# Patient Record
Sex: FEMALE | Race: WHITE | NOT HISPANIC OR LATINO | Employment: OTHER | URBAN - METROPOLITAN AREA
[De-identification: names, ages, dates, MRNs, and addresses within clinical notes are randomized per-mention and may not be internally consistent; named-entity substitution may affect disease eponyms.]

---

## 2019-01-22 ENCOUNTER — OFFICE VISIT (OUTPATIENT)
Dept: ENDOCRINOLOGY | Facility: CLINIC | Age: 64
End: 2019-01-22

## 2019-01-22 VITALS
BODY MASS INDEX: 35.52 KG/M2 | HEART RATE: 78 BPM | DIASTOLIC BLOOD PRESSURE: 88 MMHG | SYSTOLIC BLOOD PRESSURE: 112 MMHG | OXYGEN SATURATION: 99 % | WEIGHT: 194.2 LBS

## 2019-01-22 DIAGNOSIS — E11.42 DIABETIC PERIPHERAL NEUROPATHY ASSOCIATED WITH TYPE 2 DIABETES MELLITUS (HCC): ICD-10-CM

## 2019-01-22 DIAGNOSIS — E11.65 UNCONTROLLED TYPE 2 DIABETES MELLITUS WITH HYPERGLYCEMIA (HCC): Primary | ICD-10-CM

## 2019-01-22 LAB
GLUCOSE BLDC GLUCOMTR-MCNC: 284 MG/DL (ref 70–130)
HBA1C MFR BLD: 11.2 %

## 2019-01-22 PROCEDURE — 82570 ASSAY OF URINE CREATININE: CPT | Performed by: PHYSICIAN ASSISTANT

## 2019-01-22 PROCEDURE — 83036 HEMOGLOBIN GLYCOSYLATED A1C: CPT | Performed by: PHYSICIAN ASSISTANT

## 2019-01-22 PROCEDURE — 99204 OFFICE O/P NEW MOD 45 MIN: CPT | Performed by: PHYSICIAN ASSISTANT

## 2019-01-22 PROCEDURE — 82043 UR ALBUMIN QUANTITATIVE: CPT | Performed by: PHYSICIAN ASSISTANT

## 2019-01-22 PROCEDURE — 82947 ASSAY GLUCOSE BLOOD QUANT: CPT | Performed by: PHYSICIAN ASSISTANT

## 2019-01-22 RX ORDER — DULOXETIN HYDROCHLORIDE 60 MG/1
60 CAPSULE, DELAYED RELEASE ORAL DAILY
Refills: 0 | COMMUNITY
Start: 2019-01-14

## 2019-01-22 RX ORDER — INSULIN GLARGINE 100 [IU]/ML
INJECTION, SOLUTION SUBCUTANEOUS
Qty: 3 PEN | Refills: 6 | Status: SHIPPED | OUTPATIENT
Start: 2019-01-22 | End: 2019-03-12 | Stop reason: SDUPTHER

## 2019-01-22 RX ORDER — LORATADINE 10 MG/1
TABLET ORAL
Refills: 0 | COMMUNITY
Start: 2018-12-26

## 2019-01-22 RX ORDER — FLUTICASONE PROPIONATE 50 MCG
SPRAY, SUSPENSION (ML) NASAL
Refills: 0 | COMMUNITY
Start: 2018-12-23 | End: 2019-11-18

## 2019-01-22 RX ORDER — FUROSEMIDE 40 MG/1
TABLET ORAL
Refills: 0 | COMMUNITY
Start: 2018-12-23 | End: 2022-03-21 | Stop reason: HOSPADM

## 2019-01-22 RX ORDER — METFORMIN HYDROCHLORIDE 500 MG/1
TABLET, EXTENDED RELEASE ORAL
COMMUNITY
Start: 2014-07-29 | End: 2019-03-12

## 2019-01-22 RX ORDER — CHOLECALCIFEROL (VITAMIN D3) 125 MCG
5000 CAPSULE ORAL DAILY
Refills: 0 | COMMUNITY
Start: 2019-01-10

## 2019-01-22 RX ORDER — INSULIN LISPRO 100 [IU]/ML
INJECTION, SUSPENSION SUBCUTANEOUS
Refills: 0 | COMMUNITY
Start: 2019-01-10 | End: 2019-01-22 | Stop reason: ALTCHOICE

## 2019-01-22 RX ORDER — DOCUSATE SODIUM 100 MG/1
100 CAPSULE, LIQUID FILLED ORAL 2 TIMES DAILY
Refills: 0 | COMMUNITY
Start: 2019-01-10 | End: 2019-11-18

## 2019-01-22 RX ORDER — APIXABAN 5 MG/1
TABLET, FILM COATED ORAL
Refills: 0 | COMMUNITY
Start: 2019-01-11 | End: 2019-06-25 | Stop reason: HOSPADM

## 2019-01-22 RX ORDER — CEPHALEXIN 500 MG/1
CAPSULE ORAL
Refills: 0 | COMMUNITY
Start: 2019-01-10 | End: 2019-05-15

## 2019-01-22 RX ORDER — ACETAMINOPHEN/DIPHENHYDRAMINE 500MG-25MG
81 TABLET ORAL DAILY
Refills: 0 | COMMUNITY
Start: 2019-01-10 | End: 2022-03-21 | Stop reason: HOSPADM

## 2019-01-22 RX ORDER — FENOFIBRATE 145 MG/1
160 TABLET, COATED ORAL
Refills: 0 | COMMUNITY
Start: 2018-12-26 | End: 2023-02-23

## 2019-01-22 RX ORDER — FLURBIPROFEN SODIUM 0.3 MG/ML
SOLUTION/ DROPS OPHTHALMIC DAILY
Refills: 0 | COMMUNITY
Start: 2018-10-26 | End: 2019-11-18 | Stop reason: SDUPTHER

## 2019-01-22 RX ORDER — MORPHINE SULFATE 30 MG/1
30 TABLET, FILM COATED, EXTENDED RELEASE ORAL 2 TIMES DAILY
Refills: 0 | COMMUNITY
Start: 2018-12-28

## 2019-01-22 RX ORDER — MAGNESIUM OXIDE 400 MG/1
1 TABLET ORAL 2 TIMES DAILY
Refills: 0 | COMMUNITY
Start: 2019-01-10 | End: 2019-11-18

## 2019-01-22 NOTE — PROGRESS NOTES
"Chief Complaint  Establish care for Diabetes Mellitus.    HPI   Martha Harrell is a 63 y.o. female who is here today for evaluation of Diabetes Mellitus type 2. The initial diagnosis of diabetes was made approx 2008.     A1C -  11.2 (1/22/19)  Labs reviewed:  1/10/19- , LDL 67, TSH 1.85, vit D 39, GFR 97, H/H wnl    Diabetic complications: peripheral neuropathy  Eye exam current (within one year): due   Foot care and dental care: discussed, follows with [facility]    Current diabetic medications include:  Metformin ER 500mg 2 tab BID  Humalog 75/25 16U BID - doesn't eat breakfast most of the time, evening insulin taken sometimes before sometimes after dinner  On insulin since beginning 2018    Statin: no. On fenofibrate    Past medications: glipizide (dc when she was started on insulin)    Diabetic Monitoring  - checks glucose BID  Glucose is averaging- per pt reports fasting -300, before dinner 300-400  Hypoglycemia- no  Home blood sugar records: did not bring    Nutrition:     Current diet: in general, an \"unhealthy\" diet, on average, 1-2 meals per day. Dinner is usually only meal, sometimes eats breakfast. Drinks one reg mt dew per day.   Current exercise: none, chronic back/leg pain  Seen RD in past: no    The following portions of the patient's history were reviewed and updated by me as appropriate: allergies, current medications, past family history, past social history, past surgical history and problem list.    Past Medical History:   Diagnosis Date   • Anemia    • Asthma    • Chronic bronchitis (CMS/[facility])    • Depression    • Diabetes mellitus (CMS/[facility])    • Epilepsy (CMS/[facility])    • Fibromyalgia, primary    • GERD (gastroesophageal reflux disease)    • Hyperlipidemia        Medications    Current Outpatient Medications:   •  metFORMIN ER (GLUCOPHAGE-XR) 500 MG 24 hr tablet, Take  by mouth., Disp: , Rfl:   •  B-D UF III MINI PEN NEEDLES 31G X 5 MM misc, Daily., Disp: , Rfl: 0  •  " cephalexin (KEFLEX) 500 MG capsule, take 1 capsule by mouth three times a day for 10 days, Disp: , Rfl: 0  •  dapagliflozin (FARXIGA) 5 MG tablet tablet, Take 1 tablet by mouth Daily., Disp: 30 tablet, Rfl: 0  •  dapagliflozin (FARXIGA) 5 MG tablet tablet, Take 1 tablet by mouth Daily., Disp: 30 tablet, Rfl: 6  •  DULoxetine (CYMBALTA) 60 MG capsule, Take 60 mg by mouth Daily., Disp: , Rfl: 0  •  ELIQUIS 5 MG tablet tablet, , Disp: , Rfl: 0  •  fenofibrate (TRICOR) 145 MG tablet, take 1 tablet by mouth every evening at bedtime, Disp: , Rfl: 0  •  fluticasone (FLONASE) 50 MCG/ACT nasal spray, , Disp: , Rfl: 0  •  furosemide (LASIX) 40 MG tablet, , Disp: , Rfl: 0  •  Insulin Glargine (BASAGLAR KWIKPEN) 100 UNIT/ML injection pen, Start with 20u qhs, increase up to 30u, Disp: 3 pen, Rfl: 6  •  Insulin Lispro (HUMALOG) 100 UNIT/ML solution pen-injector, Take 10u before lunch and 15u before dinner, Disp: 3 pen, Rfl: 6  •  loratadine (CLARITIN) 10 MG tablet, take 1 tablet by mouth once daily if needed, Disp: , Rfl: 0  •  magnesium oxide (MAG-OX) 400 MG tablet, Take 1 tablet by mouth 2 (Two) Times a Day., Disp: , Rfl: 0  •  metFORMIN (GLUCOPHAGE) 1000 MG tablet, Take 1,000 mg by mouth 2 (Two) Times a Day., Disp: , Rfl: 0  •  Morphine (MS CONTIN) 30 MG 12 hr tablet, Take 30 mg by mouth 2 (Two) Times a Day., Disp: , Rfl: 0  •  ONE TOUCH ULTRA TEST test strip, , Disp: , Rfl: 0  •  ONETOUCH DELICA LANCETS FINE misc, , Disp: , Rfl: 1  •  RA ASPIRIN EC 81 MG EC tablet, Take 81 mg by mouth Daily., Disp: , Rfl: 0  •  RA COL-RITE 100 MG capsule, Take 100 mg by mouth 2 (Two) Times a Day., Disp: , Rfl: 0  •  RA VITAMIN D-3 5000 units capsule, Take 5,000 Units by mouth Daily., Disp: , Rfl: 0    Review of Systems  Review of Systems   Constitutional: Positive for fatigue and unexpected weight change (wt gain). Negative for chills and fever.        Feeling poorly   HENT: Positive for ear pain and rhinorrhea. Negative for hearing loss,  nosebleeds and sore throat.    Eyes: Positive for pain and itching. Negative for discharge, redness and visual disturbance.        Eyesight problems   Respiratory: Positive for cough, shortness of breath and wheezing.    Cardiovascular: Positive for leg swelling. Negative for chest pain and palpitations.   Gastrointestinal: Negative for abdominal pain, blood in stool, constipation and diarrhea.   Endocrine: Positive for heat intolerance and polydipsia. Negative for cold intolerance.   Genitourinary: Negative for dysuria, frequency, hematuria, pelvic pain, vaginal bleeding and vaginal discharge.   Musculoskeletal: Positive for arthralgias, joint swelling and myalgias. Negative for gait problem.   Skin: Negative for rash.   Allergic/Immunologic: Negative.    Neurological: Positive for dizziness, weakness, numbness and headaches. Negative for syncope.   Hematological: Negative for adenopathy. Does not bruise/bleed easily.   Psychiatric/Behavioral: Positive for sleep disturbance. Negative for suicidal ideas. The patient is not nervous/anxious.         Depressed, sadness        Physical Exam    /88   Pulse 78   Wt 88.1 kg (194 lb 3.2 oz)   SpO2 99%   Breastfeeding? No   BMI 35.52 kg/m² Body mass index is 35.52 kg/m².  Physical Exam   Constitutional: She is oriented to person, place, and time. She appears well-developed. No distress.   HENT:   Head: Normocephalic.   Right Ear: External ear normal.   Left Ear: External ear normal.   Mouth/Throat: No oropharyngeal exudate.   Eyes: Conjunctivae and lids are normal. Right eye exhibits no discharge. Left eye exhibits no discharge. Right pupil is reactive. Left pupil is reactive.   Neck: No JVD present. No tracheal deviation present. No thyroid mass and no thyromegaly present.   Cardiovascular: Normal rate, regular rhythm, normal heart sounds and intact distal pulses.   No murmur heard.  Pulmonary/Chest: Effort normal and breath sounds normal. No respiratory  distress. She has no wheezes.   Abdominal: Soft. Bowel sounds are normal. There is no tenderness.   Musculoskeletal: She exhibits no edema or tenderness.    Martha had a diabetic foot exam performed today.   During the foot exam she had a monofilament test performed.    Neurological Sensory Findings -  Altered sharp/dull right ankle/foot discrimination and altered sharp/dull left ankle/foot discrimination.  Vascular Status -  Her right foot exhibits normal foot vasculature  and no edema. Her left foot exhibits normal foot vasculature  and no edema.  Skin Integrity  -  Her right foot skin is intact. She has callous right foot.  She has no right foot onychomycosis and no right foot ulcer.Her left foot skin is intact.She has callous left foot. She has no left foot onychomycosis and no left foot ulcer..  Lymphadenopathy:     She has no cervical adenopathy.   Neurological: She is alert and oriented to person, place, and time.   Skin: Skin is warm, dry and intact. No rash noted. She is not diaphoretic. No erythema.   Venous stasis BLE   Psychiatric: She has a normal mood and affect. Her speech is normal and behavior is normal. Thought content normal.       Labs and Imaging   No results found for: HGBA1C  Office Visit on 01/22/2019   Component Date Value Ref Range Status   • Glucose 01/22/2019 284* 70 - 130 mg/dL Final     No images are attached to the encounter or orders placed in the encounter.  No Images in the past 120 days found..    Assessment / Plan   Martha was seen today for establish care and diabetes.    Diagnoses and all orders for this visit:    Uncontrolled type 2 diabetes mellitus with hyperglycemia (CMS/[facility])  -     Microalbumin / Creatinine Urine Ratio - Urine, Clean Catch  -     POC Glycosylated Hemoglobin (Hb A1C)  -     POCT Glucose  -     dapagliflozin (FARXIGA) 5 MG tablet tablet; Take 1 tablet by mouth Daily.  -     Insulin Lispro (HUMALOG) 100 UNIT/ML solution pen-injector; Take 10u before lunch  "and 15u before dinner  -     Insulin Glargine (BASAGLAR KWIKPEN) 100 UNIT/ML injection pen; Start with 20u qhs, increase up to 30u  -     dapagliflozin (FARXIGA) 5 MG tablet tablet; Take 1 tablet by mouth Daily.    Diabetic peripheral neuropathy associated with type 2 diabetes mellitus (CMS/HCC)        Diabetes Mellitus 2 is under poor control.  -A1c 11.2  -emphasis on dc reg mt dew. Diet reviewed and literature provided  -most days she only eats once a day, therefore premixed insulin not best option  -dc humalog 72/25  -start basaglar 20u qhs, to increase to 25u in a week  -start admelog 10u before lunch (if she eats lunch), 15u before dinner  -start farxiga 5mg qd. Discussed importance of adequate hydration and good hygiene with this medication. Samples and 30-day free coupon provided.  -asked pt to check bs before taking insulin and bring meter to f/u for insulin adjustments  -written instructions provided and reviewed pt  -she reported she was seeing her \"sugar doctor\" at  ([facility]) tomorrow. Does not need 2 endo providers. Asked her to contact pcp for clarification on this.         1.  Diet: 3-4 carb servings per meal for females, 4-5 carb servings per meal for males  Spread carb intake throughout the day  Increase lean protein and vegetable intake  Avoid sugary drinks and processed carbs including crackers, cookies, cakes  2.  Exercise: Recommend at least 30 minutes of exercise daily, at least 5 days per week. Increase exercise gradually.   3.  Blood Glucose Goal: Blood glucose goal <150 fasting, <180 2 hr postprandial  4.  Microalbumin due  5.  Education performed during this visit: long term diabetic complications discussed. , annual eye examinations at Ophthalmology discussed, dental hygiene discussed  and foot care reviewed., home glucose monitoring emphasized, all medications, side effects and compliance discussed carefully and Hypoglycemia management and prevention reviewed. Reviewed " ‘ABCs’ of diabetes management (respective goals in parentheses):  A1C (<7), blood pressure (<130/80), and cholesterol (LDL <100, if CVD <70).    Peripheral neuropathy  -controlled  -cont f/u with podiatry    There are no Patient Instructions on file for this visit.    Follow up: Return in about 6 weeks (around 3/5/2019).    Discussed the nature of the disease including, risks, complications, implications, management, safe and proper use of medications. Encouraged therapeutic lifestyle changes including low calorie diet with plenty of fruits and vegetables, daily exercise, medication compliance, and keeping scheduled follow up appointments with me and any other providers. Encouraged patient to have appointment for complete physical, fasting labs, appropriate screenings, and immunizations on an annual basis.    30 min  of 45 min face-to-face visit time spent for coordination of care and counselling regarding identified problems as outlined in the objective, assessment and discussion portions of the documentation.    Jaime Gillespie PA-C

## 2019-01-23 LAB
CREAT 24H UR-MCNC: 67.9 MG/DL
MICROALBUMIN UR-MCNC: 62.9 UG/ML
MICROALBUMIN/CREAT UR: 92.6 MG/G CREAT (ref 0–30)

## 2019-03-12 ENCOUNTER — OFFICE VISIT (OUTPATIENT)
Dept: ENDOCRINOLOGY | Facility: CLINIC | Age: 64
End: 2019-03-12

## 2019-03-12 VITALS
OXYGEN SATURATION: 94 % | HEART RATE: 76 BPM | BODY MASS INDEX: 33.11 KG/M2 | DIASTOLIC BLOOD PRESSURE: 78 MMHG | WEIGHT: 181 LBS | SYSTOLIC BLOOD PRESSURE: 122 MMHG

## 2019-03-12 DIAGNOSIS — E11.65 UNCONTROLLED TYPE 2 DIABETES MELLITUS WITH HYPERGLYCEMIA (HCC): Primary | ICD-10-CM

## 2019-03-12 DIAGNOSIS — E11.42 DIABETIC PERIPHERAL NEUROPATHY ASSOCIATED WITH TYPE 2 DIABETES MELLITUS (HCC): ICD-10-CM

## 2019-03-12 LAB — GLUCOSE BLDC GLUCOMTR-MCNC: 338 MG/DL (ref 70–130)

## 2019-03-12 PROCEDURE — 99214 OFFICE O/P EST MOD 30 MIN: CPT | Performed by: PHYSICIAN ASSISTANT

## 2019-03-12 PROCEDURE — 82947 ASSAY GLUCOSE BLOOD QUANT: CPT | Performed by: PHYSICIAN ASSISTANT

## 2019-03-12 RX ORDER — INSULIN GLARGINE 100 [IU]/ML
INJECTION, SOLUTION SUBCUTANEOUS
Qty: 5 PEN | Refills: 6 | Status: SHIPPED | OUTPATIENT
Start: 2019-03-12 | End: 2019-05-15 | Stop reason: ALTCHOICE

## 2019-03-12 RX ORDER — METFORMIN HYDROCHLORIDE 500 MG/1
TABLET, EXTENDED RELEASE ORAL
Qty: 120 TABLET | Refills: 6 | Status: SHIPPED | OUTPATIENT
Start: 2019-03-12 | End: 2019-11-18 | Stop reason: SDUPTHER

## 2019-03-12 NOTE — PROGRESS NOTES
"Chief Complaint  F/u for Diabetes Mellitus.    HPI   Martha Harrell is a 63 y.o. female who is here today for f/u of Diabetes Mellitus type 2. The initial diagnosis of diabetes was made approx 2008.     Stopped drinking sodas.    A1C - 11.2 (1/22/19)  Labs reviewed:  1/10/19- , LDL 67, TSH 1.85, vit D 39, GFR 97, H/H wnl    Diabetic complications: peripheral neuropathy  Eye exam current (within one year): due   Foot care and dental care: discussed, follows with [facility]    Current diabetic medications include:  Metformin ER 500mg 2 tab BID  Jardiance 10mg daily  Basaglar 30U QHS  Admelog 10U before lunch and 15U before dinner  On insulin since beginning 2018    Statin: no. On fenofibrate    Past medications: glipizide (dc when she was started on insulin), humalog 75/25    Diabetic Monitoring  -   Meter downloaded and reviewed- liminted BS readings available for review, mostly 300-500    Nutrition:     Current diet: in general, an \"unhealthy\" diet, on average, 1-2 meals per day. Dinner is usually only meal, sometimes eats breakfast. Drinks one reg mt dew per day.   Current exercise: none, chronic back/leg pain  Seen RD in past: no    The following portions of the patient's history were reviewed and updated by me as appropriate: allergies, current medications, past family history, past social history, past surgical history and problem list.    Past Medical History:   Diagnosis Date   • Anemia    • Asthma    • Chronic bronchitis (CMS/[facility])    • Depression    • Diabetes mellitus (CMS/[facility])    • Epilepsy (CMS/[facility])    • Fibromyalgia, primary    • GERD (gastroesophageal reflux disease)    • Hyperlipidemia        Medications    Current Outpatient Medications:   •  B-D UF III MINI PEN NEEDLES 31G X 5 MM misc, Daily., Disp: , Rfl: 0  •  cephalexin (KEFLEX) 500 MG capsule, take 1 capsule by mouth three times a day for 10 days, Disp: , Rfl: 0  •  DULoxetine (CYMBALTA) 60 MG capsule, Take 60 mg by mouth " Daily., Disp: , Rfl: 0  •  ELIQUIS 5 MG tablet tablet, , Disp: , Rfl: 0  •  Empagliflozin (JARDIANCE) 10 MG tablet, Take 10 mg by mouth Daily., Disp: 30 tablet, Rfl: 6  •  fenofibrate (TRICOR) 145 MG tablet, take 1 tablet by mouth every evening at bedtime, Disp: , Rfl: 0  •  fluticasone (FLONASE) 50 MCG/ACT nasal spray, , Disp: , Rfl: 0  •  furosemide (LASIX) 40 MG tablet, , Disp: , Rfl: 0  •  Insulin Glargine (BASAGLAR KWIKPEN) 100 UNIT/ML injection pen, Inject 40u sc qhs, increase to 50u after a week, Disp: 5 pen, Rfl: 6  •  Insulin Lispro (HUMALOG) 100 UNIT/ML solution pen-injector, Take 15u before lunch and 20u before dinner, Disp: 4 pen, Rfl: 6  •  loratadine (CLARITIN) 10 MG tablet, take 1 tablet by mouth once daily if needed, Disp: , Rfl: 0  •  magnesium oxide (MAG-OX) 400 MG tablet, Take 1 tablet by mouth 2 (Two) Times a Day., Disp: , Rfl: 0  •  metFORMIN ER (GLUCOPHAGE-XR) 500 MG 24 hr tablet, Tab 2 tab po bid, Disp: 120 tablet, Rfl: 6  •  Morphine (MS CONTIN) 30 MG 12 hr tablet, Take 30 mg by mouth 2 (Two) Times a Day., Disp: , Rfl: 0  •  ONE TOUCH ULTRA TEST test strip, , Disp: , Rfl: 0  •  ONETOUCH DELICA LANCETS FINE misc, , Disp: , Rfl: 1  •  RA ASPIRIN EC 81 MG EC tablet, Take 81 mg by mouth Daily., Disp: , Rfl: 0  •  RA COL-RITE 100 MG capsule, Take 100 mg by mouth 2 (Two) Times a Day., Disp: , Rfl: 0  •  RA VITAMIN D-3 5000 units capsule, Take 5,000 Units by mouth Daily., Disp: , Rfl: 0    Review of Systems  Review of Systems   Constitutional: Positive for fatigue and unexpected weight change (wt gain). Negative for chills and fever.        Feeling poorly   HENT: Positive for ear pain and rhinorrhea. Negative for hearing loss, nosebleeds and sore throat.    Eyes: Positive for pain and itching. Negative for discharge, redness and visual disturbance.        Eyesight problems   Respiratory: Positive for cough, shortness of breath and wheezing.    Cardiovascular: Positive for leg swelling. Negative for  chest pain and palpitations.   Gastrointestinal: Negative for abdominal pain, blood in stool, constipation and diarrhea.   Endocrine: Positive for heat intolerance and polydipsia. Negative for cold intolerance.   Genitourinary: Negative for dysuria, frequency, hematuria, pelvic pain, vaginal bleeding and vaginal discharge.   Musculoskeletal: Positive for arthralgias, joint swelling and myalgias. Negative for gait problem.   Skin: Negative for rash.   Allergic/Immunologic: Negative.    Neurological: Positive for dizziness, weakness, numbness and headaches. Negative for syncope.   Hematological: Negative for adenopathy. Does not bruise/bleed easily.   Psychiatric/Behavioral: Positive for sleep disturbance. Negative for suicidal ideas. The patient is not nervous/anxious.         Depressed, sadness        Physical Exam    /78   Pulse 76   Wt 82.1 kg (181 lb)   SpO2 94%   BMI 33.11 kg/m² Body mass index is 33.11 kg/m².  Physical Exam   Constitutional: She is oriented to person, place, and time. She appears well-developed. No distress.   HENT:   Head: Normocephalic.   Right Ear: External ear normal.   Left Ear: External ear normal.   Mouth/Throat: No oropharyngeal exudate.   Eyes: Conjunctivae and lids are normal. Right eye exhibits no discharge. Left eye exhibits no discharge. Right pupil is reactive. Left pupil is reactive.   Neck: No JVD present. No tracheal deviation present. No thyroid mass and no thyromegaly present.   Cardiovascular: Normal rate, regular rhythm, normal heart sounds and intact distal pulses.   No murmur heard.  Pulmonary/Chest: Effort normal and breath sounds normal. No respiratory distress. She has no wheezes.   Abdominal: Soft. Bowel sounds are normal. There is no tenderness.   Musculoskeletal: She exhibits no edema or tenderness.   Lymphadenopathy:     She has no cervical adenopathy.   Neurological: She is alert and oriented to person, place, and time.   Skin: Skin is warm, dry and  intact. No rash noted. She is not diaphoretic. No erythema.   Venous stasis BLE   Psychiatric: She has a normal mood and affect. Her speech is normal and behavior is normal. Thought content normal.       Labs and Imaging   Lab Results   Component Value Date    HGBA1C 11.2 01/22/2019     Office Visit on 03/12/2019   Component Date Value Ref Range Status   • Glucose 03/12/2019 338* 70 - 130 mg/dL Final     No images are attached to the encounter or orders placed in the encounter.  No Images in the past 120 days found..    Assessment / Plan   Martha was seen today for follow-up.    Diagnoses and all orders for this visit:    Uncontrolled type 2 diabetes mellitus with hyperglycemia (CMS/HCC)  -     POC Glucose Fingerstick  -     Insulin Lispro (HUMALOG) 100 UNIT/ML solution pen-injector; Take 15u before lunch and 20u before dinner  -     Insulin Glargine (BASAGLAR KWIKPEN) 100 UNIT/ML injection pen; Inject 40u sc qhs, increase to 50u after a week  -     metFORMIN ER (GLUCOPHAGE-XR) 500 MG 24 hr tablet; Tab 2 tab po bid    Diabetic peripheral neuropathy associated with type 2 diabetes mellitus (CMS/HCC)        Diabetes Mellitus 2 is under poor control.  -A1c 11.2 1/22/19  -meter downloaded and reviewed- limited readings, but all 300-500  -she has eliminated sodas from diet  -increase basaglar to 40u qhs, to increase to 50u in a week  -increase admelog to 15u before lunch (if she eats lunch), to 20u before dinner  -continue jardiance 10mg daily  - continue metformin 500mg 2 tab bid  -written instructions provided and reviewed with pt  -check BS more often and bring meter to f/u        1.  Diet: 3-4 carb servings per meal for females, 4-5 carb servings per meal for males  Spread carb intake throughout the day  Increase lean protein and vegetable intake  Avoid sugary drinks and processed carbs including crackers, cookies, cakes  2.  Exercise: Recommend at least 30 minutes of exercise daily, at least 5 days per week.  Increase exercise gradually.   3.  Blood Glucose Goal: Blood glucose goal <150 fasting, <180 2 hr postprandial  4.  Microalbumin - repeat next visit  5.  Education performed during this visit: long term diabetic complications discussed. , annual eye examinations at Ophthalmology discussed, dental hygiene discussed  and foot care reviewed., home glucose monitoring emphasized, all medications, side effects and compliance discussed carefully and Hypoglycemia management and prevention reviewed. Reviewed ‘ABCs’ of diabetes management (respective goals in parentheses):  A1C (<7), blood pressure (<130/80), and cholesterol (LDL <100, if CVD <70).    Peripheral neuropathy  -controlled  -cont f/u with podiatry    There are no Patient Instructions on file for this visit.    Follow up: Return in about 2 months (around 5/12/2019).    Discussed the nature of the disease including, risks, complications, implications, management, safe and proper use of medications. Encouraged therapeutic lifestyle changes including low calorie diet with plenty of fruits and vegetables, daily exercise, medication compliance, and keeping scheduled follow up appointments with me and any other providers. Encouraged patient to have appointment for complete physical, fasting labs, appropriate screenings, and immunizations on an annual basis.        Jaime Gillespie PA-C

## 2019-05-15 ENCOUNTER — OFFICE VISIT (OUTPATIENT)
Dept: ENDOCRINOLOGY | Facility: CLINIC | Age: 64
End: 2019-05-15

## 2019-05-15 VITALS
HEART RATE: 75 BPM | OXYGEN SATURATION: 96 % | WEIGHT: 181 LBS | DIASTOLIC BLOOD PRESSURE: 72 MMHG | BODY MASS INDEX: 33.11 KG/M2 | SYSTOLIC BLOOD PRESSURE: 120 MMHG

## 2019-05-15 DIAGNOSIS — R80.9 MICROALBUMINURIA DUE TO TYPE 2 DIABETES MELLITUS (HCC): ICD-10-CM

## 2019-05-15 DIAGNOSIS — E11.42 DIABETIC PERIPHERAL NEUROPATHY ASSOCIATED WITH TYPE 2 DIABETES MELLITUS (HCC): ICD-10-CM

## 2019-05-15 DIAGNOSIS — E11.65 UNCONTROLLED TYPE 2 DIABETES MELLITUS WITH HYPERGLYCEMIA (HCC): Primary | ICD-10-CM

## 2019-05-15 DIAGNOSIS — E11.29 MICROALBUMINURIA DUE TO TYPE 2 DIABETES MELLITUS (HCC): ICD-10-CM

## 2019-05-15 LAB
GLUCOSE BLDC GLUCOMTR-MCNC: 398 MG/DL (ref 70–130)
HBA1C MFR BLD: 12.1 %

## 2019-05-15 PROCEDURE — 99214 OFFICE O/P EST MOD 30 MIN: CPT | Performed by: PHYSICIAN ASSISTANT

## 2019-05-15 PROCEDURE — 83036 HEMOGLOBIN GLYCOSYLATED A1C: CPT | Performed by: PHYSICIAN ASSISTANT

## 2019-05-15 PROCEDURE — 82947 ASSAY GLUCOSE BLOOD QUANT: CPT | Performed by: PHYSICIAN ASSISTANT

## 2019-05-15 RX ORDER — L.ACIDOPH/B.ANIMALIS/B.LONGUM 15B CELL
CAPSULE ORAL
Refills: 0 | COMMUNITY
Start: 2019-04-12 | End: 2019-11-18

## 2019-05-15 RX ORDER — RANITIDINE 150 MG/1
TABLET ORAL
Refills: 0 | COMMUNITY
Start: 2019-04-22 | End: 2022-03-21 | Stop reason: HOSPADM

## 2019-05-15 RX ORDER — INSULIN LISPRO 100 [IU]/ML
INJECTION, SUSPENSION SUBCUTANEOUS
Refills: 0 | COMMUNITY
Start: 2019-04-12 | End: 2019-05-15

## 2019-05-15 RX ORDER — PHENYTOIN SODIUM 100 MG/1
CAPSULE, EXTENDED RELEASE ORAL
Refills: 0 | COMMUNITY
Start: 2019-04-14

## 2019-05-15 NOTE — PATIENT INSTRUCTIONS
Insulin dosing:  Basal insulin - Increase Lantus to 60 units at bedtime, in a week increase to 70 units and in 2 weeks 80 units.        Meal Insulin-  Take 20 units of humalog before lunch and 30 units before dinner. Add extra humalog for high sugar based on chart below         Correction insulin (add to meal insulin)   0 unit  if  Blood sugar (BS)  less than 150   1 unit   if BS  150 - 199   2 units if  - 249   3 units if  - 299   4 units if  - 349   5 units if -399   6 units if BS >400     Call with blood sugar readings in 4 weeks for further insulin adjustments.

## 2019-05-15 NOTE — PROGRESS NOTES
"Chief Complaint  F/u for Diabetes Mellitus.    HPI   Martha Harrell is a 64 y.o. female who is here today for f/u of Diabetes Mellitus type 2. The initial diagnosis of diabetes was made approx 2008.     bs 398 today.   Admits to eating a lot of fruit    A1C - 12.1 (5/15/19), 11.2 (1/22/19)  Labs reviewed:  1/10/19- , LDL 67, TSH 1.85, vit D 39, GFR 97, H/H wnl    Diabetic complications: peripheral neuropathy  Eye exam current (within one year): due   Foot care and dental care: discussed, follows with [facility]    Current diabetic medications include:  Metformin ER 500mg 2 tab BID  Jardiance 10mg daily - tolerating well  Lantus 50U QHS - sometimes forgets to take if she falls asleep early  Humalog 10U before lunch and 15U before dinner  On insulin since beginning 2018    Statin: no. On fenofibrate    Past medications: glipizide (dc when she was started on insulin), humalog 75/25    Diabetic Monitoring  -   No meter or log for review. Reports readings 200-300, down from 300-500s. Denies hypoglycemia    Nutrition:     Current diet: in general, an \"unhealthy\" diet, on average, 1-2 meals per day. Dinner is usually only meal, sometimes eats breakfast. Drinks one reg mt dew per day.   Current exercise: none, chronic back/leg pain  Seen RD in past: no    The following portions of the patient's history were reviewed and updated by me as appropriate: allergies, current medications, past family history, past social history, past surgical history and problem list.    Past Medical History:   Diagnosis Date   • Anemia    • Asthma    • Chronic bronchitis (CMS/HCC)    • Depression    • Diabetes mellitus (CMS/HCC)    • Epilepsy (CMS/HCC)    • Fibromyalgia, primary    • GERD (gastroesophageal reflux disease)    • Hyperlipidemia        Medications    Current Outpatient Medications:   •  B-D UF III MINI PEN NEEDLES 31G X 5 MM misc, Daily., Disp: , Rfl: 0  •  DULoxetine (CYMBALTA) 60 MG capsule, Take 60 mg by " mouth Daily., Disp: , Rfl: 0  •  ELIQUIS 5 MG tablet tablet, , Disp: , Rfl: 0  •  fenofibrate (TRICOR) 145 MG tablet, take 1 tablet by mouth every evening at bedtime, Disp: , Rfl: 0  •  fluticasone (FLONASE) 50 MCG/ACT nasal spray, , Disp: , Rfl: 0  •  furosemide (LASIX) 40 MG tablet, , Disp: , Rfl: 0  •  Insulin Glargine (LANTUS SOLOSTAR) 100 UNIT/ML injection pen, Increase to 60u qhs, to 70u in 1 week then to 80u in 2 weeks, Disp: 8 pen, Rfl: 6  •  loratadine (CLARITIN) 10 MG tablet, take 1 tablet by mouth once daily if needed, Disp: , Rfl: 0  •  magnesium oxide (MAG-OX) 400 MG tablet, Take 1 tablet by mouth 2 (Two) Times a Day., Disp: , Rfl: 0  •  metFORMIN ER (GLUCOPHAGE-XR) 500 MG 24 hr tablet, Tab 2 tab po bid, Disp: 120 tablet, Rfl: 6  •  Morphine (MS CONTIN) 30 MG 12 hr tablet, Take 30 mg by mouth 2 (Two) Times a Day., Disp: , Rfl: 0  •  ONE TOUCH ULTRA TEST test strip, , Disp: , Rfl: 0  •  ONETOUCH DELICA LANCETS FINE misc, , Disp: , Rfl: 1  •  phenytoin (DILANTIN) 100 MG ER capsule, take 2 capsule by mouth twice a day, Disp: , Rfl: 0  •  Probiotic Product (FLORAJEN3) capsule, , Disp: , Rfl: 0  •  RA ASPIRIN EC 81 MG EC tablet, Take 81 mg by mouth Daily., Disp: , Rfl: 0  •  RA COL-RITE 100 MG capsule, Take 100 mg by mouth 2 (Two) Times a Day., Disp: , Rfl: 0  •  RA VITAMIN D-3 5000 units capsule, Take 5,000 Units by mouth Daily., Disp: , Rfl: 0  •  Empagliflozin (JARDIANCE) 25 MG tablet, Take 25 mg by mouth Daily., Disp: 30 tablet, Rfl: 6  •  Insulin Lispro (HUMALOG KWIKPEN) 100 UNIT/ML solution pen-injector, Take 20 units before lunch and 30u before dinner plus correction 1:50 for >150, Disp: 7 pen, Rfl: 6  •  raNITIdine (ZANTAC) 150 MG tablet, , Disp: , Rfl: 0    Review of Systems  Review of Systems   Constitutional: Positive for fatigue and unexpected weight change (wt gain). Negative for chills and fever.        Feeling poorly   HENT: Positive for ear pain and rhinorrhea. Negative for hearing loss,  nosebleeds and sore throat.    Eyes: Positive for pain and itching. Negative for discharge, redness and visual disturbance.        Eyesight problems   Respiratory: Positive for cough, shortness of breath and wheezing.    Cardiovascular: Positive for leg swelling. Negative for chest pain and palpitations.   Gastrointestinal: Negative for abdominal pain, blood in stool, constipation and diarrhea.   Endocrine: Positive for heat intolerance and polydipsia. Negative for cold intolerance.   Genitourinary: Negative for dysuria, frequency, hematuria, pelvic pain, vaginal bleeding and vaginal discharge.   Musculoskeletal: Positive for arthralgias, joint swelling and myalgias. Negative for gait problem.   Skin: Negative for rash.   Allergic/Immunologic: Negative.    Neurological: Positive for dizziness, weakness, numbness and headaches. Negative for syncope.   Hematological: Negative for adenopathy. Does not bruise/bleed easily.   Psychiatric/Behavioral: Positive for sleep disturbance. Negative for suicidal ideas. The patient is not nervous/anxious.         Depressed, sadness        Physical Exam    /72   Pulse 75   Wt 82.1 kg (181 lb)   SpO2 96%   BMI 33.11 kg/m² Body mass index is 33.11 kg/m².  Physical Exam   Constitutional: She is oriented to person, place, and time. She appears well-developed. No distress.   HENT:   Head: Normocephalic.   Right Ear: External ear normal.   Left Ear: External ear normal.   Mouth/Throat: No oropharyngeal exudate.   Eyes: Conjunctivae and lids are normal. Right eye exhibits no discharge. Left eye exhibits no discharge. Right pupil is reactive. Left pupil is reactive.   Neck: No JVD present. No tracheal deviation present. No thyroid mass and no thyromegaly present.   Cardiovascular: Normal rate, regular rhythm, normal heart sounds and intact distal pulses.   No murmur heard.  Pulmonary/Chest: Effort normal and breath sounds normal. No respiratory distress. She has no wheezes.    Abdominal: Soft. Bowel sounds are normal. There is no tenderness.   Musculoskeletal: She exhibits no edema or tenderness.   Lymphadenopathy:     She has no cervical adenopathy.   Neurological: She is alert and oriented to person, place, and time.   Skin: Skin is warm, dry and intact. No rash noted. She is not diaphoretic. No erythema.   Venous stasis BLE   Psychiatric: She has a normal mood and affect. Her speech is normal and behavior is normal. Thought content normal.       Labs and Imaging   Lab Results   Component Value Date    HGBA1C 12.1 05/15/2019    HGBA1C 11.2 01/22/2019     Office Visit on 05/15/2019   Component Date Value Ref Range Status   • Glucose 05/15/2019 398* 70 - 130 mg/dL Final   • Hemoglobin A1C 05/15/2019 12.1  % Final     No images are attached to the encounter or orders placed in the encounter.  No Images in the past 120 days found..    Assessment / Plan   Martha was seen today for follow-up.    Diagnoses and all orders for this visit:    Uncontrolled type 2 diabetes mellitus with hyperglycemia (CMS/[facility]AC, located within [facility])  -     POC Glucose Fingerstick  -     POC Glycosylated Hemoglobin (Hb A1C)  -     Empagliflozin (JARDIANCE) 25 MG tablet; Take 25 mg by mouth Daily.  -     Insulin Glargine (LANTUS SOLOSTAR) 100 UNIT/ML injection pen; Increase to 60u qhs, to 70u in 1 week then to 80u in 2 weeks  -     Insulin Lispro (HUMALOG KWIKPEN) 100 UNIT/ML solution pen-injector; Take 20 units before lunch and 30u before dinner plus correction 1:50 for >150    Diabetic peripheral neuropathy associated with type 2 diabetes mellitus (CMS/[facility], located within [facility])    Microalbuminuria due to type 2 diabetes mellitus (CMS/[facility]AC, located within [facility])        Diabetes Mellitus 2 is under poor control.  -A1c 12.1, up from 11.2 1/22/19  -no meter or log for review but pt reports bs 200-300s without hypoglycemia  -she  -increase basaglar to 60u qhs, to increase to 70u in a week then to 80u in 2 weeks  -increase admelog to 20u before lunch (if she eats lunch), to 30u  before dinner, add correction 1:50 for >150  -continue jardiance 10mg daily  -continue metformin 500mg 2 tab bid  -written instructions provided and reviewed with pt  -bring meter to f/u  -discussed decreasing fruit intake as this is elevating her sugar. Discussed which fruits and better to consume and serving sizes. Literature provided.         1.  Diet: 3-4 carb servings per meal for females, 4-5 carb servings per meal for males  Spread carb intake throughout the day  Increase lean protein and vegetable intake  Avoid sugary drinks and processed carbs including crackers, cookies, cakes  2.  Exercise: Recommend at least 30 minutes of exercise daily, at least 5 days per week. Increase exercise gradually.   3.  Blood Glucose Goal: Blood glucose goal <150 fasting, <180 2 hr postprandial  4.  Microalbumin - repeat next visit  5.  Education performed during this visit: long term diabetic complications discussed. , annual eye examinations at [facility] discussed, dental hygiene discussed  and foot care reviewed., home glucose monitoring emphasized, all medications, side effects and compliance discussed carefully and Hypoglycemia management and prevention reviewed. Reviewed ‘ABCs’ of diabetes management (respective goals in parentheses):  A1C (<7), blood pressure (<130/80), and cholesterol (LDL <100, if CVD <70).    Peripheral neuropathy  -controlled  -cont f/u with podiatry    Microalbuminuria   -repeat next visit    Patient Instructions     Insulin dosing:  Basal insulin - Increase Lantus to 60 units at bedtime, in a week increase to 70 units and in 2 weeks 80 units.        Meal Insulin-  Take 20 units of humalog before lunch and 30 units before dinner. Add extra humalog for high sugar based on chart below         Correction insulin (add to meal insulin)   0 unit  if  Blood sugar (BS)  less than 150   1 unit   if BS  150 - 199   2 units if  - 249   3 units if  - 299   4 units if  - 349   5 units if  -399   6 units if BS >400     Call with blood sugar readings in 4 weeks for further insulin adjustments.        Follow up: Return in about 3 months (around 8/15/2019).    Discussed the nature of the disease including, risks, complications, implications, management, safe and proper use of medications. Encouraged therapeutic lifestyle changes including low calorie diet with plenty of fruits and vegetables, daily exercise, medication compliance, and keeping scheduled follow up appointments with me and any other providers. Encouraged patient to have appointment for complete physical, fasting labs, appropriate screenings, and immunizations on an annual basis.        Jaime Gillespie PA-C

## 2019-06-21 ENCOUNTER — APPOINTMENT (OUTPATIENT)
Dept: CARDIOLOGY | Facility: HOSPITAL | Age: 64
End: 2019-06-21

## 2019-06-21 ENCOUNTER — HOSPITAL ENCOUNTER (INPATIENT)
Facility: HOSPITAL | Age: 64
LOS: 3 days | Discharge: HOME HEALTH | End: 2019-06-25
Attending: EMERGENCY MEDICINE | Admitting: INTERNAL MEDICINE

## 2019-06-21 DIAGNOSIS — E11.42 DIABETIC PERIPHERAL NEUROPATHY ASSOCIATED WITH TYPE 2 DIABETES MELLITUS (HCC): ICD-10-CM

## 2019-06-21 DIAGNOSIS — Z78.9 FAILURE OF OUTPATIENT TREATMENT: ICD-10-CM

## 2019-06-21 DIAGNOSIS — I82.402 LEG DVT (DEEP VENOUS THROMBOEMBOLISM), ACUTE, LEFT (HCC): Primary | ICD-10-CM

## 2019-06-21 DIAGNOSIS — L03.116 CELLULITIS OF LEFT LOWER EXTREMITY: ICD-10-CM

## 2019-06-21 DIAGNOSIS — I87.2 STASIS DERMATITIS OF BOTH LEGS: ICD-10-CM

## 2019-06-21 DIAGNOSIS — E11.69 DIABETES MELLITUS TYPE 2 IN OBESE (HCC): ICD-10-CM

## 2019-06-21 DIAGNOSIS — E11.65 UNCONTROLLED TYPE 2 DIABETES MELLITUS WITH HYPERGLYCEMIA (HCC): ICD-10-CM

## 2019-06-21 DIAGNOSIS — E66.9 DIABETES MELLITUS TYPE 2 IN OBESE (HCC): ICD-10-CM

## 2019-06-21 PROBLEM — F32.A DEPRESSION: Chronic | Status: ACTIVE | Noted: 2019-06-21

## 2019-06-21 PROBLEM — E78.5 HYPERLIPIDEMIA: Chronic | Status: ACTIVE | Noted: 2019-06-21

## 2019-06-21 PROBLEM — M79.7 FIBROMYALGIA, PRIMARY: Chronic | Status: ACTIVE | Noted: 2019-06-21

## 2019-06-21 PROBLEM — G40.909 EPILEPSY: Chronic | Status: ACTIVE | Noted: 2019-06-21

## 2019-06-21 PROBLEM — I48.0 PAROXYSMAL ATRIAL FIBRILLATION (HCC): Chronic | Status: ACTIVE | Noted: 2019-06-21

## 2019-06-21 PROBLEM — K21.9 GERD (GASTROESOPHAGEAL REFLUX DISEASE): Chronic | Status: ACTIVE | Noted: 2019-06-21

## 2019-06-21 LAB
ALBUMIN SERPL-MCNC: 3.3 G/DL (ref 3.5–5.2)
ALBUMIN/GLOB SERPL: 0.9 G/DL
ALP SERPL-CCNC: 86 U/L (ref 39–117)
ALT SERPL W P-5'-P-CCNC: 9 U/L (ref 1–33)
ANION GAP SERPL CALCULATED.3IONS-SCNC: 14 MMOL/L
APTT PPP: 47.3 SECONDS (ref 85–120)
AST SERPL-CCNC: 12 U/L (ref 1–32)
BASOPHILS # BLD AUTO: 0.02 10*3/MM3 (ref 0–0.2)
BASOPHILS NFR BLD AUTO: 0.3 % (ref 0–1.5)
BILIRUB SERPL-MCNC: 0.3 MG/DL (ref 0.2–1.2)
BILIRUB UR QL STRIP: NEGATIVE
BUN BLD-MCNC: 19 MG/DL (ref 8–23)
BUN/CREAT SERPL: 31.1 (ref 7–25)
CALCIUM SPEC-SCNC: 8.8 MG/DL (ref 8.6–10.5)
CHLORIDE SERPL-SCNC: 99 MMOL/L (ref 98–107)
CLARITY UR: CLEAR
CO2 SERPL-SCNC: 27 MMOL/L (ref 22–29)
COLOR UR: YELLOW
CREAT BLD-MCNC: 0.61 MG/DL (ref 0.57–1)
D-LACTATE SERPL-SCNC: 2.1 MMOL/L (ref 0.5–2)
DEPRECATED RDW RBC AUTO: 54.8 FL (ref 37–54)
EOSINOPHIL # BLD AUTO: 0.41 10*3/MM3 (ref 0–0.4)
EOSINOPHIL NFR BLD AUTO: 5.6 % (ref 0.3–6.2)
ERYTHROCYTE [DISTWIDTH] IN BLOOD BY AUTOMATED COUNT: 16.2 % (ref 12.3–15.4)
GFR SERPL CREATININE-BSD FRML MDRD: 98 ML/MIN/1.73
GLOBULIN UR ELPH-MCNC: 3.6 GM/DL
GLUCOSE BLD-MCNC: 192 MG/DL (ref 65–99)
GLUCOSE BLDC GLUCOMTR-MCNC: 238 MG/DL (ref 70–130)
GLUCOSE UR STRIP-MCNC: ABNORMAL MG/DL
HCT VFR BLD AUTO: 40.3 % (ref 34–46.6)
HGB BLD-MCNC: 12.7 G/DL (ref 12–15.9)
HGB UR QL STRIP.AUTO: NEGATIVE
HOLD SPECIMEN: NORMAL
IMM GRANULOCYTES # BLD AUTO: 0.03 10*3/MM3 (ref 0–0.05)
IMM GRANULOCYTES NFR BLD AUTO: 0.4 % (ref 0–0.5)
INR PPP: 1.15 (ref 0.85–1.16)
KETONES UR QL STRIP: NEGATIVE
LEUKOCYTE ESTERASE UR QL STRIP.AUTO: NEGATIVE
LYMPHOCYTES # BLD AUTO: 1.9 10*3/MM3 (ref 0.7–3.1)
LYMPHOCYTES NFR BLD AUTO: 26 % (ref 19.6–45.3)
MCH RBC QN AUTO: 29.1 PG (ref 26.6–33)
MCHC RBC AUTO-ENTMCNC: 31.5 G/DL (ref 31.5–35.7)
MCV RBC AUTO: 92.4 FL (ref 79–97)
MONOCYTES # BLD AUTO: 0.46 10*3/MM3 (ref 0.1–0.9)
MONOCYTES NFR BLD AUTO: 6.3 % (ref 5–12)
NEUTROPHILS # BLD AUTO: 4.48 10*3/MM3 (ref 1.7–7)
NEUTROPHILS NFR BLD AUTO: 61.4 % (ref 42.7–76)
NITRITE UR QL STRIP: NEGATIVE
NRBC BLD AUTO-RTO: 0 /100 WBC (ref 0–0.2)
PH UR STRIP.AUTO: <=5 [PH] (ref 5–8)
PHENYTOIN SERPL-MCNC: 7.6 MCG/ML (ref 10–20)
PLATELET # BLD AUTO: 267 10*3/MM3 (ref 140–450)
PMV BLD AUTO: 11.1 FL (ref 6–12)
POTASSIUM BLD-SCNC: 3.9 MMOL/L (ref 3.5–5.2)
PROCALCITONIN SERPL-MCNC: 0.08 NG/ML (ref 0.1–0.25)
PROT SERPL-MCNC: 6.9 G/DL (ref 6–8.5)
PROT UR QL STRIP: NEGATIVE
PROTHROMBIN TIME: 14.1 SECONDS (ref 11.2–14.3)
RBC # BLD AUTO: 4.36 10*6/MM3 (ref 3.77–5.28)
SODIUM BLD-SCNC: 140 MMOL/L (ref 136–145)
SP GR UR STRIP: 1.02 (ref 1–1.03)
UFH PPP CHRO-ACNC: 0.1 IU/ML (ref 0.3–0.7)
UROBILINOGEN UR QL STRIP: ABNORMAL
WBC NRBC COR # BLD: 7.3 10*3/MM3 (ref 3.4–10.8)
WHOLE BLOOD HOLD SPECIMEN: NORMAL
WHOLE BLOOD HOLD SPECIMEN: NORMAL

## 2019-06-21 PROCEDURE — 81003 URINALYSIS AUTO W/O SCOPE: CPT | Performed by: EMERGENCY MEDICINE

## 2019-06-21 PROCEDURE — 93971 EXTREMITY STUDY: CPT | Performed by: INTERNAL MEDICINE

## 2019-06-21 PROCEDURE — 93005 ELECTROCARDIOGRAM TRACING: CPT | Performed by: NURSE PRACTITIONER

## 2019-06-21 PROCEDURE — 83735 ASSAY OF MAGNESIUM: CPT | Performed by: NURSE PRACTITIONER

## 2019-06-21 PROCEDURE — 85025 COMPLETE CBC W/AUTO DIFF WBC: CPT | Performed by: EMERGENCY MEDICINE

## 2019-06-21 PROCEDURE — 99285 EMERGENCY DEPT VISIT HI MDM: CPT

## 2019-06-21 PROCEDURE — 85610 PROTHROMBIN TIME: CPT | Performed by: EMERGENCY MEDICINE

## 2019-06-21 PROCEDURE — 80053 COMPREHEN METABOLIC PANEL: CPT | Performed by: EMERGENCY MEDICINE

## 2019-06-21 PROCEDURE — 83605 ASSAY OF LACTIC ACID: CPT | Performed by: EMERGENCY MEDICINE

## 2019-06-21 PROCEDURE — 99223 1ST HOSP IP/OBS HIGH 75: CPT | Performed by: INTERNAL MEDICINE

## 2019-06-21 PROCEDURE — 83036 HEMOGLOBIN GLYCOSYLATED A1C: CPT | Performed by: NURSE PRACTITIONER

## 2019-06-21 PROCEDURE — 80185 ASSAY OF PHENYTOIN TOTAL: CPT | Performed by: NURSE PRACTITIONER

## 2019-06-21 PROCEDURE — 93971 EXTREMITY STUDY: CPT

## 2019-06-21 PROCEDURE — 25010000002 HEPARIN (PORCINE) IN NACL 25000-0.45 UT/250ML-% SOLUTION: Performed by: EMERGENCY MEDICINE

## 2019-06-21 PROCEDURE — 85520 HEPARIN ASSAY: CPT | Performed by: EMERGENCY MEDICINE

## 2019-06-21 PROCEDURE — 84443 ASSAY THYROID STIM HORMONE: CPT | Performed by: NURSE PRACTITIONER

## 2019-06-21 PROCEDURE — 82962 GLUCOSE BLOOD TEST: CPT

## 2019-06-21 PROCEDURE — 84145 PROCALCITONIN (PCT): CPT | Performed by: EMERGENCY MEDICINE

## 2019-06-21 PROCEDURE — 85730 THROMBOPLASTIN TIME PARTIAL: CPT | Performed by: EMERGENCY MEDICINE

## 2019-06-21 PROCEDURE — 25010000002 CEFTRIAXONE PER 250 MG: Performed by: NURSE PRACTITIONER

## 2019-06-21 RX ORDER — ASPIRIN 325 MG
325 TABLET ORAL ONCE
Status: COMPLETED | OUTPATIENT
Start: 2019-06-21 | End: 2019-06-21

## 2019-06-21 RX ORDER — HEPARIN SODIUM 1000 [USP'U]/ML
80 INJECTION, SOLUTION INTRAVENOUS; SUBCUTANEOUS ONCE
Status: DISCONTINUED | OUTPATIENT
Start: 2019-06-21 | End: 2019-06-21

## 2019-06-21 RX ORDER — SODIUM CHLORIDE 9 MG/ML
75 INJECTION, SOLUTION INTRAVENOUS CONTINUOUS
Status: ACTIVE | OUTPATIENT
Start: 2019-06-21 | End: 2019-06-22

## 2019-06-21 RX ORDER — SODIUM CHLORIDE 0.9 % (FLUSH) 0.9 %
10 SYRINGE (ML) INJECTION AS NEEDED
Status: DISCONTINUED | OUTPATIENT
Start: 2019-06-21 | End: 2019-06-25 | Stop reason: HOSPADM

## 2019-06-21 RX ORDER — HEPARIN SODIUM 1000 [USP'U]/ML
40 INJECTION, SOLUTION INTRAVENOUS; SUBCUTANEOUS AS NEEDED
Status: DISCONTINUED | OUTPATIENT
Start: 2019-06-21 | End: 2019-06-21

## 2019-06-21 RX ORDER — SODIUM CHLORIDE 9 MG/ML
100 INJECTION, SOLUTION INTRAVENOUS ONCE
Status: DISCONTINUED | OUTPATIENT
Start: 2019-06-21 | End: 2019-06-21

## 2019-06-21 RX ADMIN — CEFTRIAXONE SODIUM 1 G: 1 INJECTION, POWDER, FOR SOLUTION INTRAMUSCULAR; INTRAVENOUS at 19:34

## 2019-06-21 RX ADMIN — SODIUM CHLORIDE 75 ML/HR: 9 INJECTION, SOLUTION INTRAVENOUS at 21:27

## 2019-06-21 RX ADMIN — ASPIRIN 325 MG ORAL TABLET 325 MG: 325 PILL ORAL at 19:34

## 2019-06-21 RX ADMIN — HEPARIN SODIUM 18 UNITS/KG/HR: 10000 INJECTION, SOLUTION INTRAVENOUS at 20:35

## 2019-06-22 PROBLEM — I82.402 LEG DVT (DEEP VENOUS THROMBOEMBOLISM), ACUTE, LEFT (HCC): Status: ACTIVE | Noted: 2019-06-22

## 2019-06-22 LAB
ANION GAP SERPL CALCULATED.3IONS-SCNC: 12 MMOL/L
BASOPHILS # BLD AUTO: 0.04 10*3/MM3 (ref 0–0.2)
BASOPHILS NFR BLD AUTO: 0.6 % (ref 0–1.5)
BH CV ECHO MEAS - BSA(HAYCOCK): 1.9 M^2
BH CV ECHO MEAS - BSA: 1.8 M^2
BH CV ECHO MEAS - BZI_BMI: 33.1 KILOGRAMS/M^2
BH CV ECHO MEAS - BZI_METRIC_HEIGHT: 157.5 CM
BH CV ECHO MEAS - BZI_METRIC_WEIGHT: 82.1 KG
BH CV LOW VAS LEFT COMMON FEMORAL SPONT: 1
BH CV LOW VAS LEFT DISTAL FEMORAL SPONT: 1
BH CV LOW VAS LEFT GASTRONEMIUS VESSEL: 1
BH CV LOW VAS LEFT GREATER SAPH AK VESSEL: 1
BH CV LOW VAS LEFT GREATER SAPH BK VESSEL: 1
BH CV LOW VAS LEFT LESSER SAPH VESSEL: 1
BH CV LOW VAS LEFT MID FEMORAL SPONT: 1
BH CV LOW VAS LEFT PERONEAL VESSEL: 1
BH CV LOW VAS LEFT POPLITEAL SPONT: 1
BH CV LOW VAS LEFT POSTERIOR TIBIAL VESSEL: 1
BH CV LOW VAS LEFT PROFUNDA FEMORAL SPONT: 1
BH CV LOW VAS LEFT PROXIMAL FEMORAL SPONT: 1
BH CV LOW VAS LEFT SAPHENOFEMORAL JUNCTION SPONT: 1
BH CV LOWER VASCULAR LEFT COMMON FEMORAL AUGMENT: NORMAL
BH CV LOWER VASCULAR LEFT COMMON FEMORAL COMPRESS: NORMAL
BH CV LOWER VASCULAR LEFT COMMON FEMORAL PHASIC: NORMAL
BH CV LOWER VASCULAR LEFT COMMON FEMORAL SPONT: NORMAL
BH CV LOWER VASCULAR LEFT COMMON FEMORAL THROMBUS: NORMAL
BH CV LOWER VASCULAR LEFT DISTAL FEMORAL AUGMENT: NORMAL
BH CV LOWER VASCULAR LEFT DISTAL FEMORAL COMPRESS: NORMAL
BH CV LOWER VASCULAR LEFT DISTAL FEMORAL PHASIC: NORMAL
BH CV LOWER VASCULAR LEFT DISTAL FEMORAL SPONT: NORMAL
BH CV LOWER VASCULAR LEFT GASTRONEMIUS COMPRESS: NORMAL
BH CV LOWER VASCULAR LEFT GASTRONEMIUS THROMBUS: NORMAL
BH CV LOWER VASCULAR LEFT GREATER SAPH AK COMPRESS: NORMAL
BH CV LOWER VASCULAR LEFT GREATER SAPH AK THROMBUS: NORMAL
BH CV LOWER VASCULAR LEFT GREATER SAPH BK COMPRESS: NORMAL
BH CV LOWER VASCULAR LEFT GREATER SAPH BK THROMBUS: NORMAL
BH CV LOWER VASCULAR LEFT LESSER SAPH COMPRESS: NORMAL
BH CV LOWER VASCULAR LEFT LESSER SAPH THROMBUS: NORMAL
BH CV LOWER VASCULAR LEFT MID FEMORAL AUGMENT: NORMAL
BH CV LOWER VASCULAR LEFT MID FEMORAL COMPRESS: NORMAL
BH CV LOWER VASCULAR LEFT MID FEMORAL PHASIC: NORMAL
BH CV LOWER VASCULAR LEFT MID FEMORAL SPONT: NORMAL
BH CV LOWER VASCULAR LEFT MID FEMORAL THROMBUS: NORMAL
BH CV LOWER VASCULAR LEFT PERONEAL AUGMENT: NORMAL
BH CV LOWER VASCULAR LEFT PERONEAL COMPRESS: NORMAL
BH CV LOWER VASCULAR LEFT POPLITEAL AUGMENT: NORMAL
BH CV LOWER VASCULAR LEFT POPLITEAL COMPRESS: NORMAL
BH CV LOWER VASCULAR LEFT POPLITEAL PHASIC: NORMAL
BH CV LOWER VASCULAR LEFT POPLITEAL SPONT: NORMAL
BH CV LOWER VASCULAR LEFT POPLITEAL THROMBUS: NORMAL
BH CV LOWER VASCULAR LEFT POSTERIOR TIBIAL AUGMENT: NORMAL
BH CV LOWER VASCULAR LEFT POSTERIOR TIBIAL COMPRESS: NORMAL
BH CV LOWER VASCULAR LEFT PROFUNDA FEMORAL AUGMENT: NORMAL
BH CV LOWER VASCULAR LEFT PROFUNDA FEMORAL COMPRESS: NORMAL
BH CV LOWER VASCULAR LEFT PROFUNDA FEMORAL PHASIC: NORMAL
BH CV LOWER VASCULAR LEFT PROFUNDA FEMORAL SPONT: NORMAL
BH CV LOWER VASCULAR LEFT PROFUNDA FEMORAL THROMBUS: NORMAL
BH CV LOWER VASCULAR LEFT PROXIMAL FEMORAL AUGMENT: NORMAL
BH CV LOWER VASCULAR LEFT PROXIMAL FEMORAL COMPRESS: NORMAL
BH CV LOWER VASCULAR LEFT PROXIMAL FEMORAL PHASIC: NORMAL
BH CV LOWER VASCULAR LEFT PROXIMAL FEMORAL SPONT: NORMAL
BH CV LOWER VASCULAR LEFT PROXIMAL FEMORAL THROMBUS: NORMAL
BH CV LOWER VASCULAR LEFT SAPHENOFEMORAL JUNCTION AUGMENT: NORMAL
BH CV LOWER VASCULAR LEFT SAPHENOFEMORAL JUNCTION COMPRESS: NORMAL
BH CV LOWER VASCULAR LEFT SAPHENOFEMORAL JUNCTION PHASIC: NORMAL
BH CV LOWER VASCULAR LEFT SAPHENOFEMORAL JUNCTION SPONT: NORMAL
BH CV LOWER VASCULAR LEFT SAPHENOFEMORAL JUNCTION THROMBUS: NORMAL
BH CV LOWER VASCULAR RIGHT COMMON FEMORAL AUGMENT: NORMAL
BH CV LOWER VASCULAR RIGHT COMMON FEMORAL COMPRESS: NORMAL
BH CV LOWER VASCULAR RIGHT COMMON FEMORAL PHASIC: NORMAL
BH CV LOWER VASCULAR RIGHT COMMON FEMORAL SPONT: NORMAL
BH CV LOWER VASCULAR RIGHT SAPHENOFEMORAL JUNCTION AUGMENT: NORMAL
BH CV LOWER VASCULAR RIGHT SAPHENOFEMORAL JUNCTION COMPRESS: NORMAL
BH CV LOWER VASCULAR RIGHT SAPHENOFEMORAL JUNCTION PHASIC: NORMAL
BH CV LOWER VASCULAR RIGHT SAPHENOFEMORAL JUNCTION SPONT: NORMAL
BUN BLD-MCNC: 18 MG/DL (ref 8–23)
BUN/CREAT SERPL: 34.6 (ref 7–25)
CALCIUM SPEC-SCNC: 8.6 MG/DL (ref 8.6–10.5)
CHLORIDE SERPL-SCNC: 103 MMOL/L (ref 98–107)
CO2 SERPL-SCNC: 28 MMOL/L (ref 22–29)
CREAT BLD-MCNC: 0.52 MG/DL (ref 0.57–1)
D-LACTATE SERPL-SCNC: 1.3 MMOL/L (ref 0.5–2)
DEPRECATED RDW RBC AUTO: 55.4 FL (ref 37–54)
EOSINOPHIL # BLD AUTO: 0.49 10*3/MM3 (ref 0–0.4)
EOSINOPHIL NFR BLD AUTO: 7.5 % (ref 0.3–6.2)
ERYTHROCYTE [DISTWIDTH] IN BLOOD BY AUTOMATED COUNT: 16.5 % (ref 12.3–15.4)
GFR SERPL CREATININE-BSD FRML MDRD: 118 ML/MIN/1.73
GLUCOSE BLD-MCNC: 219 MG/DL (ref 65–99)
GLUCOSE BLDC GLUCOMTR-MCNC: 184 MG/DL (ref 70–130)
GLUCOSE BLDC GLUCOMTR-MCNC: 212 MG/DL (ref 70–130)
GLUCOSE BLDC GLUCOMTR-MCNC: 249 MG/DL (ref 70–130)
GLUCOSE BLDC GLUCOMTR-MCNC: 252 MG/DL (ref 70–130)
HBA1C MFR BLD: 8.8 % (ref 4.8–5.6)
HCT VFR BLD AUTO: 35.6 % (ref 34–46.6)
HGB BLD-MCNC: 11.2 G/DL (ref 12–15.9)
IMM GRANULOCYTES # BLD AUTO: 0.02 10*3/MM3 (ref 0–0.05)
IMM GRANULOCYTES NFR BLD AUTO: 0.3 % (ref 0–0.5)
LYMPHOCYTES # BLD AUTO: 2.31 10*3/MM3 (ref 0.7–3.1)
LYMPHOCYTES NFR BLD AUTO: 35.4 % (ref 19.6–45.3)
MAGNESIUM SERPL-MCNC: 1.6 MG/DL (ref 1.6–2.4)
MAGNESIUM SERPL-MCNC: 1.9 MG/DL (ref 1.6–2.4)
MCH RBC QN AUTO: 29.2 PG (ref 26.6–33)
MCHC RBC AUTO-ENTMCNC: 31.5 G/DL (ref 31.5–35.7)
MCV RBC AUTO: 92.7 FL (ref 79–97)
MONOCYTES # BLD AUTO: 0.5 10*3/MM3 (ref 0.1–0.9)
MONOCYTES NFR BLD AUTO: 7.7 % (ref 5–12)
NEUTROPHILS # BLD AUTO: 3.16 10*3/MM3 (ref 1.7–7)
NEUTROPHILS NFR BLD AUTO: 48.5 % (ref 42.7–76)
NRBC BLD AUTO-RTO: 0 /100 WBC (ref 0–0.2)
PLATELET # BLD AUTO: 278 10*3/MM3 (ref 140–450)
PMV BLD AUTO: 9.5 FL (ref 6–12)
POTASSIUM BLD-SCNC: 3.9 MMOL/L (ref 3.5–5.2)
RBC # BLD AUTO: 3.84 10*6/MM3 (ref 3.77–5.28)
SODIUM BLD-SCNC: 143 MMOL/L (ref 136–145)
TSH SERPL DL<=0.05 MIU/L-ACNC: 2.54 MIU/ML (ref 0.27–4.2)
UFH PPP CHRO-ACNC: 0.26 IU/ML (ref 0.3–0.7)
UFH PPP CHRO-ACNC: 0.35 IU/ML (ref 0.3–0.7)
UFH PPP CHRO-ACNC: 0.47 IU/ML (ref 0.3–0.7)
WBC NRBC COR # BLD: 6.52 10*3/MM3 (ref 3.4–10.8)

## 2019-06-22 PROCEDURE — A9270 NON-COVERED ITEM OR SERVICE: HCPCS | Performed by: NURSE PRACTITIONER

## 2019-06-22 PROCEDURE — 85520 HEPARIN ASSAY: CPT

## 2019-06-22 PROCEDURE — 82962 GLUCOSE BLOOD TEST: CPT

## 2019-06-22 PROCEDURE — 99233 SBSQ HOSP IP/OBS HIGH 50: CPT | Performed by: HOSPITALIST

## 2019-06-22 PROCEDURE — 63710000001 FENOFIBRATE 145 MG TABLET: Performed by: NURSE PRACTITIONER

## 2019-06-22 PROCEDURE — 63710000001 INSULIN LISPRO (HUMAN) PER 5 UNITS: Performed by: NURSE PRACTITIONER

## 2019-06-22 PROCEDURE — 25010000002 CEFTRIAXONE PER 250 MG: Performed by: NURSE PRACTITIONER

## 2019-06-22 PROCEDURE — 25010000002 HEPARIN (PORCINE) IN NACL 25000-0.45 UT/250ML-% SOLUTION

## 2019-06-22 PROCEDURE — 63710000001 VITAMIN D3 5000 UNITS CAPSULE: Performed by: NURSE PRACTITIONER

## 2019-06-22 PROCEDURE — 83735 ASSAY OF MAGNESIUM: CPT | Performed by: NURSE PRACTITIONER

## 2019-06-22 PROCEDURE — 63710000001 ASPIRIN 81 MG TABLET DELAYED-RELEASE: Performed by: NURSE PRACTITIONER

## 2019-06-22 PROCEDURE — 63710000001 INSULIN DETEMIR PER 5 UNITS: Performed by: NURSE PRACTITIONER

## 2019-06-22 PROCEDURE — 80048 BASIC METABOLIC PNL TOTAL CA: CPT | Performed by: NURSE PRACTITIONER

## 2019-06-22 PROCEDURE — 25010000002 VANCOMYCIN 10 G RECONSTITUTED SOLUTION

## 2019-06-22 PROCEDURE — 63710000001 CETIRIZINE 10 MG TABLET: Performed by: NURSE PRACTITIONER

## 2019-06-22 PROCEDURE — 63710000001 DOCUSATE SODIUM 100 MG CAPSULE: Performed by: NURSE PRACTITIONER

## 2019-06-22 PROCEDURE — 25010000002 HEPARIN (PORCINE) PER 1000 UNITS

## 2019-06-22 PROCEDURE — 85025 COMPLETE CBC W/AUTO DIFF WBC: CPT | Performed by: NURSE PRACTITIONER

## 2019-06-22 PROCEDURE — 63710000001 FLUTICASONE 50 MCG/ACT SUSPENSION 16 G BOTTLE: Performed by: NURSE PRACTITIONER

## 2019-06-22 PROCEDURE — 63710000001 PHENYTOIN 100 MG CAPSULE: Performed by: NURSE PRACTITIONER

## 2019-06-22 PROCEDURE — 63710000001 MORPHINE 30 MG TABLET CONTROLLED-RELEASE: Performed by: NURSE PRACTITIONER

## 2019-06-22 PROCEDURE — 63710000001 FAMOTIDINE 20 MG TABLET: Performed by: NURSE PRACTITIONER

## 2019-06-22 PROCEDURE — 63710000001 DULOXETINE 60 MG CAPSULE DELAYED-RELEASE PARTICLES: Performed by: NURSE PRACTITIONER

## 2019-06-22 RX ORDER — ASPIRIN 81 MG/1
81 TABLET ORAL DAILY
Status: DISCONTINUED | OUTPATIENT
Start: 2019-06-22 | End: 2019-06-25 | Stop reason: HOSPADM

## 2019-06-22 RX ORDER — DEXTROSE MONOHYDRATE 25 G/50ML
25 INJECTION, SOLUTION INTRAVENOUS
Status: DISCONTINUED | OUTPATIENT
Start: 2019-06-22 | End: 2019-06-25 | Stop reason: HOSPADM

## 2019-06-22 RX ORDER — NICOTINE POLACRILEX 4 MG
15 LOZENGE BUCCAL
Status: DISCONTINUED | OUTPATIENT
Start: 2019-06-22 | End: 2019-06-25 | Stop reason: HOSPADM

## 2019-06-22 RX ORDER — DOCUSATE SODIUM 100 MG/1
100 CAPSULE, LIQUID FILLED ORAL 2 TIMES DAILY
Status: DISCONTINUED | OUTPATIENT
Start: 2019-06-22 | End: 2019-06-25 | Stop reason: HOSPADM

## 2019-06-22 RX ORDER — ONDANSETRON 2 MG/ML
4 INJECTION INTRAMUSCULAR; INTRAVENOUS EVERY 6 HOURS PRN
Status: DISCONTINUED | OUTPATIENT
Start: 2019-06-22 | End: 2019-06-25 | Stop reason: HOSPADM

## 2019-06-22 RX ORDER — FAMOTIDINE 20 MG/1
20 TABLET, FILM COATED ORAL DAILY
Status: DISCONTINUED | OUTPATIENT
Start: 2019-06-22 | End: 2019-06-25 | Stop reason: HOSPADM

## 2019-06-22 RX ORDER — POTASSIUM CHLORIDE 7.45 MG/ML
10 INJECTION INTRAVENOUS
Status: DISCONTINUED | OUTPATIENT
Start: 2019-06-22 | End: 2019-06-25 | Stop reason: HOSPADM

## 2019-06-22 RX ORDER — DULOXETIN HYDROCHLORIDE 60 MG/1
60 CAPSULE, DELAYED RELEASE ORAL DAILY
Status: DISCONTINUED | OUTPATIENT
Start: 2019-06-22 | End: 2019-06-25 | Stop reason: HOSPADM

## 2019-06-22 RX ORDER — CETIRIZINE HYDROCHLORIDE 10 MG/1
10 TABLET ORAL DAILY
Status: DISCONTINUED | OUTPATIENT
Start: 2019-06-22 | End: 2019-06-25 | Stop reason: HOSPADM

## 2019-06-22 RX ORDER — MAGNESIUM SULFATE HEPTAHYDRATE 40 MG/ML
4 INJECTION, SOLUTION INTRAVENOUS AS NEEDED
Status: DISCONTINUED | OUTPATIENT
Start: 2019-06-22 | End: 2019-06-25 | Stop reason: HOSPADM

## 2019-06-22 RX ORDER — MAGNESIUM SULFATE HEPTAHYDRATE 40 MG/ML
2 INJECTION, SOLUTION INTRAVENOUS AS NEEDED
Status: DISCONTINUED | OUTPATIENT
Start: 2019-06-22 | End: 2019-06-25 | Stop reason: HOSPADM

## 2019-06-22 RX ORDER — ACETAMINOPHEN 325 MG/1
650 TABLET ORAL EVERY 4 HOURS PRN
Status: DISCONTINUED | OUTPATIENT
Start: 2019-06-22 | End: 2019-06-25 | Stop reason: HOSPADM

## 2019-06-22 RX ORDER — POTASSIUM CHLORIDE 1.5 G/1.77G
40 POWDER, FOR SOLUTION ORAL AS NEEDED
Status: DISCONTINUED | OUTPATIENT
Start: 2019-06-22 | End: 2019-06-25 | Stop reason: HOSPADM

## 2019-06-22 RX ORDER — POTASSIUM CHLORIDE 750 MG/1
40 CAPSULE, EXTENDED RELEASE ORAL AS NEEDED
Status: DISCONTINUED | OUTPATIENT
Start: 2019-06-22 | End: 2019-06-25 | Stop reason: HOSPADM

## 2019-06-22 RX ORDER — FENOFIBRATE 145 MG/1
145 TABLET, COATED ORAL DAILY
Status: DISCONTINUED | OUTPATIENT
Start: 2019-06-22 | End: 2019-06-25 | Stop reason: HOSPADM

## 2019-06-22 RX ORDER — HEPARIN SODIUM 1000 [USP'U]/ML
3000 INJECTION, SOLUTION INTRAVENOUS; SUBCUTANEOUS ONCE
Status: COMPLETED | OUTPATIENT
Start: 2019-06-22 | End: 2019-06-22

## 2019-06-22 RX ORDER — ACETAMINOPHEN 650 MG/1
650 SUPPOSITORY RECTAL EVERY 4 HOURS PRN
Status: DISCONTINUED | OUTPATIENT
Start: 2019-06-22 | End: 2019-06-25 | Stop reason: HOSPADM

## 2019-06-22 RX ORDER — PHENYTOIN SODIUM 100 MG/1
200 CAPSULE, EXTENDED RELEASE ORAL EVERY 12 HOURS SCHEDULED
Status: DISCONTINUED | OUTPATIENT
Start: 2019-06-22 | End: 2019-06-25 | Stop reason: HOSPADM

## 2019-06-22 RX ORDER — SODIUM CHLORIDE 0.9 % (FLUSH) 0.9 %
3 SYRINGE (ML) INJECTION EVERY 12 HOURS SCHEDULED
Status: DISCONTINUED | OUTPATIENT
Start: 2019-06-22 | End: 2019-06-25 | Stop reason: HOSPADM

## 2019-06-22 RX ORDER — MORPHINE SULFATE 30 MG/1
30 TABLET, FILM COATED, EXTENDED RELEASE ORAL 2 TIMES DAILY
Status: DISCONTINUED | OUTPATIENT
Start: 2019-06-22 | End: 2019-06-25 | Stop reason: HOSPADM

## 2019-06-22 RX ORDER — CALCIUM CARBONATE 200(500)MG
2 TABLET,CHEWABLE ORAL 2 TIMES DAILY PRN
Status: DISCONTINUED | OUTPATIENT
Start: 2019-06-22 | End: 2019-06-25 | Stop reason: HOSPADM

## 2019-06-22 RX ORDER — SODIUM CHLORIDE 0.9 % (FLUSH) 0.9 %
3-10 SYRINGE (ML) INJECTION AS NEEDED
Status: DISCONTINUED | OUTPATIENT
Start: 2019-06-22 | End: 2019-06-25 | Stop reason: HOSPADM

## 2019-06-22 RX ORDER — FLUTICASONE PROPIONATE 50 MCG
1 SPRAY, SUSPENSION (ML) NASAL DAILY
Status: DISCONTINUED | OUTPATIENT
Start: 2019-06-22 | End: 2019-06-25 | Stop reason: HOSPADM

## 2019-06-22 RX ORDER — ONDANSETRON 4 MG/1
4 TABLET, FILM COATED ORAL EVERY 6 HOURS PRN
Status: DISCONTINUED | OUTPATIENT
Start: 2019-06-22 | End: 2019-06-25 | Stop reason: HOSPADM

## 2019-06-22 RX ADMIN — Medication 400 MG: at 10:03

## 2019-06-22 RX ADMIN — HEPARIN SODIUM 20 UNITS/KG/HR: 10000 INJECTION, SOLUTION INTRAVENOUS at 10:02

## 2019-06-22 RX ADMIN — FLUTICASONE PROPIONATE 1 SPRAY: 50 SPRAY, METERED NASAL at 08:14

## 2019-06-22 RX ADMIN — PHENYTOIN SODIUM 200 MG: 100 CAPSULE ORAL at 08:14

## 2019-06-22 RX ADMIN — CEFTRIAXONE SODIUM 1 G: 1 INJECTION, POWDER, FOR SOLUTION INTRAMUSCULAR; INTRAVENOUS at 08:11

## 2019-06-22 RX ADMIN — INSULIN LISPRO 15 UNITS: 100 INJECTION, SOLUTION INTRAVENOUS; SUBCUTANEOUS at 08:10

## 2019-06-22 RX ADMIN — INSULIN LISPRO 8 UNITS: 100 INJECTION, SOLUTION INTRAVENOUS; SUBCUTANEOUS at 18:47

## 2019-06-22 RX ADMIN — CHOLECALCIFEROL CAP 125 MCG (5000 UNIT) 5000 UNITS: 125 CAP at 08:10

## 2019-06-22 RX ADMIN — CETIRIZINE HYDROCHLORIDE 10 MG: 10 TABLET, FILM COATED ORAL at 08:10

## 2019-06-22 RX ADMIN — INSULIN LISPRO 5 UNITS: 100 INJECTION, SOLUTION INTRAVENOUS; SUBCUTANEOUS at 20:24

## 2019-06-22 RX ADMIN — SODIUM CHLORIDE, PRESERVATIVE FREE 3 ML: 5 INJECTION INTRAVENOUS at 08:15

## 2019-06-22 RX ADMIN — PHENYTOIN SODIUM 200 MG: 100 CAPSULE ORAL at 20:13

## 2019-06-22 RX ADMIN — INSULIN LISPRO 15 UNITS: 100 INJECTION, SOLUTION INTRAVENOUS; SUBCUTANEOUS at 18:47

## 2019-06-22 RX ADMIN — INSULIN LISPRO 15 UNITS: 100 INJECTION, SOLUTION INTRAVENOUS; SUBCUTANEOUS at 12:46

## 2019-06-22 RX ADMIN — SODIUM CHLORIDE 75 ML/HR: 9 INJECTION, SOLUTION INTRAVENOUS at 10:04

## 2019-06-22 RX ADMIN — INSULIN DETEMIR 40 UNITS: 100 INJECTION, SOLUTION SUBCUTANEOUS at 20:25

## 2019-06-22 RX ADMIN — MORPHINE SULFATE 30 MG: 30 TABLET, FILM COATED, EXTENDED RELEASE ORAL at 08:11

## 2019-06-22 RX ADMIN — INSULIN LISPRO 5 UNITS: 100 INJECTION, SOLUTION INTRAVENOUS; SUBCUTANEOUS at 08:11

## 2019-06-22 RX ADMIN — MORPHINE SULFATE 30 MG: 30 TABLET, FILM COATED, EXTENDED RELEASE ORAL at 20:13

## 2019-06-22 RX ADMIN — FENOFIBRATE 145 MG: 145 TABLET ORAL at 08:10

## 2019-06-22 RX ADMIN — ASPIRIN 81 MG: 81 TABLET, COATED ORAL at 08:10

## 2019-06-22 RX ADMIN — FAMOTIDINE 20 MG: 20 TABLET, FILM COATED ORAL at 08:11

## 2019-06-22 RX ADMIN — DULOXETINE HYDROCHLORIDE 60 MG: 60 CAPSULE, DELAYED RELEASE ORAL at 08:10

## 2019-06-22 RX ADMIN — INSULIN DETEMIR 40 UNITS: 100 INJECTION, SOLUTION SUBCUTANEOUS at 10:03

## 2019-06-22 RX ADMIN — DOCUSATE SODIUM 100 MG: 100 CAPSULE, LIQUID FILLED ORAL at 08:10

## 2019-06-22 RX ADMIN — HEPARIN SODIUM 3000 UNITS: 1000 INJECTION INTRAVENOUS; SUBCUTANEOUS at 04:43

## 2019-06-22 RX ADMIN — VANCOMYCIN HYDROCHLORIDE 2000 MG: 10 INJECTION, POWDER, LYOPHILIZED, FOR SOLUTION INTRAVENOUS at 14:30

## 2019-06-22 RX ADMIN — INSULIN LISPRO 3 UNITS: 100 INJECTION, SOLUTION INTRAVENOUS; SUBCUTANEOUS at 12:47

## 2019-06-23 LAB
ANION GAP SERPL CALCULATED.3IONS-SCNC: 12 MMOL/L
BUN BLD-MCNC: 18 MG/DL (ref 8–23)
BUN/CREAT SERPL: 34.6 (ref 7–25)
CALCIUM SPEC-SCNC: 8.6 MG/DL (ref 8.6–10.5)
CHLORIDE SERPL-SCNC: 105 MMOL/L (ref 98–107)
CO2 SERPL-SCNC: 25 MMOL/L (ref 22–29)
CREAT BLD-MCNC: 0.52 MG/DL (ref 0.57–1)
CRP SERPL-MCNC: 3.97 MG/DL (ref 0–0.5)
DEPRECATED RDW RBC AUTO: 58.4 FL (ref 37–54)
ERYTHROCYTE [DISTWIDTH] IN BLOOD BY AUTOMATED COUNT: 16.7 % (ref 12.3–15.4)
ERYTHROCYTE [SEDIMENTATION RATE] IN BLOOD: 32 MM/HR (ref 0–30)
GFR SERPL CREATININE-BSD FRML MDRD: 118 ML/MIN/1.73
GLUCOSE BLD-MCNC: 185 MG/DL (ref 65–99)
GLUCOSE BLDC GLUCOMTR-MCNC: 174 MG/DL (ref 70–130)
GLUCOSE BLDC GLUCOMTR-MCNC: 221 MG/DL (ref 70–130)
GLUCOSE BLDC GLUCOMTR-MCNC: 247 MG/DL (ref 70–130)
GLUCOSE BLDC GLUCOMTR-MCNC: 305 MG/DL (ref 70–130)
HCT VFR BLD AUTO: 37.1 % (ref 34–46.6)
HGB BLD-MCNC: 11.3 G/DL (ref 12–15.9)
MCH RBC QN AUTO: 29 PG (ref 26.6–33)
MCHC RBC AUTO-ENTMCNC: 30.5 G/DL (ref 31.5–35.7)
MCV RBC AUTO: 95.4 FL (ref 79–97)
PLATELET # BLD AUTO: 256 10*3/MM3 (ref 140–450)
PMV BLD AUTO: 9.7 FL (ref 6–12)
POTASSIUM BLD-SCNC: 4.2 MMOL/L (ref 3.5–5.2)
PROCALCITONIN SERPL-MCNC: 0.07 NG/ML (ref 0.1–0.25)
RBC # BLD AUTO: 3.89 10*6/MM3 (ref 3.77–5.28)
SODIUM BLD-SCNC: 142 MMOL/L (ref 136–145)
UFH PPP CHRO-ACNC: 0.47 IU/ML (ref 0.3–0.7)
VANCOMYCIN TROUGH SERPL-MCNC: 10.5 MCG/ML (ref 5–20)
WBC NRBC COR # BLD: 6.89 10*3/MM3 (ref 3.4–10.8)

## 2019-06-23 PROCEDURE — 84145 PROCALCITONIN (PCT): CPT | Performed by: INTERNAL MEDICINE

## 2019-06-23 PROCEDURE — 80202 ASSAY OF VANCOMYCIN: CPT

## 2019-06-23 PROCEDURE — 82962 GLUCOSE BLOOD TEST: CPT

## 2019-06-23 PROCEDURE — 63710000001 INSULIN DETEMIR PER 5 UNITS: Performed by: NURSE PRACTITIONER

## 2019-06-23 PROCEDURE — 99232 SBSQ HOSP IP/OBS MODERATE 35: CPT | Performed by: HOSPITALIST

## 2019-06-23 PROCEDURE — 25010000002 VANCOMYCIN 10 G RECONSTITUTED SOLUTION

## 2019-06-23 PROCEDURE — 85652 RBC SED RATE AUTOMATED: CPT | Performed by: INTERNAL MEDICINE

## 2019-06-23 PROCEDURE — 85027 COMPLETE CBC AUTOMATED: CPT | Performed by: HOSPITALIST

## 2019-06-23 PROCEDURE — 80048 BASIC METABOLIC PNL TOTAL CA: CPT | Performed by: HOSPITALIST

## 2019-06-23 PROCEDURE — 86140 C-REACTIVE PROTEIN: CPT | Performed by: INTERNAL MEDICINE

## 2019-06-23 PROCEDURE — 25010000002 CEFTRIAXONE PER 250 MG: Performed by: NURSE PRACTITIONER

## 2019-06-23 PROCEDURE — 25010000002 HEPARIN (PORCINE) IN NACL 25000-0.45 UT/250ML-% SOLUTION

## 2019-06-23 PROCEDURE — 85520 HEPARIN ASSAY: CPT | Performed by: HOSPITALIST

## 2019-06-23 RX ADMIN — CHOLECALCIFEROL CAP 125 MCG (5000 UNIT) 5000 UNITS: 125 CAP at 08:20

## 2019-06-23 RX ADMIN — INSULIN LISPRO 10 UNITS: 100 INJECTION, SOLUTION INTRAVENOUS; SUBCUTANEOUS at 20:34

## 2019-06-23 RX ADMIN — INSULIN LISPRO 15 UNITS: 100 INJECTION, SOLUTION INTRAVENOUS; SUBCUTANEOUS at 17:41

## 2019-06-23 RX ADMIN — INSULIN LISPRO 15 UNITS: 100 INJECTION, SOLUTION INTRAVENOUS; SUBCUTANEOUS at 08:21

## 2019-06-23 RX ADMIN — FAMOTIDINE 20 MG: 20 TABLET, FILM COATED ORAL at 08:20

## 2019-06-23 RX ADMIN — INSULIN LISPRO 3 UNITS: 100 INJECTION, SOLUTION INTRAVENOUS; SUBCUTANEOUS at 08:22

## 2019-06-23 RX ADMIN — MORPHINE SULFATE 30 MG: 30 TABLET, FILM COATED, EXTENDED RELEASE ORAL at 08:20

## 2019-06-23 RX ADMIN — INSULIN DETEMIR 40 UNITS: 100 INJECTION, SOLUTION SUBCUTANEOUS at 20:33

## 2019-06-23 RX ADMIN — VANCOMYCIN HYDROCHLORIDE 1250 MG: 10 INJECTION, POWDER, LYOPHILIZED, FOR SOLUTION INTRAVENOUS at 15:06

## 2019-06-23 RX ADMIN — DULOXETINE HYDROCHLORIDE 60 MG: 60 CAPSULE, DELAYED RELEASE ORAL at 08:20

## 2019-06-23 RX ADMIN — VANCOMYCIN HYDROCHLORIDE 1250 MG: 10 INJECTION, POWDER, LYOPHILIZED, FOR SOLUTION INTRAVENOUS at 01:24

## 2019-06-23 RX ADMIN — PHENYTOIN SODIUM 200 MG: 100 CAPSULE ORAL at 20:01

## 2019-06-23 RX ADMIN — FENOFIBRATE 145 MG: 145 TABLET ORAL at 08:20

## 2019-06-23 RX ADMIN — HEPARIN SODIUM 20 UNITS/KG/HR: 10000 INJECTION, SOLUTION INTRAVENOUS at 01:22

## 2019-06-23 RX ADMIN — CETIRIZINE HYDROCHLORIDE 10 MG: 10 TABLET, FILM COATED ORAL at 08:20

## 2019-06-23 RX ADMIN — INSULIN LISPRO 5 UNITS: 100 INJECTION, SOLUTION INTRAVENOUS; SUBCUTANEOUS at 12:57

## 2019-06-23 RX ADMIN — Medication 400 MG: at 08:20

## 2019-06-23 RX ADMIN — CEFTRIAXONE SODIUM 1 G: 1 INJECTION, POWDER, FOR SOLUTION INTRAMUSCULAR; INTRAVENOUS at 08:21

## 2019-06-23 RX ADMIN — HEPARIN SODIUM 20 UNITS/KG/HR: 10000 INJECTION, SOLUTION INTRAVENOUS at 17:43

## 2019-06-23 RX ADMIN — MORPHINE SULFATE 30 MG: 30 TABLET, FILM COATED, EXTENDED RELEASE ORAL at 20:01

## 2019-06-23 RX ADMIN — PHENYTOIN SODIUM 200 MG: 100 CAPSULE ORAL at 08:20

## 2019-06-23 RX ADMIN — INSULIN LISPRO 5 UNITS: 100 INJECTION, SOLUTION INTRAVENOUS; SUBCUTANEOUS at 17:41

## 2019-06-23 RX ADMIN — ASPIRIN 81 MG: 81 TABLET, COATED ORAL at 08:20

## 2019-06-23 RX ADMIN — INSULIN LISPRO 15 UNITS: 100 INJECTION, SOLUTION INTRAVENOUS; SUBCUTANEOUS at 12:58

## 2019-06-23 RX ADMIN — FLUTICASONE PROPIONATE 1 SPRAY: 50 SPRAY, METERED NASAL at 08:21

## 2019-06-23 RX ADMIN — INSULIN DETEMIR 40 UNITS: 100 INJECTION, SOLUTION SUBCUTANEOUS at 08:22

## 2019-06-24 LAB
ANION GAP SERPL CALCULATED.3IONS-SCNC: 12 MMOL/L
BASOPHILS # BLD AUTO: 0.03 10*3/MM3 (ref 0–0.2)
BASOPHILS NFR BLD AUTO: 0.5 % (ref 0–1.5)
BUN BLD-MCNC: 20 MG/DL (ref 8–23)
BUN/CREAT SERPL: 37 (ref 7–25)
CALCIUM SPEC-SCNC: 8.7 MG/DL (ref 8.6–10.5)
CHLORIDE SERPL-SCNC: 104 MMOL/L (ref 98–107)
CO2 SERPL-SCNC: 25 MMOL/L (ref 22–29)
CREAT BLD-MCNC: 0.54 MG/DL (ref 0.57–1)
DEPRECATED RDW RBC AUTO: 56.9 FL (ref 37–54)
EOSINOPHIL # BLD AUTO: 0.41 10*3/MM3 (ref 0–0.4)
EOSINOPHIL NFR BLD AUTO: 6.4 % (ref 0.3–6.2)
ERYTHROCYTE [DISTWIDTH] IN BLOOD BY AUTOMATED COUNT: 16.7 % (ref 12.3–15.4)
GFR SERPL CREATININE-BSD FRML MDRD: 113 ML/MIN/1.73
GLUCOSE BLD-MCNC: 205 MG/DL (ref 65–99)
GLUCOSE BLDC GLUCOMTR-MCNC: 180 MG/DL (ref 70–130)
GLUCOSE BLDC GLUCOMTR-MCNC: 187 MG/DL (ref 70–130)
GLUCOSE BLDC GLUCOMTR-MCNC: 234 MG/DL (ref 70–130)
GLUCOSE BLDC GLUCOMTR-MCNC: 248 MG/DL (ref 70–130)
HCT VFR BLD AUTO: 37.2 % (ref 34–46.6)
HGB BLD-MCNC: 11.7 G/DL (ref 12–15.9)
IMM GRANULOCYTES # BLD AUTO: 0.03 10*3/MM3 (ref 0–0.05)
IMM GRANULOCYTES NFR BLD AUTO: 0.5 % (ref 0–0.5)
LYMPHOCYTES # BLD AUTO: 2.11 10*3/MM3 (ref 0.7–3.1)
LYMPHOCYTES NFR BLD AUTO: 32.7 % (ref 19.6–45.3)
MCH RBC QN AUTO: 29.5 PG (ref 26.6–33)
MCHC RBC AUTO-ENTMCNC: 31.5 G/DL (ref 31.5–35.7)
MCV RBC AUTO: 93.9 FL (ref 79–97)
MONOCYTES # BLD AUTO: 0.38 10*3/MM3 (ref 0.1–0.9)
MONOCYTES NFR BLD AUTO: 5.9 % (ref 5–12)
NEUTROPHILS # BLD AUTO: 3.49 10*3/MM3 (ref 1.7–7)
NEUTROPHILS NFR BLD AUTO: 54 % (ref 42.7–76)
NRBC BLD AUTO-RTO: 0 /100 WBC (ref 0–0.2)
PLATELET # BLD AUTO: 301 10*3/MM3 (ref 140–450)
PMV BLD AUTO: 9.5 FL (ref 6–12)
POTASSIUM BLD-SCNC: 4.2 MMOL/L (ref 3.5–5.2)
RBC # BLD AUTO: 3.96 10*6/MM3 (ref 3.77–5.28)
SODIUM BLD-SCNC: 141 MMOL/L (ref 136–145)
UFH PPP CHRO-ACNC: 0.39 IU/ML (ref 0.3–0.7)
WBC NRBC COR # BLD: 6.45 10*3/MM3 (ref 3.4–10.8)

## 2019-06-24 PROCEDURE — 80048 BASIC METABOLIC PNL TOTAL CA: CPT

## 2019-06-24 PROCEDURE — 25010000002 CEFTRIAXONE PER 250 MG: Performed by: NURSE PRACTITIONER

## 2019-06-24 PROCEDURE — 25010000002 HEPARIN (PORCINE) IN NACL 25000-0.45 UT/250ML-% SOLUTION

## 2019-06-24 PROCEDURE — 25010000002 VANCOMYCIN 10 G RECONSTITUTED SOLUTION

## 2019-06-24 PROCEDURE — 63710000001 INSULIN DETEMIR PER 5 UNITS: Performed by: NURSE PRACTITIONER

## 2019-06-24 PROCEDURE — 82962 GLUCOSE BLOOD TEST: CPT

## 2019-06-24 PROCEDURE — 99232 SBSQ HOSP IP/OBS MODERATE 35: CPT | Performed by: HOSPITALIST

## 2019-06-24 PROCEDURE — 85025 COMPLETE CBC W/AUTO DIFF WBC: CPT | Performed by: EMERGENCY MEDICINE

## 2019-06-24 PROCEDURE — 85520 HEPARIN ASSAY: CPT

## 2019-06-24 RX ORDER — SULFAMETHOXAZOLE AND TRIMETHOPRIM 800; 160 MG/1; MG/1
1 TABLET ORAL EVERY 12 HOURS SCHEDULED
Status: DISCONTINUED | OUTPATIENT
Start: 2019-06-24 | End: 2019-06-25 | Stop reason: HOSPADM

## 2019-06-24 RX ADMIN — INSULIN LISPRO 5 UNITS: 100 INJECTION, SOLUTION INTRAVENOUS; SUBCUTANEOUS at 20:27

## 2019-06-24 RX ADMIN — FLUTICASONE PROPIONATE 1 SPRAY: 50 SPRAY, METERED NASAL at 08:18

## 2019-06-24 RX ADMIN — SULFAMETHOXAZOLE AND TRIMETHOPRIM 160 MG: 800; 160 TABLET ORAL at 20:23

## 2019-06-24 RX ADMIN — Medication 400 MG: at 08:19

## 2019-06-24 RX ADMIN — PHENYTOIN SODIUM 200 MG: 100 CAPSULE ORAL at 08:18

## 2019-06-24 RX ADMIN — FENOFIBRATE 145 MG: 145 TABLET ORAL at 08:18

## 2019-06-24 RX ADMIN — INSULIN LISPRO 3 UNITS: 100 INJECTION, SOLUTION INTRAVENOUS; SUBCUTANEOUS at 08:19

## 2019-06-24 RX ADMIN — CEFTRIAXONE SODIUM 1 G: 1 INJECTION, POWDER, FOR SOLUTION INTRAMUSCULAR; INTRAVENOUS at 08:18

## 2019-06-24 RX ADMIN — PHENYTOIN SODIUM 200 MG: 100 CAPSULE ORAL at 20:24

## 2019-06-24 RX ADMIN — FAMOTIDINE 20 MG: 20 TABLET, FILM COATED ORAL at 08:18

## 2019-06-24 RX ADMIN — DULOXETINE HYDROCHLORIDE 60 MG: 60 CAPSULE, DELAYED RELEASE ORAL at 08:19

## 2019-06-24 RX ADMIN — HEPARIN SODIUM 20 UNITS/KG/HR: 10000 INJECTION, SOLUTION INTRAVENOUS at 07:18

## 2019-06-24 RX ADMIN — CHOLECALCIFEROL CAP 125 MCG (5000 UNIT) 5000 UNITS: 125 CAP at 08:19

## 2019-06-24 RX ADMIN — MORPHINE SULFATE 30 MG: 30 TABLET, FILM COATED, EXTENDED RELEASE ORAL at 08:19

## 2019-06-24 RX ADMIN — DOCUSATE SODIUM 100 MG: 100 CAPSULE, LIQUID FILLED ORAL at 08:19

## 2019-06-24 RX ADMIN — INSULIN LISPRO 3 UNITS: 100 INJECTION, SOLUTION INTRAVENOUS; SUBCUTANEOUS at 13:04

## 2019-06-24 RX ADMIN — VANCOMYCIN HYDROCHLORIDE 1250 MG: 10 INJECTION, POWDER, LYOPHILIZED, FOR SOLUTION INTRAVENOUS at 02:21

## 2019-06-24 RX ADMIN — INSULIN DETEMIR 40 UNITS: 100 INJECTION, SOLUTION SUBCUTANEOUS at 20:25

## 2019-06-24 RX ADMIN — ASPIRIN 81 MG: 81 TABLET, COATED ORAL at 08:18

## 2019-06-24 RX ADMIN — HEPARIN SODIUM 20 UNITS/KG/HR: 10000 INJECTION, SOLUTION INTRAVENOUS at 22:35

## 2019-06-24 RX ADMIN — INSULIN LISPRO 5 UNITS: 100 INJECTION, SOLUTION INTRAVENOUS; SUBCUTANEOUS at 17:36

## 2019-06-24 RX ADMIN — MORPHINE SULFATE 30 MG: 30 TABLET, FILM COATED, EXTENDED RELEASE ORAL at 20:23

## 2019-06-24 RX ADMIN — SODIUM CHLORIDE, PRESERVATIVE FREE 3 ML: 5 INJECTION INTRAVENOUS at 08:20

## 2019-06-24 RX ADMIN — CETIRIZINE HYDROCHLORIDE 10 MG: 10 TABLET, FILM COATED ORAL at 08:19

## 2019-06-24 RX ADMIN — INSULIN DETEMIR 40 UNITS: 100 INJECTION, SOLUTION SUBCUTANEOUS at 08:21

## 2019-06-25 VITALS
OXYGEN SATURATION: 91 % | WEIGHT: 179.6 LBS | HEART RATE: 75 BPM | HEIGHT: 62 IN | RESPIRATION RATE: 18 BRPM | TEMPERATURE: 98.1 F | SYSTOLIC BLOOD PRESSURE: 96 MMHG | DIASTOLIC BLOOD PRESSURE: 52 MMHG | BODY MASS INDEX: 33.05 KG/M2

## 2019-06-25 PROBLEM — I87.2 VENOUS STASIS DERMATITIS OF BOTH LOWER EXTREMITIES: Status: ACTIVE | Noted: 2019-06-25

## 2019-06-25 PROBLEM — Z79.01 CHRONIC ANTICOAGULATION: Status: ACTIVE | Noted: 2019-06-25

## 2019-06-25 LAB
ANION GAP SERPL CALCULATED.3IONS-SCNC: 11 MMOL/L
BUN BLD-MCNC: 17 MG/DL (ref 8–23)
BUN/CREAT SERPL: 31.5 (ref 7–25)
CALCIUM SPEC-SCNC: 9.1 MG/DL (ref 8.6–10.5)
CHLORIDE SERPL-SCNC: 104 MMOL/L (ref 98–107)
CO2 SERPL-SCNC: 28 MMOL/L (ref 22–29)
CREAT BLD-MCNC: 0.54 MG/DL (ref 0.57–1)
DEPRECATED RDW RBC AUTO: 56.6 FL (ref 37–54)
ERYTHROCYTE [DISTWIDTH] IN BLOOD BY AUTOMATED COUNT: 16.4 % (ref 12.3–15.4)
GFR SERPL CREATININE-BSD FRML MDRD: 113 ML/MIN/1.73
GLUCOSE BLD-MCNC: 191 MG/DL (ref 65–99)
GLUCOSE BLDC GLUCOMTR-MCNC: 201 MG/DL (ref 70–130)
GLUCOSE BLDC GLUCOMTR-MCNC: 241 MG/DL (ref 70–130)
HCT VFR BLD AUTO: 38.1 % (ref 34–46.6)
HGB BLD-MCNC: 11.7 G/DL (ref 12–15.9)
MCH RBC QN AUTO: 29 PG (ref 26.6–33)
MCHC RBC AUTO-ENTMCNC: 30.7 G/DL (ref 31.5–35.7)
MCV RBC AUTO: 94.3 FL (ref 79–97)
PLATELET # BLD AUTO: 260 10*3/MM3 (ref 140–450)
PMV BLD AUTO: 10 FL (ref 6–12)
POTASSIUM BLD-SCNC: 4.3 MMOL/L (ref 3.5–5.2)
RBC # BLD AUTO: 4.04 10*6/MM3 (ref 3.77–5.28)
SODIUM BLD-SCNC: 143 MMOL/L (ref 136–145)
UFH PPP CHRO-ACNC: 0.31 IU/ML (ref 0.3–0.7)
WBC NRBC COR # BLD: 5.95 10*3/MM3 (ref 3.4–10.8)

## 2019-06-25 PROCEDURE — 85520 HEPARIN ASSAY: CPT

## 2019-06-25 PROCEDURE — 99239 HOSP IP/OBS DSCHRG MGMT >30: CPT | Performed by: PHYSICIAN ASSISTANT

## 2019-06-25 PROCEDURE — 80048 BASIC METABOLIC PNL TOTAL CA: CPT

## 2019-06-25 PROCEDURE — 25010000002 ENOXAPARIN PER 10 MG: Performed by: PHYSICIAN ASSISTANT

## 2019-06-25 PROCEDURE — 63710000001 INSULIN DETEMIR PER 5 UNITS: Performed by: NURSE PRACTITIONER

## 2019-06-25 PROCEDURE — 82962 GLUCOSE BLOOD TEST: CPT

## 2019-06-25 PROCEDURE — 85027 COMPLETE CBC AUTOMATED: CPT | Performed by: HOSPITALIST

## 2019-06-25 RX ORDER — SULFAMETHOXAZOLE AND TRIMETHOPRIM 800; 160 MG/1; MG/1
1 TABLET ORAL EVERY 12 HOURS SCHEDULED
Qty: 12 TABLET | Refills: 0 | Status: SHIPPED | OUTPATIENT
Start: 2019-06-25 | End: 2019-07-01

## 2019-06-25 RX ORDER — ACETAMINOPHEN 325 MG/1
650 TABLET ORAL EVERY 4 HOURS PRN
Start: 2019-06-25 | End: 2022-03-21 | Stop reason: HOSPADM

## 2019-06-25 RX ORDER — WARFARIN SODIUM 1 MG/1
TABLET ORAL
Qty: 30 TABLET | Refills: 1 | Status: SHIPPED | OUTPATIENT
Start: 2019-06-25 | End: 2019-11-18

## 2019-06-25 RX ADMIN — CHOLECALCIFEROL CAP 125 MCG (5000 UNIT) 5000 UNITS: 125 CAP at 08:35

## 2019-06-25 RX ADMIN — Medication 400 MG: at 08:35

## 2019-06-25 RX ADMIN — SULFAMETHOXAZOLE AND TRIMETHOPRIM 160 MG: 800; 160 TABLET ORAL at 08:35

## 2019-06-25 RX ADMIN — CETIRIZINE HYDROCHLORIDE 10 MG: 10 TABLET, FILM COATED ORAL at 08:35

## 2019-06-25 RX ADMIN — DULOXETINE HYDROCHLORIDE 60 MG: 60 CAPSULE, DELAYED RELEASE ORAL at 08:35

## 2019-06-25 RX ADMIN — PHENYTOIN SODIUM 200 MG: 100 CAPSULE ORAL at 08:35

## 2019-06-25 RX ADMIN — INSULIN DETEMIR 40 UNITS: 100 INJECTION, SOLUTION SUBCUTANEOUS at 08:37

## 2019-06-25 RX ADMIN — INSULIN LISPRO 5 UNITS: 100 INJECTION, SOLUTION INTRAVENOUS; SUBCUTANEOUS at 08:37

## 2019-06-25 RX ADMIN — ASPIRIN 81 MG: 81 TABLET, COATED ORAL at 08:35

## 2019-06-25 RX ADMIN — FAMOTIDINE 20 MG: 20 TABLET, FILM COATED ORAL at 08:35

## 2019-06-25 RX ADMIN — SODIUM CHLORIDE, PRESERVATIVE FREE 3 ML: 5 INJECTION INTRAVENOUS at 08:36

## 2019-06-25 RX ADMIN — MORPHINE SULFATE 30 MG: 30 TABLET, FILM COATED, EXTENDED RELEASE ORAL at 08:35

## 2019-06-25 RX ADMIN — ENOXAPARIN SODIUM 80 MG: 80 INJECTION SUBCUTANEOUS at 13:38

## 2019-06-25 RX ADMIN — DOCUSATE SODIUM 100 MG: 100 CAPSULE, LIQUID FILLED ORAL at 08:35

## 2019-06-25 RX ADMIN — INSULIN LISPRO 5 UNITS: 100 INJECTION, SOLUTION INTRAVENOUS; SUBCUTANEOUS at 12:08

## 2019-06-25 RX ADMIN — FENOFIBRATE 145 MG: 145 TABLET ORAL at 08:35

## 2019-06-26 ENCOUNTER — READMISSION MANAGEMENT (OUTPATIENT)
Dept: CALL CENTER | Facility: HOSPITAL | Age: 64
End: 2019-06-26

## 2019-06-26 NOTE — OUTREACH NOTE
Prep Survey      Responses   Facility patient discharged from?  [facility]   Is patient eligible?  Yes   Discharge diagnosis  Cellulitis of left lower extremity,     Leg DVT    Does the patient have one of the following disease processes/diagnoses(primary or secondary)?  Other   Does the patient have Home health ordered?  Yes   What is the Home health agency?    [facility]   Is there a DME ordered?  No   Prep survey completed?  Yes          Tigist Hull RN

## 2019-06-28 ENCOUNTER — READMISSION MANAGEMENT (OUTPATIENT)
Dept: CALL CENTER | Facility: HOSPITAL | Age: 64
End: 2019-06-28

## 2019-06-28 NOTE — OUTREACH NOTE
Medical Week 1 Survey      Responses   Facility patient discharged from?  [facility]   Does the patient have one of the following disease processes/diagnoses(primary or secondary)?  Other   Is there a successful TCM telephone encounter documented?  No   Week 1 attempt successful?  Yes   Call start time  1547   Call end time  1553   Discharge diagnosis  Cellulitis of left lower extremity,     Leg DVT    Meds reviewed with patient/caregiver?  Yes   Is the patient having any side effects they believe may be caused by any medication additions or changes?  No   Does the patient have all medications ordered at discharge?  Yes   Is the patient taking all medications as directed (includes completed medication regime)?  Yes   Medication comments  on bridge therapy and dosing Warfarin per INR results from PCP office   Does the patient have a primary care provider?   Yes   Does the patient have an appointment with their PCP within 7 days of discharge?  Yes   Has the patient kept scheduled appointments due by today?  Yes   What is the Home health agency?    [facility]   Has home health visited the patient within 72 hours of discharge?  Yes   Home health comments  HH drawing labs   Psychosocial issues?  No   Did the patient receive a copy of their discharge instructions?  Yes   Nursing interventions  Reviewed instructions with patient   What is the patient's perception of their health status since discharge?  Improving   Is the patient/caregiver able to teach back signs and symptoms related to disease process for when to call PCP?  Yes   Is the patient/caregiver able to teach back signs and symptoms related to disease process for when to call 911?  Yes   Is the patient/caregiver able to teach back the hierarchy of who to call/visit for symptoms/problems? PCP, Specialist, Home health nurse, Urgent Care, ED, 911  Yes   Additional teach back comments  Redness and sdwelling going down in leg   Week 1 call completed?  Yes           Margarita Moeller RN

## 2019-07-08 ENCOUNTER — READMISSION MANAGEMENT (OUTPATIENT)
Dept: CALL CENTER | Facility: HOSPITAL | Age: 64
End: 2019-07-08

## 2019-07-08 NOTE — OUTREACH NOTE
Medical Week 2 Survey      Responses   Facility patient discharged from?  [facility]   Does the patient have one of the following disease processes/diagnoses(primary or secondary)?  Other   Week 2 attempt successful?  Yes   Call start time  1206   Unsuccessful attempts  Attempt 1   Discharge diagnosis  Cellulitis of left lower extremity,     Leg DVT    Call end time  1210   Is patient permission given to speak with other caregiver?  No   Meds reviewed with patient/caregiver?  Yes   Is the patient having any side effects they believe may be caused by any medication additions or changes?  No   Does the patient have all medications ordered at discharge?  Yes   Is the patient taking all medications as directed (includes completed medication regime)?  Yes   Medication comments  INR 2.7  Stopped Lovonex, INR theurapetic   What is the Home health agency?    [facility]   What is the patient's perception of their health status since discharge?  Improving   Additional teach back comments  Seelling in leg decreasing   Week 2 Call Completed?  Yes          Mariana Arita RN

## 2019-07-08 NOTE — OUTREACH NOTE
Medical Week 2 Survey      Responses   Facility patient discharged from?  [place] [facility]   Does the patient have one of the following disease processes/diagnoses(primary or secondary)?  Other   Week 2 attempt successful?  No   Unsuccessful attempts  Attempt 1          Mariana Arita RN

## 2019-07-15 ENCOUNTER — TRANSCRIBE ORDERS (OUTPATIENT)
Dept: ADMINISTRATIVE | Facility: HOSPITAL | Age: 64
End: 2019-07-15

## 2019-07-15 ENCOUNTER — READMISSION MANAGEMENT (OUTPATIENT)
Dept: CALL CENTER | Facility: HOSPITAL | Age: 64
End: 2019-07-15

## 2019-07-15 DIAGNOSIS — Z12.31 ENCOUNTER FOR SCREENING MAMMOGRAM FOR MALIGNANT NEOPLASM OF BREAST: Primary | ICD-10-CM

## 2019-07-15 NOTE — OUTREACH NOTE
Medical Week 3 Survey      Responses   Facility patient discharged from?  [facility]   Does the patient have one of the following disease processes/diagnoses(primary or secondary)?  Other   Week 3 attempt successful?  Yes   Call start time  1632   Call end time  1633   Discharge diagnosis  Cellulitis of left lower extremity,     Leg DVT    Meds reviewed with patient/caregiver?  Yes   Is the patient having any side effects they believe may be caused by any medication additions or changes?  No   Does the patient have all medications ordered at discharge?  Yes   Is the patient taking all medications as directed (includes completed medication regime)?  Yes   Does the patient have a primary care provider?   Yes   Does the patient have an appointment with their PCP within 7 days of discharge?  Yes   Has the patient kept scheduled appointments due by today?  Yes   What is the Home health agency?    [facility]   Has home health visited the patient within 72 hours of discharge?  Yes   Home health comments   drawing labs   Psychosocial issues?  No   Did the patient receive a copy of their discharge instructions?  Yes   Nursing interventions  Reviewed instructions with patient   What is the patient's perception of their health status since discharge?  Improving   Is the patient/caregiver able to teach back signs and symptoms related to disease process for when to call PCP?  Yes   Is the patient/caregiver able to teach back signs and symptoms related to disease process for when to call 911?  Yes   Is the patient/caregiver able to teach back the hierarchy of who to call/visit for symptoms/problems? PCP, Specialist, Home health nurse, Urgent Care, ED, 911  Yes   Additional teach back comments  Leg is getting red again, HH RN has notified MD and waiting on return call.   Week 3 Call Completed?  Yes          Sanjuana Ramirez RN

## 2019-07-16 ENCOUNTER — HOSPITAL ENCOUNTER (OUTPATIENT)
Dept: MAMMOGRAPHY | Facility: HOSPITAL | Age: 64
Discharge: HOME | End: 2019-07-16
Admitting: NURSE PRACTITIONER

## 2019-07-16 DIAGNOSIS — Z12.31 ENCOUNTER FOR SCREENING MAMMOGRAM FOR MALIGNANT NEOPLASM OF BREAST: ICD-10-CM

## 2019-07-16 PROCEDURE — 77063 BREAST TOMOSYNTHESIS BI: CPT

## 2019-07-16 PROCEDURE — 77067 SCR MAMMO BI INCL CAD: CPT

## 2019-07-18 ENCOUNTER — HOSPITAL ENCOUNTER (EMERGENCY)
Facility: HOSPITAL | Age: 64
Discharge: HOME | End: 2019-07-18
Attending: EMERGENCY MEDICINE | Admitting: EMERGENCY MEDICINE

## 2019-07-18 ENCOUNTER — TRANSCRIBE ORDERS (OUTPATIENT)
Dept: ADMINISTRATIVE | Facility: HOSPITAL | Age: 64
End: 2019-07-18

## 2019-07-18 ENCOUNTER — APPOINTMENT (OUTPATIENT)
Dept: CARDIOLOGY | Facility: HOSPITAL | Age: 64
End: 2019-07-18

## 2019-07-18 VITALS
BODY MASS INDEX: 33.13 KG/M2 | TEMPERATURE: 98.9 F | SYSTOLIC BLOOD PRESSURE: 119 MMHG | DIASTOLIC BLOOD PRESSURE: 63 MMHG | HEART RATE: 72 BPM | HEIGHT: 62 IN | WEIGHT: 180 LBS | RESPIRATION RATE: 16 BRPM | OXYGEN SATURATION: 92 %

## 2019-07-18 DIAGNOSIS — I87.8 CHRONIC VENOUS STASIS: ICD-10-CM

## 2019-07-18 DIAGNOSIS — I82.502 CHRONIC DEEP VEIN THROMBOSIS (DVT) OF LEFT LOWER EXTREMITY, UNSPECIFIED VEIN (HCC): Primary | ICD-10-CM

## 2019-07-18 DIAGNOSIS — L03.116 CELLULITIS OF LEFT LOWER LEG: ICD-10-CM

## 2019-07-18 DIAGNOSIS — Z79.01 WARFARIN ANTICOAGULATION: ICD-10-CM

## 2019-07-18 DIAGNOSIS — IMO0001 DVT, RECURRENT, LOWER EXTREMITY, CHRONIC, LEFT: ICD-10-CM

## 2019-07-18 DIAGNOSIS — M79.605 LEFT LEG PAIN: Primary | ICD-10-CM

## 2019-07-18 LAB
ALBUMIN SERPL-MCNC: 3.8 G/DL (ref 3.5–5.2)
ALBUMIN/GLOB SERPL: 1.1 G/DL
ALP SERPL-CCNC: 73 U/L (ref 39–117)
ALT SERPL W P-5'-P-CCNC: 19 U/L (ref 1–33)
ANION GAP SERPL CALCULATED.3IONS-SCNC: 10 MMOL/L (ref 5–15)
AST SERPL-CCNC: 19 U/L (ref 1–32)
BASOPHILS # BLD AUTO: 0.05 10*3/MM3 (ref 0–0.2)
BASOPHILS NFR BLD AUTO: 0.6 % (ref 0–1.5)
BILIRUB SERPL-MCNC: 0.2 MG/DL (ref 0.2–1.2)
BUN BLD-MCNC: 26 MG/DL (ref 8–23)
BUN/CREAT SERPL: 36.6 (ref 7–25)
CALCIUM SPEC-SCNC: 9.6 MG/DL (ref 8.6–10.5)
CHLORIDE SERPL-SCNC: 100 MMOL/L (ref 98–107)
CO2 SERPL-SCNC: 30 MMOL/L (ref 22–29)
CREAT BLD-MCNC: 0.71 MG/DL (ref 0.57–1)
DEPRECATED RDW RBC AUTO: 50.3 FL (ref 37–54)
EOSINOPHIL # BLD AUTO: 0.62 10*3/MM3 (ref 0–0.4)
EOSINOPHIL NFR BLD AUTO: 7 % (ref 0.3–6.2)
ERYTHROCYTE [DISTWIDTH] IN BLOOD BY AUTOMATED COUNT: 14.9 % (ref 12.3–15.4)
GFR SERPL CREATININE-BSD FRML MDRD: 83 ML/MIN/1.73
GLOBULIN UR ELPH-MCNC: 3.6 GM/DL
GLUCOSE BLD-MCNC: 103 MG/DL (ref 65–99)
HCT VFR BLD AUTO: 42.5 % (ref 34–46.6)
HGB BLD-MCNC: 13.4 G/DL (ref 12–15.9)
IMM GRANULOCYTES # BLD AUTO: 0.02 10*3/MM3 (ref 0–0.05)
IMM GRANULOCYTES NFR BLD AUTO: 0.2 % (ref 0–0.5)
INR PPP: 2.87 (ref 0.85–1.16)
LYMPHOCYTES # BLD AUTO: 2.53 10*3/MM3 (ref 0.7–3.1)
LYMPHOCYTES NFR BLD AUTO: 28.7 % (ref 19.6–45.3)
MCH RBC QN AUTO: 28.9 PG (ref 26.6–33)
MCHC RBC AUTO-ENTMCNC: 31.5 G/DL (ref 31.5–35.7)
MCV RBC AUTO: 91.6 FL (ref 79–97)
MONOCYTES # BLD AUTO: 0.6 10*3/MM3 (ref 0.1–0.9)
MONOCYTES NFR BLD AUTO: 6.8 % (ref 5–12)
NEUTROPHILS # BLD AUTO: 5.01 10*3/MM3 (ref 1.7–7)
NEUTROPHILS NFR BLD AUTO: 56.7 % (ref 42.7–76)
NRBC BLD AUTO-RTO: 0 /100 WBC (ref 0–0.2)
PLATELET # BLD AUTO: 273 10*3/MM3 (ref 140–450)
PMV BLD AUTO: 9.2 FL (ref 6–12)
POTASSIUM BLD-SCNC: 4.4 MMOL/L (ref 3.5–5.2)
PROT SERPL-MCNC: 7.4 G/DL (ref 6–8.5)
PROTHROMBIN TIME: 29 SECONDS (ref 11.2–14.3)
RBC # BLD AUTO: 4.64 10*6/MM3 (ref 3.77–5.28)
SODIUM BLD-SCNC: 140 MMOL/L (ref 136–145)
WBC NRBC COR # BLD: 8.83 10*3/MM3 (ref 3.4–10.8)

## 2019-07-18 PROCEDURE — 93971 EXTREMITY STUDY: CPT | Performed by: INTERNAL MEDICINE

## 2019-07-18 PROCEDURE — 80053 COMPREHEN METABOLIC PANEL: CPT | Performed by: PHYSICIAN ASSISTANT

## 2019-07-18 PROCEDURE — 85025 COMPLETE CBC W/AUTO DIFF WBC: CPT | Performed by: PHYSICIAN ASSISTANT

## 2019-07-18 PROCEDURE — 99284 EMERGENCY DEPT VISIT MOD MDM: CPT

## 2019-07-18 PROCEDURE — 85610 PROTHROMBIN TIME: CPT | Performed by: PHYSICIAN ASSISTANT

## 2019-07-18 PROCEDURE — 93971 EXTREMITY STUDY: CPT

## 2019-07-18 RX ORDER — CEPHALEXIN 500 MG/1
500 CAPSULE ORAL 2 TIMES DAILY
Qty: 20 CAPSULE | Refills: 0 | Status: SHIPPED | OUTPATIENT
Start: 2019-07-18 | End: 2019-07-18

## 2019-07-18 RX ORDER — FLUCONAZOLE 150 MG/1
150 TABLET ORAL ONCE
Qty: 1 TABLET | Refills: 0 | Status: SHIPPED | OUTPATIENT
Start: 2019-07-18 | End: 2019-07-18

## 2019-07-18 RX ORDER — SULFAMETHOXAZOLE AND TRIMETHOPRIM 800; 160 MG/1; MG/1
1 TABLET ORAL 2 TIMES DAILY
Qty: 20 TABLET | Refills: 0 | Status: SHIPPED | OUTPATIENT
Start: 2019-07-18 | End: 2019-07-28

## 2019-07-19 LAB
BH CV ECHO MEAS - BSA(HAYCOCK): 1.9 M^2
BH CV ECHO MEAS - BSA: 1.8 M^2
BH CV ECHO MEAS - BZI_BMI: 32.9 KILOGRAMS/M^2
BH CV ECHO MEAS - BZI_METRIC_HEIGHT: 157.5 CM
BH CV ECHO MEAS - BZI_METRIC_WEIGHT: 81.6 KG
BH CV LOW VAS LEFT DISTAL FEMORAL SPONT: 1
BH CV LOW VAS LEFT PERONEAL VESSEL: 1
BH CV LOW VAS LEFT POPLITEAL SPONT: 1
BH CV LOWER VASCULAR LEFT COMMON FEMORAL AUGMENT: NORMAL
BH CV LOWER VASCULAR LEFT COMMON FEMORAL COMPRESS: NORMAL
BH CV LOWER VASCULAR LEFT COMMON FEMORAL PHASIC: NORMAL
BH CV LOWER VASCULAR LEFT COMMON FEMORAL SPONT: NORMAL
BH CV LOWER VASCULAR LEFT DISTAL FEMORAL AUGMENT: NORMAL
BH CV LOWER VASCULAR LEFT DISTAL FEMORAL COMPETENT: NORMAL
BH CV LOWER VASCULAR LEFT DISTAL FEMORAL COMPRESS: NORMAL
BH CV LOWER VASCULAR LEFT DISTAL FEMORAL PHASIC: NORMAL
BH CV LOWER VASCULAR LEFT DISTAL FEMORAL SPONT: NORMAL
BH CV LOWER VASCULAR LEFT DISTAL FEMORAL THROMBUS: NORMAL
BH CV LOWER VASCULAR LEFT GASTRONEMIUS COMPRESS: NORMAL
BH CV LOWER VASCULAR LEFT GREATER SAPH AK COMPRESS: NORMAL
BH CV LOWER VASCULAR LEFT GREATER SAPH BK COMPRESS: NORMAL
BH CV LOWER VASCULAR LEFT LESSER SAPH COMPRESS: NORMAL
BH CV LOWER VASCULAR LEFT MID FEMORAL AUGMENT: NORMAL
BH CV LOWER VASCULAR LEFT MID FEMORAL COMPRESS: NORMAL
BH CV LOWER VASCULAR LEFT MID FEMORAL PHASIC: NORMAL
BH CV LOWER VASCULAR LEFT MID FEMORAL SPONT: NORMAL
BH CV LOWER VASCULAR LEFT POPLITEAL AUGMENT: NORMAL
BH CV LOWER VASCULAR LEFT POPLITEAL COMPETENT: NORMAL
BH CV LOWER VASCULAR LEFT POPLITEAL COMPRESS: NORMAL
BH CV LOWER VASCULAR LEFT POPLITEAL PHASIC: NORMAL
BH CV LOWER VASCULAR LEFT POPLITEAL SPONT: NORMAL
BH CV LOWER VASCULAR LEFT POPLITEAL THROMBUS: NORMAL
BH CV LOWER VASCULAR LEFT PROXIMAL FEMORAL COMPRESS: NORMAL
BH CV LOWER VASCULAR LEFT SAPHENOFEMORAL JUNCTION AUGMENT: NORMAL
BH CV LOWER VASCULAR LEFT SAPHENOFEMORAL JUNCTION COMPRESS: NORMAL
BH CV LOWER VASCULAR LEFT SAPHENOFEMORAL JUNCTION PHASIC: NORMAL
BH CV LOWER VASCULAR LEFT SAPHENOFEMORAL JUNCTION SPONT: NORMAL
BH CV LOWER VASCULAR RIGHT COMMON FEMORAL AUGMENT: NORMAL
BH CV LOWER VASCULAR RIGHT COMMON FEMORAL COMPRESS: NORMAL
BH CV LOWER VASCULAR RIGHT COMMON FEMORAL PHASIC: NORMAL
BH CV LOWER VASCULAR RIGHT COMMON FEMORAL SPONT: NORMAL

## 2019-07-22 ENCOUNTER — READMISSION MANAGEMENT (OUTPATIENT)
Dept: CALL CENTER | Facility: HOSPITAL | Age: 64
End: 2019-07-22

## 2019-07-22 NOTE — OUTREACH NOTE
Medical Week 4 Survey      Responses   Facility patient discharged from?  [place]   Does the patient have one of the following disease processes/diagnoses(primary or secondary)?  Other   Week 4 attempt successful?  No          Krystyna Bauer RN

## 2019-07-23 ENCOUNTER — READMISSION MANAGEMENT (OUTPATIENT)
Dept: CALL CENTER | Facility: HOSPITAL | Age: 64
End: 2019-07-23

## 2019-07-23 NOTE — OUTREACH NOTE
Medical Week 4 Survey      Responses   Facility patient discharged from?  [place]   Does the patient have one of the following disease processes/diagnoses(primary or secondary)?  Other   Week 4 attempt successful?  Yes   Call start time  0806   Call end time  0808   Discharge diagnosis  Cellulitis of left lower extremity,     Leg DVT    Meds reviewed with patient/caregiver?  Yes   Is the patient taking all medications as directed (includes completed medication regime)?  Yes   Has the patient kept scheduled appointments due by today?  Yes   Is the patient still receiving Home Health Services?  Yes   What is the patient's perception of their health status since discharge?  Improving   Week 4 Call Completed?  Yes   Would the patient like one additional call?  No   Graduated  Yes   Did the patient feel the follow up calls were helpful during their recovery period?  Yes   Was the number of calls appropriate?  Yes          Mariana Arita RN

## 2019-08-16 ENCOUNTER — OFFICE VISIT (OUTPATIENT)
Dept: ENDOCRINOLOGY | Facility: CLINIC | Age: 64
End: 2019-08-16

## 2019-08-16 VITALS
DIASTOLIC BLOOD PRESSURE: 72 MMHG | WEIGHT: 182 LBS | HEART RATE: 75 BPM | BODY MASS INDEX: 33.29 KG/M2 | SYSTOLIC BLOOD PRESSURE: 118 MMHG | OXYGEN SATURATION: 98 %

## 2019-08-16 DIAGNOSIS — E11.29 MICROALBUMINURIA DUE TO TYPE 2 DIABETES MELLITUS (HCC): ICD-10-CM

## 2019-08-16 DIAGNOSIS — E11.42 DIABETIC PERIPHERAL NEUROPATHY ASSOCIATED WITH TYPE 2 DIABETES MELLITUS (HCC): ICD-10-CM

## 2019-08-16 DIAGNOSIS — R80.9 MICROALBUMINURIA DUE TO TYPE 2 DIABETES MELLITUS (HCC): ICD-10-CM

## 2019-08-16 DIAGNOSIS — E11.65 UNCONTROLLED TYPE 2 DIABETES MELLITUS WITH HYPERGLYCEMIA (HCC): Primary | ICD-10-CM

## 2019-08-16 LAB
GLUCOSE BLDC GLUCOMTR-MCNC: 81 MG/DL (ref 70–130)
HBA1C MFR BLD: 6.9 %

## 2019-08-16 PROCEDURE — 82570 ASSAY OF URINE CREATININE: CPT | Performed by: PHYSICIAN ASSISTANT

## 2019-08-16 PROCEDURE — 82043 UR ALBUMIN QUANTITATIVE: CPT | Performed by: PHYSICIAN ASSISTANT

## 2019-08-16 PROCEDURE — 83036 HEMOGLOBIN GLYCOSYLATED A1C: CPT | Performed by: PHYSICIAN ASSISTANT

## 2019-08-16 PROCEDURE — 99214 OFFICE O/P EST MOD 30 MIN: CPT | Performed by: PHYSICIAN ASSISTANT

## 2019-08-16 PROCEDURE — 82947 ASSAY GLUCOSE BLOOD QUANT: CPT | Performed by: PHYSICIAN ASSISTANT

## 2019-08-16 NOTE — PROGRESS NOTES
"Chief Complaint  F/u for Diabetes Mellitus.    HPI   Martha Harrell is a 64 y.o. female who is here today for f/u of Diabetes Mellitus type 2. The initial diagnosis of diabetes was made approx 2008.     On coumadin for recent DVT 6/2019.     A1C - 6.9 (8/16/19), 12.1 (5/15/19), 11.2 (1/22/19)  Labs reviewed:  1/10/19- , LDL 67, TSH 1.85, vit D 39, GFR 97, H/H wnl    Diabetic complications: peripheral neuropathy  Eye exam current (within one year): due   Foot care and dental care: discussed, follows with [facility]    Current diabetic medications include:  Metformin ER 500mg 2 tab BID  Jardiance 10mg daily - tolerating well  Lantus 70U QHS  Humalog 20U before lunch and 30U before dinner - forgets to take before dinner at times  On insulin since beginning 2018    Statin: no. On fenofibrate    Past medications: glipizide (dc when she was started on insulin), humalog 75/25    Diabetic Monitoring  -   Meter downloaded and reviewed- checking bs 1-2x/day, bs ranging 150s to 200s, down from 200-300s. Reports hypoglycemia if she takes humalog but then forgets to eat.    Nutrition:     Current diet: in general, an \"unhealthy\" diet, on average, 1-2 meals per day. Dinner is usually only meal, sometimes eats breakfast. Drinks one reg mt dew per day.   Current exercise: none, chronic back/leg pain  Seen RD in past: no    The following portions of the patient's history were reviewed and updated by me as appropriate: allergies, current medications, past family history, past social history, past surgical history and problem list.    Past Medical History:   Diagnosis Date   • Anemia    • Asthma    • Chronic bronchitis (CMS/HCC)    • Depression    • Diabetes mellitus (CMS/HCC)    • Epilepsy (CMS/HCC)    • Fibromyalgia, primary    • GERD (gastroesophageal reflux disease)    • Hyperlipidemia        Medications    Current Outpatient Medications:   •  acetaminophen (TYLENOL) 325 MG tablet, Take 2 tablets by mouth " Every 4 (Four) Hours As Needed for Mild Pain ., Disp: , Rfl:   •  B-D UF III MINI PEN NEEDLES 31G X 5 MM misc, Daily., Disp: , Rfl: 0  •  DULoxetine (CYMBALTA) 60 MG capsule, Take 60 mg by mouth Daily., Disp: , Rfl: 0  •  Empagliflozin (JARDIANCE) 25 MG tablet, Take 25 mg by mouth Daily., Disp: 30 tablet, Rfl: 6  •  enoxaparin (LOVENOX) 80 MG/0.8ML solution syringe, Inject 0.8 mL under the skin into the appropriate area as directed Every 12 (Twelve) Hours., Disp: 20 syringe, Rfl: 1  •  fenofibrate (TRICOR) 145 MG tablet, take 1 tablet by mouth every evening at bedtime, Disp: , Rfl: 0  •  fluticasone (FLONASE) 50 MCG/ACT nasal spray, , Disp: , Rfl: 0  •  furosemide (LASIX) 40 MG tablet, , Disp: , Rfl: 0  •  Insulin Glargine (LANTUS SOLOSTAR) 100 UNIT/ML injection pen, Inject 80 Units under the skin into the appropriate area as directed Every Night., Disp: 8 pen, Rfl: 6  •  Insulin Lispro (HUMALOG KWIKPEN) 100 UNIT/ML solution pen-injector, Take 20 units before lunch and 30u before dinner plus correction 1:50 for >150, Disp: 7 pen, Rfl: 6  •  loratadine (CLARITIN) 10 MG tablet, take 1 tablet by mouth once daily if needed, Disp: , Rfl: 0  •  magnesium oxide (MAG-OX) 400 MG tablet, Take 1 tablet by mouth 2 (Two) Times a Day., Disp: , Rfl: 0  •  metFORMIN ER (GLUCOPHAGE-XR) 500 MG 24 hr tablet, Tab 2 tab po bid, Disp: 120 tablet, Rfl: 6  •  Morphine (MS CONTIN) 30 MG 12 hr tablet, Take 30 mg by mouth 2 (Two) Times a Day., Disp: , Rfl: 0  •  ONE TOUCH ULTRA TEST test strip, , Disp: , Rfl: 0  •  ONETOUCH DELICA LANCETS FINE misc, , Disp: , Rfl: 1  •  phenytoin (DILANTIN) 100 MG ER capsule, take 2 capsule by mouth twice a day, Disp: , Rfl: 0  •  Probiotic Product (FLORAJEN3) capsule, , Disp: , Rfl: 0  •  RA ASPIRIN EC 81 MG EC tablet, Take 81 mg by mouth Daily., Disp: , Rfl: 0  •  RA COL-RITE 100 MG capsule, Take 100 mg by mouth 2 (Two) Times a Day., Disp: , Rfl: 0  •  RA VITAMIN D-3 5000 units capsule, Take 5,000 Units by  mouth Daily., Disp: , Rfl: 0  •  raNITIdine (ZANTAC) 150 MG tablet, , Disp: , Rfl: 0  •  warfarin (COUMADIN) 1 MG tablet, Take 5mg (5 tablets) nightly. Ask PCP on 6/27 about potential dose adjustments, Disp: 30 tablet, Rfl: 1    Review of Systems  Review of Systems   Constitutional: Positive for fatigue and unexpected weight change (wt gain). Negative for chills and fever.        Feeling poorly   HENT: Positive for ear pain and rhinorrhea. Negative for hearing loss, nosebleeds and sore throat.    Eyes: Positive for pain and itching. Negative for discharge, redness and visual disturbance.        Eyesight problems   Respiratory: Positive for cough, shortness of breath and wheezing.    Cardiovascular: Positive for leg swelling. Negative for chest pain and palpitations.   Gastrointestinal: Negative for abdominal pain, blood in stool, constipation and diarrhea.   Endocrine: Positive for heat intolerance and polydipsia. Negative for cold intolerance.   Genitourinary: Negative for dysuria, frequency, hematuria, pelvic pain, vaginal bleeding and vaginal discharge.   Musculoskeletal: Positive for arthralgias, joint swelling and myalgias. Negative for gait problem.   Skin: Negative for rash.   Allergic/Immunologic: Negative.    Neurological: Positive for dizziness, weakness, numbness and headaches. Negative for syncope.   Hematological: Negative for adenopathy. Does not bruise/bleed easily.   Psychiatric/Behavioral: Positive for sleep disturbance. Negative for suicidal ideas. The patient is not nervous/anxious.         Depressed, sadness        Physical Exam    /72   Pulse 75   Wt 82.6 kg (182 lb)   SpO2 98%   BMI 33.29 kg/m² Body mass index is 33.29 kg/m².  Physical Exam   Constitutional: She is oriented to person, place, and time. She appears well-developed. No distress.   HENT:   Head: Normocephalic.   Right Ear: External ear normal.   Left Ear: External ear normal.   Mouth/Throat: No oropharyngeal exudate.    Eyes: Conjunctivae and lids are normal. Right eye exhibits no discharge. Left eye exhibits no discharge. Right pupil is reactive. Left pupil is reactive.   Neck: No JVD present. No tracheal deviation present. No thyroid mass and no thyromegaly present.   Cardiovascular: Normal rate, regular rhythm, normal heart sounds and intact distal pulses.   No murmur heard.  Pulmonary/Chest: Effort normal and breath sounds normal. No respiratory distress. She has no wheezes.   Abdominal: Soft. Bowel sounds are normal. There is no tenderness.   Musculoskeletal: She exhibits no edema or tenderness.   Lymphadenopathy:     She has no cervical adenopathy.   Neurological: She is alert and oriented to person, place, and time.   Skin: Skin is warm, dry and intact. No rash noted. She is not diaphoretic. No erythema.   Venous stasis BLE   Psychiatric: She has a normal mood and affect. Her speech is normal and behavior is normal. Thought content normal.       Labs and Imaging   Lab Results   Component Value Date    HGBA1C 6.9 08/16/2019    HGBA1C 8.80 (H) 06/21/2019    HGBA1C 12.1 05/15/2019     Office Visit on 08/16/2019   Component Date Value Ref Range Status   • Glucose 08/16/2019 81  70 - 130 mg/dL Final   • Hemoglobin A1C 08/16/2019 6.9  % Final   Admission on 07/18/2019, Discharged on 07/18/2019   Component Date Value Ref Range Status   • Glucose 07/18/2019 103* 65 - 99 mg/dL Final   • BUN 07/18/2019 26* 8 - 23 mg/dL Final   • Creatinine 07/18/2019 0.71  0.57 - 1.00 mg/dL Final   • Sodium 07/18/2019 140  136 - 145 mmol/L Final   • Potassium 07/18/2019 4.4  3.5 - 5.2 mmol/L Final   • Chloride 07/18/2019 100  98 - 107 mmol/L Final   • CO2 07/18/2019 30.0* 22.0 - 29.0 mmol/L Final   • Calcium 07/18/2019 9.6  8.6 - 10.5 mg/dL Final   • Total Protein 07/18/2019 7.4  6.0 - 8.5 g/dL Final   • Albumin 07/18/2019 3.80  3.50 - 5.20 g/dL Final   • ALT (SGPT) 07/18/2019 19  1 - 33 U/L Final   • AST (SGOT) 07/18/2019 19  1 - 32 U/L Final   •  Alkaline Phosphatase 07/18/2019 73  39 - 117 U/L Final   • Total Bilirubin 07/18/2019 0.2  0.2 - 1.2 mg/dL Final   • eGFR Non African Amer 07/18/2019 83  >60 mL/min/1.73 Final   • Globulin 07/18/2019 3.6  gm/dL Final   • A/G Ratio 07/18/2019 1.1  g/dL Final   • BUN/Creatinine Ratio 07/18/2019 36.6* 7.0 - 25.0 Final   • Anion Gap 07/18/2019 10.0  5.0 - 15.0 mmol/L Final   • BSA 07/18/2019 1.8  m^2 Final   •  CV ECHO OLGA - BZI_BMI 07/18/2019 32.9  kilograms/m^2 Final   •  CV ECHO OLGA - BSA(HAYCOCK) 07/18/2019 1.9  m^2 Final   •  CV ECHO OLGA - BZI_METRIC_WEIGHT 07/18/2019 81.6  kg Final   •  CV ECHO OLGA - BZI_METRIC_HEIGHT 07/18/2019 157.5  cm Final   • Left Distal Femoral Spont 07/18/2019 1   Final   • Left Popliteal Spont 07/18/2019 1   Final   • Left Peroneal Vessel 07/18/2019 1   Final   • Right Common Femoral Spont 07/18/2019 Y   Final   • Right Common Femoral Phasic 07/18/2019 Y   Final   • Right Common Femoral Augment 07/18/2019 Y   Final   • Right Common Femoral Compress 07/18/2019 C   Final   • Left Common Femoral Spont 07/18/2019 Y   Final   • Left Common Femoral Phasic 07/18/2019 Y   Final   • Left Common Femoral Augment 07/18/2019 Y   Final   • Left Common Femoral Compress 07/18/2019 C   Final   • Left Saphenofemoral Junction Spont 07/18/2019 Y   Final   • Left Saphenofemoral Junction Phasic 07/18/2019 Y   Final   • Left Saphenofemoral Junction Augme* 07/18/2019 Y   Final   • Left Saphenofemoral Junction Compr* 07/18/2019 C   Final   • Left Proximal Femoral Compress 07/18/2019 C   Final   • Left Mid Femoral Spont 07/18/2019 Y   Final   • Left Mid Femoral Phasic 07/18/2019 Y   Final   • Left Mid Femoral Augment 07/18/2019 Y   Final   • Left Mid Femoral Compress 07/18/2019 C   Final   • Left Distal Femoral Spont 07/18/2019 N   Final   • Left Distal Femoral Phasic 07/18/2019 N   Final   • Left Distal Femoral Augment 07/18/2019 N   Final   • Left Distal Femoral Competent 07/18/2019 N   Final   •  Left Distal Femoral Compress 07/18/2019 P   Final   • Left Distal Femoral Thrombus 07/18/2019 S   Final   • Left Popliteal Spont 07/18/2019 N   Final   • Left Popliteal Phasic 07/18/2019 N   Final   • Left Popliteal Augment 07/18/2019 N   Final   • Left Popliteal Competent 07/18/2019 N   Final   • Left Popliteal Compress 07/18/2019 P   Final   • Left Popliteal Thrombus 07/18/2019 S   Final   • Left GastronemiusSoleal Compress 07/18/2019 C   Final   • Left Greater Saph AK Compress 07/18/2019 C   Final   • Left Greater Saph BK Compress 07/18/2019 C   Final   • Left Lesser Saph Compress 07/18/2019 C   Final   • WBC 07/18/2019 8.83  3.40 - 10.80 10*3/mm3 Final   • RBC 07/18/2019 4.64  3.77 - 5.28 10*6/mm3 Final   • Hemoglobin 07/18/2019 13.4  12.0 - 15.9 g/dL Final   • Hematocrit 07/18/2019 42.5  34.0 - 46.6 % Final   • MCV 07/18/2019 91.6  79.0 - 97.0 fL Final   • MCH 07/18/2019 28.9  26.6 - 33.0 pg Final   • MCHC 07/18/2019 31.5  31.5 - 35.7 g/dL Final   • RDW 07/18/2019 14.9  12.3 - 15.4 % Final   • RDW-SD 07/18/2019 50.3  37.0 - 54.0 fl Final   • MPV 07/18/2019 9.2  6.0 - 12.0 fL Final   • Platelets 07/18/2019 273  140 - 450 10*3/mm3 Final   • Neutrophil % 07/18/2019 56.7  42.7 - 76.0 % Final   • Lymphocyte % 07/18/2019 28.7  19.6 - 45.3 % Final   • Monocyte % 07/18/2019 6.8  5.0 - 12.0 % Final   • Eosinophil % 07/18/2019 7.0* 0.3 - 6.2 % Final   • Basophil % 07/18/2019 0.6  0.0 - 1.5 % Final   • Immature Grans % 07/18/2019 0.2  0.0 - 0.5 % Final   • Neutrophils, Absolute 07/18/2019 5.01  1.70 - 7.00 10*3/mm3 Final   • Lymphocytes, Absolute 07/18/2019 2.53  0.70 - 3.10 10*3/mm3 Final   • Monocytes, Absolute 07/18/2019 0.60  0.10 - 0.90 10*3/mm3 Final   • Eosinophils, Absolute 07/18/2019 0.62* 0.00 - 0.40 10*3/mm3 Final   • Basophils, Absolute 07/18/2019 0.05  0.00 - 0.20 10*3/mm3 Final   • Immature Grans, Absolute 07/18/2019 0.02  0.00 - 0.05 10*3/mm3 Final   • nRBC 07/18/2019 0.0  0.0 - 0.2 /100 WBC Final   •  Protime 07/18/2019 29.0* 11.2 - 14.3 Seconds Final   • INR 07/18/2019 2.87* 0.85 - 1.16 Final     No images are attached to the encounter or orders placed in the encounter.  No Images in the past 120 days found..    Assessment / Plan   Martha was seen today for follow-up.    Diagnoses and all orders for this visit:    Uncontrolled type 2 diabetes mellitus with hyperglycemia (CMS/HCC)  -     POC Glucose Fingerstick  -     POC Glycosylated Hemoglobin (Hb A1C)  -     Insulin Lispro (HUMALOG KWIKPEN) 100 UNIT/ML solution pen-injector; Take 20 units before lunch and 30u before dinner plus correction 1:50 for >150  -     Insulin Glargine (LANTUS SOLOSTAR) 100 UNIT/ML injection pen; Inject 80 Units under the skin into the appropriate area as directed Every Night.    Diabetic peripheral neuropathy associated with type 2 diabetes mellitus (CMS/HCC)    Microalbuminuria due to type 2 diabetes mellitus (CMS/HCC)  -     Microalbumin / Creatinine Urine Ratio - Urine, Clean Catch        Diabetes Mellitus 2 is under fair control.  -A1c 6.9, down from 12.1 5/2019  -6.9 lower than what bs readings suggest, but overall improving readings  -increase lantus to 80u qhs  -continue admelog to 20u before lunch (if she eats lunch), to 30u before dinner, correction 1:50 for >150  -continue jardiance 10mg daily  -continue metformin 500mg 2 tab bid  -written instructions provided and reviewed with pt  -bring meter to f/u          1.  Diet: 3-4 carb servings per meal for females, 4-5 carb servings per meal for males  Spread carb intake throughout the day  Increase lean protein and vegetable intake  Avoid sugary drinks and processed carbs including crackers, cookies, cakes  2.  Exercise: Recommend at least 30 minutes of exercise daily, at least 5 days per week. Increase exercise gradually.   3.  Blood Glucose Goal: Blood glucose goal <150 fasting, <180 2 hr postprandial  4.  Microalbumin - repeat next visit  5.  Education performed during this  visit: long term diabetic complications discussed. , annual eye examinations at Ophthalmology discussed, dental hygiene discussed  and foot care reviewed., home glucose monitoring emphasized, all medications, side effects and compliance discussed carefully and Hypoglycemia management and prevention reviewed. Reviewed ‘ABCs’ of diabetes management (respective goals in parentheses):  A1C (<7), blood pressure (<130/80), and cholesterol (LDL <100, if CVD <70).    Peripheral neuropathy  -controlled  -cont f/u with podiatry    Microalbuminuria   -repeat today    There are no Patient Instructions on file for this visit.    Follow up: Return in about 3 months (around 11/16/2019).    Discussed the nature of the disease including, risks, complications, implications, management, safe and proper use of medications. Encouraged therapeutic lifestyle changes including low calorie diet with plenty of fruits and vegetables, daily exercise, medication compliance, and keeping scheduled follow up appointments with me and any other providers. Encouraged patient to have appointment for complete physical, fasting labs, appropriate screenings, and immunizations on an annual basis.        Jaime Gillespie PA-C

## 2019-08-17 LAB
ALBUMIN UR-MCNC: 4.7 MG/DL
CREAT UR-MCNC: 46.7 MG/DL
MICROALBUMIN/CREAT UR: 100.6 MG/G

## 2019-08-19 ENCOUNTER — DOCUMENTATION (OUTPATIENT)
Dept: ENDOCRINOLOGY | Facility: CLINIC | Age: 64
End: 2019-08-19

## 2019-08-19 DIAGNOSIS — E11.29 MICROALBUMINURIA DUE TO TYPE 2 DIABETES MELLITUS (HCC): Primary | ICD-10-CM

## 2019-08-19 DIAGNOSIS — R80.9 MICROALBUMINURIA DUE TO TYPE 2 DIABETES MELLITUS (HCC): Primary | ICD-10-CM

## 2019-08-19 RX ORDER — LISINOPRIL 2.5 MG/1
2.5 TABLET ORAL DAILY
Qty: 30 TABLET | Refills: 6 | Status: SHIPPED | OUTPATIENT
Start: 2019-08-19 | End: 2020-02-24 | Stop reason: SDUPTHER

## 2019-11-18 ENCOUNTER — OFFICE VISIT (OUTPATIENT)
Dept: ENDOCRINOLOGY | Facility: CLINIC | Age: 64
End: 2019-11-18

## 2019-11-18 VITALS
WEIGHT: 191 LBS | DIASTOLIC BLOOD PRESSURE: 68 MMHG | OXYGEN SATURATION: 98 % | SYSTOLIC BLOOD PRESSURE: 110 MMHG | BODY MASS INDEX: 34.93 KG/M2 | HEART RATE: 73 BPM

## 2019-11-18 DIAGNOSIS — E11.65 UNCONTROLLED TYPE 2 DIABETES MELLITUS WITH HYPERGLYCEMIA (HCC): Primary | ICD-10-CM

## 2019-11-18 DIAGNOSIS — E11.29 MICROALBUMINURIA DUE TO TYPE 2 DIABETES MELLITUS (HCC): ICD-10-CM

## 2019-11-18 DIAGNOSIS — E11.42 DIABETIC PERIPHERAL NEUROPATHY ASSOCIATED WITH TYPE 2 DIABETES MELLITUS (HCC): ICD-10-CM

## 2019-11-18 DIAGNOSIS — R80.9 MICROALBUMINURIA DUE TO TYPE 2 DIABETES MELLITUS (HCC): ICD-10-CM

## 2019-11-18 LAB
GLUCOSE BLDC GLUCOMTR-MCNC: 157 MG/DL (ref 70–130)
HBA1C MFR BLD: 8.1 %

## 2019-11-18 PROCEDURE — 83036 HEMOGLOBIN GLYCOSYLATED A1C: CPT | Performed by: PHYSICIAN ASSISTANT

## 2019-11-18 PROCEDURE — 82947 ASSAY GLUCOSE BLOOD QUANT: CPT | Performed by: PHYSICIAN ASSISTANT

## 2019-11-18 PROCEDURE — 99214 OFFICE O/P EST MOD 30 MIN: CPT | Performed by: PHYSICIAN ASSISTANT

## 2019-11-18 RX ORDER — APIXABAN 2.5 MG/1
2.5 TABLET, FILM COATED ORAL 2 TIMES DAILY
Refills: 0 | COMMUNITY
Start: 2019-10-23 | End: 2021-11-16 | Stop reason: DRUGHIGH

## 2019-11-18 RX ORDER — FLURBIPROFEN SODIUM 0.3 MG/ML
SOLUTION/ DROPS OPHTHALMIC
Qty: 50 EACH | Refills: 11 | Status: SHIPPED | OUTPATIENT
Start: 2019-11-18

## 2019-11-18 RX ORDER — OMEGA-3 FATTY ACIDS/FISH OIL 300-500 MG
1 CAPSULE ORAL 2 TIMES DAILY
Refills: 0 | COMMUNITY
Start: 2019-10-02

## 2019-11-18 RX ORDER — INSULIN LISPRO 100 [IU]/ML
INJECTION, SOLUTION INTRAVENOUS; SUBCUTANEOUS
Qty: 6 PEN | Refills: 6 | Status: SHIPPED | OUTPATIENT
Start: 2019-11-18

## 2019-11-18 NOTE — PATIENT INSTRUCTIONS
Take Humalog 20 units before lunch (if you eat lunch), increase to 40 units before supper.  Change Lantus to [place]jeo and take 100 units at bedtime. After a week increase to 110 units.  Continue Jardiance and metformin.

## 2019-11-18 NOTE — PROGRESS NOTES
"Chief Complaint  F/u for Diabetes Mellitus.    HPI   Martha Harrell is a 64 y.o. female who is here today for f/u of Diabetes Mellitus type 2. The initial diagnosis of diabetes was made approx 2008.     BS earlier was 306, took 20 units of humalog but did not eat. BS now 157.     A1C - 8.1 (11/18/19), 6.9 (8/16/19), 12.1 (5/15/19), 11.2 (1/22/19)    Diabetic complications: peripheral neuropathy  Eye exam current (within one year): due   Foot care and dental care: discussed, follows with [facility]    Current diabetic medications include:  Metformin 1000mg BID  Jardiance 25mg daily - tolerating well  Lantus 90U QHS  Humalog 20U before lunch and 30U before dinner - forgets to take before dinner at times  On insulin since beginning 2018    Statin: no. On fenofibrate    Past medications: glipizide (dc when she was started on insulin), humalog 75/25    Diabetic Monitoring  -   Meter downloaded and reviewed- only 4 readings in the last 2 weeks, readings ranging 170-314, no hypoglycemia    Nutrition:     Current diet: in general, an \"unhealthy\" diet, on average, 1-2 meals per day. Dinner is usually only meal, sometimes eats lunch  Current exercise: none, chronic back/leg pain  Seen RD in past: no    The following portions of the patient's history were reviewed and updated by me as appropriate: allergies, current medications, past family history, past social history, past surgical history and problem list.    Past Medical History:   Diagnosis Date   • Anemia    • Asthma    • Chronic bronchitis (CMS/[facility])    • Depression    • Diabetes mellitus (CMS/[facility])    • Epilepsy (CMS/[facility])    • Fibromyalgia, primary    • GERD (gastroesophageal reflux disease)    • Hyperlipidemia        Medications    Current Outpatient Medications:   •  acetaminophen (TYLENOL) 325 MG tablet, Take 2 tablets by mouth Every 4 (Four) Hours As Needed for Mild Pain ., Disp: , Rfl:   •  B-D UF III MINI PEN NEEDLES 31G X 5 MM misc, Use daily with " insulin, Disp: 50 each, Rfl: 11  •  DULoxetine (CYMBALTA) 60 MG capsule, Take 60 mg by mouth Daily., Disp: , Rfl: 0  •  Empagliflozin (JARDIANCE) 25 MG tablet, Take 25 mg by mouth Daily., Disp: 30 tablet, Rfl: 6  •  fenofibrate (TRICOR) 145 MG tablet, take 1 tablet by mouth every evening at bedtime, Disp: , Rfl: 0  •  furosemide (LASIX) 40 MG tablet, , Disp: , Rfl: 0  •  lisinopril (ZESTRIL) 2.5 MG tablet, Take 1 tablet by mouth Daily., Disp: 30 tablet, Rfl: 6  •  loratadine (CLARITIN) 10 MG tablet, take 1 tablet by mouth once daily if needed, Disp: , Rfl: 0  •  metFORMIN (GLUCOPHAGE) 1000 MG tablet, Take 1 tablet by mouth 2 (Two) Times a Day With Meals., Disp: 60 tablet, Rfl: 6  •  Morphine (MS CONTIN) 30 MG 12 hr tablet, Take 30 mg by mouth 2 (Two) Times a Day., Disp: , Rfl: 0  •  Omega-3 Fatty Acids (RA FISH OIL) 1000 MG capsule, Take 1 capsule by mouth 2 (Two) Times a Day., Disp: , Rfl: 0  •  ONE TOUCH ULTRA TEST test strip, Test BS TID, Disp: 100 each, Rfl: 11  •  ONETOUCH DELICA LANCETS FINE misc, Test BS TID, Disp: 100 each, Rfl: 11  •  phenytoin (DILANTIN) 100 MG ER capsule, take 2 capsule by mouth twice a day, Disp: , Rfl: 0  •  RA ASPIRIN EC 81 MG EC tablet, Take 81 mg by mouth Daily., Disp: , Rfl: 0  •  RA VITAMIN D-3 5000 units capsule, Take 5,000 Units by mouth Daily., Disp: , Rfl: 0  •  raNITIdine (ZANTAC) 150 MG tablet, , Disp: , Rfl: 0  •  ELIQUIS 2.5 MG tablet tablet, Take 2.5 mg by mouth 2 (Two) Times a Day., Disp: , Rfl: 0  •  Insulin Glargine, 2 Unit Dial, (TOUJEO MAX SOLOSTAR) 300 UNIT/ML solution pen-injector injection, Take 100 units at bedtime, after a week go up to 110 units, Disp: 4 pen, Rfl: 6  •  Insulin Lispro, 1 Unit Dial, (HUMALOG KWIKPEN) 100 UNIT/ML solution pen-injector, Take 20u before lunch (if you eat lunch) and take 40 units before supper, Disp: 6 pen, Rfl: 6    Review of Systems  Review of Systems   Constitutional: Positive for fatigue. Negative for chills and fever.         Feeling poorly   HENT: Negative for hearing loss, nosebleeds and sore throat.    Eyes: Negative for discharge, redness and visual disturbance.        Eyesight problems   Respiratory: Positive for cough, shortness of breath and wheezing.    Cardiovascular: Positive for leg swelling. Negative for chest pain and palpitations.   Gastrointestinal: Negative for abdominal pain, blood in stool, constipation and diarrhea.   Endocrine: Positive for heat intolerance and polydipsia. Negative for cold intolerance.   Genitourinary: Negative for dysuria, frequency, hematuria, pelvic pain, vaginal bleeding and vaginal discharge.   Musculoskeletal: Positive for arthralgias, joint swelling and myalgias. Negative for gait problem.   Skin: Negative for rash.   Allergic/Immunologic: Negative.    Neurological: Positive for dizziness, weakness, numbness and headaches. Negative for syncope.   Hematological: Negative for adenopathy. Does not bruise/bleed easily.   Psychiatric/Behavioral: Positive for sleep disturbance. Negative for suicidal ideas. The patient is not nervous/anxious.         Depressed, sadness        Physical Exam    /68   Pulse 73   Wt 86.6 kg (191 lb)   SpO2 98%   BMI 34.93 kg/m² Body mass index is 34.93 kg/m².  Physical Exam   Constitutional: She is oriented to person, place, and time. She appears well-developed. No distress.   HENT:   Head: Normocephalic.   Right Ear: External ear normal.   Left Ear: External ear normal.   Mouth/Throat: No oropharyngeal exudate.   Eyes: Conjunctivae and lids are normal. Right eye exhibits no discharge. Left eye exhibits no discharge. Right pupil is reactive. Left pupil is reactive.   Neck: No JVD present. No tracheal deviation present. No thyroid mass and no thyromegaly present.   Cardiovascular: Normal rate, regular rhythm, normal heart sounds and intact distal pulses.   No murmur heard.  Pulmonary/Chest: Effort normal and breath sounds normal. No respiratory distress. She has  no wheezes.   Abdominal: Soft. Bowel sounds are normal. There is no tenderness.   Musculoskeletal: She exhibits no edema or tenderness.   Lymphadenopathy:     She has no cervical adenopathy.   Neurological: She is alert and oriented to person, place, and time.   Skin: Skin is warm, dry and intact. No rash noted. She is not diaphoretic. No erythema.   Venous stasis BLE   Psychiatric: She has a normal mood and affect. Her speech is normal and behavior is normal. Thought content normal.       Labs and Imaging   Lab Results   Component Value Date    HGBA1C 8.1 11/18/2019    HGBA1C 6.9 08/16/2019    HGBA1C 8.80 (H) 06/21/2019     Office Visit on 11/18/2019   Component Date Value Ref Range Status   • Glucose 11/18/2019 157* 70 - 130 mg/dL Final   • Hemoglobin A1C 11/18/2019 8.1  % Final     No images are attached to the encounter or orders placed in the encounter.  No Images in the past 120 days found..    Assessment / Plan   Martha was seen today for follow-up.    Diagnoses and all orders for this visit:    Uncontrolled type 2 diabetes mellitus with hyperglycemia (CMS/HCC)  -     POC Glucose Fingerstick  -     POC Glycosylated Hemoglobin (Hb A1C)  -     Empagliflozin (JARDIANCE) 25 MG tablet; Take 25 mg by mouth Daily.  -     ONE TOUCH ULTRA TEST test strip; Test BS TID  -     ONETOUCH DELICA LANCETS FINE misc; Test BS TID  -     Insulin Lispro, 1 Unit Dial, (HUMALOG KWIKPEN) 100 UNIT/ML solution pen-injector; Take 20u before lunch (if you eat lunch) and take 40 units before supper  -     metFORMIN (GLUCOPHAGE) 1000 MG tablet; Take 1 tablet by mouth 2 (Two) Times a Day With Meals.  -     Insulin Glargine, 2 Unit Dial, (TOUJEO MAX SOLOSTAR) 300 UNIT/ML solution pen-injector injection; Take 100 units at bedtime, after a week go up to 110 units  -     B-D UF III MINI PEN NEEDLES 31G X 5 MM misc; Use daily with insulin    Diabetic peripheral neuropathy associated with type 2 diabetes mellitus (CMS/HCC)    Microalbuminuria  due to type 2 diabetes mellitus (CMS/[facility])        Diabetes Mellitus 2 is under fair control.  -A1c 8.1  -limited bs readings but suggesting even higher average than a1c   -change lantus to toujeo as she needs a more concentrated insulin. Increase dose to 100u qhs, after a week increase to 110u if no hypoglycemia. Sample provided.   -humalog- 20u before lunch (if she eats lunch), increase to 40u before dinner. Emphasis on taking AC to avoid hypoglycemia.   -continue jardiance 10mg daily  -continue metformin 500mg 2 tab bid  -written instructions provided and reviewed with pt  -bring meter to f/u          1.  Diet: 3-4 carb servings per meal for females, 4-5 carb servings per meal for males  Spread carb intake throughout the day  Increase lean protein and vegetable intake  Avoid sugary drinks and processed carbs including crackers, cookies, cakes  2.  Exercise: Recommend at least 30 minutes of exercise daily, at least 5 days per week. Increase exercise gradually.   3.  Blood Glucose Goal: Blood glucose goal <150 fasting, <180 2 hr postprandial  4.  Microalbumin due 8/2020  5.  Education performed during this visit: long term diabetic complications discussed. , annual eye examinations at Ophthalmology discussed, dental hygiene discussed  and foot care reviewed., home glucose monitoring emphasized, all medications, side effects and compliance discussed carefully and Hypoglycemia management and prevention reviewed. Reviewed ‘ABCs’ of diabetes management (respective goals in parentheses):  A1C (<7), blood pressure (<130/80), and cholesterol (LDL <100, if CVD <70).    Peripheral neuropathy  -controlled  -cont f/u with podiatry    Microalbuminuria   -continue lisinopril 2.5mg daily    Patient Instructions   Take Humalog 20 units before lunch (if you eat lunch), increase to 40 units before supper.  Change Lantus to Toujeo and take 100 units at bedtime. After a week increase to 110 units.  Continue Jardiance and metformin.        Follow up: Return in about 3 months (around 2/18/2020).    Discussed the nature of the disease including, risks, complications, implications, management, safe and proper use of medications. Encouraged therapeutic lifestyle changes including low calorie diet with plenty of fruits and vegetables, daily exercise, medication compliance, and keeping scheduled follow up appointments with me and any other providers. Encouraged patient to have appointment for complete physical, fasting labs, appropriate screenings, and immunizations on an annual basis.        Jaime Gillespie PA-C

## 2020-02-19 ENCOUNTER — TELEPHONE (OUTPATIENT)
Dept: ENDOCRINOLOGY | Facility: CLINIC | Age: 65
End: 2020-02-19

## 2020-02-19 NOTE — TELEPHONE ENCOUNTER
Left a message for patient asking her to call me to discuss the fact that her insurance company informed us that her Lisinopril had recently been discontinued. Jaime would like to know if someone else took her off of it because she added it 8/2019 to protect her kidneys because she has protein in her urine. It is also still on her med list.

## 2020-02-24 DIAGNOSIS — R80.9 MICROALBUMINURIA DUE TO TYPE 2 DIABETES MELLITUS (HCC): ICD-10-CM

## 2020-02-24 DIAGNOSIS — E11.29 MICROALBUMINURIA DUE TO TYPE 2 DIABETES MELLITUS (HCC): ICD-10-CM

## 2020-02-24 RX ORDER — LISINOPRIL 2.5 MG/1
2.5 TABLET ORAL DAILY
Qty: 30 TABLET | Refills: 6 | Status: SHIPPED | OUTPATIENT
Start: 2020-02-24 | End: 2021-02-23

## 2020-02-24 NOTE — TELEPHONE ENCOUNTER
Spoke with patient and advised of message. She states she knows no one has taken her off of it but doesn't know why it would be d/c'd. She states she does not have any and if we want her on it to please send a new Rx to her [facility] pharmacy in [place].

## 2020-02-24 NOTE — TELEPHONE ENCOUNTER
Left message on voicemail letting her know that her Lisinopril was sent in and that she would need to pick it up and restart it.

## 2021-06-11 DIAGNOSIS — E11.65 UNCONTROLLED TYPE 2 DIABETES MELLITUS WITH HYPERGLYCEMIA (HCC): ICD-10-CM

## 2021-06-11 RX ORDER — FLURBIPROFEN SODIUM 0.3 MG/ML
SOLUTION/ DROPS OPHTHALMIC
Qty: 100 EACH | Refills: 3 | OUTPATIENT
Start: 2021-06-11

## 2021-09-28 ENCOUNTER — OFFICE VISIT (OUTPATIENT)
Dept: ORTHOPEDIC SURGERY | Facility: CLINIC | Age: 66
End: 2021-09-28

## 2021-09-28 VITALS
HEIGHT: 62 IN | HEART RATE: 79 BPM | SYSTOLIC BLOOD PRESSURE: 138 MMHG | DIASTOLIC BLOOD PRESSURE: 61 MMHG | BODY MASS INDEX: 35.59 KG/M2 | WEIGHT: 193.4 LBS

## 2021-09-28 DIAGNOSIS — M17.0 PRIMARY OSTEOARTHRITIS OF BOTH KNEES: Primary | ICD-10-CM

## 2021-09-28 DIAGNOSIS — M25.561 PAIN IN BOTH KNEES, UNSPECIFIED CHRONICITY: ICD-10-CM

## 2021-09-28 DIAGNOSIS — M25.562 PAIN IN BOTH KNEES, UNSPECIFIED CHRONICITY: ICD-10-CM

## 2021-09-28 PROCEDURE — 20610 DRAIN/INJ JOINT/BURSA W/O US: CPT | Performed by: ORTHOPAEDIC SURGERY

## 2021-09-28 PROCEDURE — 99204 OFFICE O/P NEW MOD 45 MIN: CPT | Performed by: ORTHOPAEDIC SURGERY

## 2021-09-28 RX ORDER — HYDROCODONE BITARTRATE AND ACETAMINOPHEN 7.5; 325 MG/1; MG/1
1 TABLET ORAL 3 TIMES DAILY PRN
COMMUNITY
Start: 2021-08-30

## 2021-09-28 RX ORDER — BUPIVACAINE HYDROCHLORIDE 2.5 MG/ML
3 INJECTION, SOLUTION EPIDURAL; INFILTRATION; INTRACAUDAL
Status: COMPLETED | OUTPATIENT
Start: 2021-09-28 | End: 2021-09-28

## 2021-09-28 RX ORDER — LIDOCAINE HYDROCHLORIDE 10 MG/ML
3 INJECTION, SOLUTION EPIDURAL; INFILTRATION; INTRACAUDAL; PERINEURAL
Status: COMPLETED | OUTPATIENT
Start: 2021-09-28 | End: 2021-09-28

## 2021-09-28 RX ORDER — ATORVASTATIN CALCIUM 20 MG/1
20 TABLET, FILM COATED ORAL
COMMUNITY
Start: 2021-08-30 | End: 2023-02-23

## 2021-09-28 RX ORDER — OMEPRAZOLE 20 MG/1
CAPSULE, DELAYED RELEASE ORAL
COMMUNITY
Start: 2021-09-27 | End: 2023-02-23

## 2021-09-28 RX ORDER — TRIAMCINOLONE ACETONIDE 40 MG/ML
80 INJECTION, SUSPENSION INTRA-ARTICULAR; INTRAMUSCULAR
Status: COMPLETED | OUTPATIENT
Start: 2021-09-28 | End: 2021-09-28

## 2021-09-28 RX ORDER — PHENOL 1.4 %
600 AEROSOL, SPRAY (ML) MUCOUS MEMBRANE DAILY
COMMUNITY
End: 2023-02-23

## 2021-09-28 RX ORDER — MELOXICAM 15 MG/1
15 TABLET ORAL EVERY MORNING
COMMUNITY
Start: 2021-09-22 | End: 2022-03-21 | Stop reason: HOSPADM

## 2021-09-28 RX ORDER — BUSPIRONE HYDROCHLORIDE 15 MG/1
TABLET ORAL
COMMUNITY
Start: 2021-09-27

## 2021-09-28 RX ORDER — MAGNESIUM OXIDE 400 MG/1
1 TABLET ORAL 2 TIMES DAILY
COMMUNITY
Start: 2021-08-24

## 2021-09-28 RX ADMIN — LIDOCAINE HYDROCHLORIDE 3 ML: 10 INJECTION, SOLUTION EPIDURAL; INFILTRATION; INTRACAUDAL; PERINEURAL at 14:15

## 2021-09-28 RX ADMIN — BUPIVACAINE HYDROCHLORIDE 3 ML: 2.5 INJECTION, SOLUTION EPIDURAL; INFILTRATION; INTRACAUDAL at 14:15

## 2021-09-28 RX ADMIN — TRIAMCINOLONE ACETONIDE 80 MG: 40 INJECTION, SUSPENSION INTRA-ARTICULAR; INTRAMUSCULAR at 14:15

## 2021-09-28 NOTE — PROGRESS NOTES
Procedure   Large Joint Arthrocentesis: R knee  Date/Time: 9/28/2021 2:15 PM  Site marked: site marked  Timeout: Immediately prior to procedure a time out was called to verify the correct patient, procedure, equipment, support staff and site/side marked as required   Supporting Documentation  Indications: pain   Procedure Details  Location: knee - R knee  Preparation: Patient was prepped and draped in the usual sterile fashion  Needle size: 22 G  Approach: anterolateral  Medications administered: 80 mg triamcinolone acetonide 40 MG/ML; 3 mL lidocaine PF 1% 1 %; 3 mL bupivacaine (PF) 0.25 %  Patient tolerance: patient tolerated the procedure well with no immediate complications

## 2021-09-28 NOTE — PROGRESS NOTES
Orthopaedic Clinic Note: Knee New Patient    Chief Complaint   Patient presents with   • Pain     bilateral knee pain        HPI    Martha Harrell is a 66 y.o. female who presents with bilateral knee pain for 2 month(s).  She has a history of bilateral knee pain has been ongoing for several years.  She recently fell 2 months ago which progressed her knee pain to a higher level of pain compared to her baseline.  She is now complaining of right knee more painful than the left.   Pain is localized to the entire knee (globally) and is a 7/10 on the pain scale. Pain is described as aching. Associated symptoms include pain and stiffness. The pain is worse with walking, climbing stairs and any movement of the joint; resting and elevating the extremity make it better. Previous treatments have included: nothing since symptom onset. Although some transient relief was reported with these interventions, these conservative measures have failed and symptoms have persisted. The patient is limited in daily activities and has had a significant decrease in quality of life as a result. She denies fevers, chills, or constitutional symptoms.    I have reviewed the following portions of the patient's history:History of Present Illness    Past Medical History:   Diagnosis Date   • Anemia    • Asthma    • Chronic bronchitis (CMS/HCC)    • Depression    • Diabetes mellitus (CMS/HCC)    • Epilepsy (CMS/HCC)    • Fibromyalgia, primary    • GERD (gastroesophageal reflux disease)    • Hyperlipidemia       History reviewed. No pertinent surgical history.   Family History   Problem Relation Age of Onset   • Arthritis Mother    • Heart disease Mother    • Obesity Mother    • Arthritis Father    • Cancer Father    • Hypertension Father    • Hyperlipidemia Father    • Obesity Father    • Arthritis Sister    • Obesity Sister    • Arthritis Maternal Aunt    • Arthritis Maternal Uncle    • Cancer Maternal Uncle      Social History     Socioeconomic  History   • Marital status:      Spouse name: Not on file   • Number of children: Not on file   • Years of education: Not on file   • Highest education level: Not on file   Tobacco Use   • Smoking status: Former Smoker     Types: Cigarettes     Quit date: 2010     Years since quittin.6   • Smokeless tobacco: Never Used   Substance and Sexual Activity   • Alcohol use: No   • Drug use: No   • Sexual activity: Defer      Current Outpatient Medications on File Prior to Visit   Medication Sig Dispense Refill   • atorvastatin (LIPITOR) 20 MG tablet Take 20 mg by mouth every night at bedtime.     • busPIRone (BUSPAR) 15 MG tablet      • calcium carbonate (OS-HOSEA) 600 MG tablet Take 600 mg by mouth Daily.     • DULoxetine (CYMBALTA) 60 MG capsule Take 60 mg by mouth Daily.  0   • ELIQUIS 2.5 MG tablet tablet Take 2.5 mg by mouth 2 (Two) Times a Day.  0   • Empagliflozin (JARDIANCE) 25 MG tablet Take 25 mg by mouth Daily. 30 tablet 6   • fenofibrate (TRICOR) 145 MG tablet 160 mg.  0   • furosemide (LASIX) 40 MG tablet   0   • HYDROcodone-acetaminophen (NORCO) 7.5-325 MG per tablet Take 1 tablet by mouth 3 (Three) Times a Day As Needed.     • Insulin Glargine, 2 Unit Dial, (TOUJEO MAX SOLOSTAR) 300 UNIT/ML solution pen-injector injection Take 100 units at bedtime, after a week go up to 110 units 4 pen 6   • Insulin Lispro, 1 Unit Dial, (HUMALOG KWIKPEN) 100 UNIT/ML solution pen-injector Take 20u before lunch (if you eat lunch) and take 40 units before supper 6 pen 6   • loratadine (CLARITIN) 10 MG tablet take 1 tablet by mouth once daily if needed  0   • magnesium oxide (MAG-OX) 400 MG tablet Take 1 tablet by mouth 2 (Two) Times a Day.     • meloxicam (MOBIC) 15 MG tablet Take 15 mg by mouth Every Morning.     • metFORMIN (GLUCOPHAGE) 1000 MG tablet Take 1 tablet by mouth 2 (Two) Times a Day With Meals. 60 tablet 6   • Morphine (MS CONTIN) 30 MG 12 hr tablet Take 30 mg by mouth 2 (Two) Times a Day.  0   •  "Omega-3 Fatty Acids (RA FISH OIL) 1000 MG capsule Take 1 capsule by mouth 2 (Two) Times a Day.  0   • omeprazole (priLOSEC) 20 MG capsule      • phenytoin (DILANTIN) 100 MG ER capsule take 2 capsule by mouth twice a day  0   • RA ASPIRIN EC 81 MG EC tablet Take 81 mg by mouth Daily.  0   • RA VITAMIN D-3 5000 units capsule Take 5,000 Units by mouth Daily.  0   • raNITIdine (ZANTAC) 150 MG tablet   0   • acetaminophen (TYLENOL) 325 MG tablet Take 2 tablets by mouth Every 4 (Four) Hours As Needed for Mild Pain .     • B-D UF III MINI PEN NEEDLES 31G X 5 MM misc Use daily with insulin 50 each 11   • ONE TOUCH ULTRA TEST test strip Test BS  each 11   • ONETOUCH DELICA LANCETS FINE misc Test BS  each 11     No current facility-administered medications on file prior to visit.      Allergies   Allergen Reactions   • Aleve [Naproxen] Itching        Review of Systems   Constitutional: Negative.    HENT: Negative.    Eyes: Negative.    Respiratory: Negative.    Cardiovascular: Negative.    Gastrointestinal: Negative.    Endocrine: Negative.    Genitourinary: Negative.    Musculoskeletal: Positive for arthralgias.   Skin: Negative.    Allergic/Immunologic: Negative.    Neurological: Negative.    Hematological: Negative.    Psychiatric/Behavioral: Negative.         The patient's Review of Systems was personally reviewed and confirmed as accurate.    The following portions of the patient's history were reviewed and updated as appropriate: allergies, current medications, past family history, past medical history, past social history, past surgical history and problem list.    Physical Exam  Blood pressure 138/61, pulse 79, height 157.5 cm (62.01\"), weight 87.7 kg (193 lb 6.4 oz), not currently breastfeeding.    Body mass index is 35.36 kg/m².    GENERAL APPEARANCE: awake, alert & oriented x 3, in no acute distress and well developed, well nourished  PSYCH: normal affect  LUNGS:  breathing nonlabored  EYES: sclera " anicteric  CARDIOVASCULAR: palpable dorsalis pedis, palpable posterior tibial bilaterally. Capillary refill less than 2 seconds  EXTREMITIES: no clubbing, cyanosis  GAIT:  Antalgic            Right Lower Extremity Exam:   ----------  Hip Exam  ----------  FLEXION CONTRACTURE: None  FLEXION: 110 degrees  INTERNAL ROTATION: 20 degrees at 90 degrees of flexion   EXTERNAL ROTATION: 40 degrees at 90 degrees of flexion    PAIN WITH HIP MOTION: no  ----------  Knee Exam  ----------  ALIGNMENT: severe varus, correctable to neutral    RANGE OF MOTION:  Decreased (5 - 115 degrees) with no extensor lag  LIGAMENTOUS STABILITY:   stable to varus and valgus stress at terminal extension and 30 degrees; retensioning of the MCL is appreciated with valgus stress at 30 degrees consistent with medial compartment degeneration     STRENGTH:  4/5 knee flexion, extension. 5/5 ankle dorsiflexion and plantarflexion.     PAIN WITH PALPATION: medial joint line and anterior knee  KNEE EFFUSION: yes, mild effusion  PAIN WITH KNEE ROM: yes, global  PATELLAR CREPITUS: yes, painful and symptomatic  SPECIAL EXAM FINDINGS:  none    REFLEXES:  PATELLAR 2+/4  ACHILLES 2+/4    CLONUS: no  STRAIGHT LEG TEST:   negative    SENSATION TO LIGHT TOUCH:  DEEP PERONEAL/SUPERFICIAL PERONEAL/SURAL/SAPHENOUS/TIBIAL:   intact    EDEMA:  no  ERYTHEMA:  no  WOUNDS/INCISIONS:  no        Left Lower Extremity Exam:   ----------  Hip Exam  ----------  FLEXION CONTRACTURE: None  FLEXION: 110 degrees  INTERNAL ROTATION: 20 degrees at 90 degrees of flexion   EXTERNAL ROTATION: 40 degrees at 90 degrees of flexion    PAIN WITH HIP MOTION: no  ----------  Knee Exam  ----------  ALIGNMENT: neutral, no varus or valgus deformity     RANGE OF MOTION:  Normal (0-120 degrees) with no extensor lag or flexion contracture  LIGAMENTOUS STABILITY:   stable to varus and valgus stress at terminal extension and 30 degrees without any evidence of laxity     STRENGTH:  5/5 knee flexion,  extension. 5/5 ankle dorsiflexion and plantarflexion.     PAIN WITH PALPATION: denies tenderness to palpation about the knee, denies medial or lateral joint line pain  KNEE EFFUSION: no  PAIN WITH KNEE ROM: no  PATELLAR CREPITUS: yes, asymptomatic  SPECIAL EXAM FINDINGS:  Negative patellar compression    REFLEXES:  PATELLAR 2+/4  ACHILLES 2+/4    CLONUS: negative  STRAIGHT LEG TEST:   negative    SENSATION TO LIGHT TOUCH:  DEEP PERONEAL/SUPERFICIAL PERONEAL/SURAL/SAPHENOUS/TIBIAL:   intact    EDEMA:  no  ERYTHEMA:  no  WOUNDS/INCISIONS: no overlying skin problems.    ______________________________________________________________________  ______________________________________________________________________    RADIOGRAPHIC FINDINGS:   Indication: Bilateral knee pain    Comparison: No prior xrays are available for comparison    Right knee(s) 4 views: moderate to severe tricompartmental arthritis with genu varum alignment, periarticular osteophytes visualized in all compartments    Left knee 4 views: Radiographs demonstrate moderate severe tricompartmental osteoarthritis with neutral alignment.  Large peritracheal osteophytes visualized in all compartments as well as chondrocalcinosis throughout the joint.  No acute bony injury or fracture.    Assessment/Plan:   Diagnosis Plan   1. Primary osteoarthritis of both knees     2. Pain in both knees, unspecified chronicity  XR Knee 4+ View Bilateral     Patient is severe bilateral knee arthritis.  I discussed treatment options with her regarding her ongoing knee pain.  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  In addition to this, she is recently being evaluated for cardiac arrhythmia.  As a result she is not a candidate for surgical intervention.  I discussed alternative treatment options.  She is agreeable to cortisone injection the right knee today as it is the most symptomatic.  She will follow-up in 3  months.    Procedure Note:  I discussed with the patient the potential benefits of performing a therapeutic injection of the right knee as well as potential risks including but not limited to infection, swelling, pain, bleeding, bruising, nerve/vessel damage, skin color changes, transient elevation in blood glucose levels, and fat atrophy. After informed consent and verifying correct patient, procedure site, and type of procedure, the area was prepped with alcohol, ethyl chloride was used to numb the skin. Via the superior lateral approach, 3cc of 1% lidocaine, 3cc of 0.25% bupivicaine and 2 cc of 40mg/ml of Kenalog were injected into the right knee. The patient tolerated the procedure well. There were no complications. A sterile dressing was placed over the injection site.      Davian Singletary MD  09/28/21  14:16 EDT

## 2021-10-12 ENCOUNTER — OFFICE VISIT (OUTPATIENT)
Dept: ORTHOPEDIC SURGERY | Facility: CLINIC | Age: 66
End: 2021-10-12

## 2021-10-12 VITALS
HEART RATE: 69 BPM | SYSTOLIC BLOOD PRESSURE: 145 MMHG | HEIGHT: 62 IN | BODY MASS INDEX: 35.58 KG/M2 | DIASTOLIC BLOOD PRESSURE: 71 MMHG | WEIGHT: 193.34 LBS

## 2021-10-12 DIAGNOSIS — M65.331 TRIGGER MIDDLE FINGER OF RIGHT HAND: Primary | ICD-10-CM

## 2021-10-12 DIAGNOSIS — M19.041 ARTHRITIS OF FINGER OF RIGHT HAND: ICD-10-CM

## 2021-10-12 DIAGNOSIS — M19.041 ARTHRITIS OF HAND, RIGHT: ICD-10-CM

## 2021-10-12 PROCEDURE — 99214 OFFICE O/P EST MOD 30 MIN: CPT | Performed by: PHYSICIAN ASSISTANT

## 2021-10-12 PROCEDURE — 20600 DRAIN/INJ JOINT/BURSA W/O US: CPT | Performed by: PHYSICIAN ASSISTANT

## 2021-10-12 RX ORDER — INSULIN GLARGINE 300 U/ML
INJECTION, SOLUTION SUBCUTANEOUS
COMMUNITY
Start: 2021-10-08 | End: 2023-02-23

## 2021-10-12 RX ORDER — TRIAMCINOLONE ACETONIDE 40 MG/ML
20 INJECTION, SUSPENSION INTRA-ARTICULAR; INTRAMUSCULAR
Status: COMPLETED | OUTPATIENT
Start: 2021-10-12 | End: 2021-10-12

## 2021-10-12 RX ORDER — LIDOCAINE HYDROCHLORIDE 10 MG/ML
0.5 INJECTION, SOLUTION INFILTRATION; PERINEURAL
Status: COMPLETED | OUTPATIENT
Start: 2021-10-12 | End: 2021-10-12

## 2021-10-12 RX ADMIN — TRIAMCINOLONE ACETONIDE 20 MG: 40 INJECTION, SUSPENSION INTRA-ARTICULAR; INTRAMUSCULAR at 13:34

## 2021-10-12 RX ADMIN — LIDOCAINE HYDROCHLORIDE 0.5 ML: 10 INJECTION, SOLUTION INFILTRATION; PERINEURAL at 13:34

## 2021-10-12 NOTE — PROGRESS NOTES
[facility]    Subjective     Chief Complaint   Patient presents with   • Right Hand - Pain        HPI  Martha Harrell is a 66 y.o. female.  Right-hand-dominant.  Established patient presents for evaluation of right middle finger pain and triggering.  Symptoms/pain have been ongoing 2 months.  TIM: No history of injury or trauma.  She also notes stiffness and pain throughout all fingers/hand.    Pain scale: 7/10.  Severity of the pain moderate.  Quality of the pain aching, burning.  Associated symptoms some swelling.  Activity related to pain gripping, grasping, twisting activities.  Pain relieved by rest.  No reported numbness or tingling.  Prior treatments none.    Denies fever, chills, night sweats or other constitutional symptoms.      Past Medical History:   Diagnosis Date   • Anemia    • Asthma    • Chronic bronchitis (HCC)    • Depression    • Diabetes mellitus (HCC)    • Epilepsy (HCC)    • Fibromyalgia, primary    • GERD (gastroesophageal reflux disease)    • Hyperlipidemia       History reviewed. No pertinent surgical history.   Family History   Problem Relation Age of Onset   • Arthritis Mother    • Heart disease Mother    • Obesity Mother    • Arthritis Father    • Cancer Father    • Hypertension Father    • Hyperlipidemia Father    • Obesity Father    • Arthritis Sister    • Obesity Sister    • Arthritis Maternal Aunt    • Arthritis Maternal Uncle    • Cancer Maternal Uncle      Social History     Socioeconomic History   • Marital status:    Tobacco Use   • Smoking status: Former Smoker     Types: Cigarettes     Quit date: 2010     Years since quittin.7   • Smokeless tobacco: Never Used   Substance and Sexual Activity   • Alcohol use: No   • Drug use: No   • Sexual activity: Defer      Current Outpatient Medications on File Prior to Visit   Medication Sig Dispense Refill   • acetaminophen (TYLENOL) 325 MG tablet Take 2 tablets by mouth Every 4 (Four)  Hours As Needed for Mild Pain .     • atorvastatin (LIPITOR) 20 MG tablet Take 20 mg by mouth every night at bedtime.     • B-D UF III MINI PEN NEEDLES 31G X 5 MM misc Use daily with insulin 50 each 11   • busPIRone (BUSPAR) 15 MG tablet      • calcium carbonate (OS-HOSEA) 600 MG tablet Take 600 mg by mouth Daily.     • DULoxetine (CYMBALTA) 60 MG capsule Take 60 mg by mouth Daily.  0   • ELIQUIS 2.5 MG tablet tablet Take 2.5 mg by mouth 2 (Two) Times a Day.  0   • Empagliflozin (JARDIANCE) 25 MG tablet Take 25 mg by mouth Daily. 30 tablet 6   • fenofibrate (TRICOR) 145 MG tablet 160 mg.  0   • furosemide (LASIX) 40 MG tablet   0   • HYDROcodone-acetaminophen (NORCO) 7.5-325 MG per tablet Take 1 tablet by mouth 3 (Three) Times a Day As Needed.     • Insulin Glargine, 2 Unit Dial, (TOUJEO MAX SOLOSTAR) 300 UNIT/ML solution pen-injector injection Take 100 units at bedtime, after a week go up to 110 units 4 pen 6   • Insulin Lispro, 1 Unit Dial, (HUMALOG KWIKPEN) 100 UNIT/ML solution pen-injector Take 20u before lunch (if you eat lunch) and take 40 units before supper 6 pen 6   • loratadine (CLARITIN) 10 MG tablet take 1 tablet by mouth once daily if needed  0   • magnesium oxide (MAG-OX) 400 MG tablet Take 1 tablet by mouth 2 (Two) Times a Day.     • meloxicam (MOBIC) 15 MG tablet Take 15 mg by mouth Every Morning.     • metFORMIN (GLUCOPHAGE) 1000 MG tablet Take 1 tablet by mouth 2 (Two) Times a Day With Meals. 60 tablet 6   • Morphine (MS CONTIN) 30 MG 12 hr tablet Take 30 mg by mouth 2 (Two) Times a Day.  0   • Omega-3 Fatty Acids (RA FISH OIL) 1000 MG capsule Take 1 capsule by mouth 2 (Two) Times a Day.  0   • omeprazole (priLOSEC) 20 MG capsule      • ONE TOUCH ULTRA TEST test strip Test BS  each 11   • ONETOUCH DELICA LANCETS FINE misc Test BS  each 11   • phenytoin (DILANTIN) 100 MG ER capsule take 2 capsule by mouth twice a day  0   • RA ASPIRIN EC 81 MG EC tablet Take 81 mg by mouth Daily.  0   •  "RA VITAMIN D-3 5000 units capsule Take 5,000 Units by mouth Daily.  0   • raNITIdine (ZANTAC) 150 MG tablet   0   • Toujeo SoloStar 300 UNIT/ML solution pen-injector injection ADMINISTER 100 UNITS UNDER THE SKIN DAILY       No current facility-administered medications on file prior to visit.      Allergies   Allergen Reactions   • Aleve [Naproxen] Itching        The following portions of the patient's history were reviewed and updated as appropriate: allergies, current medications, past family history, past medical history, past social history, past surgical history and problem list.    Review of Systems   Constitutional: Negative.    HENT: Negative.    Eyes: Negative.    Respiratory: Negative.    Cardiovascular: Negative.    Gastrointestinal: Negative.    Endocrine: Negative.    Genitourinary: Negative.    Musculoskeletal: Positive for arthralgias.   Skin: Negative.    Allergic/Immunologic: Negative.    Neurological: Positive for numbness (right hand).   Hematological: Negative.    Psychiatric/Behavioral: Negative.         Objective      Physical Exam  /71   Pulse 69   Ht 157.5 cm (62.01\")   Wt 87.7 kg (193 lb 5.5 oz)   BMI 35.35 kg/m²     Body mass index is 35.35 kg/m².    GENERAL APPEARANCE: awake, alert & oriented x 3, in no acute distress and well developed, well nourished  PSYCH: normal mood and affect  LUNGS:  breathing nonlabored, no wheezing  EYES: sclera anicteric, pupils equal  CARDIOVASCULAR: palpable pulses. Capillary refill less than 2 seconds  INTEGUMENTARY: skin intact, no clubbing, cyanosis  NEUROLOGIC:  Normal Sensation         Ortho Exam  Right hand/middle finger  Skin: intact without rash, redness, warmth, soft tissue swelling.  Tenderness: (+) over region A1 pulley middle finger, thickening noted  ROM: FROM finger with triggering reproduced during exam  FDS, FDP intact  Motor: intact R/U/M/AIN/PIN  Sensory: intact R/U/M  Vascular: 2+ radial pulse, brisk CR each digit     In general " patient is able to make a limited composite fist secondary to the stiffness and pain she notes throughout all digits.      Imaging/Studies  Ordered right hand plain films.  Imaging read/interpreted by Dr. Pina.    Imaging Results (Last 7 Days)     Procedure Component Value Units Date/Time    XR Hand 3+ View Right [[id number]] Resulted: 10/12/21 1305     Updated: 10/12/21 1306          Assessment/Plan        ICD-10-CM ICD-9-CM   1. Trigger middle finger of right hand  M65.331 727.03   2. Arthritis of hand, right  M19.041 716.94   3. Arthritis of finger of right hand  M19.041 716.94       Orders Placed This Encounter   Procedures   • XR Hand 3+ View Right        -Right middle finger trigger digit--offered and accepted a corticosteroid injection to the A1 pulley.  Injection was given today.  -Right hand and digit (MCP, IPJ and first CMC) arthritis--we discussed hand therapy, patient will continue with observation for now.  She may consider possible compression gloves for her arthritis treatment.  -Patient currently taking narcotic pain medication from her PCP.  If she needs to continue with this type of medication she will have to continue to go through her PCP for prescription.  -Follow-up regarding her hand in approximately 8 weeks.  Depending on how she is doing, we also discussed possible repeat injection (but she needs to be 3 months out from the first injection) or consideration for surgical release.  Patient would need to undergo medical/cardiac clearance prior to any surgical intervention.  -Questions and concerns answered.      After discussing the risks, benefits, indications of injection, the patient gave consent to proceed.  Right middle finger A1 pulley was confirmed as the correct site to be injected with a timeout.  It was then prepped using Hibiclens and injected with a mixture of 1/2 cc of 1% plain lidocaine and 1/2 cc of Kenalog (40 mg per mL), without any resistance through the volar approach,  patient in seated position.  Area was cleaned, hemostasis was achieved and a Band-Aid was applied over the injection site.  The patient tolerated procedure well.  I instructed the patient on signs and symptoms of infection.  They should report to the emergency department or return to clinic if any of these develop, for further evaluation and treatment.  Recommended modifying activity for the next 48 hours to include rest, ice, elevation and oral pain medication as needed.  Discussed post injection pain control as well as glucose monitoring as patient is a diabetic.      Medical Decision Making  Management Options : prescription/IM medicine  Data/Risk: radiology tests       Sondra Barrera PA-C  10/12/21  13:30 EDT               EMR Dragon/Transcription disclaimer:  Much of this encounter note is an electronic transcription of spoken language to printed text. Electronic transcription of spoken language may permit erroneous, or at times, nonsensical words or phrases to be inadvertently transcribed. Although I have reviewed the note for such errors, some may still exist.

## 2021-10-12 NOTE — PROGRESS NOTES
Procedure   Small Joint Arthrocentesis  Consent given by: patient  Site marked: site marked  Timeout: Immediately prior to procedure a time out was called to verify the correct patient, procedure, equipment, support staff and site/side marked as required   Supporting Documentation  Indications: pain   Procedure Details  Location: long finger - Long finger joint: Right long finger trigger finger.  Preparation: Patient was prepped and draped in the usual sterile fashion  Needle size: 25 G  Approach: volar  Medications administered: 20 mg triamcinolone acetonide 40 MG/ML; 0.5 mL lidocaine 1 %  Patient tolerance: patient tolerated the procedure well with no immediate complications

## 2021-11-16 ENCOUNTER — OFFICE VISIT (OUTPATIENT)
Dept: ORTHOPEDIC SURGERY | Facility: CLINIC | Age: 66
End: 2021-11-16

## 2021-11-16 VITALS
BODY MASS INDEX: 36.25 KG/M2 | DIASTOLIC BLOOD PRESSURE: 68 MMHG | SYSTOLIC BLOOD PRESSURE: 118 MMHG | WEIGHT: 197 LBS | HEIGHT: 62 IN

## 2021-11-16 DIAGNOSIS — M70.72 ISCHIAL BURSITIS, LEFT: ICD-10-CM

## 2021-11-16 DIAGNOSIS — M70.62 TROCHANTERIC BURSITIS OF LEFT HIP: Primary | ICD-10-CM

## 2021-11-16 DIAGNOSIS — M16.12 PRIMARY OSTEOARTHRITIS OF LEFT HIP: ICD-10-CM

## 2021-11-16 DIAGNOSIS — M25.551 RIGHT HIP PAIN: ICD-10-CM

## 2021-11-16 PROCEDURE — 99214 OFFICE O/P EST MOD 30 MIN: CPT | Performed by: ORTHOPAEDIC SURGERY

## 2021-11-16 PROCEDURE — 20610 DRAIN/INJ JOINT/BURSA W/O US: CPT | Performed by: ORTHOPAEDIC SURGERY

## 2021-11-16 RX ORDER — PROPAFENONE HYDROCHLORIDE 150 MG/1
150 TABLET, COATED ORAL 3 TIMES DAILY
COMMUNITY
Start: 2021-10-22 | End: 2023-02-23

## 2021-11-16 RX ORDER — TRIAMCINOLONE ACETONIDE 40 MG/ML
80 INJECTION, SUSPENSION INTRA-ARTICULAR; INTRAMUSCULAR
Status: COMPLETED | OUTPATIENT
Start: 2021-11-16 | End: 2021-11-16

## 2021-11-16 RX ORDER — APIXABAN 5 MG/1
5 TABLET, FILM COATED ORAL 2 TIMES DAILY
COMMUNITY
Start: 2021-10-15

## 2021-11-16 RX ORDER — LIDOCAINE HYDROCHLORIDE 10 MG/ML
3 INJECTION, SOLUTION EPIDURAL; INFILTRATION; INTRACAUDAL; PERINEURAL
Status: COMPLETED | OUTPATIENT
Start: 2021-11-16 | End: 2021-11-16

## 2021-11-16 RX ORDER — BUPIVACAINE HYDROCHLORIDE 2.5 MG/ML
3 INJECTION, SOLUTION EPIDURAL; INFILTRATION; INTRACAUDAL
Status: COMPLETED | OUTPATIENT
Start: 2021-11-16 | End: 2021-11-16

## 2021-11-16 RX ADMIN — TRIAMCINOLONE ACETONIDE 80 MG: 40 INJECTION, SUSPENSION INTRA-ARTICULAR; INTRAMUSCULAR at 08:21

## 2021-11-16 RX ADMIN — BUPIVACAINE HYDROCHLORIDE 3 ML: 2.5 INJECTION, SOLUTION EPIDURAL; INFILTRATION; INTRACAUDAL at 08:21

## 2021-11-16 RX ADMIN — LIDOCAINE HYDROCHLORIDE 3 ML: 10 INJECTION, SOLUTION EPIDURAL; INFILTRATION; INTRACAUDAL; PERINEURAL at 08:21

## 2021-11-16 NOTE — PROGRESS NOTES
Procedure   Large Joint Arthrocentesis: L greater trochanteric bursa  Date/Time: 11/16/2021 8:21 AM  Consent given by: patient  Site marked: site marked  Timeout: Immediately prior to procedure a time out was called to verify the correct patient, procedure, equipment, support staff and site/side marked as required   Supporting Documentation  Indications: pain   Procedure Details  Location: hip - L greater trochanteric bursa  Preparation: Patient was prepped and draped in the usual sterile fashion  Needle size: 22 G  Approach: lateral  Medications administered: 80 mg triamcinolone acetonide 40 MG/ML; 3 mL bupivacaine (PF) 0.25 %; 3 mL lidocaine PF 1% 1 %  Patient tolerance: patient tolerated the procedure well with no immediate complications

## 2021-11-16 NOTE — PROGRESS NOTES
Orthopaedic Clinic Note: Hip New Patient    Chief Complaint   Patient presents with   • Left Hip - Pain        HPI    Martha Harrell is a 66 y.o. female who presents with left hip pain for 2 month(s). Onset mechanical fall. Pain is localized to groin, lateral trochanter and gluteal region and is a 9/10 on the pain scale.Pain is described as burning, stabbing and shooting. Associated symptoms include pain.  The pain is worse with walking, standing, sitting, climbing stairs, sleeping, lying on affected side and any movement of the joint; resting improve the pain. Previous treatments have included: nothing since symptom onset. Although some transient relief was reported with these interventions, these conservative measures have failed and symptoms have persisted. The patient is limited in daily activities and has had a significant decrease in quality of life as a result. She denies fevers, chills, or constitutional symptoms.    I have reviewed the following portions of the patient's history:History of Present Illness    Past Medical History:   Diagnosis Date   • Anemia    • Asthma    • Chronic bronchitis (HCC)    • Depression    • Diabetes mellitus (HCC)    • Epilepsy (HCC)    • Fibromyalgia, primary    • GERD (gastroesophageal reflux disease)    • Hyperlipidemia       History reviewed. No pertinent surgical history.   Family History   Problem Relation Age of Onset   • Arthritis Mother    • Heart disease Mother    • Obesity Mother    • Arthritis Father    • Cancer Father    • Hypertension Father    • Hyperlipidemia Father    • Obesity Father    • Arthritis Sister    • Obesity Sister    • Arthritis Maternal Aunt    • Arthritis Maternal Uncle    • Cancer Maternal Uncle      Social History     Socioeconomic History   • Marital status:    Tobacco Use   • Smoking status: Former Smoker     Types: Cigarettes     Quit date: 2010     Years since quittin.8   • Smokeless tobacco: Never Used   Substance and  Sexual Activity   • Alcohol use: No   • Drug use: No   • Sexual activity: Defer      Current Outpatient Medications on File Prior to Visit   Medication Sig Dispense Refill   • acetaminophen (TYLENOL) 325 MG tablet Take 2 tablets by mouth Every 4 (Four) Hours As Needed for Mild Pain .     • atorvastatin (LIPITOR) 20 MG tablet Take 20 mg by mouth every night at bedtime.     • B-D UF III MINI PEN NEEDLES 31G X 5 MM misc Use daily with insulin 50 each 11   • busPIRone (BUSPAR) 15 MG tablet      • calcium carbonate (OS-HOSEA) 600 MG tablet Take 600 mg by mouth Daily.     • DULoxetine (CYMBALTA) 60 MG capsule Take 60 mg by mouth Daily.  0   • Eliquis 5 MG tablet tablet Take 5 mg by mouth 2 (Two) Times a Day.     • Empagliflozin (JARDIANCE) 25 MG tablet Take 25 mg by mouth Daily. 30 tablet 6   • fenofibrate (TRICOR) 145 MG tablet 160 mg.  0   • furosemide (LASIX) 40 MG tablet   0   • HYDROcodone-acetaminophen (NORCO) 7.5-325 MG per tablet Take 1 tablet by mouth 3 (Three) Times a Day As Needed.     • Insulin Glargine, 2 Unit Dial, (TOUJEO MAX SOLOSTAR) 300 UNIT/ML solution pen-injector injection Take 100 units at bedtime, after a week go up to 110 units 4 pen 6   • Insulin Lispro, 1 Unit Dial, (HUMALOG KWIKPEN) 100 UNIT/ML solution pen-injector Take 20u before lunch (if you eat lunch) and take 40 units before supper 6 pen 6   • loratadine (CLARITIN) 10 MG tablet take 1 tablet by mouth once daily if needed  0   • magnesium oxide (MAG-OX) 400 MG tablet Take 1 tablet by mouth 2 (Two) Times a Day.     • meloxicam (MOBIC) 15 MG tablet Take 15 mg by mouth Every Morning.     • metFORMIN (GLUCOPHAGE) 1000 MG tablet Take 1 tablet by mouth 2 (Two) Times a Day With Meals. 60 tablet 6   • Morphine (MS CONTIN) 30 MG 12 hr tablet Take 30 mg by mouth 2 (Two) Times a Day.  0   • Omega-3 Fatty Acids (RA FISH OIL) 1000 MG capsule Take 1 capsule by mouth 2 (Two) Times a Day.  0   • omeprazole (priLOSEC) 20 MG capsule      • ONE TOUCH ULTRA TEST  "test strip Test BS  each 11   • ONETOUCH DELICA LANCETS FINE misc Test BS  each 11   • phenytoin (DILANTIN) 100 MG ER capsule take 2 capsule by mouth twice a day  0   • propafenone (RYTHMOL) 150 MG tablet Take 150 mg by mouth 3 (Three) Times a Day.     • RA ASPIRIN EC 81 MG EC tablet Take 81 mg by mouth Daily.  0   • RA VITAMIN D-3 5000 units capsule Take 5,000 Units by mouth Daily.  0   • raNITIdine (ZANTAC) 150 MG tablet   0   • Toujeo SoloStar 300 UNIT/ML solution pen-injector injection ADMINISTER 100 UNITS UNDER THE SKIN DAILY     • [DISCONTINUED] ELIQUIS 2.5 MG tablet tablet Take 2.5 mg by mouth 2 (Two) Times a Day.  0     No current facility-administered medications on file prior to visit.      Allergies   Allergen Reactions   • Aleve [Naproxen] Itching        Review of Systems   Constitutional: Negative.    HENT: Negative.    Eyes: Negative.    Respiratory: Negative.    Cardiovascular: Negative.    Gastrointestinal: Negative.    Endocrine: Negative.    Genitourinary: Negative.    Musculoskeletal: Positive for arthralgias.   Skin: Negative.    Allergic/Immunologic: Negative.    Neurological: Negative.    Hematological: Negative.    Psychiatric/Behavioral: Negative.         The patient's Review of Systems was personally reviewed and confirmed as accurate.    The following portions of the patient's history were reviewed and updated as appropriate: allergies, current medications, past family history, past medical history, past social history, past surgical history and problem list.    Physical Exam  Blood pressure 118/68, height 157.5 cm (62.01\"), weight 89.4 kg (197 lb), not currently breastfeeding.    Body mass index is 36.02 kg/m².    GENERAL APPEARANCE: awake, alert & oriented x 3, in no acute distress, well developed, well nourished and obese  PSYCH: normal affect  LUNGS:  breathing nonlabored  EYES: sclera anicteric  CARDIOVASCULAR: palpable dorsalis pedis, palpable posterior tibial " bilaterally. Capillary refill less than 2 seconds  EXTREMITIES: no clubbing, cyanosis  GAIT:  Antalgic           Right Hip Exam:  RANGE OF MOTION:   FLEXION CONTRACTURE: None   FLEXION: 110 degrees   INTERNAL ROTATION: 20 degrees at 90 degrees of flexion   EXTERNAL ROTATION: 40 degrees at 90 degrees of flexion    PAIN WITH HIP MOTION: no  PAIN WITH LOGROLL: no  STINCHFIELD TEST: negative    STRENGTH:  5/5 hip adduction, abduction, flexion. 5/5 strength knee flexion, extension. 5/5 strength ankle dorsiflexion and plantarflexion.     GREATER TROCHANTER BURSAL PAIN:  no     REFLEXES:   PATELLAR 2+/4   ACHILLES 2+/4    CLONUS: negative  STRAIGHT LEG TEST:   negative    SENSATION TO LIGHT TOUCH:  DEEP PERONEAL/SUPERFICIAL PERONEAL/SURAL/SAPHENOUS/TIBIAL:  intact    EDEMA:   1+ edema at ankle with stasis dermatitis  ERYTHEMA:  no  WOUNDS/INCISIONS: no overlying skin problems.      Left Hip Exam:   RANGE OF MOTION:   FLEXION CONTRACTURE: None  FLEXION: 100 degrees  INTERNAL ROTATION: 10 degrees at 90 degrees of flexion   EXTERNAL ROTATION: 30 degrees at 90 degrees of flexion    PAIN WITH HIP MOTION: yes  PAIN WITH LOGROLL: no  STINCHFIELD TEST: positive  STRENGTH:  4/5 hip adduction, abduction, flexion. 5/5 knee flexion, extension. 5/5 ankle dorsiflexion and plantarflexion.     GREATER TROCHANTER BURSAL PAIN:  yes  Tender palpation about ischial bursa     REFLEXES:   PATELLAR 2+/4   ACHILLES 2+/4    CLONUS: no  STRAIGHT LEG TEST:   negative    SENSATION TO LIGHT TOUCH:  DEEP PERONEAL/SUPERFICIAL PERONEAL/SURAL/SAPHENOUS/TIBIAL:  intact    EDEMA:   1+ edema at ankle with stasis dermatitis  ERYTHEMA:  no  WOUNDS/INCISIONS:  no        ------------------------------------------------------------------    LEG LENGTHS:  equal  _____________________________________________________  _____________________________________________________    RADIOGRAPHIC FINDINGS:   Indication: Left hip pain    Comparison: No prior xrays are  available for comparison    AP pelvis, hip 2 views: Right: mild joint space narrowing, minimal osteophyte formation; Left: advanced, end-stage osteoarthritis with bone on bone articulation, subchondral sclerosis, and subchondral cysts, there are marginal osteophytes visualized at the femoral head-neck junction and acetabular margins    Assessment/Plan:   Diagnosis Plan   1. Trochanteric bursitis of left hip     2. Right hip pain  XR Hip With or Without Pelvis 2 - 3 View Left   3. Primary osteoarthritis of left hip     4. Ischial bursitis, left       Patient has advanced arthritis of the left hip with associated trochanteric bursitis.  She is also experiencing ischial bursitis.  Given her history of multiple blood clots, poorly controlled diabetes, and stasis dermatitis, she remains a poor surgical candidate.  I do not recommend surgical intervention as she develops blood clots whenever she discontinues her Eliquis.  I discussed alternative treatment options.  She is agreeable to trochanteric bursa cortisone injection today.  In addition of this, I recommended a hemorrhoid pillow to offload her ischial bursa.  She will follow-up as needed.    Procedure Note:  I discussed with the patient the potential benefits of performing a therapeutic injection of the left hip trochanteric bursa as well as potential risks including but not limited to infection, swelling, pain, bleeding, bruising, nerve/vessel damage, skin color changes, transient elevation in blood glucose levels, and fat atrophy. After informed consent and verifying correct patient, procedure site, and type of procedure, the area was prepped with alcohol, ethyl chloride was used to numb the skin. Via the direct lateral approach, 3cc of 1% lidocaine, 3cc of 0.25% bupivicaine and 2 cc of 40mg/ml of Kenalog were injected into the [right/left hip,knee,bursa]. The patient tolerated the procedure well. There were no complications. A sterile dressing was placed over the  injection site.      Davian Singletary MD  11/16/21  08:40 EST

## 2021-12-30 ENCOUNTER — OFFICE VISIT (OUTPATIENT)
Dept: ORTHOPEDIC SURGERY | Facility: CLINIC | Age: 66
End: 2021-12-30

## 2021-12-30 VITALS
HEIGHT: 62 IN | WEIGHT: 193.4 LBS | DIASTOLIC BLOOD PRESSURE: 78 MMHG | BODY MASS INDEX: 35.59 KG/M2 | SYSTOLIC BLOOD PRESSURE: 144 MMHG

## 2021-12-30 DIAGNOSIS — M17.12 PRIMARY OSTEOARTHRITIS OF LEFT KNEE: Primary | ICD-10-CM

## 2021-12-30 DIAGNOSIS — M16.12 PRIMARY OSTEOARTHRITIS OF LEFT HIP: ICD-10-CM

## 2021-12-30 PROCEDURE — 99213 OFFICE O/P EST LOW 20 MIN: CPT | Performed by: ORTHOPAEDIC SURGERY

## 2021-12-30 PROCEDURE — 20610 DRAIN/INJ JOINT/BURSA W/O US: CPT | Performed by: ORTHOPAEDIC SURGERY

## 2021-12-30 RX ORDER — TRIAMCINOLONE ACETONIDE 40 MG/ML
80 INJECTION, SUSPENSION INTRA-ARTICULAR; INTRAMUSCULAR
Status: COMPLETED | OUTPATIENT
Start: 2021-12-30 | End: 2021-12-30

## 2021-12-30 RX ORDER — LIDOCAINE HYDROCHLORIDE 10 MG/ML
3 INJECTION, SOLUTION EPIDURAL; INFILTRATION; INTRACAUDAL; PERINEURAL
Status: COMPLETED | OUTPATIENT
Start: 2021-12-30 | End: 2021-12-30

## 2021-12-30 RX ORDER — BUPIVACAINE HYDROCHLORIDE 2.5 MG/ML
3 INJECTION, SOLUTION EPIDURAL; INFILTRATION; INTRACAUDAL
Status: COMPLETED | OUTPATIENT
Start: 2021-12-30 | End: 2021-12-30

## 2021-12-30 RX ADMIN — TRIAMCINOLONE ACETONIDE 80 MG: 40 INJECTION, SUSPENSION INTRA-ARTICULAR; INTRAMUSCULAR at 13:13

## 2021-12-30 RX ADMIN — LIDOCAINE HYDROCHLORIDE 3 ML: 10 INJECTION, SOLUTION EPIDURAL; INFILTRATION; INTRACAUDAL; PERINEURAL at 13:13

## 2021-12-30 RX ADMIN — BUPIVACAINE HYDROCHLORIDE 3 ML: 2.5 INJECTION, SOLUTION EPIDURAL; INFILTRATION; INTRACAUDAL at 13:13

## 2021-12-30 NOTE — PROGRESS NOTES
Procedure   Large Joint Arthrocentesis: L knee  Date/Time: 12/30/2021 1:13 PM  Consent given by: patient  Site marked: site marked  Timeout: Immediately prior to procedure a time out was called to verify the correct patient, procedure, equipment, support staff and site/side marked as required   Supporting Documentation  Indications: pain   Procedure Details  Location: knee - L knee  Preparation: Patient was prepped and draped in the usual sterile fashion  Needle size: 22 G  Approach: anterolateral  Medications administered: 3 mL bupivacaine (PF) 0.25 %; 3 mL lidocaine PF 1% 1 %; 80 mg triamcinolone acetonide 40 MG/ML  Patient tolerance: patient tolerated the procedure well with no immediate complications

## 2021-12-30 NOTE — PROGRESS NOTES
Orthopaedic Clinic Note: Knee Established Patient    Chief Complaint   Patient presents with   • Follow-up     3 month f/u; Primary osteoarthritis of both knees (right knee cortisone injection 21)        HPI    It has been 3  month(s) since Ms. Harrell's last visit. She returns to clinic today for follow-up bilateral knee osteoarthritis.  She underwent cortisone injection the right knee 3 months ago she is.  She states the injection provided excellent relief for the right knee.  She is now complaining of pain in the left knee that is an 8/10 on the pain scale.  She has known arthritis of the left hip.  She has a history of multiple blood clots and is poorly controlled diabetic.  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  She is here today to discuss treatment for ongoing knee pain.     Past Medical History:   Diagnosis Date   • Anemia    • Asthma    • Chronic bronchitis (HCC)    • Depression    • Diabetes mellitus (HCC)    • Epilepsy (HCC)    • Fibromyalgia, primary    • GERD (gastroesophageal reflux disease)    • Hyperlipidemia       History reviewed. No pertinent surgical history.   Family History   Problem Relation Age of Onset   • Arthritis Mother    • Heart disease Mother    • Obesity Mother    • Arthritis Father    • Cancer Father    • Hypertension Father    • Hyperlipidemia Father    • Obesity Father    • Arthritis Sister    • Obesity Sister    • Arthritis Maternal Aunt    • Arthritis Maternal Uncle    • Cancer Maternal Uncle      Social History     Socioeconomic History   • Marital status:    Tobacco Use   • Smoking status: Former Smoker     Types: Cigarettes     Quit date: 2010     Years since quittin.9   • Smokeless tobacco: Never Used   Substance and Sexual Activity   • Alcohol use: No   • Drug use: No   • Sexual activity: Defer      Current Outpatient Medications on File Prior to Visit   Medication Sig Dispense Refill   • acetaminophen (TYLENOL) 325 MG tablet Take 2  tablets by mouth Every 4 (Four) Hours As Needed for Mild Pain .     • atorvastatin (LIPITOR) 20 MG tablet Take 20 mg by mouth every night at bedtime.     • B-D UF III MINI PEN NEEDLES 31G X 5 MM misc Use daily with insulin 50 each 11   • busPIRone (BUSPAR) 15 MG tablet      • calcium carbonate (OS-HOSEA) 600 MG tablet Take 600 mg by mouth Daily.     • DULoxetine (CYMBALTA) 60 MG capsule Take 60 mg by mouth Daily.  0   • Eliquis 5 MG tablet tablet Take 5 mg by mouth 2 (Two) Times a Day.     • Empagliflozin (JARDIANCE) 25 MG tablet Take 25 mg by mouth Daily. 30 tablet 6   • fenofibrate (TRICOR) 145 MG tablet 160 mg.  0   • furosemide (LASIX) 40 MG tablet   0   • HYDROcodone-acetaminophen (NORCO) 7.5-325 MG per tablet Take 1 tablet by mouth 3 (Three) Times a Day As Needed.     • Insulin Glargine, 2 Unit Dial, (TOUJEO MAX SOLOSTAR) 300 UNIT/ML solution pen-injector injection Take 100 units at bedtime, after a week go up to 110 units 4 pen 6   • Insulin Lispro, 1 Unit Dial, (HUMALOG KWIKPEN) 100 UNIT/ML solution pen-injector Take 20u before lunch (if you eat lunch) and take 40 units before supper 6 pen 6   • loratadine (CLARITIN) 10 MG tablet take 1 tablet by mouth once daily if needed  0   • magnesium oxide (MAG-OX) 400 MG tablet Take 1 tablet by mouth 2 (Two) Times a Day.     • meloxicam (MOBIC) 15 MG tablet Take 15 mg by mouth Every Morning.     • metFORMIN (GLUCOPHAGE) 1000 MG tablet Take 1 tablet by mouth 2 (Two) Times a Day With Meals. 60 tablet 6   • Morphine (MS CONTIN) 30 MG 12 hr tablet Take 30 mg by mouth 2 (Two) Times a Day.  0   • Omega-3 Fatty Acids (RA FISH OIL) 1000 MG capsule Take 1 capsule by mouth 2 (Two) Times a Day.  0   • omeprazole (priLOSEC) 20 MG capsule      • ONE TOUCH ULTRA TEST test strip Test BS  each 11   • ONETOUCH DELICA LANCETS FINE misc Test BS  each 11   • phenytoin (DILANTIN) 100 MG ER capsule take 2 capsule by mouth twice a day  0   • propafenone (RYTHMOL) 150 MG tablet  "Take 150 mg by mouth 3 (Three) Times a Day.     • RA ASPIRIN EC 81 MG EC tablet Take 81 mg by mouth Daily.  0   • RA VITAMIN D-3 5000 units capsule Take 5,000 Units by mouth Daily.  0   • raNITIdine (ZANTAC) 150 MG tablet   0   • Toujeo SoloStar 300 UNIT/ML solution pen-injector injection ADMINISTER 100 UNITS UNDER THE SKIN DAILY       No current facility-administered medications on file prior to visit.      Allergies   Allergen Reactions   • Aleve [Naproxen] Itching        Review of Systems   Constitutional: Negative.    HENT: Negative.    Eyes: Negative.    Respiratory: Negative.    Cardiovascular: Negative.    Gastrointestinal: Negative.    Endocrine: Negative.    Genitourinary: Negative.    Musculoskeletal: Positive for arthralgias.   Skin: Negative.    Allergic/Immunologic: Negative.    Neurological: Negative.    Hematological: Negative.    Psychiatric/Behavioral: Negative.         The patient's Review of Systems was personally reviewed and confirmed as accurate.    Physical Exam  Blood pressure 144/78, height 157.5 cm (62.01\"), weight 87.7 kg (193 lb 6.4 oz), not currently breastfeeding.    Body mass index is 35.36 kg/m².    GENERAL APPEARANCE: awake, alert, oriented, in no acute distress and well developed, well nourished  LUNGS:  breathing nonlabored  EXTREMITIES: no clubbing, cyanosis  PERIPHERAL PULSES: palpable dorsalis pedis and posterior tibial pulses bilaterally.    GAIT:  Antalgic        ----------  Bilateral Knee Exam:  ----------  ALIGNMENT: Right: Severe varus, correctable to neutral; left: Moderate valgus, correctable to neutral  ----------  RANGE OF MOTION:  Decreased (5 - 120 degrees) with no extensor lag  LIGAMENTOUS STABILITY:   Right: Stable to varus and valgus stress at terminal extension and 30 degrees; retensioning of the MCL is appreciated with valgus stress at 30 degrees consistent with medial compartment degeneration; left: Stable to varus valgus stress at terminal extension 30 degrees " of retensioning of the LCL with varus stress at 30 degrees consistent with lateral compartment degeneration  ----------  STRENGTH:  KNEE FLEXION 4/5  KNEE EXTENSION  4/5  ANKLE DORSIFLEXION  5/5  ANKLE PLANTARFLEXION  5/5  ----------  PAIN WITH PALPATION:global  KNEE EFFUSION: yes, trace effusion  PAIN WITH KNEE ROM: yes  PATELLAR CREPITUS:  yes, painful and symptomatic  ----------  SENSATION TO LIGHT TOUCH:  DEEP PERONEAL/SUPERFICIAL PERONEAL/SURAL/SAPHENOUS/TIBIAL:    intact  ----------  EDEMA:  no  ERYTHEMA:    no  WOUNDS/INCISIONS:   no  _____________________________________________________________________  _____________________________________________________________________    RADIOGRAPHIC FINDINGS:   No new imaging today     assessment/Plan:   Diagnosis Plan   1. Primary osteoarthritis of left knee     2. Primary osteoarthritis of left hip       Patient received excellent benefit from prior cortisone injection the right knee.  She is wishing to pursue cortisone injection the left knee today.  I once again reviewed with her that given her multiple medical comorbidities, poorly controlled diabetes, history of blood clots, and stasis dermatitis, she is a poor candidate for surgical intervention.  Explained to her that if she wishes to seek a second opinion, I can refer her to [facility] to discuss whether or not they believe she would be a candidate for total joint arthroplasty.  In the interval, I do not recommend surgery until she is able to improve upon her modifiable risk factors.  She expresses understanding.  She will follow-up as needed.    Procedure Note:  I discussed with the patient the potential benefits of performing a therapeutic injection of the left knee as well as potential risks including but not limited to infection, swelling, pain, bleeding, bruising, nerve/vessel damage, skin color changes, transient elevation in blood glucose levels, and fat atrophy. After informed consent and  verifying correct patient, procedure site, and type of procedure, the area was prepped with alcohol, ethyl chloride was used to numb the skin. Via the superior lateral approach, 3cc of 1% lidocaine, 3cc of 0.25% bupivicaine and 2 cc of 40mg/ml of Kenalog were injected into the left. The patient tolerated the procedure well. There were no complications. A sterile dressing was placed over the injection site.        Davian Singletary MD  12/30/21  13:35 EST

## 2022-03-17 ENCOUNTER — APPOINTMENT (OUTPATIENT)
Dept: GENERAL RADIOLOGY | Facility: HOSPITAL | Age: 67
End: 2022-03-17

## 2022-03-17 ENCOUNTER — HOSPITAL ENCOUNTER (INPATIENT)
Facility: HOSPITAL | Age: 67
LOS: 4 days | Discharge: HOME HEALTH | End: 2022-03-21
Attending: EMERGENCY MEDICINE | Admitting: FAMILY MEDICINE

## 2022-03-17 DIAGNOSIS — Z79.01 CHRONIC ANTICOAGULATION: ICD-10-CM

## 2022-03-17 DIAGNOSIS — G40.909 NONINTRACTABLE EPILEPSY WITHOUT STATUS EPILEPTICUS, UNSPECIFIED EPILEPSY TYPE: Chronic | ICD-10-CM

## 2022-03-17 DIAGNOSIS — G89.29 OTHER CHRONIC PAIN: ICD-10-CM

## 2022-03-17 DIAGNOSIS — I47.1 SVT (SUPRAVENTRICULAR TACHYCARDIA): Primary | ICD-10-CM

## 2022-03-17 DIAGNOSIS — E11.65 UNCONTROLLED TYPE 2 DIABETES MELLITUS WITH HYPERGLYCEMIA: ICD-10-CM

## 2022-03-17 DIAGNOSIS — I87.2 VENOUS STASIS DERMATITIS OF BOTH LOWER EXTREMITIES: ICD-10-CM

## 2022-03-17 DIAGNOSIS — I48.0 PAROXYSMAL ATRIAL FIBRILLATION: Chronic | ICD-10-CM

## 2022-03-17 DIAGNOSIS — E11.29 MICROALBUMINURIA DUE TO TYPE 2 DIABETES MELLITUS: ICD-10-CM

## 2022-03-17 DIAGNOSIS — M15.9 OSTEOARTHRITIS OF MULTIPLE JOINTS, UNSPECIFIED OSTEOARTHRITIS TYPE: ICD-10-CM

## 2022-03-17 DIAGNOSIS — R80.9 MICROALBUMINURIA DUE TO TYPE 2 DIABETES MELLITUS: ICD-10-CM

## 2022-03-17 DIAGNOSIS — F32.A DEPRESSION, UNSPECIFIED DEPRESSION TYPE: Chronic | ICD-10-CM

## 2022-03-17 DIAGNOSIS — K21.9 GASTROESOPHAGEAL REFLUX DISEASE WITHOUT ESOPHAGITIS: Chronic | ICD-10-CM

## 2022-03-17 DIAGNOSIS — M79.7 FIBROMYALGIA, PRIMARY: Chronic | ICD-10-CM

## 2022-03-17 DIAGNOSIS — E11.42 DIABETIC PERIPHERAL NEUROPATHY ASSOCIATED WITH TYPE 2 DIABETES MELLITUS: ICD-10-CM

## 2022-03-17 PROBLEM — I47.10 SVT (SUPRAVENTRICULAR TACHYCARDIA): Status: ACTIVE | Noted: 2022-03-17

## 2022-03-17 PROBLEM — L03.116 CELLULITIS OF LEFT LOWER EXTREMITY: Status: RESOLVED | Noted: 2019-06-21 | Resolved: 2022-03-17

## 2022-03-17 PROBLEM — I82.402 LEG DVT (DEEP VENOUS THROMBOEMBOLISM), ACUTE, LEFT (HCC): Status: RESOLVED | Noted: 2019-06-22 | Resolved: 2022-03-17

## 2022-03-17 PROBLEM — M19.90 OSTEOARTHRITIS: Status: ACTIVE | Noted: 2022-03-17

## 2022-03-17 PROBLEM — I82.402 ACUTE DEEP VEIN THROMBOSIS (DVT) OF LEFT LOWER EXTREMITY: Status: RESOLVED | Noted: 2019-06-21 | Resolved: 2022-03-17

## 2022-03-17 LAB
ALBUMIN SERPL-MCNC: 3.6 G/DL (ref 3.5–5.2)
ALBUMIN/GLOB SERPL: 1.1 G/DL
ALP SERPL-CCNC: 90 U/L (ref 39–117)
ALT SERPL W P-5'-P-CCNC: 28 U/L (ref 1–33)
ANION GAP SERPL CALCULATED.3IONS-SCNC: 11 MMOL/L (ref 5–15)
AST SERPL-CCNC: 24 U/L (ref 1–32)
BASOPHILS # BLD AUTO: 0.04 10*3/MM3 (ref 0–0.2)
BASOPHILS NFR BLD AUTO: 0.3 % (ref 0–1.5)
BILIRUB SERPL-MCNC: 0.4 MG/DL (ref 0–1.2)
BILIRUB UR QL STRIP: NEGATIVE
BUN SERPL-MCNC: 15 MG/DL (ref 8–23)
BUN/CREAT SERPL: 21.4 (ref 7–25)
CALCIUM SPEC-SCNC: 8.6 MG/DL (ref 8.6–10.5)
CHLORIDE SERPL-SCNC: 103 MMOL/L (ref 98–107)
CLARITY UR: CLEAR
CO2 SERPL-SCNC: 27 MMOL/L (ref 22–29)
COLOR UR: YELLOW
CREAT SERPL-MCNC: 0.7 MG/DL (ref 0.57–1)
D-LACTATE SERPL-SCNC: 0.8 MMOL/L (ref 0.5–2)
D-LACTATE SERPL-SCNC: 2.4 MMOL/L (ref 0.5–2)
DEPRECATED RDW RBC AUTO: 47.8 FL (ref 37–54)
EGFRCR SERPLBLD CKD-EPI 2021: 94.9 ML/MIN/1.73
EOSINOPHIL # BLD AUTO: 0.23 10*3/MM3 (ref 0–0.4)
EOSINOPHIL NFR BLD AUTO: 1.8 % (ref 0.3–6.2)
ERYTHROCYTE [DISTWIDTH] IN BLOOD BY AUTOMATED COUNT: 14.4 % (ref 12.3–15.4)
FLUAV SUBTYP SPEC NAA+PROBE: NOT DETECTED
FLUBV RNA ISLT QL NAA+PROBE: NOT DETECTED
GLOBULIN UR ELPH-MCNC: 3.3 GM/DL
GLUCOSE BLDC GLUCOMTR-MCNC: 80 MG/DL (ref 70–130)
GLUCOSE SERPL-MCNC: 118 MG/DL (ref 65–99)
GLUCOSE UR STRIP-MCNC: ABNORMAL MG/DL
HBA1C MFR BLD: 7.7 % (ref 4.8–5.6)
HCT VFR BLD AUTO: 42.8 % (ref 34–46.6)
HGB BLD-MCNC: 13.7 G/DL (ref 12–15.9)
HGB UR QL STRIP.AUTO: NEGATIVE
HOLD SPECIMEN: NORMAL
IMM GRANULOCYTES # BLD AUTO: 0.05 10*3/MM3 (ref 0–0.05)
IMM GRANULOCYTES NFR BLD AUTO: 0.4 % (ref 0–0.5)
KETONES UR QL STRIP: NEGATIVE
LEUKOCYTE ESTERASE UR QL STRIP.AUTO: NEGATIVE
LYMPHOCYTES # BLD AUTO: 1.67 10*3/MM3 (ref 0.7–3.1)
LYMPHOCYTES NFR BLD AUTO: 13 % (ref 19.6–45.3)
MAGNESIUM SERPL-MCNC: 1.5 MG/DL (ref 1.6–2.4)
MCH RBC QN AUTO: 28.8 PG (ref 26.6–33)
MCHC RBC AUTO-ENTMCNC: 32 G/DL (ref 31.5–35.7)
MCV RBC AUTO: 90.1 FL (ref 79–97)
MONOCYTES # BLD AUTO: 0.81 10*3/MM3 (ref 0.1–0.9)
MONOCYTES NFR BLD AUTO: 6.3 % (ref 5–12)
NEUTROPHILS NFR BLD AUTO: 10 10*3/MM3 (ref 1.7–7)
NEUTROPHILS NFR BLD AUTO: 78.2 % (ref 42.7–76)
NITRITE UR QL STRIP: NEGATIVE
NRBC BLD AUTO-RTO: 0 /100 WBC (ref 0–0.2)
NT-PROBNP SERPL-MCNC: 209 PG/ML (ref 0–900)
PH UR STRIP.AUTO: 6 [PH] (ref 5–8)
PLATELET # BLD AUTO: 219 10*3/MM3 (ref 140–450)
PMV BLD AUTO: 9.5 FL (ref 6–12)
POTASSIUM SERPL-SCNC: 3.9 MMOL/L (ref 3.5–5.2)
PROT SERPL-MCNC: 6.9 G/DL (ref 6–8.5)
PROT UR QL STRIP: ABNORMAL
QT INTERVAL: 292 MS
QTC INTERVAL: 482 MS
RBC # BLD AUTO: 4.75 10*6/MM3 (ref 3.77–5.28)
SARS-COV-2 RNA PNL SPEC NAA+PROBE: NOT DETECTED
SODIUM SERPL-SCNC: 141 MMOL/L (ref 136–145)
SP GR UR STRIP: 1.03 (ref 1–1.03)
TROPONIN T SERPL-MCNC: <0.01 NG/ML (ref 0–0.03)
TSH SERPL DL<=0.05 MIU/L-ACNC: 1.93 UIU/ML (ref 0.27–4.2)
UROBILINOGEN UR QL STRIP: ABNORMAL
WBC NRBC COR # BLD: 12.8 10*3/MM3 (ref 3.4–10.8)
WHOLE BLOOD HOLD SPECIMEN: NORMAL
WHOLE BLOOD HOLD SPECIMEN: NORMAL

## 2022-03-17 PROCEDURE — 83880 ASSAY OF NATRIURETIC PEPTIDE: CPT | Performed by: EMERGENCY MEDICINE

## 2022-03-17 PROCEDURE — 83605 ASSAY OF LACTIC ACID: CPT | Performed by: EMERGENCY MEDICINE

## 2022-03-17 PROCEDURE — 84484 ASSAY OF TROPONIN QUANT: CPT | Performed by: EMERGENCY MEDICINE

## 2022-03-17 PROCEDURE — 93005 ELECTROCARDIOGRAM TRACING: CPT

## 2022-03-17 PROCEDURE — P9612 CATHETERIZE FOR URINE SPEC: HCPCS

## 2022-03-17 PROCEDURE — 63710000001 INSULIN LISPRO (HUMAN) PER 5 UNITS: Performed by: INTERNAL MEDICINE

## 2022-03-17 PROCEDURE — 25010000002 MAGNESIUM SULFATE 2 GM/50ML SOLUTION: Performed by: INTERNAL MEDICINE

## 2022-03-17 PROCEDURE — 87636 SARSCOV2 & INF A&B AMP PRB: CPT | Performed by: EMERGENCY MEDICINE

## 2022-03-17 PROCEDURE — 25010000002 HYDROMORPHONE PER 4 MG: Performed by: EMERGENCY MEDICINE

## 2022-03-17 PROCEDURE — 83036 HEMOGLOBIN GLYCOSYLATED A1C: CPT | Performed by: INTERNAL MEDICINE

## 2022-03-17 PROCEDURE — 63710000001 INSULIN DETEMIR PER 5 UNITS: Performed by: INTERNAL MEDICINE

## 2022-03-17 PROCEDURE — 85025 COMPLETE CBC W/AUTO DIFF WBC: CPT | Performed by: EMERGENCY MEDICINE

## 2022-03-17 PROCEDURE — 84443 ASSAY THYROID STIM HORMONE: CPT | Performed by: EMERGENCY MEDICINE

## 2022-03-17 PROCEDURE — 99222 1ST HOSP IP/OBS MODERATE 55: CPT | Performed by: STUDENT IN AN ORGANIZED HEALTH CARE EDUCATION/TRAINING PROGRAM

## 2022-03-17 PROCEDURE — 99223 1ST HOSP IP/OBS HIGH 75: CPT | Performed by: INTERNAL MEDICINE

## 2022-03-17 PROCEDURE — 99285 EMERGENCY DEPT VISIT HI MDM: CPT

## 2022-03-17 PROCEDURE — 71045 X-RAY EXAM CHEST 1 VIEW: CPT

## 2022-03-17 PROCEDURE — 25010000002 ONDANSETRON PER 1 MG: Performed by: EMERGENCY MEDICINE

## 2022-03-17 PROCEDURE — 82962 GLUCOSE BLOOD TEST: CPT

## 2022-03-17 PROCEDURE — 80053 COMPREHEN METABOLIC PANEL: CPT | Performed by: EMERGENCY MEDICINE

## 2022-03-17 PROCEDURE — 87040 BLOOD CULTURE FOR BACTERIA: CPT | Performed by: EMERGENCY MEDICINE

## 2022-03-17 PROCEDURE — 81003 URINALYSIS AUTO W/O SCOPE: CPT | Performed by: NURSE PRACTITIONER

## 2022-03-17 PROCEDURE — 36415 COLL VENOUS BLD VENIPUNCTURE: CPT

## 2022-03-17 PROCEDURE — 83735 ASSAY OF MAGNESIUM: CPT | Performed by: EMERGENCY MEDICINE

## 2022-03-17 PROCEDURE — 93005 ELECTROCARDIOGRAM TRACING: CPT | Performed by: EMERGENCY MEDICINE

## 2022-03-17 RX ORDER — HYDROCODONE BITARTRATE AND ACETAMINOPHEN 7.5; 325 MG/1; MG/1
1 TABLET ORAL 3 TIMES DAILY PRN
Status: DISCONTINUED | OUTPATIENT
Start: 2022-03-17 | End: 2022-03-21 | Stop reason: HOSPADM

## 2022-03-17 RX ORDER — ATORVASTATIN CALCIUM 20 MG/1
20 TABLET, FILM COATED ORAL DAILY
Status: DISCONTINUED | OUTPATIENT
Start: 2022-03-17 | End: 2022-03-21 | Stop reason: HOSPADM

## 2022-03-17 RX ORDER — PROPAFENONE HYDROCHLORIDE 150 MG/1
150 TABLET, COATED ORAL 3 TIMES DAILY
Status: DISCONTINUED | OUTPATIENT
Start: 2022-03-17 | End: 2022-03-21 | Stop reason: HOSPADM

## 2022-03-17 RX ORDER — SODIUM CHLORIDE 9 MG/ML
75 INJECTION, SOLUTION INTRAVENOUS CONTINUOUS
Status: ACTIVE | OUTPATIENT
Start: 2022-03-17 | End: 2022-03-18

## 2022-03-17 RX ORDER — NICOTINE POLACRILEX 4 MG
15 LOZENGE BUCCAL
Status: DISCONTINUED | OUTPATIENT
Start: 2022-03-17 | End: 2022-03-21 | Stop reason: HOSPADM

## 2022-03-17 RX ORDER — SODIUM CHLORIDE 0.9 % (FLUSH) 0.9 %
10 SYRINGE (ML) INJECTION AS NEEDED
Status: DISCONTINUED | OUTPATIENT
Start: 2022-03-17 | End: 2022-03-21 | Stop reason: HOSPADM

## 2022-03-17 RX ORDER — HYDROMORPHONE HYDROCHLORIDE 1 MG/ML
0.5 INJECTION, SOLUTION INTRAMUSCULAR; INTRAVENOUS; SUBCUTANEOUS ONCE
Status: COMPLETED | OUTPATIENT
Start: 2022-03-17 | End: 2022-03-17

## 2022-03-17 RX ORDER — ACETAMINOPHEN 325 MG/1
650 TABLET ORAL EVERY 4 HOURS PRN
Status: DISCONTINUED | OUTPATIENT
Start: 2022-03-17 | End: 2022-03-21 | Stop reason: HOSPADM

## 2022-03-17 RX ORDER — NALOXONE HCL 0.4 MG/ML
0.4 VIAL (ML) INJECTION
Status: DISCONTINUED | OUTPATIENT
Start: 2022-03-17 | End: 2022-03-21 | Stop reason: HOSPADM

## 2022-03-17 RX ORDER — METOPROLOL TARTRATE 5 MG/5ML
5 INJECTION INTRAVENOUS ONCE
Status: COMPLETED | OUTPATIENT
Start: 2022-03-17 | End: 2022-03-17

## 2022-03-17 RX ORDER — AMOXICILLIN AND CLAVULANATE POTASSIUM 500; 125 MG/1; MG/1
1 TABLET, FILM COATED ORAL 2 TIMES DAILY
COMMUNITY
End: 2022-03-21 | Stop reason: HOSPADM

## 2022-03-17 RX ORDER — MAGNESIUM SULFATE HEPTAHYDRATE 40 MG/ML
4 INJECTION, SOLUTION INTRAVENOUS AS NEEDED
Status: DISCONTINUED | OUTPATIENT
Start: 2022-03-17 | End: 2022-03-21 | Stop reason: HOSPADM

## 2022-03-17 RX ORDER — CALCIUM CARBONATE 200(500)MG
1 TABLET,CHEWABLE ORAL 3 TIMES DAILY PRN
Status: DISCONTINUED | OUTPATIENT
Start: 2022-03-17 | End: 2022-03-21 | Stop reason: HOSPADM

## 2022-03-17 RX ORDER — MAGNESIUM SULFATE HEPTAHYDRATE 40 MG/ML
2 INJECTION, SOLUTION INTRAVENOUS AS NEEDED
Status: DISCONTINUED | OUTPATIENT
Start: 2022-03-17 | End: 2022-03-21 | Stop reason: HOSPADM

## 2022-03-17 RX ORDER — MORPHINE SULFATE 15 MG/1
30 TABLET, FILM COATED, EXTENDED RELEASE ORAL 2 TIMES DAILY
Status: DISCONTINUED | OUTPATIENT
Start: 2022-03-17 | End: 2022-03-21 | Stop reason: HOSPADM

## 2022-03-17 RX ORDER — DULOXETIN HYDROCHLORIDE 60 MG/1
60 CAPSULE, DELAYED RELEASE ORAL DAILY
Status: DISCONTINUED | OUTPATIENT
Start: 2022-03-17 | End: 2022-03-21 | Stop reason: HOSPADM

## 2022-03-17 RX ORDER — BUSPIRONE HYDROCHLORIDE 15 MG/1
15 TABLET ORAL EVERY 12 HOURS SCHEDULED
Status: DISCONTINUED | OUTPATIENT
Start: 2022-03-17 | End: 2022-03-21 | Stop reason: HOSPADM

## 2022-03-17 RX ORDER — METOPROLOL TARTRATE 50 MG/1
50 TABLET, FILM COATED ORAL EVERY 12 HOURS SCHEDULED
Status: DISCONTINUED | OUTPATIENT
Start: 2022-03-17 | End: 2022-03-21 | Stop reason: HOSPADM

## 2022-03-17 RX ORDER — DEXTROSE MONOHYDRATE 25 G/50ML
25 INJECTION, SOLUTION INTRAVENOUS
Status: DISCONTINUED | OUTPATIENT
Start: 2022-03-17 | End: 2022-03-21 | Stop reason: HOSPADM

## 2022-03-17 RX ORDER — ONDANSETRON 2 MG/ML
4 INJECTION INTRAMUSCULAR; INTRAVENOUS ONCE
Status: COMPLETED | OUTPATIENT
Start: 2022-03-17 | End: 2022-03-17

## 2022-03-17 RX ORDER — SODIUM CHLORIDE 0.9 % (FLUSH) 0.9 %
10 SYRINGE (ML) INJECTION EVERY 12 HOURS SCHEDULED
Status: DISCONTINUED | OUTPATIENT
Start: 2022-03-17 | End: 2022-03-21 | Stop reason: HOSPADM

## 2022-03-17 RX ORDER — MORPHINE SULFATE 2 MG/ML
1 INJECTION, SOLUTION INTRAMUSCULAR; INTRAVENOUS EVERY 4 HOURS PRN
Status: DISCONTINUED | OUTPATIENT
Start: 2022-03-17 | End: 2022-03-21 | Stop reason: HOSPADM

## 2022-03-17 RX ORDER — PHENYTOIN SODIUM 100 MG/1
200 CAPSULE, EXTENDED RELEASE ORAL DAILY
Status: DISCONTINUED | OUTPATIENT
Start: 2022-03-17 | End: 2022-03-18

## 2022-03-17 RX ADMIN — MORPHINE SULFATE 30 MG: 15 TABLET, FILM COATED, EXTENDED RELEASE ORAL at 20:55

## 2022-03-17 RX ADMIN — INSULIN DETEMIR 20 UNITS: 100 INJECTION, SOLUTION SUBCUTANEOUS at 20:57

## 2022-03-17 RX ADMIN — APIXABAN 5 MG: 5 TABLET, FILM COATED ORAL at 20:56

## 2022-03-17 RX ADMIN — METOPROLOL TARTRATE 5 MG: 5 INJECTION INTRAVENOUS at 07:59

## 2022-03-17 RX ADMIN — Medication 10 ML: at 09:26

## 2022-03-17 RX ADMIN — INSULIN LISPRO 7 UNITS: 100 INJECTION, SOLUTION INTRAVENOUS; SUBCUTANEOUS at 16:57

## 2022-03-17 RX ADMIN — PHENYTOIN SODIUM 200 MG: 100 CAPSULE ORAL at 16:53

## 2022-03-17 RX ADMIN — Medication 10 ML: at 15:44

## 2022-03-17 RX ADMIN — Medication 400 MG: at 20:56

## 2022-03-17 RX ADMIN — METOPROLOL TARTRATE 50 MG: 50 TABLET, FILM COATED ORAL at 20:56

## 2022-03-17 RX ADMIN — PROPAFENONE HYDROCHLORIDE 150 MG: 150 TABLET, FILM COATED ORAL at 16:53

## 2022-03-17 RX ADMIN — HYDROCODONE BITARTRATE AND ACETAMINOPHEN 1 TABLET: 7.5; 325 TABLET ORAL at 14:28

## 2022-03-17 RX ADMIN — BUSPIRONE HYDROCHLORIDE 15 MG: 15 TABLET ORAL at 20:56

## 2022-03-17 RX ADMIN — ATORVASTATIN CALCIUM 20 MG: 40 TABLET, FILM COATED ORAL at 14:28

## 2022-03-17 RX ADMIN — HYDROMORPHONE HYDROCHLORIDE 0.5 MG: 1 INJECTION, SOLUTION INTRAMUSCULAR; INTRAVENOUS; SUBCUTANEOUS at 09:25

## 2022-03-17 RX ADMIN — MAGNESIUM SULFATE HEPTAHYDRATE 2 G: 2 INJECTION, SOLUTION INTRAVENOUS at 16:53

## 2022-03-17 RX ADMIN — PROPAFENONE HYDROCHLORIDE 150 MG: 150 TABLET, FILM COATED ORAL at 20:55

## 2022-03-17 RX ADMIN — Medication 10 ML: at 20:57

## 2022-03-17 RX ADMIN — ONDANSETRON 4 MG: 2 INJECTION INTRAMUSCULAR; INTRAVENOUS at 09:25

## 2022-03-17 RX ADMIN — DULOXETINE 60 MG: 60 CAPSULE, DELAYED RELEASE ORAL at 16:53

## 2022-03-17 RX ADMIN — SODIUM CHLORIDE 75 ML/HR: 9 INJECTION, SOLUTION INTRAVENOUS at 15:44

## 2022-03-17 RX ADMIN — SODIUM CHLORIDE 1000 ML: 9 INJECTION, SOLUTION INTRAVENOUS at 08:00

## 2022-03-17 NOTE — CONSULTS
[facility] Electrophyiology        Date of Hospital Visit: 22      Place of Service: [facility]    Patient Name: Martha Harrell  :1955    Referral Provider: Hospitalist  Primary Care Provider: Ginger Ward APRN  Primary Cardiologist: Enriqueta Kelsey MD    Chief complaint/Reason for Consultation: SVT         Problem List:  Active Hospital Problems    Diagnosis    • SVT (supraventricular tachycardia) (HCC)    • Paroxysmal atrial fibrillation (HCC)    • Hyperlipidemia    • Osteoarthritis    • Chronic pain    • GERD (gastroesophageal reflux disease)    • Depression    • Epilepsy (HCC)    • Uncontrolled type 2 diabetes mellitus with hyperglycemia (HCC)    • Diabetic peripheral neuropathy associated with type 2 diabetes mellitus (HCC)          History of Present Illness:  This is a 66-year old female whose cardiac history is significant for apparent paroxysmal atrial fibrillation for which she is on propafenone.  She states she has been on this medication for approximately a year.  She is not aware of a history of ischemic study.  She does think she has had an echocardiogram before in the past.  She denies a history of cardioversion.  She presented to [facility] emergency department today with a complaint of severe acute on chronic pain.  She has chronic pain due to severe osteoarthritis.  On evaluation she was found to have a high heart rate.  On arrival emergency department she was in SVT.  She auto converted to sinus rhythm prior to any medical intervention.  She has no history of CHF, TIA or CVA and is not on antihypertensives.  She is a moderately poor historian and records are limited.  I can find no records from [facility] on [facility] for review.  She denies chest pain but has chronic dyspnea due to chronic respiratory failure.  She is supposed to be on 2 L of supplemental oxygen at home.  She denies orthopnea PND or claudication but complains of  occasional lower extremity edema.  She has never been on other antiarrhythmics.  Her presenting EKG shows SVT, her initial troponin is negative.  At present she is awake and alert and comfortable and in no apparent distress.          Past Surgical History:   Procedure Laterality Date   • BACK SURGERY     • NECK SURGERY     • TUBAL ABDOMINAL LIGATION         Allergies   Allergen Reactions   • Aleve [Naproxen] Itching       Current Outpatient Medications   Medication Instructions   • acetaminophen (TYLENOL) 650 mg, Oral, Every 4 Hours PRN   • amoxicillin-clavulanate (Augmentin) 500-125 MG per tablet 1 tablet, Oral, 2 Times Daily   • atorvastatin (LIPITOR) 20 mg, Oral, Every Night at Bedtime   • B-D UF III MINI PEN NEEDLES 31G X 5 MM misc Use daily with insulin   • busPIRone (BUSPAR) 15 MG tablet No dose, route, or frequency recorded.   • calcium carbonate (OS-HOSEA) 600 mg, Oral, Daily   • DULoxetine (CYMBALTA) 60 mg, Oral, Daily   • Eliquis 5 mg, Oral, 2 Times Daily   • empagliflozin (JARDIANCE) 25 mg, Oral, Daily   • fenofibrate (TRICOR) 145 MG tablet 160 mg.   • furosemide (LASIX) 40 MG tablet No dose, route, or frequency recorded.   • HYDROcodone-acetaminophen (NORCO) 7.5-325 MG per tablet 1 tablet, Oral, 3 Times Daily PRN   • Insulin Glargine, 2 Unit Dial, (TOUJEO MAX SOLOSTAR) 300 UNIT/ML solution pen-injector injection Take 100 units at bedtime, after a week go up to 110 units   • Insulin Lispro, 1 Unit Dial, (HUMALOG KWIKPEN) 100 UNIT/ML solution pen-injector Take 20u before lunch (if you eat lunch) and take 40 units before supper   • loratadine (CLARITIN) 10 MG tablet take 1 tablet by mouth once daily if needed   • magnesium oxide (MAG-OX) 400 MG tablet 1 tablet, Oral, 2 Times Daily   • meloxicam (MOBIC) 15 mg, Oral, Every Morning   • metFORMIN (GLUCOPHAGE) 1,000 mg, Oral, 2 Times Daily With Meals   • Morphine (MS CONTIN) 30 mg, Oral, 2 Times Daily   • Omega-3 Fatty Acids (RA FISH OIL) 1000 MG capsule 1  capsule, Oral, 2 Times Daily   • omeprazole (priLOSEC) 20 MG capsule No dose, route, or frequency recorded.   • ONE TOUCH ULTRA TEST test strip Test BS TID   • ONETOUCH DELICA LANCETS FINE misc Test BS TID   • phenytoin (DILANTIN) 100 MG ER capsule take 2 capsule by mouth twice a day   • propafenone (RYTHMOL) 150 mg, Oral, 3 Times Daily   • RA Aspirin EC 81 mg, Oral, Daily   • RA Vitamin D-3 5,000 Units, Oral, Daily   • raNITIdine (ZANTAC) 150 MG tablet No dose, route, or frequency recorded.   • Toujeo SoloStar 300 UNIT/ML solution pen-injector injection ADMINISTER 100 UNITS UNDER THE SKIN DAILY          Scheduled Meds:  apixaban, 5 mg, Oral, BID  atorvastatin, 20 mg, Oral, Daily  busPIRone, 15 mg, Oral, Q12H  DULoxetine, 60 mg, Oral, Daily  insulin detemir, 20 Units, Subcutaneous, Nightly  insulin lispro, 0-9 Units, Subcutaneous, TID AC  insulin lispro, 7 Units, Subcutaneous, TID With Meals  magnesium oxide, 400 mg, Oral, BID  Morphine, 30 mg, Oral, BID  phenytoin ER, 200 mg, Oral, Daily  propafenone, 150 mg, Oral, TID  sodium chloride, 10 mL, Intravenous, Q12H      Continuous Infusions:sodium chloride, 75 mL/hr            Social History     Socioeconomic History   • Marital status:    Tobacco Use   • Smoking status: Former Smoker     Types: Cigarettes     Quit date: 2010     Years since quittin.1   • Smokeless tobacco: Never Used   Substance and Sexual Activity   • Alcohol use: No   • Drug use: No   • Sexual activity: Defer       Family History   Problem Relation Age of Onset   • Arthritis Mother    • Heart disease Mother    • Obesity Mother    • Arthritis Father    • Cancer Father    • Hypertension Father    • Hyperlipidemia Father    • Obesity Father    • Arthritis Sister    • Obesity Sister    • Arthritis Maternal Aunt    • Arthritis Maternal Uncle    • Cancer Maternal Uncle        REVIEW OF SYSTEMS:   Review of Systems   Constitutional: Negative.   HENT: Negative.    Eyes: Negative.   "  Cardiovascular: Positive for leg swelling. Negative for chest pain, claudication, near-syncope, orthopnea, palpitations, paroxysmal nocturnal dyspnea and syncope.   Respiratory: Positive for shortness of breath.    Endocrine: Negative.    Hematologic/Lymphatic: Negative.    Skin: Negative.    Musculoskeletal: Positive for arthritis, joint pain and muscle weakness.   Gastrointestinal: Negative.    Genitourinary: Negative.    Neurological: Negative.    Psychiatric/Behavioral: Negative.    Allergic/Immunologic: Negative.    All other systems reviewed and are negative.           Objective:  Vitals:    03/17/22 1115 03/17/22 1130 03/17/22 1200 03/17/22 1230   BP:  131/70 131/71 138/70   Pulse: 94 93 93 92   Resp:       Temp:       TempSrc:       SpO2: 91% 94% 90% 94%   Weight:       Height:         Body mass index is 34.75 kg/m².  Flowsheet Rows    Flowsheet Row First Filed Value   Admission Height 157.5 cm (62\") Documented at 03/17/2022 0753   Admission Weight 86.2 kg (190 lb) Documented at 03/17/2022 0753          Intake/Output Summary (Last 24 hours) at 3/17/2022 1415  Last data filed at 3/17/2022 0930  Gross per 24 hour   Intake 1000 ml   Output --   Net 1000 ml       Vitals and nursing note reviewed.   Constitutional:       Appearance: Not in distress. Chronically ill-appearing.   Eyes:      Conjunctiva/sclera: Conjunctivae normal.      Pupils: Pupils are equal, round, and reactive to light.   Neck:      Vascular: JVD normal.   Pulmonary:      Effort: Pulmonary effort is normal.      Breath sounds: Normal breath sounds.   Chest:      Chest wall: Not tender to palpatation.   Cardiovascular:      Normal rate. Regular rhythm.      Murmurs: There is no murmur.      No gallop. No click. No rub.   Pulses:     Intact distal pulses.   Edema:     Peripheral edema absent.   Abdominal:      General: Bowel sounds are normal.      Palpations: Abdomen is soft.   Musculoskeletal: Normal range of motion.         General: No " deformity.      Cervical back: Normal range of motion. Skin:     General: Skin is warm and dry.   Neurological:      General: No focal deficit present.      Mental Status: Alert.   Psychiatric:         Behavior: Behavior is cooperative.               Lab Review:                CBC:      Lab 03/17/22  0757   WBC 12.80*   HEMOGLOBIN 13.7   HEMATOCRIT 42.8   PLATELETS 219   NEUTROS ABS 10.00*   IMMATURE GRANS (ABS) 0.05   LYMPHS ABS 1.67   MONOS ABS 0.81   EOS ABS 0.23   MCV 90.1     CMP:      Lab 03/17/22  0757   SODIUM 141   POTASSIUM 3.9   CHLORIDE 103   CO2 27.0   ANION GAP 11.0   BUN 15   CREATININE 0.70   EGFR 94.9   GLUCOSE 118*   CALCIUM 8.6   MAGNESIUM 1.5*   TOTAL PROTEIN 6.9   ALBUMIN 3.60   GLOBULIN 3.3   ALT (SGPT) 28   AST (SGOT) 24   BILIRUBIN 0.4   ALK PHOS 90     Results from last 7 days   Lab Units 03/17/22  0757   MAGNESIUM mg/dL 1.5*     Estimated Creatinine Clearance: 69.5 mL/min (by C-G formula based on SCr of 0.7 mg/dL).    CARDIAC LABS:      Lab 03/17/22  0757   PROBNP 209.0   TROPONIN T <0.010           EKG/ Telemetry:         CHADS-VASc Risk Assessment            3 Total Score    1 DM         1 Age 65-74    1 Sex: Female        Criteria that do not apply:    CHF    Hypertension    Age >/= 75    Vascular Disease              Result Review:  I have personally reviewed the results from the time of admission and agree with these findings:  [x]  Laboratory  []  Radiology  [x]  EKG/Telemetry   []  Pathology  [x]  Old records  []  Other:        Assessment and Plan:     1.  SVT   -Auto converted to sinus rhythm   -Continue propafenone   -Echocardiogram    2.  Apparent history of atrial fibrillation   -CHADSVASC 3   -Continue propafenone   -Continue apixaban   -Echocardiogram    3.  Mild hypomagnesemia   -Magnesium replacement          Electronically signed by CORINA Napier, 03/17/22, 2:56 PM EDT.          CARDIAC ELECTROPHYSIOLOGIST ADDENDUM    I have personally performed a face to face  "diagnostic evaluation on this patient.  I have reviewed the note authored by the advanced practice provider and agree with the history of present illness, past medical history, surgical history, social history, family history and review of systems.. I have reviewed current medications, and lab values.  My findings are below:  .    History of Present Illness:  66 y.o. female with multiple medical problems including obesity, significant osteoarthritis and chronic pain, diabetes, paroxysmal atrial fibrillation.  She presented due to multiple orthopedic complaints.  She was found to be in supraventricular tachycardia with a heart rate around 170 bpm.  This spontaneously converted to normal rhythm while she was in the emergency department.  She has a history of atrial fibrillation and has been on propafenone for this.    She does say that she was able to feel her heart racing this morning when the emergency department.  She thought this was possibly related to the amount of pain she was in.  She also says that she occasionally feels her heart racing at home, but this is pretty infrequent.  She is not overall bothered when these events are happening.  She denies any chest discomfort, dyspnea, orthopnea, or PND.    Review of Systems:  As documented in the note above     Physical Exam   Vital Signs: /74 (BP Location: Right arm, Patient Position: Lying)   Pulse 87   Temp 99.5 °F (37.5 °C) (Oral)   Resp 22   Ht 157.5 cm (62\")   Wt 86.2 kg (190 lb)   SpO2 99%   BMI 34.75 kg/m²        Admission Weight: 86.2 kg (190 lb)     General appearance: Alert oriented and cooperative.  In no acute distress, obese  Skin:  Warm and Dry to touch  Head: Normocephalic, without obvious abnormality, atraumatic.   Eyes: Conjunctivae unremarkable, EOM's intact.Sclera non icteric.  Neck: No JVD, No carotid bruit. Neck supple, trachea midline  Lungs: Clear to auscultation bilaterally, no use of accessory muscles.    Heart:: RRR with " Normal S1 and S2 . No murmurs and no gallops.  Abdomen: Soft, nontender. Bowel sounds normal.  Extremities: No edema.  Neurologic: Oriented to time, person and place, affect appropriate.  No focal/major motor or sensory defects noted.    Psychiatric:  Appropriate mood, memory and judgment.  Good use of semantics and logic.    PLAN:    I personally reviewed her ECG and telemetry.  Her ECG shows a regular narrow complex tachycardia at around 165 bpm.  Atrial activity is difficult to discern.  The tachycardia terminated spontaneously.  The exact etiology of this is uncertain, but the differential includes a supraventricular tachycardia such as AV elliot reentry, or an organized atrial flutter/atrial tachycardia with one-to-one conduction.  Given that she is on propafenone, this could potentially allow for a slower flutter cycle length and one-to-one conduction.  The determination for therapy on this would determine how often this is happening how bothered she is by it at the current time, given her comorbidities and deconditioning I would not favor an ablation of the current time.  We will go ahead and start metoprolol 50 mg twice daily in order to help prevent rapid rates so she hated with these episodes.  We will follow up the results of the echocardiogram.  At the current time she does not require any additional cardiac testing.  When she is deemed to be ready for discharge we can likely send her home with a ambulatory monitor for further monitoring.     Manny Yañez MD

## 2022-03-17 NOTE — H&P
[facility] Medicine Services  HISTORY AND PHYSICAL    Patient Name: Martha Harrell  : 1955  MRN: [id number]  Primary Care Physician: Ginger Ward APRN  Date of admission: 3/17/2022      Subjective   Subjective     Chief Complaint:  Pain all over    HPI:  Martha Harrell is a 66 y.o. female , unfortunately very poor historian, past medical history significant for asthma, diabetes mellitus, seizure disorder, hyperlipidemia, depression, extensive osteoarthritis of multiple joints including knee joints bilaterally s/p steroid injection.  Patient also has history of atrial fibrillation and is on propafenone by her local cardiologist.  She also has chronic pain and is on p.o. morphine and sees pain clinic.  Evidently patient was in her usual state of health until couple of days ago when she suddenly started having pain all over particularly in her lower extremities bilaterally in her shoulders and also in her neck.  Patient tells me that pain is so severe that she cannot even move her limbs and cannot ambulate at all.  Prior to the onset of this severe pain she could ambulate a little bit although it was difficult for her to do that.  Patient denies any fever or chills during the past several days.  No cough or coryza or any sign of respiratory issues.  Denies any nausea vomiting or diarrhea.  At the emergency room here today patient was found to have supraventricular tachycardia which was resolved by the time that I saw the patient.      COVID Details:    Symptoms:    [] NONE [] Fever []  Cough [] Shortness of breath [] Change in taste/smell      Review of Systems   Denies any recent weight loss or weight gain, no lumps or bumps, no night sweats, no unusual headaches, no vision changes, difficulty with ambulation as mentioned above, painful knee joints with tenderness which is chronic, no nausea vomiting or diarrhea or abdominal pain, no rashes or hives.  All other systems reviewed  and are negative.     Personal History     Past Medical History:   Diagnosis Date   • Anemia    • Asthma    • Chronic bronchitis (HCC)    • Depression    • Diabetes mellitus (HCC)    • Epilepsy (HCC)    • Fibromyalgia, primary    • GERD (gastroesophageal reflux disease)    • Hyperlipidemia        Past Surgical History:   Procedure Laterality Date   • BACK SURGERY     • NECK SURGERY     • TUBAL ABDOMINAL LIGATION         Family History:  family history includes Arthritis in her father, maternal aunt, maternal uncle, mother, and sister; Cancer in her father and maternal uncle; Heart disease in her mother; Hyperlipidemia in her father; Hypertension in her father; Obesity in her father, mother, and sister. Otherwise pertinent FHx was reviewed and unremarkable.     Social History:  reports that she quit smoking about 12 years ago. Her smoking use included cigarettes. She has never used smokeless tobacco. She reports that she does not drink alcohol and does not use drugs.  Social History     Social History Narrative   • Not on file       Medications:  Available home medication information reviewed.  (Not in a hospital admission)      Allergies   Allergen Reactions   • Aleve [Naproxen] Itching       Objective   Objective     Vital Signs:   Temp:  [98.5 °F (36.9 °C)] 98.5 °F (36.9 °C)  Heart Rate:  [] 92  Resp:  [14-20] 20  BP: (129-144)/(64-79) 138/70       Physical Exam   Constitutional: Awake, alert  Eyes: PERRLA, sclerae anicteric, no conjunctival injection  HENT: NCAT, mucous membranes moist  Neck: Supple, no thyromegaly, no lymphadenopathy, trachea midline  Respiratory: Clear to auscultation bilaterally, nonlabored respirations   Cardiovascular: RRR, no murmurs, rubs, or gallops  Gastrointestinal: Abdomen is obese.  Positive bowel sounds, soft, nontender, nondistended  Musculoskeletal: Trace bilateral ankle edema, no clubbing or cyanosis to extremities, painful range of motion of both knee joints and also  hip.  Psychiatric: Appropriate affect, cooperative  Neurologic: Awake, alert, oriented x3, no focality present, very weak lower extremities patient hardly can lift her lower extremities against gravity, speech clear  Skin: No rashes    Result Review:  I have personally reviewed the results from the time of this admission to 3/17/2022 13:40 EDT and agree with these findings:  [x]  Laboratory  []  Microbiology  [x]  Radiology  []  EKG/Telemetry   []  Cardiology/Vascular   []  Pathology  [x]  Old records  []  Other:  Most notable findings include: Elevated lactate, hypomagnesemia, mild leukocytosis.      LAB RESULTS:      Lab 03/17/22  1112 03/17/22  0757   WBC  --  12.80*   HEMOGLOBIN  --  13.7   HEMATOCRIT  --  42.8   PLATELETS  --  219   NEUTROS ABS  --  10.00*   IMMATURE GRANS (ABS)  --  0.05   LYMPHS ABS  --  1.67   MONOS ABS  --  0.81   EOS ABS  --  0.23   MCV  --  90.1   LACTATE 0.8 2.4*         Lab 03/17/22  0757   SODIUM 141   POTASSIUM 3.9   CHLORIDE 103   CO2 27.0   ANION GAP 11.0   BUN 15   CREATININE 0.70   EGFR 94.9   GLUCOSE 118*   CALCIUM 8.6   MAGNESIUM 1.5*   TSH 1.930         Lab 03/17/22  0757   TOTAL PROTEIN 6.9   ALBUMIN 3.60   GLOBULIN 3.3   ALT (SGPT) 28   AST (SGOT) 24   BILIRUBIN 0.4   ALK PHOS 90         Lab 03/17/22  0757   PROBNP 209.0   TROPONIN T <0.010                 UA    Urinalysis 3/17/22   Specific [place], UA 1.028   Ketones, UA Negative   Blood, UA Negative   Leukocytes, UA Negative   Nitrite, UA Negative             Microbiology Results (last 10 days)     Procedure Component Value - Date/Time    COVID PRE-OP / PRE-PROCEDURE SCREENING ORDER (NO ISOLATION) - Swab, Nasopharynx [[id number]]  (Normal) Collected: 03/17/22 1227    Lab Status: Final result Specimen: Swab from Nasopharynx Updated: 03/17/22 1257    Narrative:      The following orders were created for panel order COVID PRE-OP / PRE-PROCEDURE SCREENING ORDER (NO ISOLATION) - Swab, Nasopharynx.  Procedure                                Abnormality         Status                     ---------                               -----------         ------                     COVID-19 and FLU A/B PCR...[[id number]]  Normal              Final result                 Please view results for these tests on the individual orders.    COVID-19 and FLU A/B PCR - Swab, Nasopharynx [[id number]]  (Normal) Collected: 03/17/22 1227    Lab Status: Final result Specimen: Swab from Nasopharynx Updated: 03/17/22 1257     COVID19 Not Detected     Influenza A PCR Not Detected     Influenza B PCR Not Detected    Narrative:      Fact sheet for providers: https://www.fda.gov/media/819881/download    Fact sheet for patients: https://www.fda.gov/media/034605/download    Test performed by PCR.          XR Chest 1 View    Result Date: 3/17/2022  DATE OF EXAM: 3/17/2022 8:03 AM  PROCEDURE: XR CHEST 1 VW-  INDICATIONS: Dysrhythmia triage protocol  COMPARISON: No comparisons available.  TECHNIQUE: Single radiographic AP view of the chest was obtained.  FINDINGS: Patient rotated to the right. Heart size indeterminate. Pulmonary vasculature are within normal limits. Lungs clear other than atelectasis in the medial lung bases and calcified granulomas. Costophrenic angles sharp      Impression: Bibasilar atelectasis. No suspicious infiltrate or edema  This report was finalized on 3/17/2022 8:16 AM by Malcolm Negrete.            Assessment/Plan   Assessment & Plan     Active Hospital Problems    Diagnosis  POA   • SVT (supraventricular tachycardia) (HCC) [I47.1]  Yes       Patient is a 66-year-old  female with past medical history significant for diabetes mellitus, hyperlipidemia, history of atrial fibrillation and is on Eliquis, chronic pain and is on overall morphine, severe osteoarthritis of multiple joints including bilateral knee joints.  Patient came to the emergency room for sudden onset of severe pain in her lower extremities, shoulders, neck which was significantly  worse than her baseline.  At the emergency room patient was found to be in supraventricular tachycardia.  Labs show hypomagnesemia, mild leukocytosis, elevated lactate.    PLAN:  -Admit the patient to telemetry and monitor  -Continue home medication  -Pain control  -Blood sugar control with Levemir and also Humalog  -Cardiology consult  -PT and OT  -Consider orthopedic surgery consult.  Patient has seen Dr. Singletary before  -Gentle hydration  -Recheck labs including lactate.    DVT prophylaxis:  On eliquis      CODE STATUS:    Code Status and Medical Interventions:   Ordered at: 03/17/22 1333     Code Status (Patient has no pulse and is not breathing):    CPR (Attempt to Resuscitate)     Medical Interventions (Patient has pulse or is breathing):    Full Support         Monty Manzo MD  03/17/22

## 2022-03-17 NOTE — ED PROVIDER NOTES
EMERGENCY DEPARTMENT ENCOUNTER    Pt Name: Martha Harrell  MRN: [id number]  Pt :   1955  Room Number:  S547/1  Date of encounter:  3/17/2022  PCP: Ginger Ward APRN  ED Provider: SHERRY Hadley    Historian: patient      HPI:  Chief Complaint: pain all over        Context: Martha Harrell is a 66 y.o. female who presents to the ED c/o  c/o experiencing pain all over her body which began yesterday evening.  Patient states she does not recognize this pain is similar in presentation to previous and chronic pain that she experiences in her left leg and left groin for which she takes morphine twice daily as well as Lortab for breakthrough pain.  Patient states that this unusual pain began yesterday and exist not only in her chronic pain in her left leg but also her right leg, right arms, both hands, her neck, and headache.  Also her elbows.  She states that she has adequate pain medication at home that is provided by contract through pain management clinic.  Patient states she was in no pain yesterday when she was seen by her foot doctor where dressings were applied to some skin breakdown on her both lower extremities recently.  She states she is not out of pain medication currently.  When EMS arrived to her home they found her to be in SVT with heart rate between 160 tp 190.  Adenosine was not given due to her wrist and history of asthma.  She converted to sinus rhythm on arrival to the ER only to vacillate into SVT for several other minutes after administering metoprolol, patient had a sinus rhythm rate in the 80s.    Review of systems is negative for fever chills or recent illness.  Negative for chest pain or cough or shortness of breath.  Negative for palpitations.  Negative for nausea, vomiting, diarrhea or abdominal pain.  Negative for constipation.  Positive for generalized weakness without dizziness or syncope.  No focal neurosensory complaint or focal weakness.      PAST MEDICAL HISTORY  Past  Medical History:   Diagnosis Date   • Anemia    • Asthma    • Chronic bronchitis (HCC)    • Depression    • Diabetes mellitus (HCC)    • Epilepsy (HCC)    • Fibromyalgia, primary    • GERD (gastroesophageal reflux disease)    • Hyperlipidemia    • Leg DVT (deep venous thromboembolism), acute, left (HCC) 2019         PAST SURGICAL HISTORY  Past Surgical History:   Procedure Laterality Date   • BACK SURGERY     • NECK SURGERY     • TUBAL ABDOMINAL LIGATION           FAMILY HISTORY  Family History   Problem Relation Age of Onset   • Arthritis Mother    • Heart disease Mother    • Obesity Mother    • Arthritis Father    • Cancer Father    • Hypertension Father    • Hyperlipidemia Father    • Obesity Father    • Arthritis Sister    • Obesity Sister    • Arthritis Maternal Aunt    • Arthritis Maternal Uncle    • Cancer Maternal Uncle          SOCIAL HISTORY  Social History     Socioeconomic History   • Marital status:    Tobacco Use   • Smoking status: Former Smoker     Types: Cigarettes     Quit date: 2010     Years since quittin.1   • Smokeless tobacco: Never Used   Substance and Sexual Activity   • Alcohol use: No   • Drug use: No   • Sexual activity: Defer         ALLERGIES  Aleve [naproxen]        REVIEW OF SYSTEMS  Review of Systems     All systems reviewed and negative except for those discussed in HPI.       PHYSICAL EXAM    I have reviewed the triage vital signs and nursing notes.    ED Triage Vitals   Temp Heart Rate Resp BP SpO2   22 0753 22 0753 22 0753 22 0753 22 0753   98.5 °F (36.9 °C) (!) 165 14 134/64 99 %      Temp src Heart Rate Source Patient Position BP Location FiO2 (%)   22 0753 22 0753 22 1507 22 1507 --   Oral Monitor Lying Right arm        Physical Exam  GENERAL:   Appears in SVT on arrival.  She is a good historian.  She is oxygenating well.  HENT: Nares patent.  EYES: No scleral icterus.  CV: Regular rhythm,  tachycardic.  No peripheral edema  RESPIRATORY: Normal effort.  No audible wheezes, rales or rhonchi.  ABDOMEN: Soft, nontender  MUSCULOSKELETAL: No deformities.   NEURO: Alert, moves all extremities, follows commands.  SKIN: Warm, dry, no rash visualized.        LAB RESULTS  Recent Results (from the past 24 hour(s))   ECG 12 Lead    Collection Time: 03/17/22  7:53 AM   Result Value Ref Range    QT Interval 292 ms    QTC Interval 482 ms   Comprehensive Metabolic Panel    Collection Time: 03/17/22  7:57 AM    Specimen: Blood   Result Value Ref Range    Glucose 118 (H) 65 - 99 mg/dL    BUN 15 8 - 23 mg/dL    Creatinine 0.70 0.57 - 1.00 mg/dL    Sodium 141 136 - 145 mmol/L    Potassium 3.9 3.5 - 5.2 mmol/L    Chloride 103 98 - 107 mmol/L    CO2 27.0 22.0 - 29.0 mmol/L    Calcium 8.6 8.6 - 10.5 mg/dL    Total Protein 6.9 6.0 - 8.5 g/dL    Albumin 3.60 3.50 - 5.20 g/dL    ALT (SGPT) 28 1 - 33 U/L    AST (SGOT) 24 1 - 32 U/L    Alkaline Phosphatase 90 39 - 117 U/L    Total Bilirubin 0.4 0.0 - 1.2 mg/dL    Globulin 3.3 gm/dL    A/G Ratio 1.1 g/dL    BUN/Creatinine Ratio 21.4 7.0 - 25.0    Anion Gap 11.0 5.0 - 15.0 mmol/L    eGFR 94.9 >60.0 mL/min/1.73   Magnesium    Collection Time: 03/17/22  7:57 AM    Specimen: Blood   Result Value Ref Range    Magnesium 1.5 (L) 1.6 - 2.4 mg/dL   Troponin    Collection Time: 03/17/22  7:57 AM    Specimen: Blood   Result Value Ref Range    Troponin T <0.010 0.000 - 0.030 ng/mL   TSH    Collection Time: 03/17/22  7:57 AM    Specimen: Blood   Result Value Ref Range    TSH 1.930 0.270 - 4.200 uIU/mL   BNP    Collection Time: 03/17/22  7:57 AM    Specimen: Blood   Result Value Ref Range    proBNP 209.0 0.0 - 900.0 pg/mL   Green Top (Gel)    Collection Time: 03/17/22  7:57 AM   Result Value Ref Range    Extra Tube Hold for add-ons.    Lavender Top    Collection Time: 03/17/22  7:57 AM   Result Value Ref Range    Extra Tube hold for add-on    Gold Top - SST    Collection Time: 03/17/22  7:57 AM    Result Value Ref Range    Extra Tube Hold for add-ons.    Gray Top    Collection Time: 03/17/22  7:57 AM   Result Value Ref Range    Extra Tube Hold for add-ons.    Light Blue Top    Collection Time: 03/17/22  7:57 AM   Result Value Ref Range    Extra Tube hold for add-on    CBC Auto Differential    Collection Time: 03/17/22  7:57 AM    Specimen: Blood   Result Value Ref Range    WBC 12.80 (H) 3.40 - 10.80 10*3/mm3    RBC 4.75 3.77 - 5.28 10*6/mm3    Hemoglobin 13.7 12.0 - 15.9 g/dL    Hematocrit 42.8 34.0 - 46.6 %    MCV 90.1 79.0 - 97.0 fL    MCH 28.8 26.6 - 33.0 pg    MCHC 32.0 31.5 - 35.7 g/dL    RDW 14.4 12.3 - 15.4 %    RDW-SD 47.8 37.0 - 54.0 fl    MPV 9.5 6.0 - 12.0 fL    Platelets 219 140 - 450 10*3/mm3    Neutrophil % 78.2 (H) 42.7 - 76.0 %    Lymphocyte % 13.0 (L) 19.6 - 45.3 %    Monocyte % 6.3 5.0 - 12.0 %    Eosinophil % 1.8 0.3 - 6.2 %    Basophil % 0.3 0.0 - 1.5 %    Immature Grans % 0.4 0.0 - 0.5 %    Neutrophils, Absolute 10.00 (H) 1.70 - 7.00 10*3/mm3    Lymphocytes, Absolute 1.67 0.70 - 3.10 10*3/mm3    Monocytes, Absolute 0.81 0.10 - 0.90 10*3/mm3    Eosinophils, Absolute 0.23 0.00 - 0.40 10*3/mm3    Basophils, Absolute 0.04 0.00 - 0.20 10*3/mm3    Immature Grans, Absolute 0.05 0.00 - 0.05 10*3/mm3    nRBC 0.0 0.0 - 0.2 /100 WBC   Lactic Acid, Plasma    Collection Time: 03/17/22  7:57 AM    Specimen: Blood   Result Value Ref Range    Lactate 2.4 (C) 0.5 - 2.0 mmol/L   Hemoglobin A1c    Collection Time: 03/17/22  7:57 AM    Specimen: Blood   Result Value Ref Range    Hemoglobin A1C 7.70 (H) 4.80 - 5.60 %   Urinalysis With Microscopic If Indicated (No Culture) - Urine, Catheter    Collection Time: 03/17/22  8:18 AM    Specimen: Urine, Catheter   Result Value Ref Range    Color, UA Yellow Yellow, Straw    Appearance, UA Clear Clear    pH, UA 6.0 5.0 - 8.0    Specific Gravity, UA 1.028 1.001 - 1.030    Glucose, UA >=1000 mg/dL (3+) (A) Negative    Ketones, UA Negative Negative    Bilirubin, UA  Negative Negative    Blood, UA Negative Negative    Protein, UA Trace (A) Negative    Leuk Esterase, UA Negative Negative    Nitrite, UA Negative Negative    Urobilinogen, UA 0.2 E.U./dL 0.2 - 1.0 E.U./dL   STAT Lactic Acid, Reflex    Collection Time: 03/17/22 11:12 AM    Specimen: Blood   Result Value Ref Range    Lactate 0.8 0.5 - 2.0 mmol/L   COVID-19 and FLU A/B PCR - Swab, Nasopharynx    Collection Time: 03/17/22 12:27 PM    Specimen: Nasopharynx; Swab   Result Value Ref Range    COVID19 Not Detected Not Detected - Ref. Range    Influenza A PCR Not Detected Not Detected    Influenza B PCR Not Detected Not Detected   POC Glucose Once    Collection Time: 03/17/22  3:47 PM    Specimen: Blood   Result Value Ref Range    Glucose 80 70 - 130 mg/dL       If labs were ordered, I independently reviewed the results.        RADIOLOGY      PROCEDURES    Procedures    ECG 12 Lead   Final Result   Test Reason : Dysrhythmia triage protocol   Blood Pressure :   */*   mmHG   Vent. Rate : 164 BPM     Atrial Rate : 127 BPM      P-R Int :   * ms          QRS Dur :  94 ms       QT Int : 292 ms       P-R-T Axes :   *  36 205 degrees      QTc Int : 482 ms      Supraventricular tachycardia   Nonspecific ST and T wave abnormality   Abnormal ECG   When compared with ECG of 21-JUN-2019 18:47,   Vent. rate has increased BY  91 BPM   ST now depressed in Lateral leads   Nonspecific T wave abnormality, worse in Inferior leads   Nonspecific T wave abnormality now evident in Lateral leads   Confirmed by ARPAN HARRINGTON MD ([id number]) on 3/17/2022 1:31:22 PM      Referred By: EDMD           Confirmed By: ARPAN HARRINGTON MD          MEDICATIONS GIVEN IN ER    Medications   sodium chloride 0.9 % flush 10 mL (has no administration in time range)   sodium chloride 0.9 % flush 10 mL (10 mL Intravenous Given 3/17/22 0926)   Magnesium Sulfate 2 gram Bolus, followed by 8 gram infusion (total Mg dose 10 grams)- Mg less than or equal to 1mg/dL (has no  administration in time range)     Or   Magnesium Sulfate 2 gram / 50mL Infusion (GIVE X 3 BAGS TO EQUAL 6GM TOTAL DOSE) - Mg 1.1 - 1.5 mg/dl (has no administration in time range)     Or   Magnesium Sulfate 4 gram infusion- Mg 1.6-1.9 mg/dL (has no administration in time range)   sodium chloride 0.9 % flush 10 mL (10 mL Intravenous Given 3/17/22 0074)   sodium chloride 0.9 % flush 10 mL (has no administration in time range)   morphine injection 1 mg (has no administration in time range)     And   naloxone (NARCAN) injection 0.4 mg (has no administration in time range)   calcium carbonate (TUMS) chewable tablet 500 mg (200 mg elemental) (has no administration in time range)   acetaminophen (TYLENOL) tablet 650 mg (has no administration in time range)   atorvastatin (LIPITOR) tablet 20 mg (20 mg Oral Given 3/17/22 1428)   busPIRone (BUSPAR) tablet 15 mg (has no administration in time range)   DULoxetine (CYMBALTA) DR capsule 60 mg (has no administration in time range)   apixaban (ELIQUIS) tablet 5 mg (has no administration in time range)   HYDROcodone-acetaminophen (NORCO) 7.5-325 MG per tablet 1 tablet (1 tablet Oral Given 3/17/22 1428)   magnesium oxide (MAG-OX) tablet 400 mg (has no administration in time range)   Morphine (MS CONTIN) 12 hr tablet 30 mg (has no administration in time range)   phenytoin ER (DILANTIN) capsule 200 mg (has no administration in time range)   propafenone (RYTHMOL) tablet 150 mg (has no administration in time range)   dextrose (GLUTOSE) oral gel 15 g (has no administration in time range)   dextrose (D50W) (25 g/50 mL) IV injection 25 g (has no administration in time range)   glucagon (human recombinant) (GLUCAGEN DIAGNOSTIC) injection 1 mg (has no administration in time range)   insulin detemir (LEVEMIR) injection 20 Units (has no administration in time range)   insulin lispro (humaLOG) injection 7 Units (has no administration in time range)   insulin lispro (humaLOG) injection 0-9 Units  (0 Units Subcutaneous Hold 3/17/22 1730)   sodium chloride 0.9 % infusion (75 mL/hr Intravenous New Bag 3/17/22 1544)   metoprolol tartrate (LOPRESSOR) tablet 50 mg (has no administration in time range)   metoprolol tartrate (LOPRESSOR) injection 5 mg (5 mg Intravenous Given 3/17/22 0759)   sodium chloride 0.9 % bolus 1,000 mL (0 mL Intravenous Stopped 3/17/22 0930)   HYDROmorphone (DILAUDID) injection 0.5 mg (0.5 mg Intravenous Given 3/17/22 0925)   ondansetron (ZOFRAN) injection 4 mg (4 mg Intravenous Given 3/17/22 0925)       ED Course as of 03/17/22 1556   Thu Mar 17, 2022   0945 WBC(!): 12.80 [MS]   1219 I have conferred with Dr. Moody regarding SVT presentation and Dr. Kg Henderson accepts inaptient care.  Patient's work-up is most remarkable for detection of supraventricular tachycardia on arrival.  After administration of Lopressor, patient has been in a sinus rhythm for the last 3 hours.  She has been resting comfortably.  At rest, her oxygen saturations dropped to 85%.  She denies any knowledge of history of sleep apnea.  Oxygen has been administered.  White count of 12.8 is noted as well as lactic acid of 2.4.  No infectious process is appreciated in the chest or urine.  Blood cultures are pending. [MS]      ED Course User Index  [MS] Shayy Marc APRN             AS OF 15:56 EDT VITALS:    BP - 138/74  HR - 87  TEMP - 99.5 °F (37.5 °C) (Oral)  O2 SATS - 99%                  DIAGNOSIS  Final diagnoses:   SVT (supraventricular tachycardia) (HCC)   Venous stasis dermatitis of both lower extremities   Chronic anticoagulation   Paroxysmal atrial fibrillation (HCC)         DISPOSITION    Admitted            Shayy Marc APRN  03/17/22 7091

## 2022-03-18 LAB
BASOPHILS # BLD AUTO: 0.03 10*3/MM3 (ref 0–0.2)
BASOPHILS NFR BLD AUTO: 0.3 % (ref 0–1.5)
CK SERPL-CCNC: 28 U/L (ref 20–180)
D-LACTATE SERPL-SCNC: 0.7 MMOL/L (ref 0.5–2)
DEPRECATED RDW RBC AUTO: 48.9 FL (ref 37–54)
EOSINOPHIL # BLD AUTO: 0.32 10*3/MM3 (ref 0–0.4)
EOSINOPHIL NFR BLD AUTO: 3.7 % (ref 0.3–6.2)
ERYTHROCYTE [DISTWIDTH] IN BLOOD BY AUTOMATED COUNT: 14.7 % (ref 12.3–15.4)
GLUCOSE BLDC GLUCOMTR-MCNC: 109 MG/DL (ref 70–130)
GLUCOSE BLDC GLUCOMTR-MCNC: 157 MG/DL (ref 70–130)
GLUCOSE BLDC GLUCOMTR-MCNC: 176 MG/DL (ref 70–130)
GLUCOSE BLDC GLUCOMTR-MCNC: 63 MG/DL (ref 70–130)
HCT VFR BLD AUTO: 39.7 % (ref 34–46.6)
HGB BLD-MCNC: 12.4 G/DL (ref 12–15.9)
IMM GRANULOCYTES # BLD AUTO: 0.02 10*3/MM3 (ref 0–0.05)
IMM GRANULOCYTES NFR BLD AUTO: 0.2 % (ref 0–0.5)
LYMPHOCYTES # BLD AUTO: 1.71 10*3/MM3 (ref 0.7–3.1)
LYMPHOCYTES NFR BLD AUTO: 19.8 % (ref 19.6–45.3)
MAGNESIUM SERPL-MCNC: 2.3 MG/DL (ref 1.6–2.4)
MCH RBC QN AUTO: 28.2 PG (ref 26.6–33)
MCHC RBC AUTO-ENTMCNC: 31.2 G/DL (ref 31.5–35.7)
MCV RBC AUTO: 90.4 FL (ref 79–97)
MONOCYTES # BLD AUTO: 0.76 10*3/MM3 (ref 0.1–0.9)
MONOCYTES NFR BLD AUTO: 8.8 % (ref 5–12)
NEUTROPHILS NFR BLD AUTO: 5.8 10*3/MM3 (ref 1.7–7)
NEUTROPHILS NFR BLD AUTO: 67.2 % (ref 42.7–76)
NRBC BLD AUTO-RTO: 0 /100 WBC (ref 0–0.2)
PHOSPHATE SERPL-MCNC: 2.7 MG/DL (ref 2.5–4.5)
PLATELET # BLD AUTO: 185 10*3/MM3 (ref 140–450)
PMV BLD AUTO: 9.3 FL (ref 6–12)
RBC # BLD AUTO: 4.39 10*6/MM3 (ref 3.77–5.28)
WBC NRBC COR # BLD: 8.64 10*3/MM3 (ref 3.4–10.8)

## 2022-03-18 PROCEDURE — 83735 ASSAY OF MAGNESIUM: CPT | Performed by: INTERNAL MEDICINE

## 2022-03-18 PROCEDURE — 29580 STRAPPING UNNA BOOT: CPT

## 2022-03-18 PROCEDURE — 63710000001 INSULIN DETEMIR PER 5 UNITS: Performed by: INTERNAL MEDICINE

## 2022-03-18 PROCEDURE — 63710000001 INSULIN LISPRO (HUMAN) PER 5 UNITS: Performed by: INTERNAL MEDICINE

## 2022-03-18 PROCEDURE — 84100 ASSAY OF PHOSPHORUS: CPT | Performed by: INTERNAL MEDICINE

## 2022-03-18 PROCEDURE — 63710000001 PREDNISONE PER 1 MG: Performed by: INTERNAL MEDICINE

## 2022-03-18 PROCEDURE — 85025 COMPLETE CBC W/AUTO DIFF WBC: CPT | Performed by: INTERNAL MEDICINE

## 2022-03-18 PROCEDURE — 99233 SBSQ HOSP IP/OBS HIGH 50: CPT | Performed by: INTERNAL MEDICINE

## 2022-03-18 PROCEDURE — 83605 ASSAY OF LACTIC ACID: CPT | Performed by: INTERNAL MEDICINE

## 2022-03-18 PROCEDURE — 97162 PT EVAL MOD COMPLEX 30 MIN: CPT

## 2022-03-18 PROCEDURE — 82550 ASSAY OF CK (CPK): CPT | Performed by: INTERNAL MEDICINE

## 2022-03-18 PROCEDURE — 82962 GLUCOSE BLOOD TEST: CPT

## 2022-03-18 RX ORDER — PREDNISONE 20 MG/1
60 TABLET ORAL
Status: DISCONTINUED | OUTPATIENT
Start: 2022-03-18 | End: 2022-03-19

## 2022-03-18 RX ORDER — TIZANIDINE 4 MG/1
4 TABLET ORAL EVERY 8 HOURS PRN
Status: DISCONTINUED | OUTPATIENT
Start: 2022-03-18 | End: 2022-03-21 | Stop reason: HOSPADM

## 2022-03-18 RX ORDER — PHENYTOIN SODIUM 100 MG/1
200 CAPSULE, EXTENDED RELEASE ORAL EVERY 12 HOURS SCHEDULED
Status: DISCONTINUED | OUTPATIENT
Start: 2022-03-18 | End: 2022-03-21 | Stop reason: HOSPADM

## 2022-03-18 RX ADMIN — PROPAFENONE HYDROCHLORIDE 150 MG: 150 TABLET, FILM COATED ORAL at 21:12

## 2022-03-18 RX ADMIN — TIZANIDINE 4 MG: 4 TABLET ORAL at 17:01

## 2022-03-18 RX ADMIN — PHENYTOIN SODIUM 200 MG: 100 CAPSULE ORAL at 08:52

## 2022-03-18 RX ADMIN — BUSPIRONE HYDROCHLORIDE 15 MG: 15 TABLET ORAL at 08:19

## 2022-03-18 RX ADMIN — INSULIN DETEMIR 20 UNITS: 100 INJECTION, SOLUTION SUBCUTANEOUS at 21:12

## 2022-03-18 RX ADMIN — PROPAFENONE HYDROCHLORIDE 150 MG: 150 TABLET, FILM COATED ORAL at 08:19

## 2022-03-18 RX ADMIN — MORPHINE SULFATE 30 MG: 15 TABLET, FILM COATED, EXTENDED RELEASE ORAL at 21:12

## 2022-03-18 RX ADMIN — Medication 400 MG: at 08:19

## 2022-03-18 RX ADMIN — INSULIN LISPRO 2 UNITS: 100 INJECTION, SOLUTION INTRAVENOUS; SUBCUTANEOUS at 17:01

## 2022-03-18 RX ADMIN — MORPHINE SULFATE 30 MG: 15 TABLET, FILM COATED, EXTENDED RELEASE ORAL at 08:19

## 2022-03-18 RX ADMIN — ATORVASTATIN CALCIUM 20 MG: 40 TABLET, FILM COATED ORAL at 08:19

## 2022-03-18 RX ADMIN — INSULIN LISPRO 7 UNITS: 100 INJECTION, SOLUTION INTRAVENOUS; SUBCUTANEOUS at 12:45

## 2022-03-18 RX ADMIN — APIXABAN 5 MG: 5 TABLET, FILM COATED ORAL at 08:19

## 2022-03-18 RX ADMIN — DULOXETINE 60 MG: 60 CAPSULE, DELAYED RELEASE ORAL at 08:19

## 2022-03-18 RX ADMIN — PROPAFENONE HYDROCHLORIDE 150 MG: 150 TABLET, FILM COATED ORAL at 16:59

## 2022-03-18 RX ADMIN — METOPROLOL TARTRATE 50 MG: 50 TABLET, FILM COATED ORAL at 08:19

## 2022-03-18 RX ADMIN — BUSPIRONE HYDROCHLORIDE 15 MG: 15 TABLET ORAL at 21:12

## 2022-03-18 RX ADMIN — PREDNISONE 60 MG: 20 TABLET ORAL at 16:59

## 2022-03-18 RX ADMIN — SODIUM CHLORIDE 75 ML/HR: 9 INJECTION, SOLUTION INTRAVENOUS at 06:13

## 2022-03-18 RX ADMIN — APIXABAN 5 MG: 5 TABLET, FILM COATED ORAL at 21:12

## 2022-03-18 RX ADMIN — PHENYTOIN SODIUM 200 MG: 100 CAPSULE, EXTENDED RELEASE ORAL at 21:11

## 2022-03-18 RX ADMIN — INSULIN LISPRO 7 UNITS: 100 INJECTION, SOLUTION INTRAVENOUS; SUBCUTANEOUS at 17:01

## 2022-03-18 RX ADMIN — HYDROCODONE BITARTRATE AND ACETAMINOPHEN 1 TABLET: 7.5; 325 TABLET ORAL at 12:48

## 2022-03-18 RX ADMIN — INSULIN LISPRO 2 UNITS: 100 INJECTION, SOLUTION INTRAVENOUS; SUBCUTANEOUS at 12:45

## 2022-03-18 RX ADMIN — Medication 10 ML: at 21:12

## 2022-03-18 RX ADMIN — METOPROLOL TARTRATE 50 MG: 50 TABLET, FILM COATED ORAL at 21:12

## 2022-03-18 RX ADMIN — Medication 400 MG: at 21:12

## 2022-03-18 NOTE — PLAN OF CARE
Goal Outcome Evaluation:  Plan of Care Reviewed With: patient        Progress: no change  Outcome Evaluation: PT wound care initial evaluation complete. Pt presents with BLE venous stasis dermatitis with scattered RLE abrasions and LLE open blisters. Pt with minimal BLE edema at this time with mild BLE erythema. PT appled unna boots to BLEs to help promote venous return, improve skin integrity, and promote optimal wound healing environment. PT plans to f/u with dressing changes in 2-3 days.

## 2022-03-18 NOTE — PROGRESS NOTES
[facility]  PROGRESS NOTE    Patient Name: Martha Harrell  : 1955  MRN: [id number]    Date of Admission: 3/17/2022  Primary Care Physician: Ginger Ward APRN    Subjective   Subjective     CC:  Pain neck, shoulders, knees, SVT    HPI:  Now NSR.  Continues to c/o pain in her shoulders up into her neck and headache.  States that unable to raise her LLE to pain.  Pain much worse acutely on Wednesday. No injury that shows know up.  On MS contin goes to pain clinic.  Has h/o fibromyalgia.     ROS:  Gen- No fevers, chills  CV- No chest pain, palpitations  Resp- No cough, dyspnea  GI- No N/V/D, abd pain        Objective   Objective     Vital Signs:   Temp:  [97.4 °F (36.3 °C)-99.5 °F (37.5 °C)] 97.4 °F (36.3 °C)  Heart Rate:  [77-97] 77  Resp:  [17-22] 17  BP: (124-148)/(64-79) 135/73  Flow (L/min):  [2] 2     Physical Exam:  Constitutional: No acute distress, awake, alert  HENT: NCAT, mucous membranes moist  Respiratory: Clear to auscultation bilaterally, respiratory effort normal   Cardiovascular: RRR, no murmurs, rubs, or gallops  Gastrointestinal: Positive bowel sounds, soft, nontender, nondistended  Musculoskeletal: No bilateral ankle edema, TTP along trapezius muscle along shoulders and up neck, normal rom of left knee without fluid  Psychiatric: Appropriate affect, cooperative  Neurologic: Oriented x 3, strength symmetric in all extremities, Cranial Nerves grossly intact to confrontation, speech clear  Skin: No rashes      Results Reviewed:  LAB RESULTS:      Lab 22  0503 22  1112 22  0757   WBC 8.64  --  12.80*   HEMOGLOBIN 12.4  --  13.7   HEMATOCRIT 39.7  --  42.8   PLATELETS 185  --  219   NEUTROS ABS 5.80  --  10.00*   IMMATURE GRANS (ABS) 0.02  --  0.05   LYMPHS ABS 1.71  --  1.67   MONOS ABS 0.76  --  0.81   EOS ABS 0.32  --  0.23   MCV 90.4  --  90.1   LACTATE 0.7 0.8 2.4*         Lab 22  0503 22  0757   SODIUM  --  141    POTASSIUM  --  3.9   CHLORIDE  --  103   CO2  --  27.0   ANION GAP  --  11.0   BUN  --  15   CREATININE  --  0.70   EGFR  --  94.9   GLUCOSE  --  118*   CALCIUM  --  8.6   MAGNESIUM 2.3 1.5*   PHOSPHORUS 2.7  --    HEMOGLOBIN A1C  --  7.70*   TSH  --  1.930         Lab 03/17/22  0757   TOTAL PROTEIN 6.9   ALBUMIN 3.60   GLOBULIN 3.3   ALT (SGPT) 28   AST (SGOT) 24   BILIRUBIN 0.4   ALK PHOS 90         Lab 03/17/22  0757   PROBNP 209.0   TROPONIN T <0.010                 Brief Urine Lab Results  (Last result in the past 365 days)      Color   Clarity   Blood   Leuk Est   Nitrite   Protein   CREAT   Urine HCG        03/17/22 0818 Yellow   Clear   Negative   Negative   Negative   Trace                 Microbiology Results Abnormal     Procedure Component Value - Date/Time    COVID PRE-OP / PRE-PROCEDURE SCREENING ORDER (NO ISOLATION) - Swab, Nasopharynx [[id number]]  (Normal) Collected: 03/17/22 1227    Lab Status: Final result Specimen: Swab from Nasopharynx Updated: 03/17/22 1257    Narrative:      The following orders were created for panel order COVID PRE-OP / PRE-PROCEDURE SCREENING ORDER (NO ISOLATION) - Swab, Nasopharynx.  Procedure                               Abnormality         Status                     ---------                               -----------         ------                     COVID-19 and FLU A/B PCR...[[id number]]  Normal              Final result                 Please view results for these tests on the individual orders.    COVID-19 and FLU A/B PCR - Swab, Nasopharynx [[id number]]  (Normal) Collected: 03/17/22 1227    Lab Status: Final result Specimen: Swab from Nasopharynx Updated: 03/17/22 1257     COVID19 Not Detected     Influenza A PCR Not Detected     Influenza B PCR Not Detected    Narrative:      Fact sheet for providers: https://www.fda.gov/media/643431/download    Fact sheet for patients: https://www.fda.gov/media/613165/download    Test performed by PCR.          XR Chest 1  View    Result Date: 3/17/2022  DATE OF EXAM: 3/17/2022 8:03 AM  PROCEDURE: XR CHEST 1 VW-  INDICATIONS: Dysrhythmia triage protocol  COMPARISON: No comparisons available.  TECHNIQUE: Single radiographic AP view of the chest was obtained.  FINDINGS: Patient rotated to the right. Heart size indeterminate. Pulmonary vasculature are within normal limits. Lungs clear other than atelectasis in the medial lung bases and calcified granulomas. Costophrenic angles sharp      Impression: Bibasilar atelectasis. No suspicious infiltrate or edema  This report was finalized on 3/17/2022 8:16 AM by Malcolm Negrete.            I have reviewed the medications:  Scheduled Meds:apixaban, 5 mg, Oral, BID  atorvastatin, 20 mg, Oral, Daily  busPIRone, 15 mg, Oral, Q12H  DULoxetine, 60 mg, Oral, Daily  insulin detemir, 20 Units, Subcutaneous, Nightly  insulin lispro, 0-9 Units, Subcutaneous, TID AC  insulin lispro, 7 Units, Subcutaneous, TID With Meals  magnesium oxide, 400 mg, Oral, BID  metoprolol tartrate, 50 mg, Oral, Q12H  Morphine, 30 mg, Oral, BID  phenytoin ER, 200 mg, Oral, Daily  propafenone, 150 mg, Oral, TID  sodium chloride, 10 mL, Intravenous, Q12H      Continuous Infusions:sodium chloride, 75 mL/hr, Last Rate: 75 mL/hr (03/18/22 0613)      PRN Meds:.•  acetaminophen  •  calcium carbonate  •  dextrose  •  dextrose  •  glucagon (human recombinant)  •  HYDROcodone-acetaminophen  •  magnesium sulfate **OR** magnesium sulfate **OR** magnesium sulfate  •  Morphine **AND** naloxone  •  sodium chloride  •  [COMPLETED] Insert peripheral IV **AND** sodium chloride  •  sodium chloride    Assessment/Plan   Assessment & Plan     Active Hospital Problems    Diagnosis  POA   • SVT (supraventricular tachycardia) (HCC) [I47.1]  Yes   • Osteoarthritis [M19.90]  Yes   • Chronic pain [G89.29]  Yes   • Paroxysmal atrial fibrillation (HCC) [I48.0]  Yes   • Hyperlipidemia [E78.5]  Yes   • GERD (gastroesophageal reflux disease) [K21.9]  Yes   •  Depression [F32.A]  Yes   • Epilepsy (HCC) [G40.909]  Yes   • Uncontrolled type 2 diabetes mellitus with hyperglycemia (HCC) [E11.65]  Yes   • Diabetic peripheral neuropathy associated with type 2 diabetes mellitus (HCC) [E11.42]  Yes      Resolved Hospital Problems   No resolved problems to display.        Brief Hospital Course to date:  Martha Harrell is a 66 y.o. female with h/o DM, HL, afib on eliquis, chronic pain on morphine, severe OA of multiple joints including bilateral knees presented to [facility] ER with onset of severe pain in her LE's, shoulders, neck worse than usual and was found to be in SVT.    SVT  --cardiology following.  Recs metoprolol 50mg BID.  Will plan an ambulatory monitor at discharge  --echo pending  --Now NSR    DM  --hypoglycemic this AM.  Hold basal and preprandial.  Continue SSI.  Will likely need added back as steroids likely to exacerbate.    Severe OA multiple joints with acute onset worsening pain LE's shoulders, neck  --has seen Dr. Singletary/ortho in the past  --IVF  --on MS contin, follows with pain clinic  --Add CK level to labs  --note h/o fibromyalgia and she is tender along her trapezius muscle.  Trial of prednisone and zanaflex PRN        DVT prophylaxis:  Medical and mechanical DVT prophylaxis orders are present.       AM-PAC 6 Clicks Score (PT): 17 (03/17/22 2000)    Disposition: I expect the patient to be discharged TBD    CODE STATUS:   Code Status and Medical Interventions:   Ordered at: 03/17/22 1333     Code Status (Patient has no pulse and is not breathing):    CPR (Attempt to Resuscitate)     Medical Interventions (Patient has pulse or is breathing):    Full Support       Kuldeep Dasilva MD  03/18/22

## 2022-03-18 NOTE — PAYOR COMM NOTE
"Ref # [id number]  Debbi Andre RN, BSN, CMGT-BC  Phone # [phone]  Fax # [phone]  Martha Harrell (66 y.o. Female)             Date of Birth   1955    Social Security Number       Address   [address] [place] [address] [address] [place] [address]    Home Phone   [phone]    MRN   [id number]       Anabaptist   None    Marital Status                               Admission Date   3/17/22    Admission Type   Emergency    Admitting Provider   Kuldeep Dasilva MD    Attending Provider   Kuldeep Dasilva MD    Department, Room/Bed   [address], S547/1       Discharge Date       Discharge Disposition       Discharge Destination                               Attending Provider: Kuldeep Dasilva MD    Allergies: Aleve [Naproxen]    Isolation: None   Infection: None   Code Status: CPR   Advance Care Planning Activity    Ht: 157.5 cm (62\")   Wt: 89.2 kg (196 lb 9.6 oz)    Admission Cmt: None   Principal Problem: None                Active Insurance as of 3/17/2022     Primary Coverage     Payor Plan Insurance Group Employer/Plan Group    ANTHEM MEDICARE REPLACEMENT ANTHEM MEDICARE ADVANTAGE [id number]     Payor Plan Address Payor Plan Phone Number Payor Plan Fax Number Effective Dates    [id number] [phone]  6/1/2018 - None Entered    [facility] [id number]       Subscriber Name Subscriber Birth Date Member ID       MARTHA HARRELL 1955 [id number]           Secondary Coverage     Payor Plan Insurance Group Employer/Plan Group    [facility] [id number]     Payor Plan Address Payor Plan Phone Number Payor Plan Fax Number Effective Dates    [address]   1/1/2021 - None Entered    [facility] [id number]       Subscriber Name Subscriber Birth Date Member ID       MARTHA HARRELL 1955 [id number]                  History & Physical      Monty Manzo MD at 03/17/22 1339              [facility] Medicine " Services  HISTORY AND PHYSICAL    Patient Name: Martha Harrell  : 1955  MRN: [id number]  Primary Care Physician: Ginger Ward, SHERRY  Date of admission: 3/17/2022      Subjective   Subjective     Chief Complaint:  Pain all over    HPI:  Martha Harrell is a 66 y.o. female , unfortunately very poor historian, past medical history significant for asthma, diabetes mellitus, seizure disorder, hyperlipidemia, depression, extensive osteoarthritis of multiple joints including knee joints bilaterally s/p steroid injection.  Patient also has history of atrial fibrillation and is on propafenone by her local cardiologist.  She also has chronic pain and is on p.o. morphine and sees pain clinic.  Evidently patient was in her usual state of health until couple of days ago when she suddenly started having pain all over particularly in her lower extremities bilaterally in her shoulders and also in her neck.  Patient tells me that pain is so severe that she cannot even move her limbs and cannot ambulate at all.  Prior to the onset of this severe pain she could ambulate a little bit although it was difficult for her to do that.  Patient denies any fever or chills during the past several days.  No cough or coryza or any sign of respiratory issues.  Denies any nausea vomiting or diarrhea.  At the emergency room here today patient was found to have supraventricular tachycardia which was resolved by the time that I saw the patient.      COVID Details:    Symptoms:    [] NONE [] Fever []  Cough [] Shortness of breath [] Change in taste/smell      Review of Systems   Denies any recent weight loss or weight gain, no lumps or bumps, no night sweats, no unusual headaches, no vision changes, difficulty with ambulation as mentioned above, painful knee joints with tenderness which is chronic, no nausea vomiting or diarrhea or abdominal pain, no rashes or hives.  All other systems reviewed and are negative.     Personal History      Past Medical History:   Diagnosis Date   • Anemia    • Asthma    • Chronic bronchitis (HCC)    • Depression    • Diabetes mellitus (HCC)    • Epilepsy (HCC)    • Fibromyalgia, primary    • GERD (gastroesophageal reflux disease)    • Hyperlipidemia        Past Surgical History:   Procedure Laterality Date   • BACK SURGERY     • NECK SURGERY     • TUBAL ABDOMINAL LIGATION         Family History:  family history includes Arthritis in her father, maternal aunt, maternal uncle, mother, and sister; Cancer in her father and maternal uncle; Heart disease in her mother; Hyperlipidemia in her father; Hypertension in her father; Obesity in her father, mother, and sister. Otherwise pertinent FHx was reviewed and unremarkable.     Social History:  reports that she quit smoking about 12 years ago. Her smoking use included cigarettes. She has never used smokeless tobacco. She reports that she does not drink alcohol and does not use drugs.  Social History     Social History Narrative   • Not on file       Medications:  Available home medication information reviewed.  (Not in a hospital admission)      Allergies   Allergen Reactions   • Aleve [Naproxen] Itching       Objective   Objective     Vital Signs:   Temp:  [98.5 °F (36.9 °C)] 98.5 °F (36.9 °C)  Heart Rate:  [] 92  Resp:  [14-20] 20  BP: (129-144)/(64-79) 138/70       Physical Exam   Constitutional: Awake, alert  Eyes: PERRLA, sclerae anicteric, no conjunctival injection  HENT: NCAT, mucous membranes moist  Neck: Supple, no thyromegaly, no lymphadenopathy, trachea midline  Respiratory: Clear to auscultation bilaterally, nonlabored respirations   Cardiovascular: RRR, no murmurs, rubs, or gallops  Gastrointestinal: Abdomen is obese.  Positive bowel sounds, soft, nontender, nondistended  Musculoskeletal: Trace bilateral ankle edema, no clubbing or cyanosis to extremities, painful range of motion of both knee joints and also hip.  Psychiatric: Appropriate affect,  cooperative  Neurologic: Awake, alert, oriented x3, no focality present, very weak lower extremities patient hardly can lift her lower extremities against gravity, speech clear  Skin: No rashes    Result Review:  I have personally reviewed the results from the time of this admission to 3/17/2022 13:40 EDT and agree with these findings:  [x]  Laboratory  []  Microbiology  [x]  Radiology  []  EKG/Telemetry   []  Cardiology/Vascular   []  Pathology  [x]  Old records  []  Other:  Most notable findings include: Elevated lactate, hypomagnesemia, mild leukocytosis.      LAB RESULTS:      Lab 03/17/22  1112 03/17/22  0757   WBC  --  12.80*   HEMOGLOBIN  --  13.7   HEMATOCRIT  --  42.8   PLATELETS  --  219   NEUTROS ABS  --  10.00*   IMMATURE GRANS (ABS)  --  0.05   LYMPHS ABS  --  1.67   MONOS ABS  --  0.81   EOS ABS  --  0.23   MCV  --  90.1   LACTATE 0.8 2.4*         Lab 03/17/22  0757   SODIUM 141   POTASSIUM 3.9   CHLORIDE 103   CO2 27.0   ANION GAP 11.0   BUN 15   CREATININE 0.70   EGFR 94.9   GLUCOSE 118*   CALCIUM 8.6   MAGNESIUM 1.5*   TSH 1.930         Lab 03/17/22  0757   TOTAL PROTEIN 6.9   ALBUMIN 3.60   GLOBULIN 3.3   ALT (SGPT) 28   AST (SGOT) 24   BILIRUBIN 0.4   ALK PHOS 90         Lab 03/17/22  0757   PROBNP 209.0   TROPONIN T <0.010                 UA    Urinalysis 3/17/22   Specific [place], UA 1.028   Ketones, UA Negative   Blood, UA Negative   Leukocytes, UA Negative   Nitrite, UA Negative             Microbiology Results (last 10 days)     Procedure Component Value - Date/Time    COVID PRE-OP / PRE-PROCEDURE SCREENING ORDER (NO ISOLATION) - Swab, Nasopharynx [[id number]]  (Normal) Collected: 03/17/22 1227    Lab Status: Final result Specimen: Swab from Nasopharynx Updated: 03/17/22 1257    Narrative:      The following orders were created for panel order COVID PRE-OP / PRE-PROCEDURE SCREENING ORDER (NO ISOLATION) - Swab, Nasopharynx.  Procedure                               Abnormality         Status                      ---------                               -----------         ------                     COVID-19 and FLU A/B PCR...[[id number]]  Normal              Final result                 Please view results for these tests on the individual orders.    COVID-19 and FLU A/B PCR - Swab, Nasopharynx [[id number]]  (Normal) Collected: 03/17/22 1227    Lab Status: Final result Specimen: Swab from Nasopharynx Updated: 03/17/22 1257     COVID19 Not Detected     Influenza A PCR Not Detected     Influenza B PCR Not Detected    Narrative:      Fact sheet for providers: https://www.fda.gov/media/543454/download    Fact sheet for patients: https://www.fda.gov/media/766905/download    Test performed by PCR.          XR Chest 1 View    Result Date: 3/17/2022  DATE OF EXAM: 3/17/2022 8:03 AM  PROCEDURE: XR CHEST 1 VW-  INDICATIONS: Dysrhythmia triage protocol  COMPARISON: No comparisons available.  TECHNIQUE: Single radiographic AP view of the chest was obtained.  FINDINGS: Patient rotated to the right. Heart size indeterminate. Pulmonary vasculature are within normal limits. Lungs clear other than atelectasis in the medial lung bases and calcified granulomas. Costophrenic angles sharp      Impression: Bibasilar atelectasis. No suspicious infiltrate or edema  This report was finalized on 3/17/2022 8:16 AM by Malcolm Negrete.            Assessment/Plan   Assessment & Plan     Active Hospital Problems    Diagnosis  POA   • SVT (supraventricular tachycardia) (HCC) [I47.1]  Yes       Patient is a 66-year-old  female with past medical history significant for diabetes mellitus, hyperlipidemia, history of atrial fibrillation and is on Eliquis, chronic pain and is on overall morphine, severe osteoarthritis of multiple joints including bilateral knee joints.  Patient came to the emergency room for sudden onset of severe pain in her lower extremities, shoulders, neck which was significantly worse than her baseline.  At the  emergency room patient was found to be in supraventricular tachycardia.  Labs show hypomagnesemia, mild leukocytosis, elevated lactate.    PLAN:  -Admit the patient to telemetry and monitor  -Continue home medication  -Pain control  -Blood sugar control with Levemir and also Humalog  -Cardiology consult  -PT and OT  -Consider orthopedic surgery consult.  Patient has seen Dr. Singletary before  -Gentle hydration  -Recheck labs including lactate.    DVT prophylaxis:  On eliquis      CODE STATUS:    Code Status and Medical Interventions:   Ordered at: 22 1333     Code Status (Patient has no pulse and is not breathing):    CPR (Attempt to Resuscitate)     Medical Interventions (Patient has pulse or is breathing):    Full Support         Monty Manzo MD  22    Electronically signed by Monty Manzo MD at 22 1351          Emergency Department Notes      Shayy Marc APRN at 22 1554           EMERGENCY DEPARTMENT ENCOUNTER    Pt Name: Martha Harrell  MRN: [id number]  Pt :   1955  Room Number:  S547/1  Date of encounter:  3/17/2022  PCP: Ginger Ward APRN  ED Provider: SHERRY Hadley    Historian: patient      HPI:  Chief Complaint: pain all over        Context: Martha Harrell is a 66 y.o. female who presents to the ED c/o  c/o experiencing pain all over her body which began yesterday evening.  Patient states she does not recognize this pain is similar in presentation to previous and chronic pain that she experiences in her left leg and left groin for which she takes morphine twice daily as well as Lortab for breakthrough pain.  Patient states that this unusual pain began yesterday and exist not only in her chronic pain in her left leg but also her right leg, right arms, both hands, her neck, and headache.  Also her elbows.  She states that she has adequate pain medication at home that is provided by contract through pain management clinic.  Patient states she was in  no pain yesterday when she was seen by her foot doctor where dressings were applied to some skin breakdown on her both lower extremities recently.  She states she is not out of pain medication currently.  When EMS arrived to her home they found her to be in SVT with heart rate between 160 tp 190.  Adenosine was not given due to her wrist and history of asthma.  She converted to sinus rhythm on arrival to the ER only to vacillate into SVT for several other minutes after administering metoprolol, patient had a sinus rhythm rate in the 80s.    Review of systems is negative for fever chills or recent illness.  Negative for chest pain or cough or shortness of breath.  Negative for palpitations.  Negative for nausea, vomiting, diarrhea or abdominal pain.  Negative for constipation.  Positive for generalized weakness without dizziness or syncope.  No focal neurosensory complaint or focal weakness.      PAST MEDICAL HISTORY  Past Medical History:   Diagnosis Date   • Anemia    • Asthma    • Chronic bronchitis (HCC)    • Depression    • Diabetes mellitus (HCC)    • Epilepsy (HCC)    • Fibromyalgia, primary    • GERD (gastroesophageal reflux disease)    • Hyperlipidemia    • Leg DVT (deep venous thromboembolism), acute, left (HCC) 6/22/2019         PAST SURGICAL HISTORY  Past Surgical History:   Procedure Laterality Date   • BACK SURGERY     • NECK SURGERY     • TUBAL ABDOMINAL LIGATION           FAMILY HISTORY  Family History   Problem Relation Age of Onset   • Arthritis Mother    • Heart disease Mother    • Obesity Mother    • Arthritis Father    • Cancer Father    • Hypertension Father    • Hyperlipidemia Father    • Obesity Father    • Arthritis Sister    • Obesity Sister    • Arthritis Maternal Aunt    • Arthritis Maternal Uncle    • Cancer Maternal Uncle          SOCIAL HISTORY  Social History     Socioeconomic History   • Marital status:    Tobacco Use   • Smoking status: Former Smoker     Types: Cigarettes      Quit date: 2010     Years since quittin.1   • Smokeless tobacco: Never Used   Substance and Sexual Activity   • Alcohol use: No   • Drug use: No   • Sexual activity: Defer         ALLERGIES  Aleve [naproxen]        REVIEW OF SYSTEMS  Review of Systems     All systems reviewed and negative except for those discussed in HPI.       PHYSICAL EXAM    I have reviewed the triage vital signs and nursing notes.    ED Triage Vitals   Temp Heart Rate Resp BP SpO2   22 0753 22 0753 22 0753 22 0753 22 0753   98.5 °F (36.9 °C) (!) 165 14 134/64 99 %      Temp src Heart Rate Source Patient Position BP Location FiO2 (%)   22 0753 22 0753 22 1507 22 1507 --   Oral Monitor Lying Right arm        Physical Exam  GENERAL:   Appears in SVT on arrival.  She is a good historian.  She is oxygenating well.  HENT: Nares patent.  EYES: No scleral icterus.  CV: Regular rhythm, tachycardic.  No peripheral edema  RESPIRATORY: Normal effort.  No audible wheezes, rales or rhonchi.  ABDOMEN: Soft, nontender  MUSCULOSKELETAL: No deformities.   NEURO: Alert, moves all extremities, follows commands.  SKIN: Warm, dry, no rash visualized.        LAB RESULTS  Recent Results (from the past 24 hour(s))   ECG 12 Lead    Collection Time: 22  7:53 AM   Result Value Ref Range    QT Interval 292 ms    QTC Interval 482 ms   Comprehensive Metabolic Panel    Collection Time: 22  7:57 AM    Specimen: Blood   Result Value Ref Range    Glucose 118 (H) 65 - 99 mg/dL    BUN 15 8 - 23 mg/dL    Creatinine 0.70 0.57 - 1.00 mg/dL    Sodium 141 136 - 145 mmol/L    Potassium 3.9 3.5 - 5.2 mmol/L    Chloride 103 98 - 107 mmol/L    CO2 27.0 22.0 - 29.0 mmol/L    Calcium 8.6 8.6 - 10.5 mg/dL    Total Protein 6.9 6.0 - 8.5 g/dL    Albumin 3.60 3.50 - 5.20 g/dL    ALT (SGPT) 28 1 - 33 U/L    AST (SGOT) 24 1 - 32 U/L    Alkaline Phosphatase 90 39 - 117 U/L    Total Bilirubin 0.4 0.0 - 1.2 mg/dL    Globulin  3.3 gm/dL    A/G Ratio 1.1 g/dL    BUN/Creatinine Ratio 21.4 7.0 - 25.0    Anion Gap 11.0 5.0 - 15.0 mmol/L    eGFR 94.9 >60.0 mL/min/1.73   Magnesium    Collection Time: 03/17/22  7:57 AM    Specimen: Blood   Result Value Ref Range    Magnesium 1.5 (L) 1.6 - 2.4 mg/dL   Troponin    Collection Time: 03/17/22  7:57 AM    Specimen: Blood   Result Value Ref Range    Troponin T <0.010 0.000 - 0.030 ng/mL   TSH    Collection Time: 03/17/22  7:57 AM    Specimen: Blood   Result Value Ref Range    TSH 1.930 0.270 - 4.200 uIU/mL   BNP    Collection Time: 03/17/22  7:57 AM    Specimen: Blood   Result Value Ref Range    proBNP 209.0 0.0 - 900.0 pg/mL   Green Top (Gel)    Collection Time: 03/17/22  7:57 AM   Result Value Ref Range    Extra Tube Hold for add-ons.    Lavender Top    Collection Time: 03/17/22  7:57 AM   Result Value Ref Range    Extra Tube hold for add-on    Gold Top - SST    Collection Time: 03/17/22  7:57 AM   Result Value Ref Range    Extra Tube Hold for add-ons.    Gray Top    Collection Time: 03/17/22  7:57 AM   Result Value Ref Range    Extra Tube Hold for add-ons.    Light Blue Top    Collection Time: 03/17/22  7:57 AM   Result Value Ref Range    Extra Tube hold for add-on    CBC Auto Differential    Collection Time: 03/17/22  7:57 AM    Specimen: Blood   Result Value Ref Range    WBC 12.80 (H) 3.40 - 10.80 10*3/mm3    RBC 4.75 3.77 - 5.28 10*6/mm3    Hemoglobin 13.7 12.0 - 15.9 g/dL    Hematocrit 42.8 34.0 - 46.6 %    MCV 90.1 79.0 - 97.0 fL    MCH 28.8 26.6 - 33.0 pg    MCHC 32.0 31.5 - 35.7 g/dL    RDW 14.4 12.3 - 15.4 %    RDW-SD 47.8 37.0 - 54.0 fl    MPV 9.5 6.0 - 12.0 fL    Platelets 219 140 - 450 10*3/mm3    Neutrophil % 78.2 (H) 42.7 - 76.0 %    Lymphocyte % 13.0 (L) 19.6 - 45.3 %    Monocyte % 6.3 5.0 - 12.0 %    Eosinophil % 1.8 0.3 - 6.2 %    Basophil % 0.3 0.0 - 1.5 %    Immature Grans % 0.4 0.0 - 0.5 %    Neutrophils, Absolute 10.00 (H) 1.70 - 7.00 10*3/mm3    Lymphocytes, Absolute 1.67 0.70 -  3.10 10*3/mm3    Monocytes, Absolute 0.81 0.10 - 0.90 10*3/mm3    Eosinophils, Absolute 0.23 0.00 - 0.40 10*3/mm3    Basophils, Absolute 0.04 0.00 - 0.20 10*3/mm3    Immature Grans, Absolute 0.05 0.00 - 0.05 10*3/mm3    nRBC 0.0 0.0 - 0.2 /100 WBC   Lactic Acid, Plasma    Collection Time: 03/17/22  7:57 AM    Specimen: Blood   Result Value Ref Range    Lactate 2.4 (C) 0.5 - 2.0 mmol/L   Hemoglobin A1c    Collection Time: 03/17/22  7:57 AM    Specimen: Blood   Result Value Ref Range    Hemoglobin A1C 7.70 (H) 4.80 - 5.60 %   Urinalysis With Microscopic If Indicated (No Culture) - Urine, Catheter    Collection Time: 03/17/22  8:18 AM    Specimen: Urine, Catheter   Result Value Ref Range    Color, UA Yellow Yellow, Straw    Appearance, UA Clear Clear    pH, UA 6.0 5.0 - 8.0    Specific Gravity, UA 1.028 1.001 - 1.030    Glucose, UA >=1000 mg/dL (3+) (A) Negative    Ketones, UA Negative Negative    Bilirubin, UA Negative Negative    Blood, UA Negative Negative    Protein, UA Trace (A) Negative    Leuk Esterase, UA Negative Negative    Nitrite, UA Negative Negative    Urobilinogen, UA 0.2 E.U./dL 0.2 - 1.0 E.U./dL   STAT Lactic Acid, Reflex    Collection Time: 03/17/22 11:12 AM    Specimen: Blood   Result Value Ref Range    Lactate 0.8 0.5 - 2.0 mmol/L   COVID-19 and FLU A/B PCR - Swab, Nasopharynx    Collection Time: 03/17/22 12:27 PM    Specimen: Nasopharynx; Swab   Result Value Ref Range    COVID19 Not Detected Not Detected - Ref. Range    Influenza A PCR Not Detected Not Detected    Influenza B PCR Not Detected Not Detected   POC Glucose Once    Collection Time: 03/17/22  3:47 PM    Specimen: Blood   Result Value Ref Range    Glucose 80 70 - 130 mg/dL       If labs were ordered, I independently reviewed the results.        RADIOLOGY      PROCEDURES    Procedures    ECG 12 Lead   Final Result   Test Reason : Dysrhythmia triage protocol   Blood Pressure :   */*   mmHG   Vent. Rate : 164 BPM     Atrial Rate : 127 BPM       P-R Int :   * ms          QRS Dur :  94 ms       QT Int : 292 ms       P-R-T Axes :   *  36 205 degrees      QTc Int : 482 ms      Supraventricular tachycardia   Nonspecific ST and T wave abnormality   Abnormal ECG   When compared with ECG of 21-JUN-2019 18:47,   Vent. rate has increased BY  91 BPM   ST now depressed in Lateral leads   Nonspecific T wave abnormality, worse in Inferior leads   Nonspecific T wave abnormality now evident in Lateral leads   Confirmed by ARPAN HARRINGTON MD ([id number]) on 3/17/2022 1:31:22 PM      Referred By: EDMD           Confirmed By: ARPAN HARRINGTON MD          MEDICATIONS GIVEN IN ER    Medications   sodium chloride 0.9 % flush 10 mL (has no administration in time range)   sodium chloride 0.9 % flush 10 mL (10 mL Intravenous Given 3/17/22 9008)   Magnesium Sulfate 2 gram Bolus, followed by 8 gram infusion (total Mg dose 10 grams)- Mg less than or equal to 1mg/dL (has no administration in time range)     Or   Magnesium Sulfate 2 gram / 50mL Infusion (GIVE X 3 BAGS TO EQUAL 6GM TOTAL DOSE) - Mg 1.1 - 1.5 mg/dl (has no administration in time range)     Or   Magnesium Sulfate 4 gram infusion- Mg 1.6-1.9 mg/dL (has no administration in time range)   sodium chloride 0.9 % flush 10 mL (10 mL Intravenous Given 3/17/22 5844)   sodium chloride 0.9 % flush 10 mL (has no administration in time range)   morphine injection 1 mg (has no administration in time range)     And   naloxone (NARCAN) injection 0.4 mg (has no administration in time range)   calcium carbonate (TUMS) chewable tablet 500 mg (200 mg elemental) (has no administration in time range)   acetaminophen (TYLENOL) tablet 650 mg (has no administration in time range)   atorvastatin (LIPITOR) tablet 20 mg (20 mg Oral Given 3/17/22 1428)   busPIRone (BUSPAR) tablet 15 mg (has no administration in time range)   DULoxetine (CYMBALTA) DR capsule 60 mg (has no administration in time range)   apixaban (ELIQUIS) tablet 5 mg (has no administration in  time range)   HYDROcodone-acetaminophen (NORCO) 7.5-325 MG per tablet 1 tablet (1 tablet Oral Given 3/17/22 1428)   magnesium oxide (MAG-OX) tablet 400 mg (has no administration in time range)   Morphine (MS CONTIN) 12 hr tablet 30 mg (has no administration in time range)   phenytoin ER (DILANTIN) capsule 200 mg (has no administration in time range)   propafenone (RYTHMOL) tablet 150 mg (has no administration in time range)   dextrose (GLUTOSE) oral gel 15 g (has no administration in time range)   dextrose (D50W) (25 g/50 mL) IV injection 25 g (has no administration in time range)   glucagon (human recombinant) (GLUCAGEN DIAGNOSTIC) injection 1 mg (has no administration in time range)   insulin detemir (LEVEMIR) injection 20 Units (has no administration in time range)   insulin lispro (humaLOG) injection 7 Units (has no administration in time range)   insulin lispro (humaLOG) injection 0-9 Units (0 Units Subcutaneous Hold 3/17/22 1730)   sodium chloride 0.9 % infusion (75 mL/hr Intravenous New Bag 3/17/22 1544)   metoprolol tartrate (LOPRESSOR) tablet 50 mg (has no administration in time range)   metoprolol tartrate (LOPRESSOR) injection 5 mg (5 mg Intravenous Given 3/17/22 0759)   sodium chloride 0.9 % bolus 1,000 mL (0 mL Intravenous Stopped 3/17/22 0930)   HYDROmorphone (DILAUDID) injection 0.5 mg (0.5 mg Intravenous Given 3/17/22 0925)   ondansetron (ZOFRAN) injection 4 mg (4 mg Intravenous Given 3/17/22 0925)       ED Course as of 03/17/22 1556   Thu Mar 17, 2022   0945 WBC(!): 12.80 [MS]   1219 I have conferred with Dr. Moody regarding SVT presentation and Dr. Kg Henderson accepts inaptient care.  Patient's work-up is most remarkable for detection of supraventricular tachycardia on arrival.  After administration of Lopressor, patient has been in a sinus rhythm for the last 3 hours.  She has been resting comfortably.  At rest, her oxygen saturations dropped to 85%.  She denies any knowledge of history of sleep  apnea.  Oxygen has been administered.  White count of 12.8 is noted as well as lactic acid of 2.4.  No infectious process is appreciated in the chest or urine.  Blood cultures are pending. [MS]      ED Course User Index  [MS] Shayy Marc APRN             AS OF 15:56 EDT VITALS:    BP - 138/74  HR - 87  TEMP - 99.5 °F (37.5 °C) (Oral)  O2 SATS - 99%                  DIAGNOSIS  Final diagnoses:   SVT (supraventricular tachycardia) (HCC)   Venous stasis dermatitis of both lower extremities   Chronic anticoagulation   Paroxysmal atrial fibrillation (HCC)         DISPOSITION    Admitted            Shayy Marc APRN  03/17/22 1556      Electronically signed by Shayy Marc APRN at 03/17/22 1556         Current Facility-Administered Medications   Medication Dose Route Frequency Provider Last Rate Last Admin   • acetaminophen (TYLENOL) tablet 650 mg  650 mg Oral Q4H PRN Monty Manzo MD       • apixaban (ELIQUIS) tablet 5 mg  5 mg Oral BID Monty Manzo MD   5 mg at 03/18/22 0819   • atorvastatin (LIPITOR) tablet 20 mg  20 mg Oral Daily Monty Manzo MD   20 mg at 03/18/22 0819   • busPIRone (BUSPAR) tablet 15 mg  15 mg Oral Q12H Monty Manzo MD   15 mg at 03/18/22 0819   • calcium carbonate (TUMS) chewable tablet 500 mg (200 mg elemental)  1 tablet Oral TID PRN Monty Manzo MD       • dextrose (D50W) (25 g/50 mL) IV injection 25 g  25 g Intravenous Q15 Min PRN Monty Manzo MD       • dextrose (GLUTOSE) oral gel 15 g  15 g Oral Q15 Min PRN Monty Manzo MD       • DULoxetine (CYMBALTA) DR capsule 60 mg  60 mg Oral Daily Monty Manzo MD   60 mg at 03/18/22 0819   • glucagon (human recombinant) (GLUCAGEN DIAGNOSTIC) injection 1 mg  1 mg Intramuscular Q15 Min PRN Monty Manzo MD       • HYDROcodone-acetaminophen (NORCO) 7.5-325 MG per tablet 1 tablet  1 tablet Oral TID PRN Monty Manzo MD   1 tablet at 03/18/22 1248   • insulin detemir (LEVEMIR) injection  20 Units  20 Units Subcutaneous Nightly Monty Manzo MD   20 Units at 03/17/22 2057   • insulin lispro (humaLOG) injection 0-9 Units  0-9 Units Subcutaneous TID AC Monty Manzo MD   2 Units at 03/18/22 1701   • insulin lispro (humaLOG) injection 7 Units  7 Units Subcutaneous TID With Meals Monty Manzo MD   7 Units at 03/18/22 1701   • magnesium oxide (MAG-OX) tablet 400 mg  400 mg Oral BID Monty Manzo MD   400 mg at 03/18/22 0819   • Magnesium Sulfate 2 gram Bolus, followed by 8 gram infusion (total Mg dose 10 grams)- Mg less than or equal to 1mg/dL  2 g Intravenous PRN Monty Manzo MD        Or   • Magnesium Sulfate 2 gram / 50mL Infusion (GIVE X 3 BAGS TO EQUAL 6GM TOTAL DOSE) - Mg 1.1 - 1.5 mg/dl  2 g Intravenous PRN Monty Manzo MD 25 mL/hr at 03/17/22 1653 2 g at 03/17/22 1653    Or   • Magnesium Sulfate 4 gram infusion- Mg 1.6-1.9 mg/dL  4 g Intravenous PRN Monty Manzo MD       • metoprolol tartrate (LOPRESSOR) tablet 50 mg  50 mg Oral Q12H Manny Yañez MD   50 mg at 03/18/22 0819   • Morphine (MS CONTIN) 12 hr tablet 30 mg  30 mg Oral BID Monty Manzo MD   30 mg at 03/18/22 0819   • morphine injection 1 mg  1 mg Intravenous Q4H PRN Monty Manzo MD        And   • naloxone (NARCAN) injection 0.4 mg  0.4 mg Intravenous Q5 Min PRN Monty Manzo MD       • phenytoin ER (DILANTIN) capsule 200 mg  200 mg Oral Q12H Monty Manzo MD       • predniSONE (DELTASONE) tablet 60 mg  60 mg Oral Daily With Breakfast Kuldeep Dasilva MD   60 mg at 03/18/22 1659   • propafenone (RYTHMOL) tablet 150 mg  150 mg Oral TID Monty Manzo MD   150 mg at 03/18/22 1659   • sodium chloride 0.9 % flush 10 mL  10 mL Intravenous PRN Samuel Miranda MD       • sodium chloride 0.9 % flush 10 mL  10 mL Intravenous PRN Samuel Miranda MD   10 mL at 03/17/22 0902   • sodium chloride 0.9 % flush 10 mL  10 mL Intravenous Q12H Monty Manzo MD   10 mL at 03/17/22 9197   •  sodium chloride 0.9 % flush 10 mL  10 mL Intravenous PRN Monty Manzo MD       • tiZANidine (ZANAFLEX) tablet 4 mg  4 mg Oral Q8H PRN Kuldeep Dasilva MD   4 mg at 03/18/22 1701     Lab Results (last 48 hours)     Procedure Component Value Units Date/Time    CK [[id number]] Collected: 03/18/22 1655    Specimen: Blood Updated: 03/18/22 1706    POC Glucose Once [[id number]]  (Abnormal) Collected: 03/18/22 1618    Specimen: Blood Updated: 03/18/22 1620     Glucose 157 mg/dL      Comment: Meter: [id number] : [id number] Angie Rg       POC Glucose Once [[id number]]  (Abnormal) Collected: 03/18/22 1116    Specimen: Blood Updated: 03/18/22 1119     Glucose 176 mg/dL      Comment: Meter: [id number] : [id number] Angie Rg       Blood Culture - Blood, Arm, Right [[id number]]  (Normal) Collected: 03/17/22 0800    Specimen: Blood from Arm, Right Updated: 03/18/22 0932     Blood Culture No growth at 24 hours    Blood Culture - Blood, Arm, Left [[id number]]  (Normal) Collected: 03/17/22 0830    Specimen: Blood from Arm, Left Updated: 03/18/22 0932     Blood Culture No growth at 24 hours    POC Glucose Once [[id number]]  (Normal) Collected: 03/18/22 0735    Specimen: Blood Updated: 03/18/22 0737     Glucose 109 mg/dL      Comment: Meter: [id number] : [id number] Vitaly Werner       POC Glucose Once [[id number]]  (Abnormal) Collected: 03/18/22 0714    Specimen: Blood Updated: 03/18/22 0716     Glucose 63 mg/dL      Comment: Meter: [id number] : [id number] Angie Rg       Magnesium [[id number]]  (Normal) Collected: 03/18/22 0503    Specimen: Blood Updated: 03/18/22 0623     Magnesium 2.3 mg/dL     Phosphorus [[id number]]  (Normal) Collected: 03/18/22 0503    Specimen: Blood Updated: 03/18/22 0620     Phosphorus 2.7 mg/dL     Lactic Acid, Plasma [[id number]]  (Normal) Collected: 03/18/22 0503    Specimen: Blood Updated: 03/18/22 0617     Lactate 0.7 mmol/L      Comment: Falsely depressed results may occur on  samples drawn from patients receiving N-Acetylcysteine (NAC) or Metamizole.       CBC & Differential [[id number]]  (Abnormal) Collected: 03/18/22 0503    Specimen: Blood Updated: 03/18/22 0541    Narrative:      The following orders were created for panel order CBC & Differential.  Procedure                               Abnormality         Status                     ---------                               -----------         ------                     CBC Auto Differential[[id number]]        Abnormal            Final result                 Please view results for these tests on the individual orders.    CBC Auto Differential [[id number]]  (Abnormal) Collected: 03/18/22 0503    Specimen: Blood Updated: 03/18/22 0541     WBC 8.64 10*3/mm3      RBC 4.39 10*6/mm3      Hemoglobin 12.4 g/dL      Hematocrit 39.7 %      MCV 90.4 fL      MCH 28.2 pg      MCHC 31.2 g/dL      RDW 14.7 %      RDW-SD 48.9 fl      MPV 9.3 fL      Platelets 185 10*3/mm3      Neutrophil % 67.2 %      Lymphocyte % 19.8 %      Monocyte % 8.8 %      Eosinophil % 3.7 %      Basophil % 0.3 %      Immature Grans % 0.2 %      Neutrophils, Absolute 5.80 10*3/mm3      Lymphocytes, Absolute 1.71 10*3/mm3      Monocytes, Absolute 0.76 10*3/mm3      Eosinophils, Absolute 0.32 10*3/mm3      Basophils, Absolute 0.03 10*3/mm3      Immature Grans, Absolute 0.02 10*3/mm3      nRBC 0.0 /100 WBC     POC Glucose Once [[id number]]  (Normal) Collected: 03/17/22 1547    Specimen: Blood Updated: 03/17/22 1548     Glucose 80 mg/dL      Comment: Meter: [id number] : [id number] Vitaly Werner       Hemoglobin A1c [[id number]]  (Abnormal) Collected: 03/17/22 0757    Specimen: Blood Updated: 03/17/22 1429     Hemoglobin A1C 7.70 %     Narrative:      Hemoglobin A1C Ranges:    Increased Risk for Diabetes  5.7% to 6.4%  Diabetes                     >= 6.5%  Diabetic Goal                < 7.0%    COVID PRE-OP / PRE-PROCEDURE SCREENING ORDER (NO ISOLATION) - Swab, Nasopharynx  [[id number]]  (Normal) Collected: 03/17/22 1227    Specimen: Swab from Nasopharynx Updated: 03/17/22 1257    Narrative:      The following orders were created for panel order COVID PRE-OP / PRE-PROCEDURE SCREENING ORDER (NO ISOLATION) - Swab, Nasopharynx.  Procedure                               Abnormality         Status                     ---------                               -----------         ------                     COVID-19 and FLU A/B PCR...[[id number]]  Normal              Final result                 Please view results for these tests on the individual orders.    COVID-19 and FLU A/B PCR - Swab, Nasopharynx [[id number]]  (Normal) Collected: 03/17/22 1227    Specimen: Swab from Nasopharynx Updated: 03/17/22 1257     COVID19 Not Detected     Influenza A PCR Not Detected     Influenza B PCR Not Detected    Narrative:      Fact sheet for providers: https://www.fda.gov/media/550715/download    Fact sheet for patients: https://www.fda.gov/media/254046/download    Test performed by PCR.    [place] Draw [[id number]] Collected: 03/17/22 0757    Specimen: Blood Updated: 03/17/22 1203    Narrative:      The following orders were created for panel order [place] Draw.  Procedure                               Abnormality         Status                     ---------                               -----------         ------                     Green Top (Gel)[[id number]]                                  Final result               Lavender Top[[id number]]                                     Final result               Gold Top - SST[[id number]]                                   Final result               Sanchez Top[[id number]]                                         Final result               Light Blue Top[[id number]]                                   Final result                 Please view results for these tests on the individual orders.    [place] Top [[id number]] Collected: 03/17/22 0757    Specimen: Blood Updated: 03/17/22  1203     Extra Tube Hold for add-ons.     Comment: Auto resulted.       STAT Lactic Acid, Reflex [[id number]]  (Normal) Collected: 03/17/22 1112    Specimen: Blood Updated: 03/17/22 1141     Lactate 0.8 mmol/L      Comment: Falsely depressed results may occur on samples drawn from patients receiving N-Acetylcysteine (NAC) or Metamizole.       Green Top (Gel) [[id number]] Collected: 03/17/22 0757    Specimen: Blood Updated: 03/17/22 0904     Extra Tube Hold for add-ons.     Comment: Auto resulted.       Gold Top - SST [[id number]] Collected: 03/17/22 0757    Specimen: Blood Updated: 03/17/22 0904     Extra Tube Hold for add-ons.     Comment: Auto resulted.       Lavender Top [[id number]] Collected: 03/17/22 0757    Specimen: Blood Updated: 03/17/22 0904     Extra Tube hold for add-on     Comment: Auto resulted       Light Blue Top [[id number]] Collected: 03/17/22 0757    Specimen: Blood Updated: 03/17/22 0904     Extra Tube hold for add-on     Comment: Auto resulted       Lactic Acid, Plasma [[id number]]  (Abnormal) Collected: 03/17/22 0757    Specimen: Blood Updated: 03/17/22 0845     Lactate 2.4 mmol/L      Comment: Falsely depressed results may occur on samples drawn from patients receiving N-Acetylcysteine (NAC) or Metamizole.       TSH [[id number]]  (Normal) Collected: 03/17/22 0757    Specimen: Blood Updated: 03/17/22 0842     TSH 1.930 uIU/mL     Comprehensive Metabolic Panel [[id number]]  (Abnormal) Collected: 03/17/22 0757    Specimen: Blood Updated: 03/17/22 0842     Glucose 118 mg/dL      BUN 15 mg/dL      Creatinine 0.70 mg/dL      Sodium 141 mmol/L      Potassium 3.9 mmol/L      Chloride 103 mmol/L      CO2 27.0 mmol/L      Calcium 8.6 mg/dL      Total Protein 6.9 g/dL      Albumin 3.60 g/dL      ALT (SGPT) 28 U/L      AST (SGOT) 24 U/L      Alkaline Phosphatase 90 U/L      Total Bilirubin 0.4 mg/dL      Globulin 3.3 gm/dL      Comment: Calculated Result        A/G Ratio 1.1 g/dL      BUN/Creatinine Ratio 21.4      Anion Gap 11.0 mmol/L      eGFR 94.9 mL/min/1.73      Comment: National Kidney Foundation and American Society of Nephrology (ASN) Task Force recommended calculation based on the Chronic Kidney Disease Epidemiology Collaboration (CKD-EPI) equation refit without adjustment for race.       Narrative:      GFR Normal >60  Chronic Kidney Disease <60  Kidney Failure <15      Urinalysis With Microscopic If Indicated (No Culture) - Urine, Catheter [[id number]]  (Abnormal) Collected: 03/17/22 0818    Specimen: Urine, Catheter Updated: 03/17/22 0840     Color, UA Yellow     Appearance, UA Clear     pH, UA 6.0     Specific Gravity, UA 1.028     Glucose, UA >=1000 mg/dL (3+)     Ketones, UA Negative     Bilirubin, UA Negative     Blood, UA Negative     Protein, UA Trace     Leuk Esterase, UA Negative     Nitrite, UA Negative     Urobilinogen, UA 0.2 E.U./dL    Narrative:      Urine microscopic not indicated.    Troponin [[id number]]  (Normal) Collected: 03/17/22 0757    Specimen: Blood Updated: 03/17/22 0838     Troponin T <0.010 ng/mL     Narrative:      Troponin T Reference Range:  <= 0.03 ng/mL-   Negative for AMI  >0.03 ng/mL-     Abnormal for myocardial necrosis.  Clinicians would have to utilize clinical acumen, EKG, Troponin and serial changes to determine if it is an Acute Myocardial Infarction or myocardial injury due to an underlying chronic condition.       Results may be falsely decreased if patient taking Biotin.      Magnesium [[id number]]  (Abnormal) Collected: 03/17/22 0757    Specimen: Blood Updated: 03/17/22 0838     Magnesium 1.5 mg/dL     BNP [[id number]]  (Normal) Collected: 03/17/22 0757    Specimen: Blood Updated: 03/17/22 0838     proBNP 209.0 pg/mL     Narrative:      Among patients with dyspnea, NT-proBNP is highly sensitive for the detection of acute congestive heart failure. In addition NT-proBNP of <300 pg/ml effectively rules out acute congestive heart failure with 99% negative predictive  value.    Results may be falsely decreased if patient taking Biotin.      CBC & Differential [[id number]]  (Abnormal) Collected: 03/17/22 0757    Specimen: Blood Updated: 03/17/22 0813    Narrative:      The following orders were created for panel order CBC & Differential.  Procedure                               Abnormality         Status                     ---------                               -----------         ------                     CBC Auto Differential[[id number]]        Abnormal            Final result                 Please view results for these tests on the individual orders.    CBC Auto Differential [[id number]]  (Abnormal) Collected: 03/17/22 0757    Specimen: Blood Updated: 03/17/22 0813     WBC 12.80 10*3/mm3      RBC 4.75 10*6/mm3      Hemoglobin 13.7 g/dL      Hematocrit 42.8 %      MCV 90.1 fL      MCH 28.8 pg      MCHC 32.0 g/dL      RDW 14.4 %      RDW-SD 47.8 fl      MPV 9.5 fL      Platelets 219 10*3/mm3      Neutrophil % 78.2 %      Lymphocyte % 13.0 %      Monocyte % 6.3 %      Eosinophil % 1.8 %      Basophil % 0.3 %      Immature Grans % 0.4 %      Neutrophils, Absolute 10.00 10*3/mm3      Lymphocytes, Absolute 1.67 10*3/mm3      Monocytes, Absolute 0.81 10*3/mm3      Eosinophils, Absolute 0.23 10*3/mm3      Basophils, Absolute 0.04 10*3/mm3      Immature Grans, Absolute 0.05 10*3/mm3      nRBC 0.0 /100 WBC           Imaging Results (Last 48 Hours)     Procedure Component Value Units Date/Time    XR Chest 1 View [[id number]] Collected: 03/17/22 0816     Updated: 03/17/22 0819    Narrative:      DATE OF EXAM: 3/17/2022 8:03 AM     PROCEDURE: XR CHEST 1 VW-     INDICATIONS: Dysrhythmia triage protocol     COMPARISON: No comparisons available.     TECHNIQUE: Single radiographic AP view of the chest was obtained.     FINDINGS:  Patient rotated to the right. Heart size indeterminate. Pulmonary  vasculature are within normal limits. Lungs clear other than atelectasis  in the medial lung bases  and calcified granulomas. Costophrenic angles  sharp        Impression:      Bibasilar atelectasis. No suspicious infiltrate or edema     This report was finalized on 3/17/2022 8:16 AM by Malcolm Negrete.           Operative/Procedure Notes (last 72 hours)  Notes from 03/15/22 170 through 22   No notes of this type exist for this encounter.            Physician Progress Notes (last 72 hours)      Kuldeep Dasilva MD at 22 0833              [facility] Services  PROGRESS NOTE    Patient Name: Martha Harrell  : 1955  MRN: [id number]    Date of Admission: 3/17/2022  Primary Care Physician: Ginger Ward APRN    Subjective   Subjective     CC:  Pain neck, shoulders, knees, SVT    HPI:  Now NSR.  Continues to c/o pain in her shoulders up into her neck and headache.  States that unable to raise her LLE to pain.  Pain much worse acutely on Wednesday. No injury that shows know up.  On MS contin goes to pain clinic.  Has h/o fibromyalgia.     ROS:  Gen- No fevers, chills  CV- No chest pain, palpitations  Resp- No cough, dyspnea  GI- No N/V/D, abd pain        Objective   Objective     Vital Signs:   Temp:  [97.4 °F (36.3 °C)-99.5 °F (37.5 °C)] 97.4 °F (36.3 °C)  Heart Rate:  [77-97] 77  Resp:  [17-22] 17  BP: (124-148)/(64-79) 135/73  Flow (L/min):  [2] 2     Physical Exam:  Constitutional: No acute distress, awake, alert  HENT: NCAT, mucous membranes moist  Respiratory: Clear to auscultation bilaterally, respiratory effort normal   Cardiovascular: RRR, no murmurs, rubs, or gallops  Gastrointestinal: Positive bowel sounds, soft, nontender, nondistended  Musculoskeletal: No bilateral ankle edema, TTP along trapezius muscle along shoulders and up neck, normal rom of left knee without fluid  Psychiatric: Appropriate affect, cooperative  Neurologic: Oriented x 3, strength symmetric in all extremities, Cranial Nerves grossly intact to confrontation, speech clear  Skin: No  gia      Results Reviewed:  LAB RESULTS:      Lab 03/18/22  0503 03/17/22  1112 03/17/22  0757   WBC 8.64  --  12.80*   HEMOGLOBIN 12.4  --  13.7   HEMATOCRIT 39.7  --  42.8   PLATELETS 185  --  219   NEUTROS ABS 5.80  --  10.00*   IMMATURE GRANS (ABS) 0.02  --  0.05   LYMPHS ABS 1.71  --  1.67   MONOS ABS 0.76  --  0.81   EOS ABS 0.32  --  0.23   MCV 90.4  --  90.1   LACTATE 0.7 0.8 2.4*         Lab 03/18/22  0503 03/17/22  0757   SODIUM  --  141   POTASSIUM  --  3.9   CHLORIDE  --  103   CO2  --  27.0   ANION GAP  --  11.0   BUN  --  15   CREATININE  --  0.70   EGFR  --  94.9   GLUCOSE  --  118*   CALCIUM  --  8.6   MAGNESIUM 2.3 1.5*   PHOSPHORUS 2.7  --    HEMOGLOBIN A1C  --  7.70*   TSH  --  1.930         Lab 03/17/22  0757   TOTAL PROTEIN 6.9   ALBUMIN 3.60   GLOBULIN 3.3   ALT (SGPT) 28   AST (SGOT) 24   BILIRUBIN 0.4   ALK PHOS 90         Lab 03/17/22  0757   PROBNP 209.0   TROPONIN T <0.010                 Brief Urine Lab Results  (Last result in the past 365 days)      Color   Clarity   Blood   Leuk Est   Nitrite   Protein   CREAT   Urine HCG        03/17/22 0818 Yellow   Clear   Negative   Negative   Negative   Trace                 Microbiology Results Abnormal     Procedure Component Value - Date/Time    COVID PRE-OP / PRE-PROCEDURE SCREENING ORDER (NO ISOLATION) - Swab, Nasopharynx [[id number]]  (Normal) Collected: 03/17/22 1227    Lab Status: Final result Specimen: Swab from Nasopharynx Updated: 03/17/22 1257    Narrative:      The following orders were created for panel order COVID PRE-OP / PRE-PROCEDURE SCREENING ORDER (NO ISOLATION) - Swab, Nasopharynx.  Procedure                               Abnormality         Status                     ---------                               -----------         ------                     COVID-19 and FLU A/B PCR...[[id number]]  Normal              Final result                 Please view results for these tests on the individual orders.    COVID-19 and FLU A/B  PCR - Swab, Nasopharynx [[id number]]  (Normal) Collected: 03/17/22 1227    Lab Status: Final result Specimen: Swab from Nasopharynx Updated: 03/17/22 1257     COVID19 Not Detected     Influenza A PCR Not Detected     Influenza B PCR Not Detected    Narrative:      Fact sheet for providers: https://www.fda.gov/media/918991/download    Fact sheet for patients: https://www.fda.gov/media/563627/download    Test performed by PCR.          XR Chest 1 View    Result Date: 3/17/2022  DATE OF EXAM: 3/17/2022 8:03 AM  PROCEDURE: XR CHEST 1 VW-  INDICATIONS: Dysrhythmia triage protocol  COMPARISON: No comparisons available.  TECHNIQUE: Single radiographic AP view of the chest was obtained.  FINDINGS: Patient rotated to the right. Heart size indeterminate. Pulmonary vasculature are within normal limits. Lungs clear other than atelectasis in the medial lung bases and calcified granulomas. Costophrenic angles sharp      Impression: Bibasilar atelectasis. No suspicious infiltrate or edema  This report was finalized on 3/17/2022 8:16 AM by Malcolm Negrete.            I have reviewed the medications:  Scheduled Meds:apixaban, 5 mg, Oral, BID  atorvastatin, 20 mg, Oral, Daily  busPIRone, 15 mg, Oral, Q12H  DULoxetine, 60 mg, Oral, Daily  insulin detemir, 20 Units, Subcutaneous, Nightly  insulin lispro, 0-9 Units, Subcutaneous, TID AC  insulin lispro, 7 Units, Subcutaneous, TID With Meals  magnesium oxide, 400 mg, Oral, BID  metoprolol tartrate, 50 mg, Oral, Q12H  Morphine, 30 mg, Oral, BID  phenytoin ER, 200 mg, Oral, Daily  propafenone, 150 mg, Oral, TID  sodium chloride, 10 mL, Intravenous, Q12H      Continuous Infusions:sodium chloride, 75 mL/hr, Last Rate: 75 mL/hr (03/18/22 0613)      PRN Meds:.•  acetaminophen  •  calcium carbonate  •  dextrose  •  dextrose  •  glucagon (human recombinant)  •  HYDROcodone-acetaminophen  •  magnesium sulfate **OR** magnesium sulfate **OR** magnesium sulfate  •  Morphine **AND** naloxone  •  sodium  chloride  •  [COMPLETED] Insert peripheral IV **AND** sodium chloride  •  sodium chloride    Assessment/Plan   Assessment & Plan     Active Hospital Problems    Diagnosis  POA   • SVT (supraventricular tachycardia) (HCC) [I47.1]  Yes   • Osteoarthritis [M19.90]  Yes   • Chronic pain [G89.29]  Yes   • Paroxysmal atrial fibrillation (HCC) [I48.0]  Yes   • Hyperlipidemia [E78.5]  Yes   • GERD (gastroesophageal reflux disease) [K21.9]  Yes   • Depression [F32.A]  Yes   • Epilepsy (HCC) [G40.909]  Yes   • Uncontrolled type 2 diabetes mellitus with hyperglycemia (HCC) [E11.65]  Yes   • Diabetic peripheral neuropathy associated with type 2 diabetes mellitus (HCC) [E11.42]  Yes      Resolved Hospital Problems   No resolved problems to display.        Brief Hospital Course to date:  Martha Harrell is a 66 y.o. female with h/o DM, HL, afib on eliquis, chronic pain on morphine, severe OA of multiple joints including bilateral knees presented to [facility] ER with onset of severe pain in her LE's, shoulders, neck worse than usual and was found to be in SVT.    SVT  --cardiology following.  Recs metoprolol 50mg BID.  Will plan an ambulatory monitor at discharge  --echo pending  --Now NSR    DM  --hypoglycemic this AM.  Hold basal and preprandial.  Continue SSI.  Will likely need added back as steroids likely to exacerbate.    Severe OA multiple joints with acute onset worsening pain LE's shoulders, neck  --has seen Dr. Singletary/ortho in the past  --IVF  --on MS contin, follows with pain clinic  --Add CK level to labs  --note h/o fibromyalgia and she is tender along her trapezius muscle.  Trial of prednisone and zanaflex PRN        DVT prophylaxis:  Medical and mechanical DVT prophylaxis orders are present.       AM-PAC 6 Clicks Score (PT): 17 (03/17/22 2000)    Disposition: I expect the patient to be discharged TBD    CODE STATUS:   Code Status and Medical Interventions:   Ordered at: 03/17/22 2101     Code Status (Patient has no  pulse and is not breathing):    CPR (Attempt to Resuscitate)     Medical Interventions (Patient has pulse or is breathing):    Full Support       Kuldeep Dasilva MD  22                Electronically signed by Kuldeep Dasilva MD at 22 1405          Consult Notes (last 72 hours)      Manny Yañez MD at 22 1414      Consult Orders    1. Inpatient Cardiology Consult [[id number]] ordered by Monty Manzo MD at 22 1352               [facility] Electrophyiology        Date of Hospital Visit: 22      Place of Service: [facility]    Patient Name: Martha Harrell  :1955    Referral Provider: Hospitalist  Primary Care Provider: Ginger Ward APRN  Primary Cardiologist: Enriqueta Kelsey MD    Chief complaint/Reason for Consultation: SVT    Subjective     Problem List:  Active Hospital Problems    Diagnosis    • SVT (supraventricular tachycardia) (HCC)    • Paroxysmal atrial fibrillation (HCC)    • Hyperlipidemia    • Osteoarthritis    • Chronic pain    • GERD (gastroesophageal reflux disease)    • Depression    • Epilepsy (HCC)    • Uncontrolled type 2 diabetes mellitus with hyperglycemia (HCC)    • Diabetic peripheral neuropathy associated with type 2 diabetes mellitus (HCC)          History of Present Illness:  This is a 66-year old female whose cardiac history is significant for apparent paroxysmal atrial fibrillation for which she is on propafenone.  She states she has been on this medication for approximately a year.  She is not aware of a history of ischemic study.  She does think she has had an echocardiogram before in the past.  She denies a history of cardioversion.  She presented to [facility] emergency department today with a complaint of severe acute on chronic pain.  She has chronic pain due to severe osteoarthritis.  On evaluation she was found to have a high heart rate.  On arrival emergency department she was in SVT.  She auto  converted to sinus rhythm prior to any medical intervention.  She has no history of CHF, TIA or CVA and is not on antihypertensives.  She is a moderately poor historian and records are limited.  I can find no records from [facility] on doggyloot for review.  She denies chest pain but has chronic dyspnea due to chronic respiratory failure.  She is supposed to be on 2 L of supplemental oxygen at home.  She denies orthopnea PND or claudication but complains of occasional lower extremity edema.  She has never been on other antiarrhythmics.  Her presenting EKG shows SVT, her initial troponin is negative.  At present she is awake and alert and comfortable and in no apparent distress.          Past Surgical History:   Procedure Laterality Date   • BACK SURGERY     • NECK SURGERY     • TUBAL ABDOMINAL LIGATION         Allergies   Allergen Reactions   • Aleve [Naproxen] Itching       Current Outpatient Medications   Medication Instructions   • acetaminophen (TYLENOL) 650 mg, Oral, Every 4 Hours PRN   • amoxicillin-clavulanate (Augmentin) 500-125 MG per tablet 1 tablet, Oral, 2 Times Daily   • atorvastatin (LIPITOR) 20 mg, Oral, Every Night at Bedtime   • B-D UF III MINI PEN NEEDLES 31G X 5 MM misc Use daily with insulin   • busPIRone (BUSPAR) 15 MG tablet No dose, route, or frequency recorded.   • calcium carbonate (OS-HOSEA) 600 mg, Oral, Daily   • DULoxetine (CYMBALTA) 60 mg, Oral, Daily   • Eliquis 5 mg, Oral, 2 Times Daily   • empagliflozin (JARDIANCE) 25 mg, Oral, Daily   • fenofibrate (TRICOR) 145 MG tablet 160 mg.   • furosemide (LASIX) 40 MG tablet No dose, route, or frequency recorded.   • HYDROcodone-acetaminophen (NORCO) 7.5-325 MG per tablet 1 tablet, Oral, 3 Times Daily PRN   • Insulin Glargine, 2 Unit Dial, (TOUJEO MAX SOLOSTAR) 300 UNIT/ML solution pen-injector injection Take 100 units at bedtime, after a week go up to 110 units   • Insulin Lispro, 1 Unit Dial, (HUMALOG KWIKPEN) 100 UNIT/ML solution  pen-injector Take 20u before lunch (if you eat lunch) and take 40 units before supper   • loratadine (CLARITIN) 10 MG tablet take 1 tablet by mouth once daily if needed   • magnesium oxide (MAG-OX) 400 MG tablet 1 tablet, Oral, 2 Times Daily   • meloxicam (MOBIC) 15 mg, Oral, Every Morning   • metFORMIN (GLUCOPHAGE) 1,000 mg, Oral, 2 Times Daily With Meals   • Morphine (MS CONTIN) 30 mg, Oral, 2 Times Daily   • Omega-3 Fatty Acids (RA FISH OIL) 1000 MG capsule 1 capsule, Oral, 2 Times Daily   • omeprazole (priLOSEC) 20 MG capsule No dose, route, or frequency recorded.   • ONE TOUCH ULTRA TEST test strip Test BS TID   • ONETOUCH DELICA LANCETS FINE misc Test BS TID   • phenytoin (DILANTIN) 100 MG ER capsule take 2 capsule by mouth twice a day   • propafenone (RYTHMOL) 150 mg, Oral, 3 Times Daily   • RA Aspirin EC 81 mg, Oral, Daily   • RA Vitamin D-3 5,000 Units, Oral, Daily   • raNITIdine (ZANTAC) 150 MG tablet No dose, route, or frequency recorded.   • Toujeo SoloStar 300 UNIT/ML solution pen-injector injection ADMINISTER 100 UNITS UNDER THE SKIN DAILY          Scheduled Meds:  apixaban, 5 mg, Oral, BID  atorvastatin, 20 mg, Oral, Daily  busPIRone, 15 mg, Oral, Q12H  DULoxetine, 60 mg, Oral, Daily  insulin detemir, 20 Units, Subcutaneous, Nightly  insulin lispro, 0-9 Units, Subcutaneous, TID AC  insulin lispro, 7 Units, Subcutaneous, TID With Meals  magnesium oxide, 400 mg, Oral, BID  Morphine, 30 mg, Oral, BID  phenytoin ER, 200 mg, Oral, Daily  propafenone, 150 mg, Oral, TID  sodium chloride, 10 mL, Intravenous, Q12H      Continuous Infusions:sodium chloride, 75 mL/hr            Social History     Socioeconomic History   • Marital status:    Tobacco Use   • Smoking status: Former Smoker     Types: Cigarettes     Quit date: 2010     Years since quittin.1   • Smokeless tobacco: Never Used   Substance and Sexual Activity   • Alcohol use: No   • Drug use: No   • Sexual activity: Defer       Family  "History   Problem Relation Age of Onset   • Arthritis Mother    • Heart disease Mother    • Obesity Mother    • Arthritis Father    • Cancer Father    • Hypertension Father    • Hyperlipidemia Father    • Obesity Father    • Arthritis Sister    • Obesity Sister    • Arthritis Maternal Aunt    • Arthritis Maternal Uncle    • Cancer Maternal Uncle        REVIEW OF SYSTEMS:   Review of Systems   Constitutional: Negative.   HENT: Negative.    Eyes: Negative.    Cardiovascular: Positive for leg swelling. Negative for chest pain, claudication, near-syncope, orthopnea, palpitations, paroxysmal nocturnal dyspnea and syncope.   Respiratory: Positive for shortness of breath.    Endocrine: Negative.    Hematologic/Lymphatic: Negative.    Skin: Negative.    Musculoskeletal: Positive for arthritis, joint pain and muscle weakness.   Gastrointestinal: Negative.    Genitourinary: Negative.    Neurological: Negative.    Psychiatric/Behavioral: Negative.    Allergic/Immunologic: Negative.    All other systems reviewed and are negative.      Objective     Objective:  Vitals:    03/17/22 1115 03/17/22 1130 03/17/22 1200 03/17/22 1230   BP:  131/70 131/71 138/70   Pulse: 94 93 93 92   Resp:       Temp:       TempSrc:       SpO2: 91% 94% 90% 94%   Weight:       Height:         Body mass index is 34.75 kg/m².  Flowsheet Rows    Flowsheet Row First Filed Value   Admission Height 157.5 cm (62\") Documented at 03/17/2022 0753   Admission Weight 86.2 kg (190 lb) Documented at 03/17/2022 0753          Intake/Output Summary (Last 24 hours) at 3/17/2022 1415  Last data filed at 3/17/2022 0930  Gross per 24 hour   Intake 1000 ml   Output --   Net 1000 ml       Vitals and nursing note reviewed.   Constitutional:       Appearance: Not in distress. Chronically ill-appearing.   Eyes:      Conjunctiva/sclera: Conjunctivae normal.      Pupils: Pupils are equal, round, and reactive to light.   Neck:      Vascular: JVD normal.   Pulmonary:      Effort: " Pulmonary effort is normal.      Breath sounds: Normal breath sounds.   Chest:      Chest wall: Not tender to palpatation.   Cardiovascular:      Normal rate. Regular rhythm.      Murmurs: There is no murmur.      No gallop. No click. No rub.   Pulses:     Intact distal pulses.   Edema:     Peripheral edema absent.   Abdominal:      General: Bowel sounds are normal.      Palpations: Abdomen is soft.   Musculoskeletal: Normal range of motion.         General: No deformity.      Cervical back: Normal range of motion. Skin:     General: Skin is warm and dry.   Neurological:      General: No focal deficit present.      Mental Status: Alert.   Psychiatric:         Behavior: Behavior is cooperative.               Lab Review:                CBC:      Lab 03/17/22  0757   WBC 12.80*   HEMOGLOBIN 13.7   HEMATOCRIT 42.8   PLATELETS 219   NEUTROS ABS 10.00*   IMMATURE GRANS (ABS) 0.05   LYMPHS ABS 1.67   MONOS ABS 0.81   EOS ABS 0.23   MCV 90.1     CMP:      Lab 03/17/22  0757   SODIUM 141   POTASSIUM 3.9   CHLORIDE 103   CO2 27.0   ANION GAP 11.0   BUN 15   CREATININE 0.70   EGFR 94.9   GLUCOSE 118*   CALCIUM 8.6   MAGNESIUM 1.5*   TOTAL PROTEIN 6.9   ALBUMIN 3.60   GLOBULIN 3.3   ALT (SGPT) 28   AST (SGOT) 24   BILIRUBIN 0.4   ALK PHOS 90     Results from last 7 days   Lab Units 03/17/22  0757   MAGNESIUM mg/dL 1.5*     Estimated Creatinine Clearance: 69.5 mL/min (by C-G formula based on SCr of 0.7 mg/dL).    CARDIAC LABS:      Lab 03/17/22  0757   PROBNP 209.0   TROPONIN T <0.010           EKG/ Telemetry:         CHADS-VASc Risk Assessment            3 Total Score    1 DM         1 Age 65-74    1 Sex: Female        Criteria that do not apply:    CHF    Hypertension    Age >/= 75    Vascular Disease              Result Review:  I have personally reviewed the results from the time of admission and agree with these findings:  [x]  Laboratory  []  Radiology  [x]  EKG/Telemetry   []  Pathology  [x]  Old records  []   "Other:        Assessment and Plan:     1.  SVT   -Auto converted to sinus rhythm   -Continue propafenone   -Echocardiogram    2.  Apparent history of atrial fibrillation   -CHADSVASC 3   -Continue propafenone   -Continue apixaban   -Echocardiogram    3.  Mild hypomagnesemia   -Magnesium replacement          Electronically signed by CORINA Napier, 03/17/22, 2:56 PM EDT.          CARDIAC ELECTROPHYSIOLOGIST ADDENDUM    I have personally performed a face to face diagnostic evaluation on this patient.  I have reviewed the note authored by the advanced practice provider and agree with the history of present illness, past medical history, surgical history, social history, family history and review of systems.. I have reviewed current medications, and lab values.  My findings are below:  .    History of Present Illness:  66 y.o. female with multiple medical problems including obesity, significant osteoarthritis and chronic pain, diabetes, paroxysmal atrial fibrillation.  She presented due to multiple orthopedic complaints.  She was found to be in supraventricular tachycardia with a heart rate around 170 bpm.  This spontaneously converted to normal rhythm while she was in the emergency department.  She has a history of atrial fibrillation and has been on propafenone for this.    She does say that she was able to feel her heart racing this morning when the emergency department.  She thought this was possibly related to the amount of pain she was in.  She also says that she occasionally feels her heart racing at home, but this is pretty infrequent.  She is not overall bothered when these events are happening.  She denies any chest discomfort, dyspnea, orthopnea, or PND.    Review of Systems:  As documented in the note above     Physical Exam   Vital Signs: /74 (BP Location: Right arm, Patient Position: Lying)   Pulse 87   Temp 99.5 °F (37.5 °C) (Oral)   Resp 22   Ht 157.5 cm (62\")   Wt 86.2 kg (190 lb)   " SpO2 99%   BMI 34.75 kg/m²        Admission Weight: 86.2 kg (190 lb)     General appearance: Alert oriented and cooperative.  In no acute distress, obese  Skin:  Warm and Dry to touch  Head: Normocephalic, without obvious abnormality, atraumatic.   Eyes: Conjunctivae unremarkable, EOM's intact.Sclera non icteric.  Neck: No JVD, No carotid bruit. Neck supple, trachea midline  Lungs: Clear to auscultation bilaterally, no use of accessory muscles.    Heart:: RRR with Normal S1 and S2 . No murmurs and no gallops.  Abdomen: Soft, nontender. Bowel sounds normal.  Extremities: No edema.  Neurologic: Oriented to time, person and place, affect appropriate.  No focal/major motor or sensory defects noted.    Psychiatric:  Appropriate mood, memory and judgment.  Good use of semantics and logic.    PLAN:    I personally reviewed her ECG and telemetry.  Her ECG shows a regular narrow complex tachycardia at around 165 bpm.  Atrial activity is difficult to discern.  The tachycardia terminated spontaneously.  The exact etiology of this is uncertain, but the differential includes a supraventricular tachycardia such as AV elliot reentry, or an organized atrial flutter/atrial tachycardia with one-to-one conduction.  Given that she is on propafenone, this could potentially allow for a slower flutter cycle length and one-to-one conduction.  The determination for therapy on this would determine how often this is happening how bothered she is by it at the current time, given her comorbidities and deconditioning I would not favor an ablation of the current time.  We will go ahead and start metoprolol 50 mg twice daily in order to help prevent rapid rates so she hated with these episodes.  We will follow up the results of the echocardiogram.  At the current time she does not require any additional cardiac testing.  When she is deemed to be ready for discharge we can likely send her home with a ambulatory monitor for further monitoring.      Manny Yañez MD         Electronically signed by Manny Yañez MD at 03/17/22 6067

## 2022-03-18 NOTE — THERAPY EVALUATION
Acute Care - Wound/Debridement Initial Evaluation  [facility]     Patient Name: Martha Harrell  : 1955  MRN: [id number]  Today's Date: 3/18/2022                Admit Date: 3/17/2022    Visit Dx:    ICD-10-CM ICD-9-CM   1. SVT (supraventricular tachycardia) (HCC)  I47.1 427.89   2. Venous stasis dermatitis of both lower extremities  I87.2 454.1   3. Chronic anticoagulation  Z79.01 V58.61   4. Paroxysmal atrial fibrillation (HCC)  I48.0 427.31       Patient Active Problem List   Diagnosis   • Uncontrolled type 2 diabetes mellitus with hyperglycemia (HCC)   • Diabetic peripheral neuropathy associated with type 2 diabetes mellitus (HCC)   • Microalbuminuria due to type 2 diabetes mellitus (HCC)   • Depression   • Epilepsy (HCC)   • GERD (gastroesophageal reflux disease)   • Hyperlipidemia   • Fibromyalgia, primary   • Paroxysmal atrial fibrillation (HCC)   • Venous stasis dermatitis of both lower extremities   • Chronic anticoagulation   • SVT (supraventricular tachycardia) (HCC)   • Osteoarthritis   • Chronic pain        Past Medical History:   Diagnosis Date   • Anemia    • Asthma    • Chronic bronchitis (HCC)    • Depression    • Diabetes mellitus (HCC)    • Epilepsy (HCC)    • Fibromyalgia, primary    • GERD (gastroesophageal reflux disease)    • Hyperlipidemia    • Leg DVT (deep venous thromboembolism), acute, left (HCC) 2019        Past Surgical History:   Procedure Laterality Date   • BACK SURGERY     • NECK SURGERY     • TUBAL ABDOMINAL LIGATION             Wound 22 1622 Bilateral lower leg Diabetic Ulcer (Active)   Dressing Appearance dry;intact 22 0815   Closure Open to air 22 1610   Base clean;dry;scab;pink 22 0815   Periwound intact;redness;blanchable;swelling 22 0815   Periwound Temperature warm 22 0815   Periwound Skin Turgor soft 22 0815   Edges irregular;open 22 0815   Drainage Amount none 22 0815   Care, Wound cleansed with;wound  "cleanser;jayjay boot 03/18/22 0815   Dressing Care dressing applied;multi-layer wrap 03/18/22 0815   Periwound Care cleansed with pH balanced cleanser;dry periwound area maintained 03/18/22 0815      Lymphedema     Row Name 03/18/22 0815             Subjective Pain    Able to rate subjective pain? yes  -      Pre-Treatment Pain Level 4  -      Post-Treatment Pain Level 8  -              Lymphedema Edema Assessment    Ptting Edema Category By severity  -      Pitting Edema Mild  -              Skin Changes/Observations    Location/Assessment Lower Extremity  -      Lower Extremity Conditions bilateral:;dry;clean;inflamed;scab(s);fragile  -      Lower Extremity Color/Pigment bilateral:;blanchable;erythema;non-blanchable;hyperpigmented  -              Lymphedema Pulses/Capillary Refill    Lymphedema Pulses/Capillary Refill lower extremity pulses  -      Dorsalis Pedis Pulse right:;left:;+2 normal  -      Posterior Tibialis Pulse right:;left:;+2 normal  -              Lymphedema Measurements    Measurement Type(s) Quick Girth  -      Quick Girth Areas Lower extremities  -              LLE Quick Girth (cm)    Mid foot 21.5 cm  -      Smallest ankle 19.4 cm  -      Largest calf 34 cm  -              RLE Quick Girth (cm)    Mid foot 22 cm  -      Smallest ankle 19.2 cm  -      Largest calf 33.1 cm  -      RLE Quick Girth Total 74.3  -              Compression/Skin Care    Compression/Skin Care skin care;wrapping location  -      Skin Care washed/dried;lotion applied  -      Wrapping Location lower extremity  -      Wrapping Location LE bilateral:;foot to knee  -      Wrapping Comments BLE unna boot applied in figure eight pattern with 4\" coban and spandage to secure.  -            User Key  (r) = Recorded By, (t) = Taken By, (c) = Cosigned By    Initials Name Provider Type     Santi Manley, PT Physical Therapist                WOUND DEBRIDEMENT                     PT " Assessment (last 12 hours)     PT Evaluation and Treatment     Row Name 03/18/22 0815          Physical Therapy Time and Intention    Subjective Information complains of;pain  -LH     Document Type wound care;evaluation  -     Mode of Treatment physical therapy;individual therapy  -     Row Name 03/18/22 0815          General Information    Patient Profile Reviewed yes  -     Patient Observations alert;cooperative;agree to therapy  -     Risks Reviewed patient:;increased discomfort  -     Benefits Reviewed patient:;improve function;decrease risk of DVT;improve skin integrity;increase knowledge  -     Barriers to Rehab medically complex  -     Row Name 03/18/22 0815          Pain    Pretreatment Pain Rating 4/10  -     Posttreatment Pain Rating 8/10  -LH     Pain Location - Side/Orientation Bilateral  -     Pain Location lower  -     Pain Location - extremity  -     Pre/Posttreatment Pain Comment LLE pain > RLE pain to light touch and mobility  -     Pain Intervention(s) Repositioned;Elevated;Rest  -     Row Name 03/18/22 0815          Cognition    Affect/Mental Status (Cognition) WFL  -     Orientation Status (Cognition) oriented x 3  -     Row Name 03/18/22 0815          Wound 03/17/22 1622 Bilateral lower leg Diabetic Ulcer    Wound - Properties Group Placement Date: 03/17/22  -CM Placement Time: 1622  -CM Present on Hospital Admission: Y  -CM Side: Bilateral  -CM Orientation: lower  -CM Location: leg  -CM Primary Wound Type: Diabetic ulc  -     Dressing Appearance dry;intact  -     Base clean;dry;scab;pink  -LH     Periwound intact;redness;blanchable;swelling  -     Periwound Temperature warm  -     Periwound Skin Turgor soft  -     Edges irregular;open  -     Wound Length (cm) --  Scattered RLE abrasions and LLE blisters  -     Drainage Amount none  -     Care, Wound cleansed with;wound cleanser;jayjay boot  -     Dressing Care dressing applied;multi-layer wrap  Unn  "aboot, 4\" coban, spandage  -     Periwound Care cleansed with pH balanced cleanser;dry periwound area maintained  -     Retired Wound - Properties Group Placement Date: 03/17/22  -CM Placement Time: 1622  -CM Present on Hospital Admission: Y  -CM Side: Bilateral  -CM Orientation: lower  -CM Location: leg  -CM Primary Wound Type: Diabetic ulc  -CM     Retired Wound - Properties Group Date first assessed: 03/17/22  -CM Time first assessed: 1622  -CM Present on Hospital Admission: Y  -CM Side: Bilateral  -CM Location: leg  -CM Primary Wound Type: Diabetic ulc  -CM     Row Name 03/18/22 0815          Coping    Observed Emotional State calm;cooperative  -     Verbalized Emotional State acceptance  -     Trust Relationship/Rapport care explained;questions answered  -     Row Name 03/18/22 0815          Plan of Care Review    Plan of Care Reviewed With patient  -     Progress no change  -     Outcome Evaluation PT wound care initial evaluation complete. Pt presents with BLE venous stasis dermatitis with scattered RLE abrasions and LLE open blisters. Pt with minimal BLE edema at this time with mild BLE erythema. PT appled unna boots to BLEs to help promote venous return, improve skin integrity, and promote optimal wound healing environment. PT plans to f/u with dressing changes in 2-3 days.  -     Row Name 03/18/22 0815          Positioning and Restraints    Pre-Treatment Position in bed  -     Post Treatment Position bed  -     In Bed supine;call light within reach;encouraged to call for assist  -     Row Name 03/18/22 0815          Therapy Assessment/Plan (PT)    Patient/Family Therapy Goals Statement (PT) Wound healing  -     PT Diagnosis (PT) BLE venous stasis dermatitis, RLE abrasions, LLE blistering  -     Rehab Potential (PT) good, to achieve stated therapy goals  -     Criteria for Skilled Interventions Met (PT) yes;meets criteria;skilled treatment is necessary  -     Row Name 03/18/22 " 0815          PT Evaluation Complexity    History, PT Evaluation Complexity 3 or more personal factors and/or comorbidities  -     Examination of Body Systems (PT Eval Complexity) total of 3 or more elements  -     Clinical Presentation (PT Evaluation Complexity) evolving  -     Clinical Decision Making (PT Evaluation Complexity) moderate complexity  -     Overall Complexity (PT Evaluation Complexity) moderate complexity  -     Row Name 03/18/22 0815          Therapy Plan Review/Discharge Plan (PT)    Therapy Plan Review (PT) evaluation/treatment results reviewed;care plan/treatment goals reviewed;risks/benefits reviewed;current/potential barriers reviewed;participants included;participants voiced agreement with care plan;patient  -     Row Name 03/18/22 0815          Wound Care Goal 1 (PT)    Wound Care Goal 1 (PT) Decrease BLE edema to none to promote wound healing.  -     Time Frame (Wound Care Goal 1, PT) 2 weeks  -     Row Name 03/18/22 0815          Wound Care Goal 2 (PT)    Wound Care Goal 2 (PT) Decrease area of BLE wounds by 10% as evidence of wound healing.  -     Time Frame (Wound Care Goal 2, PT) 2 weeks  -           User Key  (r) = Recorded By, (t) = Taken By, (c) = Cosigned By    Initials Name Provider Type    Roma Renner, RN Registered Nurse     Santi Manley, PT Physical Therapist                  Recommendation and Plan  Planned Therapy Interventions (PT): patient/family education, wound care  Plan of Care Reviewed With: patient   Progress: no change       Progress: no change  Outcome Evaluation: PT wound care initial evaluation complete. Pt presents with BLE venous stasis dermatitis with scattered RLE abrasions and LLE open blisters. Pt with minimal BLE edema at this time with mild BLE erythema. PT appled unna boots to BLEs to help promote venous return, improve skin integrity, and promote optimal wound healing environment. PT plans to f/u with dressing changes in  2-3 days.  Plan of Care Reviewed With: patient            Time Calculation   PT Charges     Row Name 03/18/22 0932             Time Calculation    Start Time 0815  -      PT Received On --  -      PT Goal Re-Cert Due Date 03/28/22  -              Untimed Charges    PT Eval/Re-eval Minutes 35  -LH      Wound Care [id number] Unna boot  -      29580-Unna Boot 20  -LH              Total Minutes    Untimed Charges Total Minutes 55  -LH       Total Minutes 55  -LH            User Key  (r) = Recorded By, (t) = Taken By, (c) = Cosigned By    Initials Name Provider Type     Santi Manley, PT Physical Therapist                  Therapy Charges for Today     Code Description Service Date Service Provider Modifiers Qty    [id number] HC PT EVAL MOD COMPLEXITY 3 3/18/2022 Santi Manley, PT GP 1    [id number] HC PT STAPPING UNNA BOOT 3/18/2022 Santi Manley, PT GP 1            PT G-Codes  AM-PAC 6 Clicks Score (PT): 17       Santi Manley PT  3/18/2022

## 2022-03-18 NOTE — NURSING NOTE
Woc consulted for: Serous filled bullae, intact on buttock    Wound/ Skin Assessment: Left upper medial buttock presents with an intact serous filled bullae, Woc was able to press around this without rupturing it and it blanched.  Patient does have MASD but at this time no open skin was noted just redness.  The guard was in place.    Recommendations/ Intervention performed: Recommend keeping this covered with silicone foam at this time if pops and cannot be reapproximated will have to think of new wound care orders; have instructed RNs to contact Woc if pops.    Head to toe Ax performed.    Additional skin issues: Patient has bilateral Unna boots it must be noted that patient moved around and rolled for Woc really well in bed.  It is encouraging.    Skin interventions in place.    Pressure Injury Protocol (initiate for Adama Score of 18 or less):   *Maintain good skin care, keep dry, turn q 2 hr, keep heels elevated and offloaded with heel boots.    *Apply z-guard to sacrococcygeal area/ perineal area BID or daily and PRN per incontinent episodes.  *Follow C.A.R.E protocol if medical devices (Bipap, joel, Ng tube, etc) are being used.     Specialty bed: Maisha bed was ordered due to bilateral lower extremity venous ulcers blisters on buttock and presence of MASD.    Woc will follow.    Please contact with questions or concerns.

## 2022-03-18 NOTE — CASE MANAGEMENT/SOCIAL WORK
Discharge Planning Assessment  [facility]     Patient Name: Martha Harrell  MRN: [id number]  Today's Date: 3/18/2022    Admit Date: 3/17/2022     Discharge Needs Assessment     Row Name 03/18/22 1241       Living Environment    People in Home child(mansi), adult    Name(s) of People in Home Aguila Harrell(son)    Current Living Arrangements home    Primary Care Provided by self    Provides Primary Care For no one    Family Caregiver if Needed child(mansi), adult    Family Caregiver Names Germain- Aguila and Harris    Quality of Family Relationships unable to assess    Able to Return to Prior Arrangements yes    Living Arrangement Comments CM spoke with pt in room with permission regarding discharge plan. Pt resides in [facility] with adult son, Aguila.       Transition Planning    Patient/Family Anticipates Transition to home with family    Patient/Family Anticipated Services at Transition     Transportation Anticipated family or friend will provide       Discharge Needs Assessment    Readmission Within the Last 30 Days no previous admission in last 30 days    Equipment Currently Used at Home oxygen;respiratory supplies;walker, rolling  Pt uses nocturnal home O2 at 2 L provided by [facility]    Concerns to be Addressed discharge planning    Equipment Needed After Discharge walker, rolling;oxygen;respiratory supplies  Pt reports she uses home O2 at 2liters at HS    Discharge Coordination/Progress Pt has [employer] Medicare Replacement and Passport insurance with prescription coverage and uses [facility] in [place].  Pt has a history of asthma, DM,  seizure disorder and SVT. Pt came in with c/os pain and found with SVT. Pt reports she is independent of ADLs.  Plan is home with son at discharge. Pt currently denies discharge needs. CM will cont to follow.               Discharge Plan     Row Name 03/18/22 1247       Plan    Plan discharge plan    Plan Comments Plan is home with son at discharge. Pt currently  denies discharge needs. CM will cont to follow.    Final Discharge Disposition Code 01 - home or self-care              Continued Care and Services - Admitted Since 3/17/2022    Coordination has not been started for this encounter.       Expected Discharge Date and Time     Expected Discharge Date Expected Discharge Time    Mar 21, 2022          Demographic Summary     Row Name 03/18/22 1239       General Information    General Information Comments Pt's PCP Ginger Ward       Contact Information    Permission Granted to Share Info With     Contact Information Obtained for     Contact Information Comments Aguila Harrell(son) [phone] or Harris Harrell(son) [phone]               Functional Status    No documentation.                Psychosocial    No documentation.                Abuse/Neglect    No documentation.                Legal    No documentation.                Substance Abuse    No documentation.                Patient Forms    No documentation.                   Mayela Ruff RN

## 2022-03-19 LAB
GLUCOSE BLDC GLUCOMTR-MCNC: 116 MG/DL (ref 70–130)
GLUCOSE BLDC GLUCOMTR-MCNC: 199 MG/DL (ref 70–130)
GLUCOSE BLDC GLUCOMTR-MCNC: 265 MG/DL (ref 70–130)

## 2022-03-19 PROCEDURE — 99233 SBSQ HOSP IP/OBS HIGH 50: CPT | Performed by: FAMILY MEDICINE

## 2022-03-19 PROCEDURE — 97116 GAIT TRAINING THERAPY: CPT

## 2022-03-19 PROCEDURE — 63710000001 INSULIN DETEMIR PER 5 UNITS: Performed by: INTERNAL MEDICINE

## 2022-03-19 PROCEDURE — 97110 THERAPEUTIC EXERCISES: CPT

## 2022-03-19 PROCEDURE — 63710000001 PREDNISONE PER 1 MG: Performed by: INTERNAL MEDICINE

## 2022-03-19 PROCEDURE — 63710000001 INSULIN LISPRO (HUMAN) PER 5 UNITS: Performed by: INTERNAL MEDICINE

## 2022-03-19 PROCEDURE — 82962 GLUCOSE BLOOD TEST: CPT

## 2022-03-19 PROCEDURE — 97165 OT EVAL LOW COMPLEX 30 MIN: CPT

## 2022-03-19 PROCEDURE — 97164 PT RE-EVAL EST PLAN CARE: CPT

## 2022-03-19 PROCEDURE — 97530 THERAPEUTIC ACTIVITIES: CPT

## 2022-03-19 RX ORDER — PREDNISONE 20 MG/1
40 TABLET ORAL
Status: DISCONTINUED | OUTPATIENT
Start: 2022-03-20 | End: 2022-03-20

## 2022-03-19 RX ADMIN — INSULIN LISPRO 7 UNITS: 100 INJECTION, SOLUTION INTRAVENOUS; SUBCUTANEOUS at 12:00

## 2022-03-19 RX ADMIN — PREDNISONE 60 MG: 20 TABLET ORAL at 08:12

## 2022-03-19 RX ADMIN — Medication 10 ML: at 08:13

## 2022-03-19 RX ADMIN — BUSPIRONE HYDROCHLORIDE 15 MG: 15 TABLET ORAL at 21:16

## 2022-03-19 RX ADMIN — PROPAFENONE HYDROCHLORIDE 150 MG: 150 TABLET, FILM COATED ORAL at 08:13

## 2022-03-19 RX ADMIN — METOPROLOL TARTRATE 50 MG: 50 TABLET, FILM COATED ORAL at 08:13

## 2022-03-19 RX ADMIN — BUSPIRONE HYDROCHLORIDE 15 MG: 15 TABLET ORAL at 08:14

## 2022-03-19 RX ADMIN — METOPROLOL TARTRATE 50 MG: 50 TABLET, FILM COATED ORAL at 21:16

## 2022-03-19 RX ADMIN — MORPHINE SULFATE 30 MG: 15 TABLET, FILM COATED, EXTENDED RELEASE ORAL at 08:52

## 2022-03-19 RX ADMIN — INSULIN LISPRO 7 UNITS: 100 INJECTION, SOLUTION INTRAVENOUS; SUBCUTANEOUS at 17:17

## 2022-03-19 RX ADMIN — HYDROCODONE BITARTRATE AND ACETAMINOPHEN 1 TABLET: 7.5; 325 TABLET ORAL at 15:04

## 2022-03-19 RX ADMIN — Medication 400 MG: at 21:15

## 2022-03-19 RX ADMIN — DULOXETINE 60 MG: 60 CAPSULE, DELAYED RELEASE ORAL at 08:12

## 2022-03-19 RX ADMIN — INSULIN LISPRO 6 UNITS: 100 INJECTION, SOLUTION INTRAVENOUS; SUBCUTANEOUS at 17:16

## 2022-03-19 RX ADMIN — INSULIN LISPRO 2 UNITS: 100 INJECTION, SOLUTION INTRAVENOUS; SUBCUTANEOUS at 12:00

## 2022-03-19 RX ADMIN — Medication 10 ML: at 21:16

## 2022-03-19 RX ADMIN — TIZANIDINE 4 MG: 4 TABLET ORAL at 03:40

## 2022-03-19 RX ADMIN — INSULIN LISPRO 7 UNITS: 100 INJECTION, SOLUTION INTRAVENOUS; SUBCUTANEOUS at 08:12

## 2022-03-19 RX ADMIN — PROPAFENONE HYDROCHLORIDE 150 MG: 150 TABLET, FILM COATED ORAL at 17:16

## 2022-03-19 RX ADMIN — APIXABAN 5 MG: 5 TABLET, FILM COATED ORAL at 08:13

## 2022-03-19 RX ADMIN — ATORVASTATIN CALCIUM 20 MG: 40 TABLET, FILM COATED ORAL at 08:13

## 2022-03-19 RX ADMIN — MORPHINE SULFATE 30 MG: 15 TABLET, FILM COATED, EXTENDED RELEASE ORAL at 21:15

## 2022-03-19 RX ADMIN — PHENYTOIN SODIUM 200 MG: 100 CAPSULE, EXTENDED RELEASE ORAL at 21:15

## 2022-03-19 RX ADMIN — HYDROCODONE BITARTRATE AND ACETAMINOPHEN 1 TABLET: 7.5; 325 TABLET ORAL at 08:52

## 2022-03-19 RX ADMIN — APIXABAN 5 MG: 5 TABLET, FILM COATED ORAL at 21:15

## 2022-03-19 RX ADMIN — PHENYTOIN SODIUM 200 MG: 100 CAPSULE, EXTENDED RELEASE ORAL at 10:09

## 2022-03-19 RX ADMIN — PROPAFENONE HYDROCHLORIDE 150 MG: 150 TABLET, FILM COATED ORAL at 21:15

## 2022-03-19 RX ADMIN — INSULIN DETEMIR 20 UNITS: 100 INJECTION, SOLUTION SUBCUTANEOUS at 21:17

## 2022-03-19 RX ADMIN — Medication 400 MG: at 08:12

## 2022-03-19 RX ADMIN — HYDROCODONE BITARTRATE AND ACETAMINOPHEN 1 TABLET: 7.5; 325 TABLET ORAL at 23:30

## 2022-03-19 NOTE — THERAPY RE-EVALUATION
Patient Name: Martha Harrell  : 1955    MRN: [id number]                              Today's Date: 3/19/2022       Admit Date: 3/17/2022    Visit Dx:     ICD-10-CM ICD-9-CM   1. SVT (supraventricular tachycardia) (HCC)  I47.1 427.89   2. Venous stasis dermatitis of both lower extremities  I87.2 454.1   3. Chronic anticoagulation  Z79.01 V58.61   4. Paroxysmal atrial fibrillation (HCC)  I48.0 427.31     Patient Active Problem List   Diagnosis   • Uncontrolled type 2 diabetes mellitus with hyperglycemia (HCC)   • Diabetic peripheral neuropathy associated with type 2 diabetes mellitus (HCC)   • Microalbuminuria due to type 2 diabetes mellitus (HCC)   • Depression   • Epilepsy (HCC)   • GERD (gastroesophageal reflux disease)   • Hyperlipidemia   • Fibromyalgia, primary   • Paroxysmal atrial fibrillation (HCC)   • Venous stasis dermatitis of both lower extremities   • Chronic anticoagulation   • SVT (supraventricular tachycardia) (HCC)   • Osteoarthritis   • Chronic pain     Past Medical History:   Diagnosis Date   • Anemia    • Asthma    • Chronic bronchitis (HCC)    • Depression    • Diabetes mellitus (HCC)    • Epilepsy (HCC)    • Fibromyalgia, primary    • GERD (gastroesophageal reflux disease)    • Hyperlipidemia    • Leg DVT (deep venous thromboembolism), acute, left (HCC) 2019     Past Surgical History:   Procedure Laterality Date   • BACK SURGERY     • NECK SURGERY     • TUBAL ABDOMINAL LIGATION        General Information     Row Name 22 1336          Physical Therapy Time and Intention    Document Type re-evaluation  -DM     Mode of Treatment physical therapy  -DM     Row Name 22 1336          General Information    Patient Profile Reviewed yes  -DM     Prior Level of Function independent:;all household mobility;gait;transfer;bed mobility;feeding;grooming;bathing;mod assist:;dressing;dependent:;driving;shopping  Grddtr assists w/Dsg;used rollator briefly after neck sx. 5-10 yrs ago but  now indep w/o AD  -DM     Existing Precautions/Restrictions fall;other (see comments);seizures  AF; SVT;BLE diab.ulcers (jayjay boots)  -DM     Barriers to Rehab medically complex;previous functional deficit;ineffective coping  h/o Dep.  -DM     Row Name 03/19/22 1336          Living Environment    People in Home child(mansi), adult;grandchild(mansi)  son & grddtr  -DM     Row Name 03/19/22 1336          Home Main Entrance    Number of Stairs, Main Entrance one  -DM     Stair Railings, Main Entrance none  -DM     Row Name 03/19/22 1336          Stairs Within Home, Primary    Stairs, Within Home, Primary 1-st. w/basement W/D (hasn't accessed past 6 mos. s/p fall during laundry)  -DM     Number of Stairs, Within Home, Primary twelve  -DM     Stair Railings, Within Home, Primary railing on right side (ascending)  -DM     Row Name 03/19/22 1336          Cognition    Orientation Status (Cognition) oriented x 3  -DM     Row Name 03/19/22 1336          Safety Issues, Functional Mobility    Safety Issues Affecting Function (Mobility) positioning of assistive device;safety precaution awareness;safety precautions follow-through/compliance;sequencing abilities  -DM     Impairments Affecting Function (Mobility) balance;endurance/activity tolerance;pain;postural/trunk control;strength  -DM           User Key  (r) = Recorded By, (t) = Taken By, (c) = Cosigned By    Initials Name Provider Type    DM Ade Mariscal, PT Physical Therapist               Mobility     Row Name 03/19/22 1336          Bed Mobility    Comment, (Bed Mobility) UIC per OT this AM;JAYJAY boots BLE;brought willi R wx from unit;PT issued mesh panty/pad & placed,then pure wick disconn for mobil  -DM     Row Name 03/19/22 1336          Transfers    Comment, (Transfers) Cues for HP, seq  -DM     Row Name 03/19/22 1336          Sit-Stand Transfer    Sit-Stand [place] (Transfers) verbal cues;moderate assist (50% patient effort)  STS x 2;brief sit rest after mesh panty  pulled up, while PCT disconn.exit al.to bring chair behind  -DM     Assistive Device (Sit-Stand Transfers) walker, front-wheeled  -DM     Row Name 03/19/22 1336          Gait/Stairs (Locomotion)    [place] Level (Gait) verbal cues;minimum assist (75% patient effort);1 person to manage equipment  min A to propel R wx (pt attempted to lift forw.x 2 despite cues to roll); 4 stand.rests;PCT brought chair;B knee instabil;rolled to BS;pure wick reconn.  -DM     Assistive Device (Gait) walker, front-wheeled  -DM     Distance in Feet (Gait) 20  -DM     Deviations/Abnormal Patterns (Gait) bilateral deviations;antalgic;base of support, narrow;juan c decreased;stride length decreased;weight shifting decreased  decr step length  -DM     Bilateral Gait Deviations forward flexed posture;heel strike decreased  incr flexed posture & knee instabil; down gaze  -DM     Comment, (Gait/Stairs) cues for incr step length & EMILY, knee stabil., trunk ext/focusing toward door, PLB; desat to 93% on RA;hr 79  -DM           User Key  (r) = Recorded By, (t) = Taken By, (c) = Cosigned By    Initials Name Provider Type    DM Ade Mariscal, PT Physical Therapist               Obj/Interventions     Row Name 03/19/22 1336          Range of Motion Comprehensive    General Range of Motion lower extremity range of motion deficits identified  -DM     Comment, General Range of Motion BLE dorsey.25-50 % (by swelling/jayjay boots & arthritis)  -DM     Row Name 03/19/22 1336          Strength Comprehensive (MMT)    General Manual Muscle Testing (MMT) Assessment lower extremity strength deficits identified  -DM     Comment, General Manual Muscle Testing (MMT) Assessment BLE grossly 3- to 4-/5  -DM     Row Name 03/19/22 1336          Motor Skills    Therapeutic Exercise shoulder;hip;knee;ankle  issued HEP & instructed  -DM     Row Name 03/19/22 1336          Shoulder (Therapeutic Exercise)    Shoulder (Therapeutic Exercise) AROM (active range of motion)  -DM      Shoulder AROM (Therapeutic Exercise) bilateral;flexion;extension;aBduction;aDduction;sitting;5 repetitions;10 repetitions;other (see comments);horizontal aBduction/aDduction  had done some sh. exer w/ OT but wanting to repeat w/ PT;also biceps curls  -DM     Row Name 03/19/22 1336          Hip (Therapeutic Exercise)    Hip (Therapeutic Exercise) AROM (active range of motion);AAROM (active assistive range of motion);isometric exercises  -DM     Hip AROM (Therapeutic Exercise) bilateral;flexion;extension;external rotation;internal rotation;sitting;10 repetitions;other (see comments)  sitting marches  -DM     Hip AAROM (Therapeutic Exercise) bilateral;aBduction;aDduction;sitting;10 repetitions  -DM     Hip Isometrics (Therapeutic Exercise) bilateral;gluteal sets;sitting;10 repetitions  -DM     Row Name 03/19/22 1336          Knee (Therapeutic Exercise)    Knee (Therapeutic Exercise) AROM (active range of motion);isometric exercises;AAROM (active assistive range of motion)  -DM     Knee AROM (Therapeutic Exercise) bilateral;flexion;extension;SAQ (short arc quad);LAQ (long arc quad);heel slides;sitting;10 repetitions  -DM     Knee AAROM (Therapeutic Exercise) bilateral;extension;sitting;10 repetitions  min A for full knee ext w/ SAQ  -DM     Knee Isometrics (Therapeutic Exercise) bilateral;hamstring sets;quad sets;sitting;10 repetitions  -DM     Row Name 03/19/22 1336          Ankle (Therapeutic Exercise)    Ankle (Therapeutic Exercise) AROM (active range of motion)  -DM     Ankle AROM (Therapeutic Exercise) bilateral;dorsiflexion;plantarflexion;sitting;10 repetitions;other (see comments)  AC  -DM     Row Name 03/19/22 1336          Balance    Balance Assessment sitting static balance;sitting dynamic balance;standing static balance;standing dynamic balance  -DM     Static Sitting Balance independent  sat forw in chair for BP,LAQ  -DM     Dynamic Sitting Balance minimal assist;verbal cues  recip scooting  -DM      Position, Sitting Balance supported;sitting in chair  -DM     Static Standing Balance verbal cues;contact guard  -DM     Dynamic Standing Balance verbal cues;minimal assist  -DM     Position/Device Used, Standing Balance supported;walker, front-wheeled  -DM     Balance Interventions sitting;standing;static;dynamic;weight shifting activity  -DM           User Key  (r) = Recorded By, (t) = Taken By, (c) = Cosigned By    Initials Name Provider Type    DM Ade Mariscal, PT Physical Therapist               Goals/Plan     Row Name 03/19/22 1336          Bed Mobility Goal 1 (PT)    Activity/Assistive Device (Bed Mobility Goal 1, PT) bed mobility activities, all  -DM     [place] Level/Cues Needed (Bed Mobility Goal 1, PT) contact guard required  -DM     Time Frame (Bed Mobility Goal 1, PT) long term goal (LTG);1 week  -DM     Row Name 03/19/22 1336          Transfer Goal 1 (PT)    Activity/Assistive Device (Transfer Goal 1, PT) sit-to-stand/stand-to-sit;bed-to-chair/chair-to-bed;walker, rolling  -DM     [place] Level/Cues Needed (Transfer Goal 1, PT) minimum assist (75% or more patient effort)  -DM     Time Frame (Transfer Goal 1, PT) long term goal (LTG);1 week  -DM     Row Name 03/19/22 1336          Gait Training Goal 1 (PT)    Activity/Assistive Device (Gait Training Goal 1, PT) gait (walking locomotion);walker, rolling  stable VS; O2 sat > 92%  -DM     [place] Level (Gait Training Goal 1, PT) minimum assist (75% or more patient effort)  -DM     Distance (Gait Training Goal 1, PT) 50  -DM     Time Frame (Gait Training Goal 1, PT) long term goal (LTG);1 week  -DM     Row Name 03/19/22 1336          Stairs Goal 1 (PT)    Activity/Assistive Device (Stairs Goal 1, PT) ascending stairs;descending stairs;walker, rolling  -DM     [place] Level/Cues Needed (Stairs Goal 1, PT) moderate assist (50-74% patient effort)  -DM     Number of Stairs (Stairs Goal 1, PT) 1  -DM     Time Frame (Stairs Goal 1, PT)  long term goal (LTG);1 week  -DM     Row Name 03/19/22 1336          Patient Education Goal (PT)    Activity (Patient Education Goal, PT) HEP exer  -DM     [place]/Cues/Accuracy (Memory Goal 2, PT) demonstrates adequately;verbalizes understanding  -DM     Time Frame (Patient Education Goal, PT) long term goal (LTG);1 week  -DM           User Key  (r) = Recorded By, (t) = Taken By, (c) = Cosigned By    Initials Name Provider Type    DM Ade Mariscal, PT Physical Therapist               Clinical Impression     Row Name 03/19/22 1336          Pain    Pretreatment Pain Rating 4/10  -DM     Posttreatment Pain Rating 9/10  -DM     Pain Location - Side/Orientation Bilateral  -DM     Pain Location lower  -DM     Pain Location - extremity  -DM     Pre/Posttreatment Pain Comment NSG premed w/ pain pill  -DM     Pain Intervention(s) Medication (See MAR);Repositioned;Elevated;Rest  -DM     Row Name 03/19/22 1336          Plan of Care Review    Plan of Care Reviewed With patient  -DM     Progress improving  -DM     Outcome Evaluation Mobility (re-eval) completed, & pt able to amb 20 ft w/ R wx & min 1 + 1A w/ 4 stand.rests (PCT brought chair,& pt rolled to BS), but limited by neck & BLE pain incr to 9/10, & desat to 93% on RA. Recommend home w/ family's assist & HHPT f/u.  -DM     Row Name 03/19/22 1336          Therapy Assessment/Plan (PT)    Patient/Family Therapy Goals Statement (PT) improved funct mobil  -DM     Rehab Potential (PT) good, to achieve stated therapy goals  -DM     Criteria for Skilled Interventions Met (PT) yes;meets criteria;skilled treatment is necessary  -DM     Row Name 03/19/22 1336          Vital Signs    Pre Systolic BP Rehab 145  -DM     Pre Treatment Diastolic BP 82  -DM     Post Systolic BP Rehab 125  -DM     Post Treatment Diastolic BP 76  -DM     Pretreatment Heart Rate (beats/min) 70  -DM     Intratreatment Heart Rate (beats/min) 79  -DM     Posttreatment Heart Rate (beats/min) 68  -DM      Pre SpO2 (%) 94  -DM     O2 Delivery Pre Treatment room air  -DM     Intra SpO2 (%) 93  -DM     O2 Delivery Intra Treatment room air  -DM     Post SpO2 (%) 94  -DM     O2 Delivery Post Treatment room air  -DM     Pre Patient Position Sitting  -DM     Intra Patient Position Standing  -DM     Post Patient Position Sitting  -DM     Rest Breaks  4  -DM     Row Name 03/19/22 1336          Positioning and Restraints    Pre-Treatment Position sitting in chair/recliner  -DM     Post Treatment Position chair  -DM     In Chair notified nsg;reclined;call light within reach;encouraged to call for assist;exit alarm on;waffle cushion;legs elevated  -DM           User Key  (r) = Recorded By, (t) = Taken By, (c) = Cosigned By    Initials Name Provider Type    Ade Aguilera, PT Physical Therapist               Outcome Measures     Row Name 03/19/22 1336          How much help from another person do you currently need...    Turning from your back to your side while in flat bed without using bedrails? 3  -DM     Moving from lying on back to sitting on the side of a flat bed without bedrails? 3  -DM     Moving to and from a bed to a chair (including a wheelchair)? 3  -DM     Standing up from a chair using your arms (e.g., wheelchair, bedside chair)? 2  -DM     Climbing 3-5 steps with a railing? 2  -DM     To walk in hospital room? 3  -DM     AM-PAC 6 Clicks Score (PT) 16  -DM     Row Name 03/19/22 1336 03/19/22 1058       Functional Assessment    Outcome Measure Options AM-PAC 6 Clicks Basic Mobility (PT)  -DM AM-PAC 6 Clicks Daily Activity (OT)  -AC          User Key  (r) = Recorded By, (t) = Taken By, (c) = Cosigned By    Initials Name Provider Type    Krystyna Jordan, OT Occupational Therapist    Ade Aguilera, PT Physical Therapist                             Physical Therapy Education                 Title: PT OT SLP Therapies (In Progress)     Topic: Physical Therapy (In Progress)     Point: Mobility training (In  Progress)     Learning Progress Summary           Patient Eager, E,D,H, NR by DM at 3/19/2022 1451                   Point: Home exercise program (In Progress)     Learning Progress Summary           Patient Eager, E,D,H, NR by DM at 3/19/2022 1451                   Point: Body mechanics (In Progress)     Learning Progress Summary           Patient Eager, E,D,H, NR by DM at 3/19/2022 1451                   Point: Precautions (In Progress)     Learning Progress Summary           Patient Eager, E,D,H, NR by DM at 3/19/2022 1451                               User Key     Initials Effective Dates Name Provider Type Discipline    DM 06/16/21 -  Ade Mariscal, PT Physical Therapist PT              PT Recommendation and Plan  Planned Therapy Interventions (PT): balance training, bed mobility training, gait training, home exercise program, patient/family education, stair training, strengthening, transfer training  Plan of Care Reviewed With: patient  Progress: improving  Outcome Evaluation: Mobility (re-eval) completed, & pt able to amb 20 ft w/ R wx & min 1 + 1A w/ 4 stand.rests (PCT brought chair,& pt rolled to BS), but limited by neck & BLE pain incr to 9/10, & desat to 93% on RA. Recommend home w/ family's assist & HHPT f/u.     Time Calculation:    PT Charges     Row Name 03/19/22 1452             Time Calculation    Start Time 1336  -DM      PT Received On 03/19/22  -DM      PT Goal Re-Cert Due Date 03/29/22  -DM              Time Calculation- PT    Total Timed Code Minutes- PT 76 minute(s)  -DM              Timed Charges    74637 - PT Therapeutic Exercise Minutes 18  -DM      03847 - Gait Training Minutes  15  -DM      23555 - PT Therapeutic Activity Minutes 13  -DM              Untimed Charges    PT Eval/Re-eval Minutes 30  -DM              Total Minutes    Timed Charges Total Minutes 46  -DM      Untimed Charges Total Minutes 30  -DM       Total Minutes 76  -DM            User Key  (r) = Recorded By, (t) = Taken  By, (c) = Cosigned By    Initials Name Provider Type    Ade Aguilera, PT Physical Therapist              Therapy Charges for Today     Code Description Service Date Service Provider Modifiers Qty    46402362002 HC PT THER PROC EA 15 MIN 3/19/2022 Ade Mariscal, PT GP 1    91533930917 HC GAIT TRAINING EA 15 MIN 3/19/2022 Ade Mariscal, PT GP 1    90364146360 HC PT THERAPEUTIC ACT EA 15 MIN 3/19/2022 Ade Mariscal, PT GP 1    34988384299 HC PT RE-EVAL ESTABLISHED PLAN 2 3/19/2022 Ade Mariscal, PT GP 1          PT G-Codes  Outcome Measure Options: AM-PAC 6 Clicks Basic Mobility (PT)  AM-PAC 6 Clicks Score (PT): 16  AM-PAC 6 Clicks Score (OT): 14    Ade Mariscal, PT  3/19/2022

## 2022-03-19 NOTE — PLAN OF CARE
Goal Outcome Evaluation:  Plan of Care Reviewed With: patient        Progress: improving  Outcome Evaluation: Mobility (re-eval) completed, & pt able to amb 20 ft w/ R wx & min 1 + 1A w/ 4 stand.rests (PCT brought chair,& pt rolled to BS), but limited by neck & BLE pain incr to 9/10, & desat to 93% on RA. Recommend home w/ family's assist & HHPT f/u.

## 2022-03-19 NOTE — PLAN OF CARE
Goal Outcome Evaluation:  Plan of Care Reviewed With: patient           Outcome Evaluation: OT eval complete.  Pt presents with LLE pain, weakness and decreased balance and activity tolerance limiting independence with self care.  Pt dependent to don socks, min A supine to sit, mod A STS,  mod A bed to chair with RW.  OT will follow to advance pt toward increased independence with self care.  Recommend SNF upon d/c.

## 2022-03-19 NOTE — THERAPY EVALUATION
Patient Name: Martha Harrell  : 1955    MRN: [id number]                              Today's Date: 3/19/2022       Admit Date: 3/17/2022    Visit Dx:     ICD-10-CM ICD-9-CM   1. SVT (supraventricular tachycardia) (HCC)  I47.1 427.89   2. Venous stasis dermatitis of both lower extremities  I87.2 454.1   3. Chronic anticoagulation  Z79.01 V58.61   4. Paroxysmal atrial fibrillation (HCC)  I48.0 427.31     Patient Active Problem List   Diagnosis   • Uncontrolled type 2 diabetes mellitus with hyperglycemia (HCC)   • Diabetic peripheral neuropathy associated with type 2 diabetes mellitus (HCC)   • Microalbuminuria due to type 2 diabetes mellitus (HCC)   • Depression   • Epilepsy (HCC)   • GERD (gastroesophageal reflux disease)   • Hyperlipidemia   • Fibromyalgia, primary   • Paroxysmal atrial fibrillation (HCC)   • Venous stasis dermatitis of both lower extremities   • Chronic anticoagulation   • SVT (supraventricular tachycardia) (HCC)   • Osteoarthritis   • Chronic pain     Past Medical History:   Diagnosis Date   • Anemia    • Asthma    • Chronic bronchitis (HCC)    • Depression    • Diabetes mellitus (HCC)    • Epilepsy (HCC)    • Fibromyalgia, primary    • GERD (gastroesophageal reflux disease)    • Hyperlipidemia    • Leg DVT (deep venous thromboembolism), acute, left (HCC) 2019     Past Surgical History:   Procedure Laterality Date   • BACK SURGERY     • NECK SURGERY     • TUBAL ABDOMINAL LIGATION        General Information     Row Name 22 0845          OT Time and Intention    Document Type evaluation  -AC     Mode of Treatment occupational therapy  -AC     Row Name 22 0845          General Information    Patient Profile Reviewed yes  -AC     Prior Level of Function independent:;all household mobility;feeding;grooming;bathing;mod assist:;dressing  grand dtr assists with LBD at times  -AC     Existing Precautions/Restrictions fall  -AC     Row Name 22 0845          Living Environment     People in Home child(mansi), adult  son and grand dtr  -AC     Row Name 03/19/22 0845          Home Main Entrance    Number of Stairs, Main Entrance one  -AC     Stair Railings, Main Entrance none  -AC     Row Name 03/19/22 0845          Stairs Within Home, Primary    Stairs, Within Home, Primary basement, does not need to access  -AC     Number of Stairs, Within Home, Primary twelve  -AC     Stair Railings, Within Home, Primary railing on right side (ascending)  -AC     Row Name 03/19/22 0845          Cognition    Orientation Status (Cognition) oriented x 3  -AC     Row Name 03/19/22 0845          Safety Issues, Functional Mobility    Safety Issues Affecting Function (Mobility) safety precaution awareness;safety precautions follow-through/compliance;sequencing abilities  -     Impairments Affecting Function (Mobility) balance;endurance/activity tolerance;pain;strength  -           User Key  (r) = Recorded By, (t) = Taken By, (c) = Cosigned By    Initials Name Provider Type     Krystyna Kent, OT Occupational Therapist               Lymphedema     Row Name 03/18/22 0815             Subjective Pain    Able to rate subjective pain? yes  -      Pre-Treatment Pain Level 4  -      Post-Treatment Pain Level 8  -      Recorded by [LH] Santi Manley, PT              Lymphedema Edema Assessment    Ptting Edema Category By severity  -      Pitting Edema Mild  -      Recorded by [LH] Santi Manley, PT              Skin Changes/Observations    Location/Assessment Lower Extremity  -      Lower Extremity Conditions bilateral:;dry;clean;inflamed;scab(s);fragile  -      Lower Extremity Color/Pigment bilateral:;blanchable;erythema;non-blanchable;hyperpigmented  -      Recorded by [LH] Santi Manley, PT              Lymphedema Pulses/Capillary Refill    Lymphedema Pulses/Capillary Refill lower extremity pulses  -      Dorsalis Pedis Pulse right:;left:;+2 normal  -      Posterior Tibialis Pulse  "right:;left:;+2 normal  -LH      Recorded by [] Santi Manley, PT              Lymphedema Measurements    Measurement Type(s) Quick Girth  -      Quick Girth Areas Lower extremities  -LH      Recorded by [] Santi Manley, PT              LLE Quick Girth (cm)    Mid foot 21.5 cm  -LH      Smallest ankle 19.4 cm  -LH      Largest calf 34 cm  -LH      Recorded by [] Santi Manley, PT              RLE Quick Girth (cm)    Mid foot 22 cm  -LH      Smallest ankle 19.2 cm  -LH      Largest calf 33.1 cm  -LH      RLE Quick Girth Total 74.3  -LH      Recorded by [] Santi Manley, PT              Compression/Skin Care    Compression/Skin Care skin care;wrapping location  -      Skin Care washed/dried;lotion applied  -      Wrapping Location lower extremity  -      Wrapping Location LE bilateral:;foot to knee  -      Wrapping Comments BLE unna boot applied in figure eight pattern with 4\" coban and spandage to secure.  -LH      Recorded by [] Santi Manley, PT            User Key  (r) = Recorded By, (t) = Taken By, (c) = Cosigned By    Initials Name Effective Dates     Santi Manley, PT 09/21/21 -                Mobility/ADL's     Row Name 03/19/22 1050          Bed Mobility    Bed Mobility supine-sit  -     Supine-Sit [place] (Bed Mobility) verbal cues;minimum assist (75% patient effort)  -     Assistive Device (Bed Mobility) bed rails;head of bed elevated  -     Row Name 03/19/22 1050          Transfers    Transfers sit-stand transfer;bed-chair transfer  -     Bed-Chair [place] (Transfers) verbal cues;moderate assist (50% patient effort)  -     Assistive Device (Bed-Chair Transfers) walker, front-wheeled  -AC     Sit-Stand [place] (Transfers) verbal cues;moderate assist (50% patient effort)  -     Row Name 03/19/22 1050          Sit-Stand Transfer    Assistive Device (Sit-Stand Transfers) walker, front-wheeled  -AC     Row Name 03/19/22 1050          Activities " of Daily Living    BADL Assessment/Intervention lower body dressing  -     Row Name 03/19/22 1050          Lower Body Dressing Assessment/Training    [place] Level (Lower Body Dressing) don;socks;dependent (less than 25% patient effort)  -AC     Position (Lower Body Dressing) supine  -           User Key  (r) = Recorded By, (t) = Taken By, (c) = Cosigned By    Initials Name Provider Type    Krystyna Jordan, CHRISTOPH Occupational Therapist               Obj/Interventions     Row Name 03/19/22 1051          Sensory Assessment (Somatosensory)    Sensory Assessment (Somatosensory) UE sensation intact  -[facility] Name 03/19/22 1051          Vision Assessment/Intervention    Visual Impairment/Limitations corrective lenses for reading  -[facility] Name 03/19/22 1051          Range of Motion Comprehensive    General Range of Motion bilateral upper extremity ROM WNL  -[facility] Name 03/19/22 1051          Strength Comprehensive (MMT)    Comment, General Manual Muscle Testing (MMT) Assessment BUE grossly 4-/5  -           User Key  (r) = Recorded By, (t) = Taken By, (c) = Cosigned By    Initials Name Provider Type    Krystyna Jordan, OT Occupational Therapist               Goals/Plan     [facility] Name 03/19/22 1056          Bed Mobility Goal 1 (OT)    Activity/Assistive Device (Bed Mobility Goal 1, OT) sit to supine;supine to sit  -AC     [place] Level/Cues Needed (Bed Mobility Goal 1, OT) verbal cues required;contact guard required  -AC     Time Frame (Bed Mobility Goal 1, OT) by discharge  -AC     Progress/Outcomes (Bed Mobility Goal 1, OT) goal ongoing  -[facility] Name 03/19/22 1056          Transfer Goal 1 (OT)    Activity/Assistive Device (Transfer Goal 1, OT) bed-to-chair/chair-to-bed;toilet;walker, rolling  -AC     [place] Level/Cues Needed (Transfer Goal 1, OT) minimum assist (75% or more patient effort)  -AC     Time Frame (Transfer Goal 1, OT) by discharge  -AC     Progress/Outcome (Transfer Goal 1,  OT) goal ongoing  -AC     Row Name 03/19/22 1056          Toileting Goal 1 (OT)    Activity/Device (Toileting Goal 1, OT) adjust/manage clothing;perform perineal hygiene  -AC     [place] Level/Cues Needed (Toileting Goal 1, OT) minimum assist (75% or more patient effort)  -AC     Time Frame (Toileting Goal 1, OT) by discharge  -AC     Progress/Outcome (Toileting Goal 1, OT) goal ongoing  -AC     Row Name 03/19/22 1056          Grooming Goal 1 (OT)    Activity/Device (Grooming Goal 1, OT) oral care;wash face, hands  -AC     [place] (Grooming Goal 1, OT) set-up required;supervision required  -AC     Time Frame (Grooming Goal 1, OT) by discharge  -AC     Strategies/Barriers (Grooming Goal 1, OT) seated EOB  -AC     Progress/Outcome (Grooming Goal 1, OT) goal ongoing  -AC     Row Name 03/19/22 1056          Therapy Assessment/Plan (OT)    Planned Therapy Interventions (OT) activity tolerance training;BADL retraining;functional balance retraining;occupation/activity based interventions;patient/caregiver education/training;strengthening exercise;transfer/mobility retraining  -AC           User Key  (r) = Recorded By, (t) = Taken By, (c) = Cosigned By    Initials Name Provider Type    AC Krystyna Kent, OT Occupational Therapist               Clinical Impression     Row Name 03/19/22 1052          Pain Assessment    Pretreatment Pain Rating 6/10  -AC     Posttreatment Pain Rating 6/10  -AC     Pain Location - Side/Orientation Left  -AC     Pain Location lower  -AC     Pain Location - extremity;head;neck  -AC     Pain Intervention(s) Repositioned;Medication (See MAR)  -     Row Name 03/19/22 1052          Plan of Care Review    Plan of Care Reviewed With patient  -AC     Outcome Evaluation OT eval complete.  Pt presents with LLE pain, weakness and decreased balance and activity tolerance limiting independence with self care.  Pt dependent to don socks, min A supine to sit, mod A STS,  mod A bed to chair with RW.   OT will follow to advance pt toward increased independence with self care.  Recommend SNF upon d/c.  -     Row Name 03/19/22 1052          Therapy Assessment/Plan (OT)    Rehab Potential (OT) good, to achieve stated therapy goals  -     Criteria for Skilled Therapeutic Interventions Met (OT) yes;skilled treatment is necessary  -     Therapy Frequency (OT) daily  -     Row Name 03/19/22 1052          Therapy Plan Review/Discharge Plan (OT)    Anticipated Discharge Disposition (OT) skilled nursing facility  -     Row Name 03/19/22 1052          Vital Signs    Pre Systolic BP Rehab 114  -AC     Pre Treatment Diastolic BP 61  -AC     Post Systolic BP Rehab 134  -AC     Post Treatment Diastolic BP 56  -AC     Pretreatment Heart Rate (beats/min) 75  -AC     Posttreatment Heart Rate (beats/min) 70  -AC     Pre SpO2 (%) 97  -AC     O2 Delivery Pre Treatment room air  -AC     O2 Delivery Intra Treatment room air  -AC     Post SpO2 (%) 93  -AC     O2 Delivery Post Treatment room air  -AC     Pre Patient Position Supine  -AC     Intra Patient Position Standing  -AC     Post Patient Position Sitting  -     Row Name 03/19/22 1052          Positioning and Restraints    Pre-Treatment Position in bed  -AC     Post Treatment Position chair  -AC     In Chair notified nsg;reclined;call light within reach;encouraged to call for assist;exit alarm on;waffle cushion  -           User Key  (r) = Recorded By, (t) = Taken By, (c) = Cosigned By    Initials Name Provider Type    AC Krystyna Kent, OT Occupational Therapist               Outcome Measures     Row Name 03/19/22 1058          How much help from another is currently needed...    Putting on and taking off regular lower body clothing? 1  -AC     Bathing (including washing, rinsing, and drying) 2  -AC     Toileting (which includes using toilet bed pan or urinal) 2  -AC     Putting on and taking off regular upper body clothing 3  -AC     Taking care of personal grooming  (such as brushing teeth) 3  -     Eating meals 3  -     AM-PAC 6 Clicks Score (OT) 14  -     Row Name 03/19/22 1058          Functional Assessment    Outcome Measure Options AM-PAC 6 Clicks Daily Activity (OT)  -           User Key  (r) = Recorded By, (t) = Taken By, (c) = Cosigned By    Initials Name Provider Type     Krystyna Kent, OT Occupational Therapist                Occupational Therapy Education                 Title: PT OT SLP Therapies (In Progress)     Topic: Occupational Therapy (In Progress)     Point: ADL training (Done)     Description:   Instruct learner(s) on proper safety adaptation and remediation techniques during self care or transfers.   Instruct in proper use of assistive devices.              Learning Progress Summary           Patient Acceptance, E, VU by  at 3/19/2022 1058    Comment: Role of OT, benefits of activity                   Point: Home exercise program (Not Started)     Description:   Instruct learner(s) on appropriate technique for monitoring, assisting and/or progressing therapeutic exercises/activities.              Learner Progress:  Not documented in this visit.          Point: Precautions (Not Started)     Description:   Instruct learner(s) on prescribed precautions during self-care and functional transfers.              Learner Progress:  Not documented in this visit.          Point: Body mechanics (Not Started)     Description:   Instruct learner(s) on proper positioning and spine alignment during self-care, functional mobility activities and/or exercises.              Learner Progress:  Not documented in this visit.                      User Key     Initials Effective Dates Name Provider Type [facility] 06/16/21 -  Krystyna Kent, OT Occupational Therapist OT              OT Recommendation and Plan  Planned Therapy Interventions (OT): activity tolerance training, BADL retraining, functional balance retraining, occupation/activity based interventions,  patient/caregiver education/training, strengthening exercise, transfer/mobility retraining  Therapy Frequency (OT): daily  Plan of Care Review  Plan of Care Reviewed With: patient  Outcome Evaluation: OT eval complete.  Pt presents with LLE pain, weakness and decreased balance and activity tolerance limiting independence with self care.  Pt dependent to don socks, min A supine to sit, mod A STS,  mod A bed to chair with RW.  OT will follow to advance pt toward increased independence with self care.  Recommend SNF upon d/c.     Time Calculation:    Time Calculation- OT     Row Name 03/19/22 0845             Time Calculation- OT    OT Start Time 0845  -AC      OT Received On 03/19/22  -AC      OT Goal Re-Cert Due Date 03/29/22  -AC              Untimed Charges    OT Eval/Re-eval Minutes 55  -AC              Total Minutes    Untimed Charges Total Minutes 55  -AC       Total Minutes 55  -AC            User Key  (r) = Recorded By, (t) = Taken By, (c) = Cosigned By    Initials Name Provider Type    AC Krystyna Kent, OT Occupational Therapist              Therapy Charges for Today     Code Description Service Date Service Provider Modifiers Qty    [id number] HC OT EVAL LOW COMPLEXITY 4 3/19/2022 Krystyna Kent OT GO 1               Krystyna Kent OT  3/19/2022

## 2022-03-19 NOTE — PROGRESS NOTES
[facility] Medicine Services  PROGRESS NOTE    Patient Name: Martha Harrell  : 1955  MRN: [id number]    Date of Admission: 3/17/2022  Primary Care Physician: Ginger Ward APRN    Subjective   Subjective     CC:  Pain neck, shoulders, knees, SVT    HPI:   Pt reports that pain in neck and head is much better with prednisone. She continues to have leg pain. She states that she is too weak to walk. No chest pain or shortness of breath.     ROS:  Gen- No fevers, chills  CV- No chest pain, palpitations  Resp- No cough, dyspnea  GI- No N/V/D, abd pain        Objective   Objective     Vital Signs:   Temp:  [98.4 °F (36.9 °C)-99.2 °F (37.3 °C)] 98.4 °F (36.9 °C)  Heart Rate:  [70-79] 70  Resp:  [18-20] 18  BP: (114-135)/(61-74) 114/61     Physical Exam:  Constitutional: No acute distress, awake, alert, sitting up in chair   HENT: NCAT, mucous membranes moist  Respiratory: Clear to auscultation bilaterally, respiratory effort normal   Cardiovascular: RRR, no murmurs, rubs, or gallops  Gastrointestinal: Positive bowel sounds, soft, nontender, nondistended  Musculoskeletal: No bilateral ankle edema  Psychiatric: Appropriate affect, cooperative  Neurologic: Oriented x 3, BLE weakness   Skin: No rashes      Results Reviewed:  LAB RESULTS:      Lab 22  0503 22  1112 22  0757   WBC 8.64  --  12.80*   HEMOGLOBIN 12.4  --  13.7   HEMATOCRIT 39.7  --  42.8   PLATELETS 185  --  219   NEUTROS ABS 5.80  --  10.00*   IMMATURE GRANS (ABS) 0.02  --  0.05   LYMPHS ABS 1.71  --  1.67   MONOS ABS 0.76  --  0.81   EOS ABS 0.32  --  0.23   MCV 90.4  --  90.1   LACTATE 0.7 0.8 2.4*         Lab 22  0503 22  0757   SODIUM  --  141   POTASSIUM  --  3.9   CHLORIDE  --  103   CO2  --  27.0   ANION GAP  --  11.0   BUN  --  15   CREATININE  --  0.70   EGFR  --  94.9   GLUCOSE  --  118*   CALCIUM  --  8.6   MAGNESIUM 2.3 1.5*   PHOSPHORUS 2.7  --    HEMOGLOBIN A1C  --  7.70*   TSH  --   1.930         Lab 03/17/22  0757   TOTAL PROTEIN 6.9   ALBUMIN 3.60   GLOBULIN 3.3   ALT (SGPT) 28   AST (SGOT) 24   BILIRUBIN 0.4   ALK PHOS 90         Lab 03/17/22  0757   PROBNP 209.0   TROPONIN T <0.010                 Brief Urine Lab Results  (Last result in the past 365 days)      Color   Clarity   Blood   Leuk Est   Nitrite   Protein   CREAT   Urine HCG        03/17/22 0818 Yellow   Clear   Negative   Negative   Negative   Trace                 Microbiology Results Abnormal     Procedure Component Value - Date/Time    Blood Culture - Blood, Arm, Right [[id number]]  (Normal) Collected: 03/17/22 0800    Lab Status: Preliminary result Specimen: Blood from Arm, Right Updated: 03/18/22 0932     Blood Culture No growth at 24 hours    Blood Culture - Blood, Arm, Left [[id number]]  (Normal) Collected: 03/17/22 0830    Lab Status: Preliminary result Specimen: Blood from Arm, Left Updated: 03/18/22 0932     Blood Culture No growth at 24 hours    COVID PRE-OP / PRE-PROCEDURE SCREENING ORDER (NO ISOLATION) - Swab, Nasopharynx [[id number]]  (Normal) Collected: 03/17/22 1227    Lab Status: Final result Specimen: Swab from Nasopharynx Updated: 03/17/22 1257    Narrative:      The following orders were created for panel order COVID PRE-OP / PRE-PROCEDURE SCREENING ORDER (NO ISOLATION) - Swab, Nasopharynx.  Procedure                               Abnormality         Status                     ---------                               -----------         ------                     COVID-19 and FLU A/B PCR...[[id number]]  Normal              Final result                 Please view results for these tests on the individual orders.    COVID-19 and FLU A/B PCR - Swab, Nasopharynx [[id number]]  (Normal) Collected: 03/17/22 1227    Lab Status: Final result Specimen: Swab from Nasopharynx Updated: 03/17/22 1257     COVID19 Not Detected     Influenza A PCR Not Detected     Influenza B PCR Not Detected    Narrative:      Fact sheet for  providers: https://www.fda.gov/media/498926/download    Fact sheet for patients: https://www.fda.gov/media/062926/download    Test performed by PCR.          No radiology results from the last 24 hrs        I have reviewed the medications:  Scheduled Meds:apixaban, 5 mg, Oral, BID  atorvastatin, 20 mg, Oral, Daily  busPIRone, 15 mg, Oral, Q12H  DULoxetine, 60 mg, Oral, Daily  insulin detemir, 20 Units, Subcutaneous, Nightly  insulin lispro, 0-9 Units, Subcutaneous, TID AC  insulin lispro, 7 Units, Subcutaneous, TID With Meals  magnesium oxide, 400 mg, Oral, BID  metoprolol tartrate, 50 mg, Oral, Q12H  Morphine, 30 mg, Oral, BID  phenytoin ER, 200 mg, Oral, Q12H  predniSONE, 60 mg, Oral, Daily With Breakfast  propafenone, 150 mg, Oral, TID  sodium chloride, 10 mL, Intravenous, Q12H      Continuous Infusions:   PRN Meds:.•  acetaminophen  •  calcium carbonate  •  dextrose  •  dextrose  •  glucagon (human recombinant)  •  HYDROcodone-acetaminophen  •  magnesium sulfate **OR** magnesium sulfate **OR** magnesium sulfate  •  Morphine **AND** naloxone  •  sodium chloride  •  [COMPLETED] Insert peripheral IV **AND** sodium chloride  •  sodium chloride  •  tiZANidine    Assessment/Plan   Assessment & Plan     Active Hospital Problems    Diagnosis  POA   • SVT (supraventricular tachycardia) (HCC) [I47.1]  Yes   • Osteoarthritis [M19.90]  Yes   • Chronic pain [G89.29]  Yes   • Paroxysmal atrial fibrillation (HCC) [I48.0]  Yes   • Hyperlipidemia [E78.5]  Yes   • GERD (gastroesophageal reflux disease) [K21.9]  Yes   • Depression [F32.A]  Yes   • Epilepsy (HCC) [G40.909]  Yes   • Uncontrolled type 2 diabetes mellitus with hyperglycemia (HCC) [E11.65]  Yes   • Diabetic peripheral neuropathy associated with type 2 diabetes mellitus (HCC) [E11.42]  Yes      Resolved Hospital Problems   No resolved problems to display.        Brief Hospital Course to date:  Martha Harerll is a 66 y.o. female with h/o DM, HL, afib on eliquis,  chronic pain on morphine, severe OA of multiple joints including bilateral knees presented to [facility] ER with onset of severe pain in her LE's, shoulders, neck worse than usual and was found to be in SVT.  This patient's problems and plans were partially entered by my partner and updated as appropriate by me 03/19/22.        SVT  --cardiology following.  Started  metoprolol 50mg BID.  Will plan an ambulatory monitor at discharge  --echo pending   --Now NSR    DM  --hypoglycemic yesterday  AM.  Hold basal and preprandial.  Continue SSI.  Will likely need added back as steroids likely to exacerbate.  -[phone]     Severe OA multiple joints with acute onset worsening pain LE's shoulders, neck  --has seen Dr. Singletary/ortho in the past  --on MS contin, follows with pain clinic  -- CK level not elevated   --note h/o fibromyalgia and she is tender along her trapezius muscle.  -prednisone 60mg started decreased to 40mg tomorrow  -zanaflex prn         DVT prophylaxis:  Medical and mechanical DVT prophylaxis orders are present.       AM-PAC 6 Clicks Score (PT): 17 (03/18/22 2000)    Disposition: I expect the patient to be discharged TBD    CODE STATUS:   Code Status and Medical Interventions:   Ordered at: 03/17/22 7524     Code Status (Patient has no pulse and is not breathing):    CPR (Attempt to Resuscitate)     Medical Interventions (Patient has pulse or is breathing):    Full Support       Jes Zamarripa, DO  03/19/22

## 2022-03-20 ENCOUNTER — APPOINTMENT (OUTPATIENT)
Dept: CARDIOLOGY | Facility: HOSPITAL | Age: 67
End: 2022-03-20

## 2022-03-20 LAB
BH CV ECHO MEAS - AO MAX PG (FULL): 4.8 MMHG
BH CV ECHO MEAS - AO MAX PG: 8.2 MMHG
BH CV ECHO MEAS - AO MEAN PG (FULL): 2.2 MMHG
BH CV ECHO MEAS - AO MEAN PG: 4 MMHG
BH CV ECHO MEAS - AO ROOT AREA (BSA CORRECTED): 1.7
BH CV ECHO MEAS - AO ROOT AREA: 7.8 CM^2
BH CV ECHO MEAS - AO ROOT DIAM: 3.2 CM
BH CV ECHO MEAS - AO V2 MAX: 143.5 CM/SEC
BH CV ECHO MEAS - AO V2 MEAN: 93 CM/SEC
BH CV ECHO MEAS - AO V2 VTI: 28.8 CM
BH CV ECHO MEAS - AVA(I,A): 1.9 CM^2
BH CV ECHO MEAS - AVA(I,D): 1.9 CM^2
BH CV ECHO MEAS - AVA(V,A): 2 CM^2
BH CV ECHO MEAS - AVA(V,D): 2 CM^2
BH CV ECHO MEAS - BSA(HAYCOCK): 2 M^2
BH CV ECHO MEAS - BSA: 1.9 M^2
BH CV ECHO MEAS - BZI_BMI: 35.8 KILOGRAMS/M^2
BH CV ECHO MEAS - BZI_METRIC_HEIGHT: 157.5 CM
BH CV ECHO MEAS - BZI_METRIC_WEIGHT: 88.9 KG
BH CV ECHO MEAS - EDV(CUBED): 158.7 ML
BH CV ECHO MEAS - EDV(MOD-SP2): 87 ML
BH CV ECHO MEAS - EDV(MOD-SP4): 106 ML
BH CV ECHO MEAS - EDV(TEICH): 142.2 ML
BH CV ECHO MEAS - EF(CUBED): 76.1 %
BH CV ECHO MEAS - EF(MOD-BP): 61 %
BH CV ECHO MEAS - EF(MOD-SP2): 56.3 %
BH CV ECHO MEAS - EF(MOD-SP4): 62.3 %
BH CV ECHO MEAS - EF(TEICH): 67.5 %
BH CV ECHO MEAS - ESV(CUBED): 38 ML
BH CV ECHO MEAS - ESV(MOD-SP2): 38 ML
BH CV ECHO MEAS - ESV(MOD-SP4): 40 ML
BH CV ECHO MEAS - ESV(TEICH): 46.1 ML
BH CV ECHO MEAS - FS: 37.9 %
BH CV ECHO MEAS - IVS/LVPW: 1
BH CV ECHO MEAS - IVSD: 0.91 CM
BH CV ECHO MEAS - LA DIMENSION: 3.9 CM
BH CV ECHO MEAS - LA/AO: 1.2
BH CV ECHO MEAS - LAD MAJOR: 5.5 CM
BH CV ECHO MEAS - LAT PEAK E' VEL: 10.4 CM/SEC
BH CV ECHO MEAS - LATERAL E/E' RATIO: 7.7
BH CV ECHO MEAS - LV DIASTOLIC VOL/BSA (35-75): 55.9 ML/M^2
BH CV ECHO MEAS - LV IVRT: 0.07 SEC
BH CV ECHO MEAS - LV MASS(C)D: 183 GRAMS
BH CV ECHO MEAS - LV MASS(C)DI: 96.6 GRAMS/M^2
BH CV ECHO MEAS - LV MAX PG: 3.4 MMHG
BH CV ECHO MEAS - LV MEAN PG: 1.7 MMHG
BH CV ECHO MEAS - LV SYSTOLIC VOL/BSA (12-30): 21.1 ML/M^2
BH CV ECHO MEAS - LV V1 MAX: 92.5 CM/SEC
BH CV ECHO MEAS - LV V1 MEAN: 60.6 CM/SEC
BH CV ECHO MEAS - LV V1 VTI: 17.7 CM
BH CV ECHO MEAS - LVIDD: 5.4 CM
BH CV ECHO MEAS - LVIDS: 3.4 CM
BH CV ECHO MEAS - LVLD AP2: 7.2 CM
BH CV ECHO MEAS - LVLD AP4: 8 CM
BH CV ECHO MEAS - LVLS AP2: 6.5 CM
BH CV ECHO MEAS - LVLS AP4: 6.5 CM
BH CV ECHO MEAS - LVOT AREA (M): 3.1 CM^2
BH CV ECHO MEAS - LVOT AREA: 3.1 CM^2
BH CV ECHO MEAS - LVOT DIAM: 2 CM
BH CV ECHO MEAS - LVPWD: 0.9 CM
BH CV ECHO MEAS - MED PEAK E' VEL: 7.9 CM/SEC
BH CV ECHO MEAS - MEDIAL E/E' RATIO: 10.1
BH CV ECHO MEAS - MV A MAX VEL: 85.8 CM/SEC
BH CV ECHO MEAS - MV DEC TIME: 0.26 SEC
BH CV ECHO MEAS - MV E MAX VEL: 91.9 CM/SEC
BH CV ECHO MEAS - MV E/A: 1.1
BH CV ECHO MEAS - PA ACC SLOPE: 919.9 CM/SEC^2
BH CV ECHO MEAS - PA ACC TIME: 0.08 SEC
BH CV ECHO MEAS - PA PR(ACCEL): 41 MMHG
BH CV ECHO MEAS - PI END-D VEL: 129 CM/SEC
BH CV ECHO MEAS - RAP SYSTOLE: 3 MMHG
BH CV ECHO MEAS - RVSP: 20 MMHG
BH CV ECHO MEAS - SI(AO): 119 ML/M^2
BH CV ECHO MEAS - SI(CUBED): 63.7 ML/M^2
BH CV ECHO MEAS - SI(LVOT): 28.9 ML/M^2
BH CV ECHO MEAS - SI(MOD-SP2): 25.9 ML/M^2
BH CV ECHO MEAS - SI(MOD-SP4): 34.8 ML/M^2
BH CV ECHO MEAS - SI(TEICH): 50.7 ML/M^2
BH CV ECHO MEAS - SV(AO): 225.4 ML
BH CV ECHO MEAS - SV(CUBED): 120.7 ML
BH CV ECHO MEAS - SV(LVOT): 54.8 ML
BH CV ECHO MEAS - SV(MOD-SP2): 49 ML
BH CV ECHO MEAS - SV(MOD-SP4): 66 ML
BH CV ECHO MEAS - SV(TEICH): 96 ML
BH CV ECHO MEAS - TAPSE (>1.6): 1.6 CM
BH CV ECHO MEAS - TR MAX PG: 17 MMHG
BH CV ECHO MEAS - TR MAX VEL: 206.9 CM/SEC
BH CV ECHO MEASUREMENTS AVERAGE E/E' RATIO: 10.04
BH CV XLRA - RV BASE: 4.1 CM
BH CV XLRA - RV LENGTH: 6.7 CM
BH CV XLRA - RV MID: 3.4 CM
BH CV XLRA - TDI S': 9.54 CM/SEC
GLUCOSE BLDC GLUCOMTR-MCNC: 191 MG/DL (ref 70–130)
GLUCOSE BLDC GLUCOMTR-MCNC: 267 MG/DL (ref 70–130)
GLUCOSE BLDC GLUCOMTR-MCNC: 274 MG/DL (ref 70–130)
GLUCOSE BLDC GLUCOMTR-MCNC: 327 MG/DL (ref 70–130)
LEFT ATRIUM VOLUME INDEX: 26.4 ML/M^2
LEFT ATRIUM VOLUME: 50 ML

## 2022-03-20 PROCEDURE — 63710000001 INSULIN DETEMIR PER 5 UNITS: Performed by: INTERNAL MEDICINE

## 2022-03-20 PROCEDURE — 99233 SBSQ HOSP IP/OBS HIGH 50: CPT | Performed by: FAMILY MEDICINE

## 2022-03-20 PROCEDURE — 63710000001 PREDNISONE PER 1 MG: Performed by: FAMILY MEDICINE

## 2022-03-20 PROCEDURE — 82962 GLUCOSE BLOOD TEST: CPT

## 2022-03-20 PROCEDURE — 63710000001 INSULIN LISPRO (HUMAN) PER 5 UNITS: Performed by: INTERNAL MEDICINE

## 2022-03-20 PROCEDURE — 93306 TTE W/DOPPLER COMPLETE: CPT

## 2022-03-20 PROCEDURE — 93306 TTE W/DOPPLER COMPLETE: CPT | Performed by: INTERNAL MEDICINE

## 2022-03-20 RX ORDER — PREDNISONE 20 MG/1
20 TABLET ORAL
Status: DISCONTINUED | OUTPATIENT
Start: 2022-03-21 | End: 2022-03-21 | Stop reason: HOSPADM

## 2022-03-20 RX ADMIN — TIZANIDINE 4 MG: 4 TABLET ORAL at 21:16

## 2022-03-20 RX ADMIN — Medication 10 ML: at 08:29

## 2022-03-20 RX ADMIN — APIXABAN 5 MG: 5 TABLET, FILM COATED ORAL at 21:17

## 2022-03-20 RX ADMIN — APIXABAN 5 MG: 5 TABLET, FILM COATED ORAL at 08:28

## 2022-03-20 RX ADMIN — TIZANIDINE 4 MG: 4 TABLET ORAL at 08:34

## 2022-03-20 RX ADMIN — PROPAFENONE HYDROCHLORIDE 150 MG: 150 TABLET, FILM COATED ORAL at 17:28

## 2022-03-20 RX ADMIN — BUSPIRONE HYDROCHLORIDE 15 MG: 15 TABLET ORAL at 08:27

## 2022-03-20 RX ADMIN — PROPAFENONE HYDROCHLORIDE 150 MG: 150 TABLET, FILM COATED ORAL at 08:28

## 2022-03-20 RX ADMIN — INSULIN DETEMIR 20 UNITS: 100 INJECTION, SOLUTION SUBCUTANEOUS at 21:20

## 2022-03-20 RX ADMIN — INSULIN LISPRO 7 UNITS: 100 INJECTION, SOLUTION INTRAVENOUS; SUBCUTANEOUS at 08:28

## 2022-03-20 RX ADMIN — Medication 400 MG: at 21:16

## 2022-03-20 RX ADMIN — INSULIN LISPRO 7 UNITS: 100 INJECTION, SOLUTION INTRAVENOUS; SUBCUTANEOUS at 17:28

## 2022-03-20 RX ADMIN — METOPROLOL TARTRATE 50 MG: 50 TABLET, FILM COATED ORAL at 08:28

## 2022-03-20 RX ADMIN — Medication 400 MG: at 08:28

## 2022-03-20 RX ADMIN — HYDROCODONE BITARTRATE AND ACETAMINOPHEN 1 TABLET: 7.5; 325 TABLET ORAL at 21:16

## 2022-03-20 RX ADMIN — INSULIN LISPRO 6 UNITS: 100 INJECTION, SOLUTION INTRAVENOUS; SUBCUTANEOUS at 12:16

## 2022-03-20 RX ADMIN — PHENYTOIN SODIUM 200 MG: 100 CAPSULE, EXTENDED RELEASE ORAL at 08:27

## 2022-03-20 RX ADMIN — INSULIN LISPRO 2 UNITS: 100 INJECTION, SOLUTION INTRAVENOUS; SUBCUTANEOUS at 08:28

## 2022-03-20 RX ADMIN — MORPHINE SULFATE 30 MG: 15 TABLET, FILM COATED, EXTENDED RELEASE ORAL at 21:20

## 2022-03-20 RX ADMIN — ATORVASTATIN CALCIUM 20 MG: 40 TABLET, FILM COATED ORAL at 08:28

## 2022-03-20 RX ADMIN — Medication 10 ML: at 21:17

## 2022-03-20 RX ADMIN — HYDROCODONE BITARTRATE AND ACETAMINOPHEN 1 TABLET: 7.5; 325 TABLET ORAL at 08:30

## 2022-03-20 RX ADMIN — DULOXETINE 60 MG: 60 CAPSULE, DELAYED RELEASE ORAL at 08:28

## 2022-03-20 RX ADMIN — INSULIN LISPRO 7 UNITS: 100 INJECTION, SOLUTION INTRAVENOUS; SUBCUTANEOUS at 12:16

## 2022-03-20 RX ADMIN — PREDNISONE 40 MG: 20 TABLET ORAL at 08:28

## 2022-03-20 RX ADMIN — PHENYTOIN SODIUM 200 MG: 100 CAPSULE, EXTENDED RELEASE ORAL at 21:20

## 2022-03-20 RX ADMIN — BUSPIRONE HYDROCHLORIDE 15 MG: 15 TABLET ORAL at 21:16

## 2022-03-20 RX ADMIN — METOPROLOL TARTRATE 50 MG: 50 TABLET, FILM COATED ORAL at 21:16

## 2022-03-20 RX ADMIN — PROPAFENONE HYDROCHLORIDE 150 MG: 150 TABLET, FILM COATED ORAL at 21:15

## 2022-03-20 RX ADMIN — MORPHINE SULFATE 30 MG: 15 TABLET, FILM COATED, EXTENDED RELEASE ORAL at 08:28

## 2022-03-20 NOTE — PROGRESS NOTES
[facility] Medicine Services  PROGRESS NOTE    Patient Name: Martha Harrell  : 1955  MRN: [id number]    Date of Admission: 3/17/2022  Primary Care Physician: Ginger Ward APRN    Subjective   Subjective     CC:  Pain neck, shoulders, knees, SVT    HPI:  Pt continues to have neck and leg pain. No chest pain or shortness of breath. Pain is better than prior to coming to the hospital.     ROS:  Gen- No fevers, chills  CV- No chest pain, palpitations  Resp- No cough, dyspnea  GI- No N/V/D, abd pain        Objective   Objective     Vital Signs:   Temp:  [98.3 °F (36.8 °C)-98.9 °F (37.2 °C)] 98.4 °F (36.9 °C)  Heart Rate:  [63-73] 63  Resp:  [16-20] 18  BP: (114-156)/(61-82) 129/72     Physical Exam:  Constitutional: No acute distress, awake, alert, sitting up in chair   HENT: NCAT, mucous membranes moist  Respiratory: Clear to auscultation bilaterally, respiratory effort normal   Cardiovascular: RRR, no murmurs, rubs, or gallops  Gastrointestinal: Positive bowel sounds, soft, nontender, nondistended  Musculoskeletal: No bilateral ankle edema  Psychiatric: Appropriate affect, cooperative  Neurologic: Oriented x 3, BLE weakness   Skin: No rashes      Results Reviewed:  LAB RESULTS:      Lab 22  0503 22  1112 22  0757   WBC 8.64  --  12.80*   HEMOGLOBIN 12.4  --  13.7   HEMATOCRIT 39.7  --  42.8   PLATELETS 185  --  219   NEUTROS ABS 5.80  --  10.00*   IMMATURE GRANS (ABS) 0.02  --  0.05   LYMPHS ABS 1.71  --  1.67   MONOS ABS 0.76  --  0.81   EOS ABS 0.32  --  0.23   MCV 90.4  --  90.1   LACTATE 0.7 0.8 2.4*         Lab 22  0503 22  0757   SODIUM  --  141   POTASSIUM  --  3.9   CHLORIDE  --  103   CO2  --  27.0   ANION GAP  --  11.0   BUN  --  15   CREATININE  --  0.70   EGFR  --  94.9   GLUCOSE  --  118*   CALCIUM  --  8.6   MAGNESIUM 2.3 1.5*   PHOSPHORUS 2.7  --    HEMOGLOBIN A1C  --  7.70*   TSH  --  1.930         Lab 22  0757   TOTAL PROTEIN  6.9   ALBUMIN 3.60   GLOBULIN 3.3   ALT (SGPT) 28   AST (SGOT) 24   BILIRUBIN 0.4   ALK PHOS 90         Lab 03/17/22  0757   PROBNP 209.0   TROPONIN T <0.010                 Brief Urine Lab Results  (Last result in the past 365 days)      Color   Clarity   Blood   Leuk Est   Nitrite   Protein   CREAT   Urine HCG        03/17/22 0818 Yellow   Clear   Negative   Negative   Negative   Trace                 Microbiology Results Abnormal     Procedure Component Value - Date/Time    Blood Culture - Blood, Arm, Right [[id number]]  (Normal) Collected: 03/17/22 0800    Lab Status: Preliminary result Specimen: Blood from Arm, Right Updated: 03/19/22 0930     Blood Culture No growth at 2 days    Blood Culture - Blood, Arm, Left [[id number]]  (Normal) Collected: 03/17/22 0830    Lab Status: Preliminary result Specimen: Blood from Arm, Left Updated: 03/19/22 0930     Blood Culture No growth at 2 days    COVID PRE-OP / PRE-PROCEDURE SCREENING ORDER (NO ISOLATION) - Swab, Nasopharynx [[id number]]  (Normal) Collected: 03/17/22 1227    Lab Status: Final result Specimen: Swab from Nasopharynx Updated: 03/17/22 1257    Narrative:      The following orders were created for panel order COVID PRE-OP / PRE-PROCEDURE SCREENING ORDER (NO ISOLATION) - Swab, Nasopharynx.  Procedure                               Abnormality         Status                     ---------                               -----------         ------                     COVID-19 and FLU A/B PCR...[[id number]]  Normal              Final result                 Please view results for these tests on the individual orders.    COVID-19 and FLU A/B PCR - Swab, Nasopharynx [[id number]]  (Normal) Collected: 03/17/22 1227    Lab Status: Final result Specimen: Swab from Nasopharynx Updated: 03/17/22 1257     COVID19 Not Detected     Influenza A PCR Not Detected     Influenza B PCR Not Detected    Narrative:      Fact sheet for providers:  https://www.fda.gov/media/080208/download    Fact sheet for patients: https://www.fda.gov/media/252520/download    Test performed by PCR.          No radiology results from the last 24 hrs        I have reviewed the medications:  Scheduled Meds:apixaban, 5 mg, Oral, BID  atorvastatin, 20 mg, Oral, Daily  busPIRone, 15 mg, Oral, Q12H  DULoxetine, 60 mg, Oral, Daily  insulin detemir, 20 Units, Subcutaneous, Nightly  insulin lispro, 0-9 Units, Subcutaneous, TID AC  insulin lispro, 7 Units, Subcutaneous, TID With Meals  magnesium oxide, 400 mg, Oral, BID  metoprolol tartrate, 50 mg, Oral, Q12H  Morphine, 30 mg, Oral, BID  phenytoin ER, 200 mg, Oral, Q12H  predniSONE, 40 mg, Oral, Daily With Breakfast  propafenone, 150 mg, Oral, TID  sodium chloride, 10 mL, Intravenous, Q12H      Continuous Infusions:   PRN Meds:.•  acetaminophen  •  calcium carbonate  •  dextrose  •  dextrose  •  glucagon (human recombinant)  •  HYDROcodone-acetaminophen  •  magnesium sulfate **OR** magnesium sulfate **OR** magnesium sulfate  •  Morphine **AND** naloxone  •  sodium chloride  •  [COMPLETED] Insert peripheral IV **AND** sodium chloride  •  sodium chloride  •  tiZANidine    Assessment/Plan   Assessment & Plan     Active Hospital Problems    Diagnosis  POA   • SVT (supraventricular tachycardia) (HCC) [I47.1]  Yes   • Osteoarthritis [M19.90]  Yes   • Chronic pain [G89.29]  Yes   • Paroxysmal atrial fibrillation (HCC) [I48.0]  Yes   • Hyperlipidemia [E78.5]  Yes   • GERD (gastroesophageal reflux disease) [K21.9]  Yes   • Depression [F32.A]  Yes   • Epilepsy (HCC) [G40.909]  Yes   • Uncontrolled type 2 diabetes mellitus with hyperglycemia (HCC) [E11.65]  Yes   • Diabetic peripheral neuropathy associated with type 2 diabetes mellitus (HCC) [E11.42]  Yes      Resolved Hospital Problems   No resolved problems to display.        Brief Hospital Course to date:  Martha Harrell is a 66 y.o. female with h/o DM, HL, afib on eliquis, chronic pain on  morphine, severe OA of multiple joints including bilateral knees presented to [facility] ER with onset of severe pain in her LE's, shoulders, neck worse than usual and was found to be in SVT.  This patient's problems and plans were partially entered by my partner and updated as appropriate by me 03/20/22.        SVT  --cardiology following.  Started  metoprolol 50mg BID.  Will plan an ambulatory monitor at discharge  --echo pending   --Now NSR    DM  -- Holding  basal and preprandial due to hypoglycemia.    --Continue SSI.  Will likely need added back as steroids likely to exacerbate.  -[phone]     Severe OA multiple joints with acute onset worsening pain LE's shoulders, neck  --has seen Dr. Singletary/ortho in the past  --on MS contin, follows with pain clinic  -- CK level not elevated   --note h/o fibromyalgia and she is tender along her trapezius muscle.  -prednisone 60mg started decreased to 20mg today   -zanaflex prn         DVT prophylaxis:  Medical and mechanical DVT prophylaxis orders are present.       AM-PAC 6 Clicks Score (PT): 16 (03/19/22 2000)    Disposition: I expect the patient to be discharged TBD    CODE STATUS:   Code Status and Medical Interventions:   Ordered at: 03/17/22 6987     Code Status (Patient has no pulse and is not breathing):    CPR (Attempt to Resuscitate)     Medical Interventions (Patient has pulse or is breathing):    Full Support       Jes Zamarripa, DO  03/20/22

## 2022-03-21 ENCOUNTER — HOME HEALTH ADMISSION (OUTPATIENT)
Dept: HOME HEALTH SERVICES | Facility: HOME HEALTHCARE | Age: 67
End: 2022-03-21

## 2022-03-21 ENCOUNTER — READMISSION MANAGEMENT (OUTPATIENT)
Dept: CALL CENTER | Facility: HOSPITAL | Age: 67
End: 2022-03-21

## 2022-03-21 VITALS
HEIGHT: 62 IN | RESPIRATION RATE: 16 BRPM | OXYGEN SATURATION: 93 % | WEIGHT: 196.65 LBS | HEART RATE: 65 BPM | TEMPERATURE: 97.9 F | DIASTOLIC BLOOD PRESSURE: 74 MMHG | SYSTOLIC BLOOD PRESSURE: 129 MMHG | BODY MASS INDEX: 36.19 KG/M2

## 2022-03-21 PROBLEM — I47.10 SVT (SUPRAVENTRICULAR TACHYCARDIA): Status: RESOLVED | Noted: 2022-03-17 | Resolved: 2022-03-21

## 2022-03-21 PROBLEM — I48.0 PAROXYSMAL ATRIAL FIBRILLATION: Chronic | Status: RESOLVED | Noted: 2019-06-21 | Resolved: 2022-03-21

## 2022-03-21 PROBLEM — I47.1 SVT (SUPRAVENTRICULAR TACHYCARDIA): Status: RESOLVED | Noted: 2022-03-17 | Resolved: 2022-03-21

## 2022-03-21 LAB
GLUCOSE BLDC GLUCOMTR-MCNC: 241 MG/DL (ref 70–130)
GLUCOSE BLDC GLUCOMTR-MCNC: 271 MG/DL (ref 70–130)
GLUCOSE BLDC GLUCOMTR-MCNC: 313 MG/DL (ref 70–130)

## 2022-03-21 PROCEDURE — 99239 HOSP IP/OBS DSCHRG MGMT >30: CPT | Performed by: NURSE PRACTITIONER

## 2022-03-21 PROCEDURE — 63710000001 INSULIN LISPRO (HUMAN) PER 5 UNITS: Performed by: INTERNAL MEDICINE

## 2022-03-21 PROCEDURE — 82962 GLUCOSE BLOOD TEST: CPT

## 2022-03-21 PROCEDURE — 63710000001 PREDNISONE PER 1 MG: Performed by: FAMILY MEDICINE

## 2022-03-21 PROCEDURE — 63710000001 INSULIN LISPRO (HUMAN) PER 5 UNITS: Performed by: NURSE PRACTITIONER

## 2022-03-21 RX ORDER — PREDNISONE 20 MG/1
TABLET ORAL
Qty: 13 TABLET | Refills: 0 | Status: SHIPPED | OUTPATIENT
Start: 2022-03-21 | End: 2023-02-23

## 2022-03-21 RX ORDER — METOPROLOL TARTRATE 50 MG/1
50 TABLET, FILM COATED ORAL EVERY 12 HOURS SCHEDULED
Qty: 60 TABLET | Refills: 0 | Status: SHIPPED | OUTPATIENT
Start: 2022-03-21

## 2022-03-21 RX ADMIN — ATORVASTATIN CALCIUM 20 MG: 40 TABLET, FILM COATED ORAL at 09:31

## 2022-03-21 RX ADMIN — PREDNISONE 20 MG: 20 TABLET ORAL at 09:31

## 2022-03-21 RX ADMIN — DULOXETINE 60 MG: 60 CAPSULE, DELAYED RELEASE ORAL at 09:31

## 2022-03-21 RX ADMIN — MORPHINE SULFATE 30 MG: 15 TABLET, FILM COATED, EXTENDED RELEASE ORAL at 09:32

## 2022-03-21 RX ADMIN — METOPROLOL TARTRATE 50 MG: 50 TABLET, FILM COATED ORAL at 09:31

## 2022-03-21 RX ADMIN — PROPAFENONE HYDROCHLORIDE 150 MG: 150 TABLET, FILM COATED ORAL at 09:30

## 2022-03-21 RX ADMIN — INSULIN LISPRO 7 UNITS: 100 INJECTION, SOLUTION INTRAVENOUS; SUBCUTANEOUS at 09:31

## 2022-03-21 RX ADMIN — Medication 400 MG: at 09:31

## 2022-03-21 RX ADMIN — HYDROCODONE BITARTRATE AND ACETAMINOPHEN 1 TABLET: 7.5; 325 TABLET ORAL at 09:37

## 2022-03-21 RX ADMIN — APIXABAN 5 MG: 5 TABLET, FILM COATED ORAL at 09:30

## 2022-03-21 RX ADMIN — TIZANIDINE 4 MG: 4 TABLET ORAL at 12:17

## 2022-03-21 RX ADMIN — INSULIN LISPRO 10 UNITS: 100 INJECTION, SOLUTION INTRAVENOUS; SUBCUTANEOUS at 12:17

## 2022-03-21 RX ADMIN — BUSPIRONE HYDROCHLORIDE 15 MG: 15 TABLET ORAL at 09:31

## 2022-03-21 NOTE — PROGRESS NOTES
[facility] Medicine Services  PROGRESS NOTE    Patient Name: Martha Harrell  : 1955  MRN: [id number]    Date of Admission: 3/17/2022  Primary Care Physician: Ginger Ward APRN    Subjective   Subjective     CC:  Pain neck, shoulders, knees, SVT    HPI:  Patient sitting up in bed reporting headache, neck pain, shoulder pain. Pt noted to have fibromyalgia likely contributing.  Patient states bilateral hips and knees feel better since starting prednisone.She denies heart palpitations, soa.  Patient now wishing to attend rehab at discharge due to generalized weakness and musculoskeletal pain.    ROS:  Gen- No fevers, chills  CV- No chest pain, palpitations  Resp- No cough, dyspnea  GI- No N/V/D, abd pain  MS- (+) neck pain (+) shoulder pain  Neuro- (+) headache    Objective   Objective     Vital Signs:   Temp:  [97.9 °F (36.6 °C)-98.6 °F (37 °C)] 97.9 °F (36.6 °C)  Heart Rate:  [60-68] 65  Resp:  [16-20] 16  BP: (124-145)/(71-84) 129/74  Flow (L/min):  [2] 2     Physical Exam:  Constitutional: Awake, alert, NAD  Eyes: sclerae anicteric, no conjunctival injection  HENT: NCAT, mucous membranes moist  Neck: Supple, no thyromegaly, no lymphadenopathy, trachea midline  Respiratory: Clear to auscultation bilaterally, nonlabored respirations   Cardiovascular: RRR, no murmurs, rubs, or gallops  Gastrointestinal: Positive bowel sounds, soft, nontender, nondistended  Musculoskeletal: No bilateral ankle edema. Bilateral legs with compression wraps. Bilateral lower extremity weakness.   Psychiatric: Appropriate affect, cooperative  Neurologic: Oriented x 3, strength symmetric in all extremities, Cranial Nerves grossly intact to confrontation, speech clear  Skin: No rashes    Results Reviewed:  LAB RESULTS:      Lab 22  0503 22  1112 22  0757   WBC 8.64  --  12.80*   HEMOGLOBIN 12.4  --  13.7   HEMATOCRIT 39.7  --  42.8   PLATELETS 185  --  219   NEUTROS ABS 5.80  --  10.00*    IMMATURE GRANS (ABS) 0.02  --  0.05   LYMPHS ABS 1.71  --  1.67   MONOS ABS 0.76  --  0.81   EOS ABS 0.32  --  0.23   MCV 90.4  --  90.1   LACTATE 0.7 0.8 2.4*         Lab 03/18/22  0503 03/17/22  0757   SODIUM  --  141   POTASSIUM  --  3.9   CHLORIDE  --  103   CO2  --  27.0   ANION GAP  --  11.0   BUN  --  15   CREATININE  --  0.70   EGFR  --  94.9   GLUCOSE  --  118*   CALCIUM  --  8.6   MAGNESIUM 2.3 1.5*   PHOSPHORUS 2.7  --    HEMOGLOBIN A1C  --  7.70*   TSH  --  1.930         Lab 03/17/22  0757   TOTAL PROTEIN 6.9   ALBUMIN 3.60   GLOBULIN 3.3   ALT (SGPT) 28   AST (SGOT) 24   BILIRUBIN 0.4   ALK PHOS 90         Lab 03/17/22  0757   PROBNP 209.0   TROPONIN T <0.010                 Brief Urine Lab Results  (Last result in the past 365 days)      Color   Clarity   Blood   Leuk Est   Nitrite   Protein   CREAT   Urine HCG        03/17/22 0818 Yellow   Clear   Negative   Negative   Negative   Trace                 Microbiology Results Abnormal     Procedure Component Value - Date/Time    Blood Culture - Blood, Arm, Right [[id number]]  (Normal) Collected: 03/17/22 0800    Lab Status: Preliminary result Specimen: Blood from Arm, Right Updated: 03/21/22 0932     Blood Culture No growth at 4 days    Blood Culture - Blood, Arm, Left [[id number]]  (Normal) Collected: 03/17/22 0830    Lab Status: Preliminary result Specimen: Blood from Arm, Left Updated: 03/21/22 0932     Blood Culture No growth at 4 days    COVID PRE-OP / PRE-PROCEDURE SCREENING ORDER (NO ISOLATION) - Swab, Nasopharynx [[id number]]  (Normal) Collected: 03/17/22 1227    Lab Status: Final result Specimen: Swab from Nasopharynx Updated: 03/17/22 1257    Narrative:      The following orders were created for panel order COVID PRE-OP / PRE-PROCEDURE SCREENING ORDER (NO ISOLATION) - Swab, Nasopharynx.  Procedure                               Abnormality         Status                     ---------                               -----------         ------                      COVID-19 and FLU A/B PCR...[[id number]]  Normal              Final result                 Please view results for these tests on the individual orders.    COVID-19 and FLU A/B PCR - Swab, Nasopharynx [[id number]]  (Normal) Collected: 03/17/22 1227    Lab Status: Final result Specimen: Swab from Nasopharynx Updated: 03/17/22 1257     COVID19 Not Detected     Influenza A PCR Not Detected     Influenza B PCR Not Detected    Narrative:      Fact sheet for providers: https://www.fda.gov/media/845031/download    Fact sheet for patients: https://www.fda.gov/media/003086/download    Test performed by PCR.          Adult Transthoracic Echo Complete W/ Cont if Necessary Per Protocol    Result Date: 3/20/2022  · Left ventricular ejection fraction appears to be 61 - 65%. Left ventricular systolic function is normal. · Left ventricular diastolic function was normal. · Calculated right ventricular systolic pressure from tricuspid regurgitation is 20 mmHg.        Results for orders placed during the hospital encounter of 03/17/22    Adult Transthoracic Echo Complete W/ Cont if Necessary Per Protocol    Interpretation Summary  · Left ventricular ejection fraction appears to be 61 - 65%. Left ventricular systolic function is normal.  · Left ventricular diastolic function was normal.  · Calculated right ventricular systolic pressure from tricuspid regurgitation is 20 mmHg.      I have reviewed the medications:  Scheduled Meds:apixaban, 5 mg, Oral, BID  atorvastatin, 20 mg, Oral, Daily  busPIRone, 15 mg, Oral, Q12H  DULoxetine, 60 mg, Oral, Daily  insulin detemir, 20 Units, Subcutaneous, Nightly  insulin lispro, 0-9 Units, Subcutaneous, TID AC  insulin lispro, 7 Units, Subcutaneous, TID With Meals  magnesium oxide, 400 mg, Oral, BID  metoprolol tartrate, 50 mg, Oral, Q12H  Morphine, 30 mg, Oral, BID  phenytoin ER, 200 mg, Oral, Q12H  predniSONE, 20 mg, Oral, Daily With Breakfast  propafenone, 150 mg, Oral, TID  sodium  chloride, 10 mL, Intravenous, Q12H      Continuous Infusions:   PRN Meds:.•  acetaminophen  •  calcium carbonate  •  dextrose  •  dextrose  •  glucagon (human recombinant)  •  HYDROcodone-acetaminophen  •  magnesium sulfate **OR** magnesium sulfate **OR** magnesium sulfate  •  Morphine **AND** naloxone  •  sodium chloride  •  [COMPLETED] Insert peripheral IV **AND** sodium chloride  •  sodium chloride  •  tiZANidine    Assessment/Plan   Assessment & Plan     Active Hospital Problems    Diagnosis  POA   • SVT (supraventricular tachycardia) (HCC) [I47.1]  Yes   • Osteoarthritis [M19.90]  Yes   • Chronic pain [G89.29]  Yes   • Paroxysmal atrial fibrillation (HCC) [I48.0]  Yes   • Hyperlipidemia [E78.5]  Yes   • GERD (gastroesophageal reflux disease) [K21.9]  Yes   • Depression [F32.A]  Yes   • Epilepsy (HCC) [G40.909]  Yes   • Uncontrolled type 2 diabetes mellitus with hyperglycemia (HCC) [E11.65]  Yes   • Diabetic peripheral neuropathy associated with type 2 diabetes mellitus (HCC) [E11.42]  Yes      Resolved Hospital Problems   No resolved problems to display.     Brief Hospital Course to date:  Martha Harrell is a 66 y.o. female with h/o DM, HL, afib on eliquis, chronic pain on morphine, severe OA of multiple joints including bilateral knees presented to [facility] ER with onset of severe pain in her LE's, shoulders, neck worse than usual and was found to be in SVT.  This patient's problems and plans were partially entered by my partner and updated as appropriate by me 03/21/22.    SVT  --cardiology following.  Started  metoprolol 50mg BID.  Will plan an ambulatory monitor at discharge.  --ECHO LVEF 61-65%. Normal diastolic function.   --Now NSR    DM  --03/21 increasing prandial insulin as glucose trending upwards due to steroid therapy.  24 hr glucose 191-327    Severe OA multiple joints with acute onset worsening pain LE's shoulders, neck  --has seen Dr. Singletary/ortho in the past  --on MS contin, follows with pain  clinic  -- CK level not elevated   --note h/o fibromyalgia and she is tender along her trapezius muscle.  -prednisone 60mg started decreased to 20mg today   -zanaflex prn     DVT prophylaxis:  Medical and mechanical DVT prophylaxis orders are present.       AM-PAC 6 Clicks Score (PT): 16 (03/20/22 6466)    Disposition: I expect the patient to be discharged TBD.  Patient wishing to discharge to rehab.    CODE STATUS:   Code Status and Medical Interventions:   Ordered at: 03/17/22 1333     Code Status (Patient has no pulse and is not breathing):    CPR (Attempt to Resuscitate)     Medical Interventions (Patient has pulse or is breathing):    Full Support       Jocelynn nAdre, SHERRY  03/21/22

## 2022-03-21 NOTE — DISCHARGE SUMMARY
[facility] Medicine Services  DISCHARGE SUMMARY    Patient Name: Martha Harrell  : 1955  MRN: [id number]    Date of Admission: 3/17/2022  Date of Discharge:  3/21/2022  Primary Care Physician: Ginger Ward APRN    Consults     Date and Time Order Name Status Description    3/17/2022  1:52 PM Inpatient Cardiology Consult Completed           Hospital Course     Presenting Problem:   SVT (supraventricular tachycardia) (HCC) [I47.1]    Active Hospital Problems    Diagnosis  POA   • Osteoarthritis [M19.90]  Yes   • Chronic pain [G89.29]  Yes   • Hyperlipidemia [E78.5]  Yes   • GERD (gastroesophageal reflux disease) [K21.9]  Yes   • Depression [F32.A]  Yes   • Epilepsy (HCC) [G40.909]  Yes   • Uncontrolled type 2 diabetes mellitus with hyperglycemia (HCC) [E11.65]  Yes   • Diabetic peripheral neuropathy associated with type 2 diabetes mellitus (HCC) [E11.42]  Yes      Resolved Hospital Problems    Diagnosis Date Resolved POA   • SVT (supraventricular tachycardia) (HCC) [I47.1] 2022 Yes   • Paroxysmal atrial fibrillation (HCC) [I48.0] 2022 Yes      Hospital Course:  Martha Harrell is a 66 y.o. female PMH DM, HLD, A. fib on Eliquis, chronic pain on morphine due to severe osteoarthritis, fibromyalgia presenting with severe onset of pain to her lower extremities, shoulders, neck but found incidentally to be in SVT.  Cardiology consulted.  Metoprolol 50 mg twice daily started.  Patient is now converted into normal sinus rhythm.  Echo showed LVEF 61 to 65% with normal diastolic function.  Prednisone initiated due to severe pain associated with osteoarthritis of multiple joints with improvement of pain.  Patient will be discharged on a cardiac event monitor.     Discharge Follow Up Recommendations for labs/diagnostics:  --Follow-up with PCP in 1 week  -Follow-up with cardiology Dr. Yañez in 2 weeks  -Cardiac event monitor at discharge    Day of Discharge     HPI:   Patient  sitting up in recliner, headache neck pain shoulder pains improved since this morning.  Patient wishing an alcohol with home health services.  Spoke with the patient's son and updated him on the patient's medical condition.  Patient's headache has resolved    Review of Systems  Gen- No fevers, chills  CV- No chest pain, palpitations  Resp- No cough, dyspnea  GI- No N/V/D, abd pain  MS-(+) neck and shoulder pain  Otherwise ROS is negative except as mentioned in the HPI.    Vital Signs:   Temp:  [97.9 °F (36.6 °C)-98.6 °F (37 °C)] 97.9 °F (36.6 °C)  Heart Rate:  [60-68] 65  Resp:  [16-20] 16  BP: (124-145)/(72-84) 129/74     Physical Exam:  Constitutional: Awake, alert, NAD  Eye, sclerae anicteric, no conjunctival injection  HENT: NCAT, mucous membranes moist  Respiratory: Clear to auscultation bilaterally, nonlabored respirations   Cardiovascular: RRR, no murmurs, rubs, or gallop  Gastrointestinal: Positive bowel sounds, soft, nontender, nondistended  Musculoskeletal: No bilateral ankle edema, bilateral compression wraps.  Bilateral lower extremity weakness  Psychiatric: Appropriate affect, cooperative  Neurologic: Oriented x 3, strength symmetric in all extremities, Cranial Nerves grossly intact to confrontation, speech clear  Skin: No rashes    Plan:  SVT-resolved  -Cardiology consulted started metoprolol 50 mg twice a day with improvement.  Patient is now in sinus rhythm.  Patient will receive a cardiac monitor at discharge.  Echo LVEF 61 to 65% with normal diastolic function.    A. Fib-stable  -Eliquis, Rythmol    DM-A1c 7.7  -Insulin adjustments needed due to steroid therapy.    Severe osteoarthritis multiple joints with acute onset of worsening pain in the lower extremities, shoulders, neck.-Improving  -Follows with Dr. Singletary.  Patient continued on MS Contin with as needed Norco.  CK level not elevated.  Prednisone taper with improvement.  Zanaflex was used as needed.      Pertinent  and/or Most Recent  Results     Results from last 7 days   Lab Units 03/18/22  0503 03/17/22  0757   WBC 10*3/mm3 8.64 12.80*   HEMOGLOBIN g/dL 12.4 13.7   HEMATOCRIT % 39.7 42.8   PLATELETS 10*3/mm3 185 219   SODIUM mmol/L  --  141   POTASSIUM mmol/L  --  3.9   CHLORIDE mmol/L  --  103   CO2 mmol/L  --  27.0   BUN mg/dL  --  15   CREATININE mg/dL  --  0.70   GLUCOSE mg/dL  --  118*   CALCIUM mg/dL  --  8.6     Results from last 7 days   Lab Units 03/17/22  0757   BILIRUBIN mg/dL 0.4   ALK PHOS U/L 90   ALT (SGPT) U/L 28   AST (SGOT) U/L 24           Invalid input(s): TG, LDLCALC, LDLREALC  Results from last 7 days   Lab Units 03/18/22  0503 03/17/22  1112 03/17/22  0757   TSH uIU/mL  --   --  1.930   HEMOGLOBIN A1C %  --   --  7.70*   PROBNP pg/mL  --   --  209.0   TROPONIN T ng/mL  --   --  <0.010   LACTATE mmol/L 0.7 0.8 2.4*       Brief Urine Lab Results  (Last result in the past 365 days)      Color   Clarity   Blood   Leuk Est   Nitrite   Protein   CREAT   Urine HCG        03/17/22 0818 Yellow   Clear   Negative   Negative   Negative   Trace                 Microbiology Results Abnormal     Procedure Component Value - Date/Time    Blood Culture - Blood, Arm, Right [[id number]]  (Normal) Collected: 03/17/22 0800    Lab Status: Preliminary result Specimen: Blood from Arm, Right Updated: 03/21/22 0932     Blood Culture No growth at 4 days    Blood Culture - Blood, Arm, Left [[id number]]  (Normal) Collected: 03/17/22 0830    Lab Status: Preliminary result Specimen: Blood from Arm, Left Updated: 03/21/22 0932     Blood Culture No growth at 4 days    COVID PRE-OP / PRE-PROCEDURE SCREENING ORDER (NO ISOLATION) - Swab, Nasopharynx [[id number]]  (Normal) Collected: 03/17/22 1227    Lab Status: Final result Specimen: Swab from Nasopharynx Updated: 03/17/22 1257    Narrative:      The following orders were created for panel order COVID PRE-OP / PRE-PROCEDURE SCREENING ORDER (NO ISOLATION) - Swab, Nasopharynx.  Procedure                                Abnormality         Status                     ---------                               -----------         ------                     COVID-19 and FLU A/B PCR...[[id number]]  Normal              Final result                 Please view results for these tests on the individual orders.    COVID-19 and FLU A/B PCR - Swab, Nasopharynx [[id number]]  (Normal) Collected: 03/17/22 1227    Lab Status: Final result Specimen: Swab from Nasopharynx Updated: 03/17/22 1257     COVID19 Not Detected     Influenza A PCR Not Detected     Influenza B PCR Not Detected    Narrative:      Fact sheet for providers: https://www.fda.gov/media/604755/download    Fact sheet for patients: https://www.fda.gov/media/807106/download    Test performed by PCR.          Imaging Results (All)     Procedure Component Value Units Date/Time    XR Chest 1 View [[id number]] Collected: 03/17/22 0816     Updated: 03/17/22 0819    Narrative:      DATE OF EXAM: 3/17/2022 8:03 AM     PROCEDURE: XR CHEST 1 VW-     INDICATIONS: Dysrhythmia triage protocol     COMPARISON: No comparisons available.     TECHNIQUE: Single radiographic AP view of the chest was obtained.     FINDINGS:  Patient rotated to the right. Heart size indeterminate. Pulmonary  vasculature are within normal limits. Lungs clear other than atelectasis  in the medial lung bases and calcified granulomas. Costophrenic angles  sharp        Impression:      Bibasilar atelectasis. No suspicious infiltrate or edema     This report was finalized on 3/17/2022 8:16 AM by Malcolm Negrete.             Results for orders placed during the hospital encounter of 07/18/19    Duplex Venous Lower Extremity - Left    Interpretation Summary  · Sub-acute left lower extremity deep vein thrombosis noted in the distal femoral, popliteal and peroneal.  · All other veins appeared normal bilaterally.      Results for orders placed during the hospital encounter of 07/18/19    Duplex Venous Lower Extremity -  Left    Interpretation Summary  · Sub-acute left lower extremity deep vein thrombosis noted in the distal femoral, popliteal and peroneal.  · All other veins appeared normal bilaterally.      Results for orders placed during the hospital encounter of 03/17/22    Adult Transthoracic Echo Complete W/ Cont if Necessary Per Protocol    Interpretation Summary  · Left ventricular ejection fraction appears to be 61 - 65%. Left ventricular systolic function is normal.  · Left ventricular diastolic function was normal.  · Calculated right ventricular systolic pressure from tricuspid regurgitation is 20 mmHg.      Pending Labs     Order Current Status    Blood Culture - Blood, Arm, Left Preliminary result    Blood Culture - Blood, Arm, Right Preliminary result        Discharge Details        Discharge Medications      New Medications      Instructions Start Date   metoprolol tartrate 50 MG tablet  Commonly known as: LOPRESSOR   50 mg, Oral, Every 12 Hours Scheduled      predniSONE 20 MG tablet  Commonly known as: DELTASONE   2 tab daily for 3 days, 1 tab daily for 3 days, 1/2 tab daily for 4 days.         Continue These Medications      Instructions Start Date   atorvastatin 20 MG tablet  Commonly known as: LIPITOR   20 mg, Oral, Every Night at Bedtime      B-D UF III MINI PEN NEEDLES 31G X 5 MM misc  Generic drug: Insulin Pen Needle   Use daily with insulin      busPIRone 15 MG tablet  Commonly known as: BUSPAR   No dose, route, or frequency recorded.      calcium carbonate 600 MG tablet  Commonly known as: OS-HOSEA   600 mg, Oral, Daily      DULoxetine 60 MG capsule  Commonly known as: CYMBALTA   60 mg, Oral, Daily      Eliquis 5 MG tablet tablet  Generic drug: apixaban   5 mg, Oral, 2 Times Daily      empagliflozin 25 MG tablet tablet  Commonly known as: Jardiance   25 mg, Oral, Daily      fenofibrate 145 MG tablet  Commonly known as: TRICOR   160 mg.      HYDROcodone-acetaminophen 7.5-325 MG per tablet  Commonly known as:  NORCO   1 tablet, Oral, 3 Times Daily PRN      Insulin Glargine (2 Unit Dial) 300 UNIT/ML solution pen-injector injection  Commonly known as: Toujeo Max SoloStar   Take 100 units at bedtime, after a week go up to 110 units      Toujeo SoloStar 300 UNIT/ML solution pen-injector injection  Generic drug: Insulin Glargine (1 Unit Dial)   ADMINISTER 100 UNITS UNDER THE SKIN DAILY      Insulin Lispro (1 Unit Dial) 100 UNIT/ML solution pen-injector  Commonly known as: HumaLOG KwikPen   Take 20u before lunch (if you eat lunch) and take 40 units before supper      loratadine 10 MG tablet  Commonly known as: CLARITIN   take 1 tablet by mouth once daily if needed      magnesium oxide 400 MG tablet  Commonly known as: MAG-OX   1 tablet, Oral, 2 Times Daily      metFORMIN 1000 MG tablet  Commonly known as: GLUCOPHAGE   1,000 mg, Oral, 2 Times Daily With Meals      Morphine 30 MG 12 hr tablet  Commonly known as: MS CONTIN   30 mg, Oral, 2 Times Daily      omeprazole 20 MG capsule  Commonly known as: priLOSEC   No dose, route, or frequency recorded.      ONE TOUCH ULTRA TEST test strip  Generic drug: glucose blood   Test BS TID      OneTouch Delica Lancets Fine misc   Test BS TID      phenytoin  MG capsule  Commonly known as: DILANTIN   take 2 capsule by mouth twice a day      propafenone 150 MG tablet  Commonly known as: RYTHMOL   150 mg, Oral, 3 Times Daily      RA Fish Oil 1000 MG capsule   1 capsule, Oral, 2 Times Daily      RA Vitamin D-3 125 MCG (5000 UT) capsule capsule  Generic drug: vitamin D3   5,000 Units, Oral, Daily         Stop These Medications    acetaminophen 325 MG tablet  Commonly known as: TYLENOL     Augmentin 500-125 MG per tablet  Generic drug: amoxicillin-clavulanate     furosemide 40 MG tablet  Commonly known as: LASIX     meloxicam 15 MG tablet  Commonly known as: MOBIC     RA Aspirin EC 81 MG EC tablet  Generic drug: aspirin     raNITIdine 150 MG tablet  Commonly known as: ZANTAC             Allergies   Allergen Reactions   • Aleve [Naproxen] Itching         Discharge Disposition:  Home-Health Care Svc    Discharge Diet:  Diet Order   Procedures   • Diet Regular; Cardiac, Consistent Carbohydrate         Discharge Activity:   Activity Instructions     Activity as Tolerated      Up WIth Assist              CODE STATUS:    Code Status and Medical Interventions:   Ordered at: 03/17/22 1333     Code Status (Patient has no pulse and is not breathing):    CPR (Attempt to Resuscitate)     Medical Interventions (Patient has pulse or is breathing):    Full Support         No future appointments.    Additional Instructions for the Follow-ups that You Need to Schedule     Call MD With Problems / Concerns   As directed      Instructions: Call MD for temp >100.4, nausea, vomiting, chest pain, heart palpitations, shortness of breath    Order Comments: Instructions: Call MD for temp >100.4, nausea, vomiting, chest pain, heart palpitations, shortness of breath          Discharge Follow-up with PCP   As directed       Currently Documented PCP:    Ginger Ward APRN    PCP Phone Number:    [phone]     Follow Up Details: F/U with PCP in 1 week         Discharge Follow-up with Specified Provider: Dr Yañez; 2 Weeks   As directed      To: Dr Yañez    Follow Up: 2 Weeks               Time Spent on Discharge:  45 minutes    Electronically signed by SHERRY Damon, 03/21/22, 12:31 PM EDT.

## 2022-03-21 NOTE — PROGRESS NOTES
Met with patient to discuss home health and she is agreeable to [facility]--Gonzalo BLACK([facility])Hospital Liaison

## 2022-03-21 NOTE — CASE MANAGEMENT/SOCIAL WORK
Case Management Discharge Note      Final Note: Patient was offered my assistance with obtaining rehab bed. I saw her this afternooon and she tells me she is going home [facility] has the referral.         Selected Continued Care - Admitted Since 3/17/2022     Destination    No services have been selected for the patient.              Durable Medical Equipment    No services have been selected for the patient.              Dialysis/Infusion    No services have been selected for the patient.              Home Medical Care Coordination complete.    Service Provider Selected Services Address Phone Fax Patient Preferred    [facility]  [facility] [address] [facility] [address] [phone] --          Therapy    No services have been selected for the patient.              Community Resources    No services have been selected for the patient.              Community & DME    No services have been selected for the patient.                       Final Discharge Disposition Code: 06 - home with home health care

## 2022-03-21 NOTE — PLAN OF CARE
Problem: Adult Inpatient Plan of Care  Goal: Plan of Care Review  Outcome: Met  Goal: Patient-Specific Goal (Individualized)  Outcome: Met  Goal: Absence of Hospital-Acquired Illness or Injury  Outcome: Met  Intervention: Identify and Manage Fall Risk  Recent Flowsheet Documentation  Taken 3/21/2022 1000 by Angela Day RN  Safety Promotion/Fall Prevention: activity supervised  Taken 3/21/2022 0748 by Angela Day RN  Safety Promotion/Fall Prevention: activity supervised  Intervention: Prevent Skin Injury  Recent Flowsheet Documentation  Taken 3/21/2022 0748 by Angela Day RN  Body Position: supine  Intervention: Prevent and Manage VTE (Venous Thromboembolism) Risk  Recent Flowsheet Documentation  Taken 3/21/2022 1000 by nAgela Day RN  Activity Management: activity adjusted per tolerance  Taken 3/21/2022 0748 by Angela Day RN  Activity Management: activity adjusted per tolerance  Goal: Optimal Comfort and Wellbeing  Outcome: Met  Goal: Readiness for Transition of Care  Outcome: Met     Problem: Skin Injury Risk Increased  Goal: Skin Health and Integrity  Outcome: Met  Intervention: Optimize Skin Protection  Recent Flowsheet Documentation  Taken 3/21/2022 1000 by Angela Day RN  Head of Bed (HOB) Positioning: HOB elevated  Taken 3/21/2022 0748 by Angela Day RN  Head of Bed (HOB) Positioning: HOB elevated   Goal Outcome Evaluation:

## 2022-03-21 NOTE — CASE MANAGEMENT/SOCIAL WORK
Continued Stay Note  [facility]     Patient Name: Martha Harrell  MRN: [id number]  Today's Date: 3/21/2022    Admit Date: 3/17/2022     Discharge Plan     Row Name 03/21/22 1159       Plan    Plan Home with home health vs inpatient rehab    Plan Comments I spoke with patient this am. She was not sure if she wanted inpatient rehab or home with home health. [facility] will  referral if to home. I gave referrals to [facility] and [facility] if plans are for inpatient rehab.    Final Discharge Disposition Code 03 - skilled nursing facility (SNF)               Discharge Codes    No documentation.               Expected Discharge Date and Time     Expected Discharge Date Expected Discharge Time    Mar 21, 2022             Jes Ramirez RN

## 2022-03-22 ENCOUNTER — HOME CARE VISIT (OUTPATIENT)
Dept: HOME HEALTH SERVICES | Facility: HOME HEALTHCARE | Age: 67
End: 2022-03-22

## 2022-03-22 LAB
BACTERIA SPEC AEROBE CULT: NORMAL
BACTERIA SPEC AEROBE CULT: NORMAL

## 2022-03-22 PROCEDURE — G0299 HHS/HOSPICE OF RN EA 15 MIN: HCPCS

## 2022-03-22 NOTE — OUTREACH NOTE
Prep Survey    Flowsheet Row Responses   [facility] facility patient discharged from? [place]   Is LACE score < 7 ? No   Emergency Room discharge w/ pulse ox? No   Eligibility Readm Mgmt   Discharge diagnosis SVT/Afib   Does the patient have one of the following disease processes/diagnoses(primary or secondary)? Other   Does the patient have Home health ordered? Yes   What is the Home health agency?  [facility]   Is there a DME ordered? No   Prep survey completed? Yes          VIOLA RG - Registered Nurse

## 2022-03-23 ENCOUNTER — HOME CARE VISIT (OUTPATIENT)
Dept: HOME HEALTH SERVICES | Facility: HOME HEALTHCARE | Age: 67
End: 2022-03-23

## 2022-03-23 VITALS
SYSTOLIC BLOOD PRESSURE: 153 MMHG | TEMPERATURE: 98 F | OXYGEN SATURATION: 93 % | RESPIRATION RATE: 16 BRPM | DIASTOLIC BLOOD PRESSURE: 65 MMHG | HEART RATE: 67 BPM

## 2022-03-23 VITALS
DIASTOLIC BLOOD PRESSURE: 78 MMHG | TEMPERATURE: 97.8 F | OXYGEN SATURATION: 95 % | HEART RATE: 70 BPM | RESPIRATION RATE: 18 BRPM | SYSTOLIC BLOOD PRESSURE: 122 MMHG

## 2022-03-23 PROCEDURE — G0151 HHCP-SERV OF PT,EA 15 MIN: HCPCS

## 2022-03-23 NOTE — HOME HEALTH
Martha Harrell is a 66 y.o. female PMH DM, HLD, A. fib on Eliquis, chronic pain on morphine due to severe osteoarthritis, fibromyalgia presenting with severe onset of pain to her lower extremities, shoulders, neck but found incidentally to be in SVT. Cardiology consulted. Metoprolol 50 mg twice daily started. Patient is now converted into normal sinus rhythm. Echo showed LVEF 61 to 65% with normal diastolic function. Prednisone initiated due to severe pain associated with osteoarthritis of multiple joints with improvement of pain. Patient will be discharged on a cardiac event monitor.    Lives in single story home with family; 4 day hospitalization for supraventricular tachycardia; also reported significant increase in neck pain and weakness in legs; PLOF is IND with ADLs and functional mobility with no AD; currently using FWW for mobility and neck pain limits mobility; goal is to get off walker, increase BLE strength, and get rid of neck pain

## 2022-03-24 ENCOUNTER — READMISSION MANAGEMENT (OUTPATIENT)
Dept: CALL CENTER | Facility: HOSPITAL | Age: 67
End: 2022-03-24

## 2022-03-24 NOTE — OUTREACH NOTE
Medical Week 1 Survey    Flowsheet Row Responses   [facility] patient discharged from? [place]   Does the patient have one of the following disease processes/diagnoses(primary or secondary)? Other   Week 1 attempt successful? Yes   Call start time 0856   Call end time 0858   Discharge diagnosis SVT/Afib   Meds reviewed with patient/caregiver? Yes   Is the patient having any side effects they believe may be caused by any medication additions or changes? No   Does the patient have all medications ordered at discharge? Yes   Is the patient taking all medications as directed (includes completed medication regime)? Yes   Does the patient have a primary care provider?  Yes   Does the patient have an appointment with their PCP within 7 days of discharge? Greater than 7 days   Comments regarding PCP 3/29/22   What is preventing the patient from scheduling follow up appointments within 7 days of discharge? --  [unsure]   Nursing Interventions Verified appointment date/time/provider   Has the patient kept scheduled appointments due by today? N/A   What is the Home health agency?  [facility]   Has home health visited the patient within 72 hours of discharge? Yes   Psychosocial issues? No   Did the patient receive a copy of their discharge instructions? Yes   Nursing interventions Reviewed instructions with patient   What is the patient's perception of their health status since discharge? Improving   Is the patient/caregiver able to teach back signs and symptoms related to disease process for when to call PCP? Yes   Is the patient/caregiver able to teach back signs and symptoms related to disease process for when to call 911? Yes   Is the patient/caregiver able to teach back the hierarchy of who to call/visit for symptoms/problems? PCP, Specialist, Home health nurse, Urgent Care, ED, 911 Yes   If the patient is a current smoker, are they able to teach back resources for cessation? Not a smoker  [Quit smoking in 2010]   Week  1 call completed? Yes          MICHAEL BONILLA - Registered Nurse

## 2022-03-28 ENCOUNTER — HOME CARE VISIT (OUTPATIENT)
Dept: HOME HEALTH SERVICES | Facility: HOME HEALTHCARE | Age: 67
End: 2022-03-28

## 2022-03-28 VITALS
DIASTOLIC BLOOD PRESSURE: 87 MMHG | SYSTOLIC BLOOD PRESSURE: 115 MMHG | OXYGEN SATURATION: 92 % | RESPIRATION RATE: 16 BRPM | TEMPERATURE: 97.7 F | HEART RATE: 75 BPM

## 2022-03-28 PROCEDURE — G0299 HHS/HOSPICE OF RN EA 15 MIN: HCPCS

## 2022-03-29 NOTE — CASE COMMUNICATION
Visit from [facility] was cancelled on 3/28/2022 due to patient no show upon arrival for scheduled visit.  Patient with no answer at phone upon arrival to reschedule.     Patient contacted by phone and the following items were addressed:  -Caregiver available to provide care as needed.  -Adequate supplies in the home (e.g. food, meds)  -Patient has heat/electricity in the home    For your records only.  As per home health [facility] MD tino must be notified of missed/cancelled visits; therefore the prescribed frequency was not met.

## 2022-04-01 ENCOUNTER — HOME CARE VISIT (OUTPATIENT)
Dept: HOME HEALTH SERVICES | Facility: HOME HEALTHCARE | Age: 67
End: 2022-04-01

## 2022-04-01 ENCOUNTER — READMISSION MANAGEMENT (OUTPATIENT)
Dept: CALL CENTER | Facility: HOSPITAL | Age: 67
End: 2022-04-01

## 2022-04-01 PROCEDURE — G0151 HHCP-SERV OF PT,EA 15 MIN: HCPCS

## 2022-04-01 NOTE — OUTREACH NOTE
Medical Week 2 Survey    Flowsheet Row Responses   [facility] patient discharged from? [place]   Does the patient have one of the following disease processes/diagnoses(primary or secondary)? Other   Week 2 attempt successful? No   Unsuccessful attempts Attempt 1          ALIA SILVA - Registered Nurse

## 2022-04-03 VITALS
OXYGEN SATURATION: 97 % | RESPIRATION RATE: 19 BRPM | TEMPERATURE: 97.3 F | DIASTOLIC BLOOD PRESSURE: 71 MMHG | SYSTOLIC BLOOD PRESSURE: 124 MMHG | HEART RATE: 73 BPM

## 2022-04-04 ENCOUNTER — HOME CARE VISIT (OUTPATIENT)
Dept: HOME HEALTH SERVICES | Facility: HOME HEALTHCARE | Age: 67
End: 2022-04-04

## 2022-04-04 VITALS
DIASTOLIC BLOOD PRESSURE: 76 MMHG | RESPIRATION RATE: 18 BRPM | HEART RATE: 80 BPM | TEMPERATURE: 96.8 F | OXYGEN SATURATION: 95 % | SYSTOLIC BLOOD PRESSURE: 122 MMHG

## 2022-04-04 PROCEDURE — G0151 HHCP-SERV OF PT,EA 15 MIN: HCPCS

## 2022-04-05 ENCOUNTER — READMISSION MANAGEMENT (OUTPATIENT)
Dept: CALL CENTER | Facility: HOSPITAL | Age: 67
End: 2022-04-05

## 2022-04-05 ENCOUNTER — HOME CARE VISIT (OUTPATIENT)
Dept: HOME HEALTH SERVICES | Facility: HOME HEALTHCARE | Age: 67
End: 2022-04-05

## 2022-04-05 PROCEDURE — G0299 HHS/HOSPICE OF RN EA 15 MIN: HCPCS

## 2022-04-05 NOTE — OUTREACH NOTE
Medical Week 2 Survey    Flowsheet Row Responses   [facility] patient discharged from? [place]   Does the patient have one of the following disease processes/diagnoses(primary or secondary)? Other   Week 2 attempt successful? No   Unsuccessful attempts Attempt 2          ALESHA BANKS - Registered Nurse

## 2022-04-06 VITALS
SYSTOLIC BLOOD PRESSURE: 132 MMHG | TEMPERATURE: 97.8 F | DIASTOLIC BLOOD PRESSURE: 65 MMHG | HEART RATE: 84 BPM | OXYGEN SATURATION: 95 % | RESPIRATION RATE: 16 BRPM

## 2022-04-08 ENCOUNTER — HOME CARE VISIT (OUTPATIENT)
Dept: HOME HEALTH SERVICES | Facility: HOME HEALTHCARE | Age: 67
End: 2022-04-08

## 2022-04-08 PROCEDURE — G0157 HHC PT ASSISTANT EA 15: HCPCS

## 2022-04-11 ENCOUNTER — HOME CARE VISIT (OUTPATIENT)
Dept: HOME HEALTH SERVICES | Facility: HOME HEALTHCARE | Age: 67
End: 2022-04-11

## 2022-04-11 PROCEDURE — G0299 HHS/HOSPICE OF RN EA 15 MIN: HCPCS

## 2022-04-12 ENCOUNTER — HOME CARE VISIT (OUTPATIENT)
Dept: HOME HEALTH SERVICES | Facility: HOME HEALTHCARE | Age: 67
End: 2022-04-12

## 2022-04-12 VITALS
DIASTOLIC BLOOD PRESSURE: 77 MMHG | TEMPERATURE: 97.7 F | RESPIRATION RATE: 16 BRPM | SYSTOLIC BLOOD PRESSURE: 136 MMHG | HEART RATE: 73 BPM | OXYGEN SATURATION: 95 %

## 2022-04-12 VITALS
OXYGEN SATURATION: 96 % | DIASTOLIC BLOOD PRESSURE: 86 MMHG | HEART RATE: 60 BPM | TEMPERATURE: 97.2 F | RESPIRATION RATE: 18 BRPM | SYSTOLIC BLOOD PRESSURE: 124 MMHG

## 2022-04-12 PROCEDURE — G0157 HHC PT ASSISTANT EA 15: HCPCS

## 2022-04-13 ENCOUNTER — READMISSION MANAGEMENT (OUTPATIENT)
Dept: CALL CENTER | Facility: HOSPITAL | Age: 67
End: 2022-04-13

## 2022-04-13 NOTE — OUTREACH NOTE
Medical Week 3 Survey    Flowsheet Row Responses   [facility] patient discharged from? [place]   Does the patient have one of the following disease processes/diagnoses(primary or secondary)? Other   Week 3 attempt successful? Yes   Call start time 1345   Call end time 1347   Discharge diagnosis SVT/Afib   Meds reviewed with patient/caregiver? Yes   Is the patient taking all medications as directed (includes completed medication regime)? Yes   Medication comments Completed Prednisone   Has the patient kept scheduled appointments due by today? Yes   Comments Going to Cardiologist today for heart moniter.   What is the Home health agency?  [facility]    Home health comments PT is coming   DME comments Walker for ambulation   What is the patient's perception of their health status since discharge? Improving   Week 3 Call Completed? Yes          MELISSA IRENE - Registered Nurse

## 2022-04-20 ENCOUNTER — HOME CARE VISIT (OUTPATIENT)
Dept: HOME HEALTH SERVICES | Facility: HOME HEALTHCARE | Age: 67
End: 2022-04-20

## 2022-04-20 PROCEDURE — G0151 HHCP-SERV OF PT,EA 15 MIN: HCPCS

## 2022-04-21 VITALS
DIASTOLIC BLOOD PRESSURE: 82 MMHG | HEART RATE: 88 BPM | RESPIRATION RATE: 30 BRPM | TEMPERATURE: 98.1 F | OXYGEN SATURATION: 96 % | SYSTOLIC BLOOD PRESSURE: 124 MMHG

## 2022-04-21 NOTE — HOME HEALTH
Martha Harrell is a 66 y.o. female PMH DM, HLD, A. fib on Eliquis, chronic pain on morphine due to severe osteoarthritis, fibromyalgia presenting with severe onset of pain to her lower extremities, shoulders, neck but found incidentally to be in SVT. Pt has completed her 4 week POC for home health services and is in agreement for discharge. Pt has met all goals for PT treatment. Pt is in agreement to transition to outpatient physical therapy to treat continued cervical pain as well as some minor strength deficits.

## 2023-02-23 ENCOUNTER — OFFICE VISIT (OUTPATIENT)
Dept: ORTHOPEDIC SURGERY | Facility: CLINIC | Age: 68
End: 2023-02-23
Payer: MEDICARE

## 2023-02-23 VITALS
WEIGHT: 179.2 LBS | BODY MASS INDEX: 32.97 KG/M2 | SYSTOLIC BLOOD PRESSURE: 111 MMHG | DIASTOLIC BLOOD PRESSURE: 71 MMHG | HEIGHT: 62 IN

## 2023-02-23 DIAGNOSIS — E11.65 UNCONTROLLED TYPE 2 DIABETES MELLITUS WITH HYPERGLYCEMIA: ICD-10-CM

## 2023-02-23 DIAGNOSIS — M65.331 TRIGGER MIDDLE FINGER OF RIGHT HAND: Primary | ICD-10-CM

## 2023-02-23 PROCEDURE — 99213 OFFICE O/P EST LOW 20 MIN: CPT | Performed by: PHYSICIAN ASSISTANT

## 2023-02-23 RX ORDER — FUROSEMIDE 40 MG/1
1 TABLET ORAL EVERY 12 HOURS SCHEDULED
COMMUNITY
Start: 2023-01-25

## 2023-02-23 RX ORDER — FENOFIBRATE 160 MG/1
TABLET ORAL
COMMUNITY
Start: 2023-02-20

## 2023-02-23 RX ORDER — ALBUTEROL SULFATE 90 UG/1
1 AEROSOL, METERED RESPIRATORY (INHALATION)
COMMUNITY

## 2023-02-23 RX ORDER — CHLORAL HYDRATE 500 MG
CAPSULE ORAL
COMMUNITY

## 2023-02-23 RX ORDER — LANOLIN ALCOHOL/MO/W.PET/CERES
1 CREAM (GRAM) TOPICAL EVERY 12 HOURS SCHEDULED
COMMUNITY
Start: 2023-01-25

## 2023-02-23 RX ORDER — GABAPENTIN 100 MG/1
CAPSULE ORAL
COMMUNITY
Start: 2023-02-02

## 2023-02-23 RX ORDER — OMEPRAZOLE 20 MG/1
20 CAPSULE, DELAYED RELEASE ORAL DAILY
COMMUNITY

## 2023-02-23 RX ORDER — ATORVASTATIN CALCIUM 40 MG/1
1 TABLET, FILM COATED ORAL DAILY
COMMUNITY
Start: 2023-01-28

## 2023-02-23 RX ORDER — BUSPIRONE HYDROCHLORIDE 15 MG/1
15 TABLET ORAL DAILY
COMMUNITY
End: 2023-02-23

## 2023-02-23 RX ORDER — PROPAFENONE HYDROCHLORIDE 225 MG/1
TABLET, FILM COATED ORAL
COMMUNITY
Start: 2022-12-20

## 2023-02-23 NOTE — PROGRESS NOTES
[facility] Note        Subjective     CC: Follow-up (Trigger Middle Finger Right Hand, last OV 10/12/21)      DESIREE Harrell is a 67 y.o. female.  Right-hand-dominant.  Established patient presents for evaluation of right middle finger pain and locking.  Symptoms/pain have been ongoing for over 2 months.  TIM: No history of injury or trauma.    Pain scale: 7/10.  Severity of the pain moderate to severe at times.  Quality of the pain aching, throbbing, stabbing.  Reports pain can radiate up the volar aspect of forearm when it triggers or locks.  Associated symptoms swelling.  Activity related to pain gripping, grasping or movement of the finger.  Pain eased by resting.  No reported numbness or tingling.  Prior treatments none.  Patient reports symptoms happen both day and night.  Sometimes she wakes in the morning and the finger is in the down position and she has to manually pull it into extension.    Notes difficulty with gripping and grasping activities.    Patient is on chronic anticoagulation (Eliquis) for history of multiple blood clots.  Did recently see hematology at  for hypercoagulability they recommend she remain on her Eliquis.  History of type 2 diabetes with hyperglycemia.  On Norco for chronic pain.    ROS:    Constiutional:Pt denies fever, chills, nausea, or vomiting.  MSK:as above        Objective      Past Medical History  Past Medical History:   Diagnosis Date   • Anemia    • Asthma    • Chronic bronchitis (HCC)    • Depression    • Diabetes mellitus (HCC)    • Epilepsy (HCC)    • Fibromyalgia, primary    • GERD (gastroesophageal reflux disease)    • Hyperlipidemia    • Leg DVT (deep venous thromboembolism), acute, left (HCC) 2019     Social History     Socioeconomic History   • Marital status:    Tobacco Use   • Smoking status: Former     Types: Cigarettes     Quit date: 2010     Years since quittin.1   • Smokeless tobacco: Never  "  Substance and Sexual Activity   • Alcohol use: No   • Drug use: No   • Sexual activity: Defer          Physical Exam  /71   Ht 157.5 cm (62.01\")   Wt 81.3 kg (179 lb 3.2 oz)   BMI 32.77 kg/m²     Body mass index is 32.77 kg/m².    Patient is well nourished and well developed.        Ortho Exam  Right hand/middle finger  Skin: intact without rash, redness, warmth.  Mild soft tissue swelling noted volar aspect palm/metacarpal head.  Tenderness: (+) over region A1 pulley, thickening noted  ROM: FROM finger with triggering reproduced during exam  FDS, FDP intact  Motor: intact R/U/M/AIN/PIN  Sensory: intact R/U/M  Vascular: 2+ radial pulse, brisk CR each digit       Imaging/Labs/EMG Reviewed:  Ordered right hand plain films.  Imaging read/interpreted by Dr. Pina.    Right Hand X-Ray     Indication: Pain     Views:  AP, Lateral, and Oblique      Comparison: Right hand 10/12/2021     Findings:  No fracture  No bony lesion  Chondrocalcinosis of the TFCC is noted along with radius scaphoid degenerative changes.  Severe thumb CMC arthritis is noted.     Impression:   Degenerative changes as noted above.  Chondrocalcinosis TFCC.  No acute bony abnormality.    Laboratory data  Review of notes in [facility] showed last appointment with [facility], 2/1/2023.  A1c: 11%      Assessment:  1. Trigger middle finger of right hand    2. Uncontrolled type 2 diabetes mellitus with hyperglycemia (HCC)        Plan:  1. Right hand middle finger trigger digit--imaging showed degenerative changes throughout hand, no acute bony abnormalities.  2. Reviewed imaging.  3. Treatment options were discussed with the patient to include observation, injection, surgery.  4. Diabetes--uncontrolled diabetes with recent A1c of 11.  5. With her A1c being 11 she is not a candidate for injection or surgery.  Patient verbalized understanding.  6. She needs to continue working with her endocrinologist to get her blood sugar levels " under better control.  For us, we would require an A1c under 9 and blood sugars less than 200 in order to proceed with injections or surgery.  7. Provided patient with a extension splint to wear at night to prevent locking at night.  8. Recommend OTC pain medication as needed.  9. Follow up once she gets her sugars under control.  Additionally if she chooses to proceed with surgical intervention she will need to get clearance to be off her Eliquis for approximately 1 week prior to surgery.  She can go on to begin talking to her PCP regarding the medical clearance.  10. Questions and concerns answered.      Sondra Barrera PA-C  02/27/23  15:26 EST      Dictated Utilizing Dragon Dictation.

## 2023-05-25 ENCOUNTER — HOSPITAL ENCOUNTER (INPATIENT)
Facility: HOSPITAL | Age: 68
LOS: 4 days | Discharge: HOME | DRG: 301 | End: 2023-05-31
Attending: EMERGENCY MEDICINE | Admitting: INTERNAL MEDICINE
Payer: MEDICARE

## 2023-05-25 ENCOUNTER — APPOINTMENT (OUTPATIENT)
Dept: CT IMAGING | Facility: HOSPITAL | Age: 68
DRG: 301 | End: 2023-05-25
Payer: MEDICARE

## 2023-05-25 ENCOUNTER — APPOINTMENT (OUTPATIENT)
Dept: CARDIOLOGY | Facility: HOSPITAL | Age: 68
DRG: 301 | End: 2023-05-25
Payer: MEDICARE

## 2023-05-25 DIAGNOSIS — I82.411 ACUTE DEEP VEIN THROMBOSIS (DVT) OF FEMORAL VEIN OF RIGHT LOWER EXTREMITY: Primary | ICD-10-CM

## 2023-05-25 DIAGNOSIS — I82.412 ACUTE DEEP VEIN THROMBOSIS (DVT) OF FEMORAL VEIN OF LEFT LOWER EXTREMITY: ICD-10-CM

## 2023-05-25 DIAGNOSIS — Z78.9 FAILURE OF OUTPATIENT TREATMENT: ICD-10-CM

## 2023-05-25 PROBLEM — I48.0 PAROXYSMAL ATRIAL FIBRILLATION: Status: ACTIVE | Noted: 2023-05-25

## 2023-05-25 LAB
ALBUMIN SERPL-MCNC: 3.8 G/DL (ref 3.5–5.2)
ALBUMIN/GLOB SERPL: 1 G/DL
ALP SERPL-CCNC: 99 U/L (ref 39–117)
ALT SERPL W P-5'-P-CCNC: 10 U/L (ref 1–33)
ANION GAP SERPL CALCULATED.3IONS-SCNC: 12 MMOL/L (ref 5–15)
APTT PPP: 25.8 SECONDS (ref 60–90)
AST SERPL-CCNC: 20 U/L (ref 1–32)
BASOPHILS # BLD AUTO: 0.03 10*3/MM3 (ref 0–0.2)
BASOPHILS NFR BLD AUTO: 0.3 % (ref 0–1.5)
BH CV LOW VAS LEFT COMMON FEMORAL SPONT: 1
BH CV LOW VAS RIGHT COMMON FEMORAL SPONT: 1
BH CV LOW VAS RIGHT DISTAL FEMORAL SPONT: 1
BH CV LOW VAS RIGHT EXTERNAL ILIAC SPONT: 1
BH CV LOW VAS RIGHT GASTRONEMIUS VESSEL: 1
BH CV LOW VAS RIGHT GREATER SAPH AK VESSEL: 1
BH CV LOW VAS RIGHT LESSER SAPH VESSEL: 1
BH CV LOW VAS RIGHT MID FEMORAL SPONT: 1
BH CV LOW VAS RIGHT PERONEAL VESSEL: 1
BH CV LOW VAS RIGHT POPLITEAL SPONT: 1
BH CV LOW VAS RIGHT POSTERIOR TIBIAL VESSEL: 1
BH CV LOW VAS RIGHT PROFUNDA FEMORAL SPONT: 1
BH CV LOW VAS RIGHT PROXIMAL FEMORAL SPONT: 1
BH CV LOW VAS RIGHT SAPHENOFEMORAL JUNCTION SPONT: 1
BH CV LOWER VASCULAR LEFT COMMON FEMORAL COMPRESS: NORMAL
BH CV LOWER VASCULAR LEFT COMMON FEMORAL PHASIC: NORMAL
BH CV LOWER VASCULAR LEFT COMMON FEMORAL SPONT: NORMAL
BH CV LOWER VASCULAR RIGHT COMMON FEMORAL COMPRESS: NORMAL
BH CV LOWER VASCULAR RIGHT COMMON FEMORAL PHASIC: NORMAL
BH CV LOWER VASCULAR RIGHT COMMON FEMORAL SPONT: NORMAL
BH CV LOWER VASCULAR RIGHT DISTAL FEMORAL AUGMENT: NORMAL
BH CV LOWER VASCULAR RIGHT DISTAL FEMORAL COMPETENT: NORMAL
BH CV LOWER VASCULAR RIGHT DISTAL FEMORAL COMPRESS: NORMAL
BH CV LOWER VASCULAR RIGHT DISTAL FEMORAL PHASIC: NORMAL
BH CV LOWER VASCULAR RIGHT DISTAL FEMORAL SPONT: NORMAL
BH CV LOWER VASCULAR RIGHT EXTERNAL ILIAC COMPRESS: NORMAL
BH CV LOWER VASCULAR RIGHT EXTERNAL ILIAC PHASIC: NORMAL
BH CV LOWER VASCULAR RIGHT EXTERNAL ILIAC SPONT: NORMAL
BH CV LOWER VASCULAR RIGHT GASTRONEMIUS COMPRESS: NORMAL
BH CV LOWER VASCULAR RIGHT GREATER SAPH AK COMPRESS: NORMAL
BH CV LOWER VASCULAR RIGHT GREATER SAPH BK COMPRESS: NORMAL
BH CV LOWER VASCULAR RIGHT LESSER SAPH COMPRESS: NORMAL
BH CV LOWER VASCULAR RIGHT MID FEMORAL COMPRESS: NORMAL
BH CV LOWER VASCULAR RIGHT MID FEMORAL PHASIC: NORMAL
BH CV LOWER VASCULAR RIGHT MID FEMORAL SPONT: NORMAL
BH CV LOWER VASCULAR RIGHT PERONEAL COMPRESS: NORMAL
BH CV LOWER VASCULAR RIGHT POPLITEAL COMPRESS: NORMAL
BH CV LOWER VASCULAR RIGHT POPLITEAL PHASIC: NORMAL
BH CV LOWER VASCULAR RIGHT POPLITEAL SPONT: NORMAL
BH CV LOWER VASCULAR RIGHT POSTERIOR TIBIAL COMPRESS: NORMAL
BH CV LOWER VASCULAR RIGHT PROFUNDA FEMORAL COMPRESS: NORMAL
BH CV LOWER VASCULAR RIGHT PROXIMAL FEMORAL COMPRESS: NORMAL
BH CV LOWER VASCULAR RIGHT PROXIMAL FEMORAL PHASIC: NORMAL
BH CV LOWER VASCULAR RIGHT PROXIMAL FEMORAL SPONT: NORMAL
BH CV LOWER VASCULAR RIGHT SAPHENOFEMORAL JUNCTION COMPRESS: NORMAL
BH CV VAS PRELIMINARY FINDINGS SCRIPTING: 1
BILIRUB SERPL-MCNC: 0.7 MG/DL (ref 0–1.2)
BUN SERPL-MCNC: 37 MG/DL (ref 8–23)
BUN/CREAT SERPL: 51.4 (ref 7–25)
CALCIUM SPEC-SCNC: 9.4 MG/DL (ref 8.6–10.5)
CHLORIDE SERPL-SCNC: 94 MMOL/L (ref 98–107)
CO2 SERPL-SCNC: 25 MMOL/L (ref 22–29)
CREAT SERPL-MCNC: 0.72 MG/DL (ref 0.57–1)
DEPRECATED RDW RBC AUTO: 46.1 FL (ref 37–54)
EGFRCR SERPLBLD CKD-EPI 2021: 90.6 ML/MIN/1.73
EOSINOPHIL # BLD AUTO: 0.07 10*3/MM3 (ref 0–0.4)
EOSINOPHIL NFR BLD AUTO: 0.7 % (ref 0.3–6.2)
ERYTHROCYTE [DISTWIDTH] IN BLOOD BY AUTOMATED COUNT: 15.3 % (ref 12.3–15.4)
GLOBULIN UR ELPH-MCNC: 4 GM/DL
GLUCOSE BLDC GLUCOMTR-MCNC: 357 MG/DL (ref 70–130)
GLUCOSE SERPL-MCNC: 392 MG/DL (ref 65–99)
HCT VFR BLD AUTO: 43.9 % (ref 34–46.6)
HGB BLD-MCNC: 13.9 G/DL (ref 12–15.9)
IMM GRANULOCYTES # BLD AUTO: 0.03 10*3/MM3 (ref 0–0.05)
IMM GRANULOCYTES NFR BLD AUTO: 0.3 % (ref 0–0.5)
INR PPP: 1.22 (ref 0.89–1.12)
LYMPHOCYTES # BLD AUTO: 1.18 10*3/MM3 (ref 0.7–3.1)
LYMPHOCYTES NFR BLD AUTO: 11.1 % (ref 19.6–45.3)
MAXIMAL PREDICTED HEART RATE: 151 BPM
MCH RBC QN AUTO: 26.6 PG (ref 26.6–33)
MCHC RBC AUTO-ENTMCNC: 31.7 G/DL (ref 31.5–35.7)
MCV RBC AUTO: 83.9 FL (ref 79–97)
MONOCYTES # BLD AUTO: 0.77 10*3/MM3 (ref 0.1–0.9)
MONOCYTES NFR BLD AUTO: 7.2 % (ref 5–12)
NEUTROPHILS NFR BLD AUTO: 8.55 10*3/MM3 (ref 1.7–7)
NEUTROPHILS NFR BLD AUTO: 80.4 % (ref 42.7–76)
NRBC BLD AUTO-RTO: 0 /100 WBC (ref 0–0.2)
PLATELET # BLD AUTO: 198 10*3/MM3 (ref 140–450)
PMV BLD AUTO: 10 FL (ref 6–12)
POTASSIUM SERPL-SCNC: 5.1 MMOL/L (ref 3.5–5.2)
PROT SERPL-MCNC: 7.8 G/DL (ref 6–8.5)
PROTHROMBIN TIME: 15.6 SECONDS (ref 12.2–14.5)
RBC # BLD AUTO: 5.23 10*6/MM3 (ref 3.77–5.28)
SODIUM SERPL-SCNC: 131 MMOL/L (ref 136–145)
STRESS TARGET HR: 128 BPM
UFH PPP CHRO-ACNC: 0.1 IU/ML (ref 0.3–0.7)
WBC NRBC COR # BLD: 10.63 10*3/MM3 (ref 3.4–10.8)

## 2023-05-25 PROCEDURE — 74177 CT ABD & PELVIS W/CONTRAST: CPT

## 2023-05-25 PROCEDURE — G0378 HOSPITAL OBSERVATION PER HR: HCPCS

## 2023-05-25 PROCEDURE — 25010000002 HEPARIN (PORCINE) 25000-0.45 UT/250ML-% SOLUTION: Performed by: INTERNAL MEDICINE

## 2023-05-25 PROCEDURE — 99285 EMERGENCY DEPT VISIT HI MDM: CPT

## 2023-05-25 PROCEDURE — 82948 REAGENT STRIP/BLOOD GLUCOSE: CPT

## 2023-05-25 PROCEDURE — 93971 EXTREMITY STUDY: CPT

## 2023-05-25 PROCEDURE — 25510000001 IOPAMIDOL PER 1 ML: Performed by: EMERGENCY MEDICINE

## 2023-05-25 PROCEDURE — 93971 EXTREMITY STUDY: CPT | Performed by: INTERNAL MEDICINE

## 2023-05-25 PROCEDURE — 71275 CT ANGIOGRAPHY CHEST: CPT

## 2023-05-25 PROCEDURE — 85610 PROTHROMBIN TIME: CPT | Performed by: EMERGENCY MEDICINE

## 2023-05-25 PROCEDURE — 85520 HEPARIN ASSAY: CPT | Performed by: INTERNAL MEDICINE

## 2023-05-25 PROCEDURE — 85025 COMPLETE CBC W/AUTO DIFF WBC: CPT | Performed by: EMERGENCY MEDICINE

## 2023-05-25 PROCEDURE — 80053 COMPREHEN METABOLIC PANEL: CPT | Performed by: EMERGENCY MEDICINE

## 2023-05-25 PROCEDURE — 63710000001 INSULIN LISPRO (HUMAN) PER 5 UNITS: Performed by: NURSE PRACTITIONER

## 2023-05-25 PROCEDURE — 25510000001 IOPAMIDOL 61 % SOLUTION: Performed by: EMERGENCY MEDICINE

## 2023-05-25 PROCEDURE — 85730 THROMBOPLASTIN TIME PARTIAL: CPT | Performed by: EMERGENCY MEDICINE

## 2023-05-25 PROCEDURE — 25010000002 HEPARIN (PORCINE) PER 1000 UNITS: Performed by: INTERNAL MEDICINE

## 2023-05-25 RX ORDER — HEPARIN SODIUM 1000 [USP'U]/ML
80 INJECTION, SOLUTION INTRAVENOUS; SUBCUTANEOUS ONCE
Status: COMPLETED | OUTPATIENT
Start: 2023-05-25 | End: 2023-05-25

## 2023-05-25 RX ORDER — BISACODYL 10 MG
10 SUPPOSITORY, RECTAL RECTAL DAILY PRN
Status: DISCONTINUED | OUTPATIENT
Start: 2023-05-25 | End: 2023-05-31 | Stop reason: HOSPADM

## 2023-05-25 RX ORDER — SODIUM CHLORIDE 9 MG/ML
40 INJECTION, SOLUTION INTRAVENOUS AS NEEDED
Status: DISCONTINUED | OUTPATIENT
Start: 2023-05-25 | End: 2023-05-31 | Stop reason: HOSPADM

## 2023-05-25 RX ORDER — ALBUTEROL SULFATE 2.5 MG/3ML
2.5 SOLUTION RESPIRATORY (INHALATION) EVERY 4 HOURS PRN
Status: DISCONTINUED | OUTPATIENT
Start: 2023-05-25 | End: 2023-05-31 | Stop reason: HOSPADM

## 2023-05-25 RX ORDER — HEPARIN SODIUM 10000 [USP'U]/100ML
16 INJECTION, SOLUTION INTRAVENOUS
Status: DISCONTINUED | OUTPATIENT
Start: 2023-05-25 | End: 2023-05-27

## 2023-05-25 RX ORDER — PROPAFENONE HYDROCHLORIDE 150 MG/1
150 TABLET, COATED ORAL EVERY 8 HOURS SCHEDULED
Status: DISCONTINUED | OUTPATIENT
Start: 2023-05-25 | End: 2023-05-31 | Stop reason: HOSPADM

## 2023-05-25 RX ORDER — ATORVASTATIN CALCIUM 40 MG/1
40 TABLET, FILM COATED ORAL DAILY
Status: DISCONTINUED | OUTPATIENT
Start: 2023-05-26 | End: 2023-05-31 | Stop reason: HOSPADM

## 2023-05-25 RX ORDER — HEPARIN SODIUM 1000 [USP'U]/ML
25 INJECTION, SOLUTION INTRAVENOUS; SUBCUTANEOUS AS NEEDED
Status: DISCONTINUED | OUTPATIENT
Start: 2023-05-25 | End: 2023-05-25

## 2023-05-25 RX ORDER — CETIRIZINE HYDROCHLORIDE 10 MG/1
5 TABLET ORAL DAILY
Status: DISCONTINUED | OUTPATIENT
Start: 2023-05-26 | End: 2023-05-31 | Stop reason: HOSPADM

## 2023-05-25 RX ORDER — PHENYTOIN SODIUM 100 MG/1
200 CAPSULE, EXTENDED RELEASE ORAL EVERY 12 HOURS SCHEDULED
Status: DISCONTINUED | OUTPATIENT
Start: 2023-05-25 | End: 2023-05-31 | Stop reason: HOSPADM

## 2023-05-25 RX ORDER — AMOXICILLIN 250 MG
2 CAPSULE ORAL 2 TIMES DAILY
Status: DISCONTINUED | OUTPATIENT
Start: 2023-05-25 | End: 2023-05-31 | Stop reason: HOSPADM

## 2023-05-25 RX ORDER — MORPHINE SULFATE 30 MG/1
30 TABLET, FILM COATED, EXTENDED RELEASE ORAL 2 TIMES DAILY
Status: DISCONTINUED | OUTPATIENT
Start: 2023-05-25 | End: 2023-05-31 | Stop reason: HOSPADM

## 2023-05-25 RX ORDER — PANTOPRAZOLE SODIUM 40 MG/1
40 TABLET, DELAYED RELEASE ORAL
Status: DISCONTINUED | OUTPATIENT
Start: 2023-05-26 | End: 2023-05-31 | Stop reason: HOSPADM

## 2023-05-25 RX ORDER — ACETAMINOPHEN 160 MG/5ML
650 SOLUTION ORAL EVERY 4 HOURS PRN
Status: DISCONTINUED | OUTPATIENT
Start: 2023-05-25 | End: 2023-05-31 | Stop reason: HOSPADM

## 2023-05-25 RX ORDER — NICOTINE POLACRILEX 4 MG
15 LOZENGE BUCCAL
Status: DISCONTINUED | OUTPATIENT
Start: 2023-05-25 | End: 2023-05-31 | Stop reason: HOSPADM

## 2023-05-25 RX ORDER — DEXTROSE MONOHYDRATE 25 G/50ML
25 INJECTION, SOLUTION INTRAVENOUS
Status: DISCONTINUED | OUTPATIENT
Start: 2023-05-25 | End: 2023-05-31 | Stop reason: HOSPADM

## 2023-05-25 RX ORDER — HEPARIN SODIUM 1000 [USP'U]/ML
50 INJECTION, SOLUTION INTRAVENOUS; SUBCUTANEOUS AS NEEDED
Status: DISCONTINUED | OUTPATIENT
Start: 2023-05-25 | End: 2023-05-25

## 2023-05-25 RX ORDER — IBUPROFEN 600 MG/1
1 TABLET ORAL
Status: DISCONTINUED | OUTPATIENT
Start: 2023-05-25 | End: 2023-05-31 | Stop reason: HOSPADM

## 2023-05-25 RX ORDER — POLYETHYLENE GLYCOL 3350 17 G/17G
17 POWDER, FOR SOLUTION ORAL DAILY PRN
Status: DISCONTINUED | OUTPATIENT
Start: 2023-05-25 | End: 2023-05-31 | Stop reason: HOSPADM

## 2023-05-25 RX ORDER — ACETAMINOPHEN 325 MG/1
650 TABLET ORAL EVERY 4 HOURS PRN
Status: DISCONTINUED | OUTPATIENT
Start: 2023-05-25 | End: 2023-05-31 | Stop reason: HOSPADM

## 2023-05-25 RX ORDER — SODIUM CHLORIDE 0.9 % (FLUSH) 0.9 %
10 SYRINGE (ML) INJECTION EVERY 12 HOURS SCHEDULED
Status: DISCONTINUED | OUTPATIENT
Start: 2023-05-25 | End: 2023-05-31 | Stop reason: HOSPADM

## 2023-05-25 RX ORDER — INSULIN LISPRO 100 [IU]/ML
2-9 INJECTION, SOLUTION INTRAVENOUS; SUBCUTANEOUS
Status: DISCONTINUED | OUTPATIENT
Start: 2023-05-25 | End: 2023-05-31 | Stop reason: HOSPADM

## 2023-05-25 RX ORDER — NITROGLYCERIN 0.4 MG/1
0.4 TABLET SUBLINGUAL
Status: DISCONTINUED | OUTPATIENT
Start: 2023-05-25 | End: 2023-05-31 | Stop reason: HOSPADM

## 2023-05-25 RX ORDER — BUSPIRONE HYDROCHLORIDE 15 MG/1
15 TABLET ORAL EVERY 12 HOURS SCHEDULED
Status: DISCONTINUED | OUTPATIENT
Start: 2023-05-25 | End: 2023-05-31 | Stop reason: HOSPADM

## 2023-05-25 RX ORDER — BISACODYL 5 MG/1
5 TABLET, DELAYED RELEASE ORAL DAILY PRN
Status: DISCONTINUED | OUTPATIENT
Start: 2023-05-25 | End: 2023-05-31 | Stop reason: HOSPADM

## 2023-05-25 RX ORDER — DULOXETIN HYDROCHLORIDE 60 MG/1
60 CAPSULE, DELAYED RELEASE ORAL DAILY
Status: DISCONTINUED | OUTPATIENT
Start: 2023-05-26 | End: 2023-05-31 | Stop reason: HOSPADM

## 2023-05-25 RX ORDER — SODIUM CHLORIDE 0.9 % (FLUSH) 0.9 %
10 SYRINGE (ML) INJECTION AS NEEDED
Status: DISCONTINUED | OUTPATIENT
Start: 2023-05-25 | End: 2023-05-31 | Stop reason: HOSPADM

## 2023-05-25 RX ORDER — GABAPENTIN 100 MG/1
100 CAPSULE ORAL NIGHTLY
Status: DISCONTINUED | OUTPATIENT
Start: 2023-05-25 | End: 2023-05-31 | Stop reason: HOSPADM

## 2023-05-25 RX ORDER — ACETAMINOPHEN 650 MG/1
650 SUPPOSITORY RECTAL EVERY 4 HOURS PRN
Status: DISCONTINUED | OUTPATIENT
Start: 2023-05-25 | End: 2023-05-31 | Stop reason: HOSPADM

## 2023-05-25 RX ORDER — METOPROLOL TARTRATE 50 MG/1
50 TABLET, FILM COATED ORAL EVERY 12 HOURS SCHEDULED
Status: DISCONTINUED | OUTPATIENT
Start: 2023-05-25 | End: 2023-05-31 | Stop reason: HOSPADM

## 2023-05-25 RX ADMIN — HEPARIN SODIUM 6310 UNITS: 1000 INJECTION INTRAVENOUS; SUBCUTANEOUS at 18:19

## 2023-05-25 RX ADMIN — DOCUSATE SODIUM 50 MG AND SENNOSIDES 8.6 MG 2 TABLET: 8.6; 5 TABLET, FILM COATED ORAL at 22:02

## 2023-05-25 RX ADMIN — PHENYTOIN SODIUM 200 MG: 100 CAPSULE ORAL at 22:04

## 2023-05-25 RX ADMIN — BUSPIRONE HYDROCHLORIDE 15 MG: 15 TABLET ORAL at 22:02

## 2023-05-25 RX ADMIN — Medication 10 ML: at 22:04

## 2023-05-25 RX ADMIN — MORPHINE SULFATE 30 MG: 30 TABLET, FILM COATED, EXTENDED RELEASE ORAL at 22:02

## 2023-05-25 RX ADMIN — METOPROLOL TARTRATE 50 MG: 50 TABLET ORAL at 22:02

## 2023-05-25 RX ADMIN — PROPAFENONE HYDROCHLORIDE 150 MG: 150 TABLET, FILM COATED ORAL at 22:03

## 2023-05-25 RX ADMIN — IOPAMIDOL 75 ML: 755 INJECTION, SOLUTION INTRAVENOUS at 19:57

## 2023-05-25 RX ADMIN — SODIUM CHLORIDE 500 ML: 9 INJECTION, SOLUTION INTRAVENOUS at 22:01

## 2023-05-25 RX ADMIN — HEPARIN SODIUM 18 UNITS/KG/HR: 10000 INJECTION, SOLUTION INTRAVENOUS at 18:22

## 2023-05-25 RX ADMIN — IOPAMIDOL 85 ML: 612 INJECTION, SOLUTION INTRAVENOUS at 18:42

## 2023-05-25 RX ADMIN — INSULIN LISPRO 9 UNITS: 100 INJECTION, SOLUTION INTRAVENOUS; SUBCUTANEOUS at 22:02

## 2023-05-25 NOTE — H&P
[facility] Medicine Services  HISTORY AND PHYSICAL    Patient Name: Martha Harrell  : 1955  MRN: [id number]  Primary Care Physician: Ginger Ward APRN  Date of admission: 2023    Subjective   Subjective     Chief Complaint:  RLE swelling/pain     HPI:  Martha Harrell is a 68 y.o. female with a past medical history significant for atrial fibrillation on eliquis, LLE DVT, pulmonary embolism, epilepsy, hyperlipidemia, fibromyalgia, diabetes mellitus type 2, GERD, depression and asthma presents to the ED with complaints of RLE swelling and pain that began on Monday.  Patient states that she was evaluated at [facility] on Monday and underwent RLE US that was negative.  She was started on doxycycline for RLE cellulitis.  Patient states that she was evaluated by her PCP today due to worsening pain/swelling and she was referred to the ED for further evaluation.  Patient reports compliance with all of her medications including eliquis however she states she did miss the last two days.  She denies any fever, chills, nausea, vomiting, diarrhea, cough, shortness of air, dizziness, headache, weight loss or recent injury.  Ultrasound obtained tonight shows almost all veins in the RLE are clogged with DVT. A contralateral scan was done as well that showed the left femoral vein also has a DVT.  Patient was started on a heparin drip.  Patient will be admitted to [facility] under the care of the Hospitalist for further evaluation and treatment.         Review of Systems   Constitutional: Positive for activity change. Negative for appetite change, chills, diaphoresis, fatigue, fever and unexpected weight change.   HENT: Negative.    Eyes: Negative for photophobia and visual disturbance.   Respiratory: Negative for shortness of breath.    Cardiovascular: Positive for leg swelling. Negative for chest pain and palpitations.   Gastrointestinal: Negative for abdominal distention, abdominal pain,  blood in stool, constipation, diarrhea and vomiting.   Genitourinary: Negative.    Musculoskeletal: Positive for gait problem. Negative for back pain, neck pain and neck stiffness.   Neurological: Negative for dizziness, speech difficulty, weakness, light-headedness, numbness and headaches.   Psychiatric/Behavioral: Negative.             Personal History     Past Medical History:   Diagnosis Date   • Anemia    • Asthma    • Chronic bronchitis    • Depression    • Diabetes mellitus    • Epilepsy    • Fibromyalgia, primary    • GERD (gastroesophageal reflux disease)    • Hyperlipidemia    • Leg DVT (deep venous thromboembolism), acute, left 6/22/2019             Past Surgical History:   Procedure Laterality Date   • BACK SURGERY     • NECK SURGERY     • TUBAL ABDOMINAL LIGATION         Family History:  family history includes Arthritis in her father, maternal aunt, maternal uncle, mother, and sister; Cancer in her father and maternal uncle; Heart disease in her mother; Hyperlipidemia in her father; Hypertension in her father; Obesity in her father, mother, and sister.     Social History:  reports that she quit smoking about 13 years ago. Her smoking use included cigarettes. She has never used smokeless tobacco. She reports that she does not drink alcohol and does not use drugs.  Social History     Social History Narrative   • Not on file       Medications:  DULoxetine, FreeStyle Pauly 2 Sensor, HYDROcodone-acetaminophen, Insulin Glargine (2 Unit Dial), Insulin Lispro (1 Unit Dial), Insulin Pen Needle, Magnesium Oxide, Morphine, OneTouch Delica Lancets Fine, RA Fish Oil, albuterol sulfate HFA, apixaban, atorvastatin, busPIRone, empagliflozin, fenofibrate, fish oil, furosemide, gabapentin, glucose blood, loratadine, magnesium oxide, metFORMIN, metoprolol tartrate, omeprazole, phenytoin ER, propafenone, silver sulfadiazine, and vitamin D3    Allergies   Allergen Reactions   • Aleve [Naproxen] Itching       Objective    Objective     Vital Signs:   Temp:  [97.7 °F (36.5 °C)] 97.7 °F (36.5 °C)  Heart Rate:  [75-85] 75  Resp:  [16] 16  BP: (111-123)/(66-77) 111/66    Physical Exam  Vitals and nursing note reviewed.   Constitutional:       General: She is not in acute distress.     Appearance: Normal appearance. She is not ill-appearing, toxic-appearing or diaphoretic.      Comments: Sitting up eating in bed    HENT:      Head: Normocephalic.      Nose: Nose normal.      Mouth/Throat:      Mouth: Mucous membranes are dry.      Pharynx: Oropharynx is clear.   Eyes:      Extraocular Movements: Extraocular movements intact.      Conjunctiva/sclera: Conjunctivae normal.      Pupils: Pupils are equal, round, and reactive to light.   Cardiovascular:      Rate and Rhythm: Normal rate and regular rhythm.      Pulses: Normal pulses.      Heart sounds: Normal heart sounds.   Pulmonary:      Effort: Pulmonary effort is normal.      Breath sounds: Normal breath sounds.   Abdominal:      General: Bowel sounds are normal. There is no distension.      Palpations: Abdomen is soft. There is no mass.      Tenderness: There is no abdominal tenderness. There is no right CVA tenderness, left CVA tenderness, guarding or rebound.      Hernia: No hernia is present.   Musculoskeletal:         General: Swelling and tenderness present. No deformity or signs of injury.      Cervical back: Normal range of motion and neck supple.      Right lower leg: No edema.      Left lower leg: No edema.      Comments: Extensive swelling to RLE compared to LLE extending up to right thigh.  Tender to touch.  No erythema or warmth noted.    Skin:     General: Skin is warm and dry.   Neurological:      General: No focal deficit present.      Mental Status: She is alert and oriented to person, place, and time. Mental status is at baseline.   Psychiatric:         Mood and Affect: Mood normal.         Behavior: Behavior normal.         Thought Content: Thought content normal.          Judgment: Judgment normal.          Result Review:  I have personally reviewed the results from the time of this admission to 5/25/2023 19:31 EDT and agree with these findings:  [x]  Laboratory list / accordion  [x]  Microbiology  [x]  Radiology  [x]  EKG/Telemetry   []  Cardiology/Vascular   []  Pathology  []  Old records  []  Other:  Most notable findings include:     LAB RESULTS:      Lab 05/25/23  1549 05/22/23  1416   WBC 10.63  --    HEMOGLOBIN 13.9  --    HEMATOCRIT 43.9  --    PLATELETS 198  --    NEUTROS ABS 8.55*  --    IMMATURE GRANS (ABS) 0.03  --    LYMPHS ABS 1.18  --    MONOS ABS 0.77  --    EOS ABS 0.07  --    MCV 83.9  --    PROTIME 15.6* 10.4   APTT 25.8* 24.7   HEPARIN ANTI-XA 0.10*  --          Lab 05/25/23  1549   SODIUM 131*   POTASSIUM 5.1   CHLORIDE 94*   CO2 25.0   ANION GAP 12.0   BUN 37*   CREATININE 0.72   EGFR 90.6   GLUCOSE 392*   CALCIUM 9.4         Lab 05/25/23  1549   TOTAL PROTEIN 7.8   ALBUMIN 3.8   GLOBULIN 4.0   ALT (SGPT) 10   AST (SGOT) 20   BILIRUBIN 0.7   ALK PHOS 99         Lab 05/25/23  1549 05/22/23  1416   PROTIME 15.6* 10.4   INR 1.22* 1.04                 Brief Urine Lab Results     None        Microbiology Results (last 10 days)     ** No results found for the last 240 hours. **          No radiology results from the last 24 hrs    Results for orders placed during the hospital encounter of 03/17/22    Adult Transthoracic Echo Complete W/ Cont if Necessary Per Protocol    Interpretation Summary  · Left ventricular ejection fraction appears to be 61 - 65%. Left ventricular systolic function is normal.  · Left ventricular diastolic function was normal.  · Calculated right ventricular systolic pressure from tricuspid regurgitation is 20 mmHg.      Assessment & Plan   Assessment & Plan       Acute deep vein thrombosis (DVT) of femoral vein of right lower extremity    Diabetic peripheral neuropathy associated with type 2 diabetes mellitus (HCC)    Depression    Epilepsy  (HCC)    GERD (gastroesophageal reflux disease)    Hyperlipidemia    Chronic anticoagulation    Acute deep vein thrombosis (DVT) of left femoral vein      68 year old female presents to the ED with RLE swelling/pain that began Monday.      1) Acute DVT of RLE and left femoral vein       Failed outpatient therapy: on eliquis   -Ultrasound as mentioned above  -continue heparin drip with pharmacy to dose  -CT of abdomen/pelvis pending   -CTA of chest pending   -pain control   -bedrest  -consider vascular consult     2) Paroxysmal atrial fibrillation   -on eliquis/rhythmol   -follows with Dr. Yañez   -obtain EKG     3) Diabetes mellitus type 2  -check hgb A1c  -start ldssi   -fingersticks achs     4) Hyperlipidemia  -on lipitor     5) GERD  -PPI     6) Depression   -on cymbalta     7) Peripheral neuropathy   -continue gabapentin    8) Hx of LLE and PE        DVT prophylaxis:  Heparin drip     CODE STATUS:  Full Code        Expected Discharge  TBD     This note has been completed as part of a split-shared workflow.     Signature: Electronically signed by SHERRY Nuñez, 05/25/23, 7:43 PM EDT.    Total time spent: 65 mins  Time spent includes time reviewing chart, face-to-face time, counseling patient/family/caregiver, ordering medications/tests/procedures, communicating with other health care professionals, documenting clinical information in the electronic health record, and coordination of care.

## 2023-05-25 NOTE — ED PROVIDER NOTES
EMERGENCY DEPARTMENT ENCOUNTER    Pt Name: Martha Harrell  MRN: [id number]  Pt :   1955  Room Number:  Room/bed info not found  Date of encounter:  2023  PCP: Ginger Ward APRN  ED Provider: Brenton Merchant MD    Historian: Patient      HPI:  Chief Complaint: Right lower extremity swelling        Context: Martha Harrell is a 68 y.o. female who presents to the ED c/o right lower extremity swelling noted upon waking 3 days ago.  Patient has a history of DVT roughly 2 years ago in her left lower extremity.  She has been taking Eliquis every day 5 mg twice a day but missed her last 2 days.  She does not wish to discuss why this is but states that she will be able to take her dose tonight.  The patient was seen at [facility]'s emergency department 2 days ago and her ultrasound apparently was negative.  She was given IV antibiotics and then doxycycline 100 mg twice a day.  She has been taking that faithfully.  She denies fevers, chills, warmth and redness to her right lower extremity.  She states that the discomfort is very reminiscent of when she had a DVT in her left lower extremity.  The patient notes discomfort all the way from her calf and into her thigh and close to her hip.          PAST MEDICAL HISTORY  Past Medical History:   Diagnosis Date   • Anemia    • Asthma    • Chronic bronchitis    • Depression    • Diabetes mellitus    • Epilepsy    • Fibromyalgia, primary    • GERD (gastroesophageal reflux disease)    • Hyperlipidemia    • Leg DVT (deep venous thromboembolism), acute, left 2019         PAST SURGICAL HISTORY  Past Surgical History:   Procedure Laterality Date   • BACK SURGERY     • NECK SURGERY     • TUBAL ABDOMINAL LIGATION           FAMILY HISTORY  Family History   Problem Relation Age of Onset   • Arthritis Mother    • Heart disease Mother    • Obesity Mother    • Arthritis Father    • Cancer Father    • Hypertension Father    • Hyperlipidemia Father    • Obesity Father    •  Arthritis Sister    • Obesity Sister    • Arthritis Maternal Aunt    • Arthritis Maternal Uncle    • Cancer Maternal Uncle          SOCIAL HISTORY  Social History     Socioeconomic History   • Marital status:    Tobacco Use   • Smoking status: Former     Types: Cigarettes     Quit date: 2010     Years since quittin.3   • Smokeless tobacco: Never   Substance and Sexual Activity   • Alcohol use: No   • Drug use: No   • Sexual activity: Defer         ALLERGIES  Aleve [naproxen]        REVIEW OF SYSTEMS  Review of Systems       All systems reviewed and negative except for those discussed in HPI.       PHYSICAL EXAM    I have reviewed the triage vital signs and nursing notes.    ED Triage Vitals [23 1445]   Temp Heart Rate Resp BP SpO2   97.7 °F (36.5 °C) 85 16 123/77 98 %      Temp src Heart Rate Source Patient Position BP Location FiO2 (%)   Oral Monitor Sitting Right arm --       Physical Exam  GENERAL:   Appears little uncomfortable secondary to swelling in her right lower extremity.  I evaluate her getting her out of the lobby and bring her into our triage area.  HENT: Nares patent.  EYES: No scleral icterus.  CV: Regular rhythm, regular rate.  1-2+ dorsalis pedis pulses bilaterally symmetric.  No murmurs gallops rubs.  RESPIRATORY: Normal effort.  No audible wheezes, rales or rhonchi.  ABDOMEN: Soft, nontender  MUSCULOSKELETAL: No deformities.  Right lower extremity is 3 cm greater in circumference than left lower extremity when measured 10 cm below the tibial tuberosity.  The right thigh, mid thigh is significantly greater in circumference than the left thigh.  Both areas are tender.  NEURO: Alert, moves all extremities, follows commands.  SKIN: Warm, dry, no rash visualized.  No erythema or warmth to the right lower extremity as compared to the left lower extremity.      LAB RESULTS  Recent Results (from the past 24 hour(s))   Comprehensive Metabolic Panel    Collection Time: 23   3:49 PM    Specimen: Blood   Result Value Ref Range    Glucose 392 (H) 65 - 99 mg/dL    BUN 37 (H) 8 - 23 mg/dL    Creatinine 0.72 0.57 - 1.00 mg/dL    Sodium 131 (L) 136 - 145 mmol/L    Potassium 5.1 3.5 - 5.2 mmol/L    Chloride 94 (L) 98 - 107 mmol/L    CO2 25.0 22.0 - 29.0 mmol/L    Calcium 9.4 8.6 - 10.5 mg/dL    Total Protein 7.8 6.0 - 8.5 g/dL    Albumin 3.8 3.5 - 5.2 g/dL    ALT (SGPT) 10 1 - 33 U/L    AST (SGOT) 20 1 - 32 U/L    Alkaline Phosphatase 99 39 - 117 U/L    Total Bilirubin 0.7 0.0 - 1.2 mg/dL    Globulin 4.0 gm/dL    A/G Ratio 1.0 g/dL    BUN/Creatinine Ratio 51.4 (H) 7.0 - 25.0    Anion Gap 12.0 5.0 - 15.0 mmol/L    eGFR 90.6 >60.0 mL/min/1.73   Protime-INR    Collection Time: 05/25/23  3:49 PM    Specimen: Blood   Result Value Ref Range    Protime 15.6 (H) 12.2 - 14.5 Seconds    INR 1.22 (H) 0.89 - 1.12   aPTT    Collection Time: 05/25/23  3:49 PM    Specimen: Blood   Result Value Ref Range    PTT 25.8 (L) 60.0 - 90.0 seconds   CBC Auto Differential    Collection Time: 05/25/23  3:49 PM    Specimen: Blood   Result Value Ref Range    WBC 10.63 3.40 - 10.80 10*3/mm3    RBC 5.23 3.77 - 5.28 10*6/mm3    Hemoglobin 13.9 12.0 - 15.9 g/dL    Hematocrit 43.9 34.0 - 46.6 %    MCV 83.9 79.0 - 97.0 fL    MCH 26.6 26.6 - 33.0 pg    MCHC 31.7 31.5 - 35.7 g/dL    RDW 15.3 12.3 - 15.4 %    RDW-SD 46.1 37.0 - 54.0 fl    MPV 10.0 6.0 - 12.0 fL    Platelets 198 140 - 450 10*3/mm3    Neutrophil % 80.4 (H) 42.7 - 76.0 %    Lymphocyte % 11.1 (L) 19.6 - 45.3 %    Monocyte % 7.2 5.0 - 12.0 %    Eosinophil % 0.7 0.3 - 6.2 %    Basophil % 0.3 0.0 - 1.5 %    Immature Grans % 0.3 0.0 - 0.5 %    Neutrophils, Absolute 8.55 (H) 1.70 - 7.00 10*3/mm3    Lymphocytes, Absolute 1.18 0.70 - 3.10 10*3/mm3    Monocytes, Absolute 0.77 0.10 - 0.90 10*3/mm3    Eosinophils, Absolute 0.07 0.00 - 0.40 10*3/mm3    Basophils, Absolute 0.03 0.00 - 0.20 10*3/mm3    Immature Grans, Absolute 0.03 0.00 - 0.05 10*3/mm3    nRBC 0.0 0.0 - 0.2 /100  WBC   Duplex Venous Lower Extremity - Right CAR    Collection Time: 05/25/23  4:57 PM   Result Value Ref Range    Target HR (85%) 128 bpm    Max. Pred. HR (100%) 151 bpm    Right External Iliac Spont 1.0     Right Common Femoral Spont 1.0     Right Saphenofemoral Junction Spont 1.0     Right Profunda Femoral Spont 1.0     Right Proximal Femoral Spont 1.0     Right Mid Femoral Spont 1.0     Right Distal Femoral Spont 1.0     Right Popliteal Spont 1.0     Right Posterior Tibial Vessel 1.0     Right Peroneal Vessel 1.0     Right Gastronemius Vessel 1.0     Right Greater Saph AK Vessel 1.0     Right Lesser Saph Vessel 1.0     Left Common Femoral Spont 1.0     Right External Iliac Spont N     Right External Iliac Phasic N     Right External Iliac Compress N     Right Common Femoral Spont N     Right Common Femoral Phasic N     Right Common Femoral Compress N     Right Saphenofemoral Junction Compress N     Right Profunda Femoral Compress N     Right Proximal Femoral Spont N     Right Proximal Femoral Phasic N     Right Proximal Femoral Compress N     Right Mid Femoral Spont N     Right Mid Femoral Phasic N     Right Mid Femoral Compress N     Right Distal Femoral Spont N     Right Distal Femoral Competent N     Right Distal Femoral Phasic N     Right Distal Femoral Compress N     Right Distal Femoral Augment N     Right Popliteal Spont N     Right Popliteal Phasic N     Right Popliteal Compress N     Right Posterior Tibial Compress N     Right Peroneal Compress N     Right Gastronemius Compress N     Right Greater Saph AK Compress N     Right Greater Saph BK Compress C     Right Lesser Saph Compress N     Left Common Femoral Spont D     Left Common Femoral Phasic D     Left Common Femoral Compress P     BH CV VAS PRELIMINARY FINDINGS SCRIPTING 1.0        If labs were ordered, I independently reviewed the results and considered them in treating the patient.        RADIOLOGY  No Radiology Exams Resulted Within Past 24  Hours    I ordered and independently reviewed the above noted radiographic studies.        See radiologist's dictation for official interpretation.        PROCEDURES    Procedures    No orders to display       MEDICATIONS GIVEN IN ER    Medications   heparin (porcine) injection 6,310 Units (has no administration in time range)   heparin 15110 units/250 mL (100 units/mL) in 0.45 % NaCl infusion (has no administration in time range)   heparin (porcine) injection 3,950 Units (has no administration in time range)   heparin (porcine) injection 1,970 Units (has no administration in time range)   Pharmacy to Dose Heparin (has no administration in time range)         MEDICAL DECISION MAKING, PROGRESS, and CONSULTS    All labs have been independently reviewed by me.  All radiology studies have been reviewed by me and the radiologist dictating the report.  All EKG's have been independently viewed and interpreted by me.      Discussion below represents my analysis of pertinent findings related to patient's condition, differential diagnosis, treatment plan and final disposition.      Differential diagnosis:    DVT versus hematoma versus infection, etc.      Additional sources:      - External (non-ED) record review: Records review from [facility] outpatient procedure.  10/11/2022.  Successful sonographically guided left hip joint corticosteroid injection    - Chronic or social conditions impacting care: Former smoker.  Arthritis history.    - Shared decision making: Patient in full agreement with current plan for evaluation and treatment.      Orders placed during this visit:  Orders Placed This Encounter   Procedures   • CT Abdomen Pelvis With Contrast   • Comprehensive Metabolic Panel   • Protime-INR   • aPTT   • CBC Auto Differential   • Heparin Anti-Xa   • Diet: Regular/House Diet; Texture: Regular Texture (IDDSI 7); Fluid Consistency: Thin (IDDSI 0)   • Call Ismael in [place].  On [facility] for medication list  verification.  Patient has been taking antibiotics but does not know which one  Misc Nursing Order (Specify)   • Notify Provider Platelet Count Less Than [id number]   • Stop Infusion & Notify Provider if Bleeding Occurs   • Initiate Observation Status   • ED IP Bed Request   • CBC & Differential   • CBC & Differential         Additional orders considered but not ordered:  CT scan of the lower extremity.    ED Course:    Consultants:      ED Course as of 05/25/23 1727   Thu May 25, 2023   1725 I was contacted by the cardiovascular ultrasound technician who reports that almost all veins in the right lower extremity are clogged with DVT.  She did a contralateral scan and notes that the left femoral vein also has a DVT.  The patient has no acute dyspnea or chest pain.  [MS]   1726 I have contacted Dr. Leblanc, hospitalist for admission. [MS]      ED Course User Index  [MS] Brenton Merchant MD                  AS OF 17:27 EDT VITALS:    BP - 123/77  HR - 85  TEMP - 97.7 °F (36.5 °C) (Oral)  O2 SATS - 98%                  DIAGNOSIS  Final diagnoses:   Acute deep vein thrombosis (DVT) of femoral vein of right lower extremity   Acute deep vein thrombosis (DVT) of femoral vein of left lower extremity   Failure of outpatient treatment         DISPOSITION  Admission      Please note that portions of this document were completed with voice recognition software.      Brenton Merchant MD  05/25/23 1948

## 2023-05-25 NOTE — Clinical Note
Level of Care: Telemetry [5]   Diagnosis: Acute deep vein thrombosis (DVT) of femoral vein of right lower extremity [[id number]]   Admitting Physician: TALISHA MURDOCK [[id number]]   Attending Physician: TALISHA MURDOCK [[id number]]

## 2023-05-25 NOTE — PROGRESS NOTES
HEPARIN INFUSION  Martha Harrell is a  68 y.o. female receiving heparin infusion.     Therapy for (VTE/Cardiac):   VTE  Patient Weight: 78.9 kg  Initial Bolus (Y/N): Yes  Any Bolus (Y/N): Yes        Signs or Symptoms of Bleeding: None noted    Recommend Xa every 6 hours.   VTE (PE/DVT)   Initial Bolus: 80 units/kg (Max 10,000 units)  Initial rate: 18 units/kg/hr (Max 1,500 units/hr)    Anti Xa Rebolus Infusion Hold time Change infusion Dose (Units/kg/hr) Next Anti Xa Level Due   < 0.11 50 Units/kg  (4000 Units Max) None Increase by  4 Units/kg/hr 6 hours   0.11 - 0.19 25 Units/kg  (2000 Units Max) None Increase by  3 Units/kg/hr 6 hours   0.2 - 0.29 0 None Increase by  2 Units/kg/hr 6 hours   0.3 - 0.7 0 None No Change 6 hours (after 2 consecutive levels in range check qAM)   0.71 - 0.8 0 None Decrease by  1 Units/kg/hr 6 hours   0.81 - 0.9 0 None Decrease by  2 Units/kg/hr 6 hours   0.91 - 1 0 60 Minutes Decrease by  3 Units/kg/hr 6 hours   >1 0 Hold  After Anti Xa less than 0.7 decrease previous rate by  4 Units/kg/hr  Every 2 hours until Anti Xa is less than 0.7 then when infusion restarts in 6 hours     Results from last 7 days   Lab Units 05/25/23  1549 05/22/23  1416   INR  1.22* 1.04   HEMOGLOBIN g/dL 13.9  --    HEMATOCRIT % 43.9  --    PLATELETS 10*3/mm3 198  --           Date   Time   Anti-Xa Current Rate (Unit/kg/hr) Bolus   (Units) Rate Change   (Unit/kg/hr) New Rate (Unit/kg/hr) Next   Anti-Xa Comments  Pump Check Daily   5/25 1549 0.1 New Start 6310 +18 18 0000 D/w ADELE Stephenson [facility]  5/25/2023  18:06 EDT

## 2023-05-26 LAB
ANION GAP SERPL CALCULATED.3IONS-SCNC: 13 MMOL/L (ref 5–15)
BASOPHILS # BLD AUTO: 0.07 10*3/MM3 (ref 0–0.2)
BASOPHILS NFR BLD AUTO: 0.6 % (ref 0–1.5)
BUN SERPL-MCNC: 36 MG/DL (ref 8–23)
BUN/CREAT SERPL: 63.2 (ref 7–25)
CALCIUM SPEC-SCNC: 9.4 MG/DL (ref 8.6–10.5)
CHLORIDE SERPL-SCNC: 100 MMOL/L (ref 98–107)
CO2 SERPL-SCNC: 24 MMOL/L (ref 22–29)
CREAT SERPL-MCNC: 0.57 MG/DL (ref 0.57–1)
DEPRECATED RDW RBC AUTO: 46.4 FL (ref 37–54)
EGFRCR SERPLBLD CKD-EPI 2021: 98.5 ML/MIN/1.73
EOSINOPHIL # BLD AUTO: 0.39 10*3/MM3 (ref 0–0.4)
EOSINOPHIL NFR BLD AUTO: 3.4 % (ref 0.3–6.2)
ERYTHROCYTE [DISTWIDTH] IN BLOOD BY AUTOMATED COUNT: 15.7 % (ref 12.3–15.4)
GEN 5 2HR TROPONIN T REFLEX: 14 NG/L
GLUCOSE BLDC GLUCOMTR-MCNC: 229 MG/DL (ref 70–130)
GLUCOSE BLDC GLUCOMTR-MCNC: 246 MG/DL (ref 70–130)
GLUCOSE BLDC GLUCOMTR-MCNC: 290 MG/DL (ref 70–130)
GLUCOSE BLDC GLUCOMTR-MCNC: 414 MG/DL (ref 70–130)
GLUCOSE BLDC GLUCOMTR-MCNC: 423 MG/DL (ref 70–130)
GLUCOSE SERPL-MCNC: 171 MG/DL (ref 65–99)
HBA1C MFR BLD: 10.3 % (ref 4.8–5.6)
HCT VFR BLD AUTO: 42 % (ref 34–46.6)
HGB BLD-MCNC: 13.2 G/DL (ref 12–15.9)
IMM GRANULOCYTES # BLD AUTO: 0.04 10*3/MM3 (ref 0–0.05)
IMM GRANULOCYTES NFR BLD AUTO: 0.3 % (ref 0–0.5)
INR PPP: 1.27 (ref 0.89–1.12)
LYMPHOCYTES # BLD AUTO: 3.81 10*3/MM3 (ref 0.7–3.1)
LYMPHOCYTES NFR BLD AUTO: 33.1 % (ref 19.6–45.3)
MCH RBC QN AUTO: 26.2 PG (ref 26.6–33)
MCHC RBC AUTO-ENTMCNC: 31.4 G/DL (ref 31.5–35.7)
MCV RBC AUTO: 83.3 FL (ref 79–97)
MONOCYTES # BLD AUTO: 0.97 10*3/MM3 (ref 0.1–0.9)
MONOCYTES NFR BLD AUTO: 8.4 % (ref 5–12)
NEUTROPHILS NFR BLD AUTO: 54.2 % (ref 42.7–76)
NEUTROPHILS NFR BLD AUTO: 6.24 10*3/MM3 (ref 1.7–7)
NRBC BLD AUTO-RTO: 0 /100 WBC (ref 0–0.2)
PLATELET # BLD AUTO: 218 10*3/MM3 (ref 140–450)
PMV BLD AUTO: 10.5 FL (ref 6–12)
POTASSIUM SERPL-SCNC: 3.8 MMOL/L (ref 3.5–5.2)
PROTHROMBIN TIME: 16 SECONDS (ref 12.2–14.5)
RBC # BLD AUTO: 5.04 10*6/MM3 (ref 3.77–5.28)
SODIUM SERPL-SCNC: 137 MMOL/L (ref 136–145)
TROPONIN T DELTA: -1 NG/L
TROPONIN T SERPL HS-MCNC: 15 NG/L
UFH PPP CHRO-ACNC: 0.21 IU/ML (ref 0.3–0.7)
UFH PPP CHRO-ACNC: 0.26 IU/ML (ref 0.3–0.7)
UFH PPP CHRO-ACNC: 0.54 IU/ML (ref 0.3–0.7)
UFH PPP CHRO-ACNC: 1.1 IU/ML (ref 0.3–0.7)
WBC NRBC COR # BLD: 11.52 10*3/MM3 (ref 3.4–10.8)

## 2023-05-26 PROCEDURE — 25010000002 HEPARIN (PORCINE) 25000-0.45 UT/250ML-% SOLUTION

## 2023-05-26 PROCEDURE — 85520 HEPARIN ASSAY: CPT

## 2023-05-26 PROCEDURE — 84484 ASSAY OF TROPONIN QUANT: CPT | Performed by: NURSE PRACTITIONER

## 2023-05-26 PROCEDURE — 85025 COMPLETE CBC W/AUTO DIFF WBC: CPT | Performed by: INTERNAL MEDICINE

## 2023-05-26 PROCEDURE — 63710000001 INSULIN LISPRO (HUMAN) PER 5 UNITS: Performed by: NURSE PRACTITIONER

## 2023-05-26 PROCEDURE — 63710000001 INSULIN DETEMIR PER 5 UNITS: Performed by: FAMILY MEDICINE

## 2023-05-26 PROCEDURE — 83036 HEMOGLOBIN GLYCOSYLATED A1C: CPT | Performed by: NURSE PRACTITIONER

## 2023-05-26 PROCEDURE — 82948 REAGENT STRIP/BLOOD GLUCOSE: CPT

## 2023-05-26 PROCEDURE — G0378 HOSPITAL OBSERVATION PER HR: HCPCS

## 2023-05-26 PROCEDURE — 85520 HEPARIN ASSAY: CPT | Performed by: PHARMACIST

## 2023-05-26 PROCEDURE — 85610 PROTHROMBIN TIME: CPT | Performed by: FAMILY MEDICINE

## 2023-05-26 PROCEDURE — 99232 SBSQ HOSP IP/OBS MODERATE 35: CPT | Performed by: FAMILY MEDICINE

## 2023-05-26 PROCEDURE — 80048 BASIC METABOLIC PNL TOTAL CA: CPT | Performed by: NURSE PRACTITIONER

## 2023-05-26 RX ORDER — WARFARIN SODIUM 5 MG/1
5 TABLET ORAL
Status: DISCONTINUED | OUTPATIENT
Start: 2023-05-26 | End: 2023-05-27

## 2023-05-26 RX ADMIN — WARFARIN SODIUM 5 MG: 5 TABLET ORAL at 17:45

## 2023-05-26 RX ADMIN — DULOXETINE 60 MG: 60 CAPSULE, DELAYED RELEASE ORAL at 09:34

## 2023-05-26 RX ADMIN — METOPROLOL TARTRATE 50 MG: 50 TABLET ORAL at 21:11

## 2023-05-26 RX ADMIN — INSULIN LISPRO 9 UNITS: 100 INJECTION, SOLUTION INTRAVENOUS; SUBCUTANEOUS at 13:24

## 2023-05-26 RX ADMIN — ATORVASTATIN CALCIUM 40 MG: 40 TABLET, FILM COATED ORAL at 09:34

## 2023-05-26 RX ADMIN — BUSPIRONE HYDROCHLORIDE 15 MG: 15 TABLET ORAL at 21:11

## 2023-05-26 RX ADMIN — INSULIN LISPRO 4 UNITS: 100 INJECTION, SOLUTION INTRAVENOUS; SUBCUTANEOUS at 17:45

## 2023-05-26 RX ADMIN — PANTOPRAZOLE SODIUM 40 MG: 40 TABLET, DELAYED RELEASE ORAL at 05:29

## 2023-05-26 RX ADMIN — Medication 10 ML: at 09:33

## 2023-05-26 RX ADMIN — CETIRIZINE HYDROCHLORIDE 5 MG: 10 TABLET, FILM COATED ORAL at 09:34

## 2023-05-26 RX ADMIN — PROPAFENONE HYDROCHLORIDE 150 MG: 150 TABLET, FILM COATED ORAL at 21:11

## 2023-05-26 RX ADMIN — HEPARIN SODIUM 16 UNITS/KG/HR: 10000 INJECTION, SOLUTION INTRAVENOUS at 17:46

## 2023-05-26 RX ADMIN — GABAPENTIN 100 MG: 100 CAPSULE ORAL at 21:11

## 2023-05-26 RX ADMIN — Medication 5000 UNITS: at 09:34

## 2023-05-26 RX ADMIN — MORPHINE SULFATE 30 MG: 30 TABLET, FILM COATED, EXTENDED RELEASE ORAL at 21:11

## 2023-05-26 RX ADMIN — PHENYTOIN SODIUM 200 MG: 100 CAPSULE ORAL at 09:33

## 2023-05-26 RX ADMIN — DOCUSATE SODIUM 50 MG AND SENNOSIDES 8.6 MG 2 TABLET: 8.6; 5 TABLET, FILM COATED ORAL at 21:11

## 2023-05-26 RX ADMIN — METOPROLOL TARTRATE 50 MG: 50 TABLET ORAL at 09:34

## 2023-05-26 RX ADMIN — INSULIN LISPRO 6 UNITS: 100 INJECTION, SOLUTION INTRAVENOUS; SUBCUTANEOUS at 21:12

## 2023-05-26 RX ADMIN — INSULIN DETEMIR 20 UNITS: 100 INJECTION, SOLUTION SUBCUTANEOUS at 13:25

## 2023-05-26 RX ADMIN — PHENYTOIN SODIUM 200 MG: 100 CAPSULE ORAL at 21:10

## 2023-05-26 RX ADMIN — MORPHINE SULFATE 30 MG: 30 TABLET, FILM COATED, EXTENDED RELEASE ORAL at 09:34

## 2023-05-26 RX ADMIN — INSULIN LISPRO 4 UNITS: 100 INJECTION, SOLUTION INTRAVENOUS; SUBCUTANEOUS at 09:33

## 2023-05-26 RX ADMIN — BUSPIRONE HYDROCHLORIDE 15 MG: 15 TABLET ORAL at 09:34

## 2023-05-26 RX ADMIN — PROPAFENONE HYDROCHLORIDE 150 MG: 150 TABLET, FILM COATED ORAL at 05:29

## 2023-05-26 NOTE — PROGRESS NOTES
[facility]  PROGRESS NOTE    Patient Name: Martha Harrell  : 1955  MRN: [id number]    Date of Admission: 2023  Primary Care Physician: Ginger Ward APRN    Subjective   Subjective     CC:  DVTs    HPI:  Patient is a 68-year-old who was admitted with acute bilateral lower extremity DVTs who has failed outpatient therapy with Eliquis.  She reports she was previously on Coumadin but had a fall with a significant hematoma and was taken off of Coumadin.  She was diagnosed with a DVT and placed on Eliquis approximately 1 month ago.  She presented with worsening pain and swelling and was diagnosed with another DVT, being admitted for further anticoagulation with heparin drip.  We discussed options for anticoagulation including potentially Xarelto however it interacts with Dilantin as well as Eliquis interacts with Dilantin.  I suggested we could consider discussing with neurology changing her Dilantin she takes for epilepsy but she is very reluctant to change the Dilantin as she has had good response to this treatment.  She would rather go back on warfarin.  I did explain that this will require heparin drip until her INR is therapeutic and she is agreeable.  She states it has been about a year since she had a seizure.  She used to have grand mal seizures but her most recent seizures have just been having difficulty with word finding.    ROS:  Gen- No fevers, chills  CV- No chest pain, palpitations  Resp- No cough, dyspnea  GI- No N/V/D, abd pain         Objective   Objective     Vital Signs:   Temp:  [97.1 °F (36.2 °C)-98.7 °F (37.1 °C)] 97.2 °F (36.2 °C)  Heart Rate:  [66-85] 66  Resp:  [16] 16  BP: (111-124)/(59-77) 113/62     Physical Exam:  Constitutional: No acute distress, awake, alert  HENT: NCAT, mucous membranes moist  Respiratory: Clear to auscultation bilaterally, respiratory effort normal   Cardiovascular: RRR  Gastrointestinal: Positive bowel  sounds, soft, nontender, nondistended  Musculoskeletal: Bilateral lower extremity edema and erythema  Psychiatric: Appropriate affect, cooperative  Neurologic: Oriented x 3, strength symmetric in all extremities, Cranial Nerves grossly intact to confrontation, speech clear  Skin: No rashes      Results Reviewed:  LAB RESULTS:      Lab 05/26/23  0414 05/26/23  0101 05/25/23  1549 05/22/23  1416   WBC 11.52*  --  10.63  --    HEMOGLOBIN 13.2  --  13.9  --    HEMATOCRIT 42.0  --  43.9  --    PLATELETS 218  --  198  --    NEUTROS ABS 6.24  --  8.55*  --    IMMATURE GRANS (ABS) 0.04  --  0.03  --    LYMPHS ABS 3.81*  --  1.18  --    MONOS ABS 0.97*  --  0.77  --    EOS ABS 0.39  --  0.07  --    MCV 83.3  --  83.9  --    PROTIME  --   --  15.6* 10.4   APTT  --   --  25.8* 24.7   HEPARIN ANTI-XA 0.21* 1.10* 0.10*  --          Lab 05/26/23  0414 05/25/23  1549   SODIUM 137 131*   POTASSIUM 3.8 5.1   CHLORIDE 100 94*   CO2 24.0 25.0   ANION GAP 13.0 12.0   BUN 36* 37*   CREATININE 0.57 0.72   EGFR 98.5 90.6   GLUCOSE 171* 392*   CALCIUM 9.4 9.4   HEMOGLOBIN A1C 10.30*  --          Lab 05/25/23  1549   TOTAL PROTEIN 7.8   ALBUMIN 3.8   GLOBULIN 4.0   ALT (SGPT) 10   AST (SGOT) 20   BILIRUBIN 0.7   ALK PHOS 99         Lab 05/26/23  0703 05/26/23  0414 05/25/23  1549 05/22/23  1416   HSTROP T 14* 15*  --   --    PROTIME  --   --  15.6* 10.4   INR  --   --  1.22* 1.04                 Brief Urine Lab Results     None          Microbiology Results Abnormal     None          CT Angiogram Chest    Result Date: 5/25/2023  CT ANGIOGRAM CHEST Date of Exam: 5/25/2023 7:56 PM EDT Indication: extensive DVT's rule out PE. Comparison: Chest radiograph from March 17, 2022 Technique: CTA of the chest was performed before and after the uneventful intravenous administration of 56 mL Isovue-370. Reconstructed coronal and sagittal images were also obtained. In addition, a 3-D volume rendered image was created for interpretation. Automated  exposure control and iterative reconstruction methods were used. Findings: The central tracheobronchial tree is clear. A couple benign calcified granulomas are noted. The lungs are clear. There is no pleural effusion. The heart size appears normal. The great vessels are normal in caliber. There is no evidence of pulmonary embolus. No abnormally enlarged lymph nodes are identified. Partial evaluation of the upper abdomen is unremarkable. No aggressive osseous lesions are identified.     Impression: Impression: 1.No evidence of pulmonary embolus. 2.No acute cardiopulmonary process. Electronically Signed: Harris Becker  5/25/2023 8:29 PM EDT  Workstation ID: [id number]    CT Abdomen Pelvis With Contrast    Result Date: 5/25/2023  CT ABDOMEN PELVIS W CONTRAST Date of Exam: 5/25/2023 6:29 PM EDT Indication: Metastatic disease evaluation. Extensive right lower extremity DVT. Left common femoral DVT. Comparison: None available. Technique: Axial CT images were obtained of the abdomen and pelvis following the uneventful intravenous administration of 85 mL Isovue-300. Reconstructed coronal and sagittal images were also obtained. Automated exposure control and iterative construction methods were used. Findings: No suspicious infiltrate is seen in the lung bases. There is a small hiatal hernia. Heart size appears within normal limits. No pericardial effusion. There is suspected calcific atherosclerosis of the coronary arteries. No splenomegaly or focal splenic lesion is identified. No suspicious adrenal lesion is seen. No definite acute pancreatic abnormality is seen. No definite hepatic lesion is identified. Aorta appears normal in caliber. There is calcific atherosclerosis of  the aorta and branch vessels. No significant abdominal lymphadenopathy is seen. No hydronephrosis. No definite obstructing ureteral calculus is seen. There is expected excretion of contrast from the kidneys on delayed phase images. There appears to be a   nonenhancing cyst at the upper pole of the right kidney. There are also small nonenhancing posterior left mid/lower pole and medial lower pole cysts. No definite enhancing renal lesion is identified. The gallbladder is mildly distended. No definite biliary ductal dilatation or acute gallbladder inflammation is seen. No acute gastric abnormality is seen. Small bowel loops appear nondilated. The appendix appears within normal limits. The colon is minimally distended. There is diverticulosis of the distal colon. No definite acute colonic or rectal abnormality is seen. Urinary bladder appears grossly unremarkable. No definite suspicious uterine or adnexal abnormality is identified. There are stents in the left external iliac and common iliac veins terminating in the inferior vena cava. The stent does appear to cross the origin of the right common iliac vein. There there does not appear to be significant enhancement of the IVC below  the renal veins. There appears to be extensive edema in the soft tissues of the visualized right proximal thigh and right pelvis. There appear to be small fat-containing inguinal hernias. No significant pelvic lymphadenopathy is seen. There is severe left hip osteoarthritis. There is mild to moderate right hip osteoarthritis. There are degenerative changes in the spine. There is grade 1 anterolisthesis of L4 on L5. No definite aggressive osseous lesion is seen.     Impression: Impression: 1.Extensive edema in the soft tissues of the visualized right thigh and pelvis. This could be related to patient's reported right DVT or soft tissue infection. 2.Left external iliac and common iliac artery stents. The common iliac stent does extend into the inferior vena cava and crosses the origin of the right common iliac vein which could predispose to right lower extremity DVT. 3.No definitive findings of malignancy or metastatic disease in the abdomen or pelvis on this exam. PET/CT may be more  sensitive for occult malignancy. 4.Calcific atherosclerosis. 5.Diverticulosis. Colonoscopy following be considered as colonic malignancy could be occult on CT. 6.Severe left hip osteoarthritis. Electronically Signed: Davian Glenna  5/25/2023 7:52 PM EDT  Workstation ID: [id number]    Duplex Venous Lower Extremity - Right CAR    Result Date: 5/25/2023  •  Extensive right lower extremity DVT as detailed below •  Acute right lower extremity deep vein thrombosis noted in the external iliac, common femoral, deep femoral, proximal femoral, mid femoral, distal femoral, popliteal, posterior tibial, peroneal and gastrocnemius. •  Acute right lower extremity superficial thrombophlebitis noted in the saphenofemoral junction and great saphenous (above knee). •  Acute left lower extremity deep vein thrombosis noted in the common femoral.       Results for orders placed during the hospital encounter of 03/17/22    Adult Transthoracic Echo Complete W/ Cont if Necessary Per Protocol    Interpretation Summary  · Left ventricular ejection fraction appears to be 61 - 65%. Left ventricular systolic function is normal.  · Left ventricular diastolic function was normal.  · Calculated right ventricular systolic pressure from tricuspid regurgitation is 20 mmHg.      Current medications:  Scheduled Meds:!NOT ORDERED- warfarin (COUMADIN), , Does not apply, Daily  Pharmacy Consult, , Does not apply, Daily  atorvastatin, 40 mg, Oral, Daily  busPIRone, 15 mg, Oral, Q12H  cetirizine, 5 mg, Oral, Daily  DULoxetine, 60 mg, Oral, Daily  gabapentin, 100 mg, Oral, Nightly  insulin lispro, 2-9 Units, Subcutaneous, 4x Daily AC & at Bedtime  metoprolol tartrate, 50 mg, Oral, Q12H  Morphine, 30 mg, Oral, BID  pantoprazole, 40 mg, Oral, Q AM  phenytoin ER, 200 mg, Oral, Q12H  propafenone, 150 mg, Oral, Q8H  senna-docusate sodium, 2 tablet, Oral, BID  sodium chloride, 10 mL, Intravenous, Q12H  vitamin D3, 5,000 Units, Oral, Daily      Continuous  Infusions:heparin, 14 Units/kg/hr, Last Rate: 18 Units/kg/hr (05/25/23 6952)  Pharmacy to Dose Heparin,   Pharmacy to dose warfarin,       PRN Meds:.•  acetaminophen **OR** acetaminophen **OR** acetaminophen  •  albuterol  •  senna-docusate sodium **AND** polyethylene glycol **AND** bisacodyl **AND** bisacodyl  •  dextrose  •  dextrose  •  glucagon (human recombinant)  •  nitroglycerin  •  Pharmacy to Dose Heparin  •  Pharmacy to dose warfarin  •  sodium chloride  •  sodium chloride    Assessment & Plan   Assessment & Plan     Active Hospital Problems    Diagnosis  POA   • **Acute deep vein thrombosis (DVT) of femoral vein of right lower extremity [I82.411]  Yes   • Acute deep vein thrombosis (DVT) of left femoral vein [I82.412]  Yes   • Paroxysmal atrial fibrillation [I48.0]  Unknown   • Chronic anticoagulation [Z79.01]  Not Applicable   • Depression [F32.A]  Yes   • Epilepsy (HCC) [G40.909]  Yes   • GERD (gastroesophageal reflux disease) [K21.9]  Yes   • Hyperlipidemia [E78.5]  Yes   • Diabetic peripheral neuropathy associated with type 2 diabetes mellitus (HCC) [E11.42]  Yes      Resolved Hospital Problems   No resolved problems to display.        Brief Hospital Course to date:  Martha Harrell is a 68 y.o. female presented with acute bilateral lower extremity DVTs and failed outpatient therapy with Eliquis.  Anticoagulation is challenging given interactions with antiepileptic drug Dilantin and history of large hematoma after a fall while on Coumadin.    Acute bilateral lower extremity DVTs  -Continue heparin drip  -Initiate warfarin therapy, pharmacy to consult-goal INR 2-3  -Discussed risk of bleeding and patient is agreeable  -She will need frequent outpatient monitoring, she is aware  -Considered Xarelto however it interacts with Dilantin (decreased effectiveness of Xarelto)  -Noted Eliquis also interacts with Dilantin (decreased effectiveness of Eliquis)    Paroxysmal A-fib  -We will go back on warfarin as  above    Diabetes mellitus type 2  -Diabetic diet, sliding scale insulin as needed    Peripheral neuropathy  -Continue gabapentin    GERD  -PPI    Hyperlipidemia  -Continue Lipitor    Epilepsy  -Continue Dilantin  -Follow-up with neurology outpatient to consider alternatives to Dilantin in the event she needs other options for anticoagulation    Anticipate patient will need 3 to 5 days of IV heparin drip while dosing warfarin until INR is therapeutic with a goal of INR 2-3        Expected Discharge Location and Transportation: Home, private car  Expected Discharge   Expected Discharge Date: 5/29/2023; Expected Discharge Time:      DVT prophylaxis:  Medical DVT prophylaxis orders are present.          CODE STATUS:   Code Status and Medical Interventions:   Ordered at: 05/25/23 1948     Level Of Support Discussed With:    Patient     Code Status (Patient has no pulse and is not breathing):    CPR (Attempt to Resuscitate)     Medical Interventions (Patient has pulse or is breathing):    Full Support       Blanca Fowler MD  05/26/23

## 2023-05-26 NOTE — NURSING NOTE
0210 per pharmacist stop heparin drip now due to lab being so high and he put in lab to be drawn at 0400.

## 2023-05-26 NOTE — PROGRESS NOTES
HEPARIN INFUSION  Martha Harrell is a  68 y.o. female receiving heparin infusion.     Therapy for (VTE/Cardiac):   VTE  Patient Weight: 78.9 kg  Initial Bolus (Y/N): Yes  Any Bolus (Y/N): Yes        Signs or Symptoms of Bleeding: None noted    Recommend Xa every 6 hours.   VTE (PE/DVT)   Initial Bolus: 80 units/kg (Max 10,000 units)  Initial rate: 18 units/kg/hr (Max 1,500 units/hr)    Anti Xa Rebolus Infusion Hold time Change infusion Dose (Units/kg/hr) Next Anti Xa Level Due   < 0.11 50 Units/kg  (4000 Units Max) None Increase by  4 Units/kg/hr 6 hours   0.11 - 0.19 25 Units/kg  (2000 Units Max) None Increase by  3 Units/kg/hr 6 hours   0.2 - 0.29 0 None Increase by  2 Units/kg/hr 6 hours   0.3 - 0.7 0 None No Change 6 hours (after 2 consecutive levels in range check qAM)   0.71 - 0.8 0 None Decrease by  1 Units/kg/hr 6 hours   0.81 - 0.9 0 None Decrease by  2 Units/kg/hr 6 hours   0.91 - 1 0 60 Minutes Decrease by  3 Units/kg/hr 6 hours   >1 0 Hold  After Anti Xa less than 0.7 decrease previous rate by  4 Units/kg/hr  Every 2 hours until Anti Xa is less than 0.7 then when infusion restarts in 6 hours     Results from last 7 days   Lab Units 05/25/23  1549 05/22/23  1416   INR  1.22* 1.04   HEMOGLOBIN g/dL 13.9  --    HEMATOCRIT % 43.9  --    PLATELETS 10*3/mm3 198  --           Date   Time   Anti-Xa Current Rate (Unit/kg/hr) Bolus   (Units) Rate Change   (Unit/kg/hr) New Rate (Unit/kg/hr) Next   Anti-Xa Comments  Pump Check Daily   5/25 1549 0.1 New Start 6310 +18 18 0000 D/w RN   5/26 0210 >1.1 18 -- off off 0400 Patient on eliquis, but initial Xa 0.1. D/w RN, hold 2 hours.   5/26 0630 0.21 off -- +14 14 1200 D/w RN   5/26 1235 0.26 14 -- +2 16 2100 DW Ros, RN                                                                                                                                                                                                Gisselle Giordano, PharmD  Pharmacy Resident  05/26/23  14:48  EDT

## 2023-05-26 NOTE — PROGRESS NOTES
"Pharmacy Consult  -  Warfarin    Martha Harrell is a  68 y.o. female   Height - 157.5 cm (62.01\")  Weight - 80.6 kg (177 lb 9.6 oz)    Consulting Provider: - Blanca Fowler MD  Indication: - DVT/PE  Goal INR: - 2-3  Home Regimen:   -n/a, new start       Bridge Therapy: Yes   and unfractionated heparin    Drug-Drug Interactions with current regimen:   Pantoprazole - may increase INR              Phenytoin - may increase or decrease warfarin effect    Warfarin Dosing During Admission:    Date  5/25 5/26          INR  1.22           Dose  -- 5mg               Education Provided: Warfarin education provided on 5/26/23 verbally and in writing.  Discussed effects of warfarin, importance of checking INR, drug-drug and drug-food interactions, and signs/symptoms of bleeding and clotting.  Patient verbalized understanding through teach back.  All pertinent questions were answered.        Discharge Follow up:   Following Provider - SHERRY Hawkins   Follow up time range or appointment - 2-3 days following discharge      Labs:    Results from last 7 days   Lab Units 05/26/23  0414 05/25/23  1549 05/22/23  1416   INR   --  1.22* 1.04   APTT seconds  --  25.8* 24.7   HEMOGLOBIN g/dL 13.2 13.9  --    HEMATOCRIT % 42.0 43.9  --      Results from last 7 days   Lab Units 05/26/23  0414 05/25/23  1549   SODIUM mmol/L 137 131*   POTASSIUM mmol/L 3.8 5.1   CHLORIDE mmol/L 100 94*   CO2 mmol/L 24.0 25.0   BUN mg/dL 36* 37*   CREATININE mg/dL 0.57 0.72   CALCIUM mg/dL 9.4 9.4   BILIRUBIN mg/dL  --  0.7   ALK PHOS U/L  --  99   ALT (SGPT) U/L  --  10   AST (SGOT) U/L  --  20   GLUCOSE mg/dL 171* 392*       Current dietary intake:   Diet Order   Procedures    Diet: Regular/House Diet; Texture: Regular Texture (IDDSI 7); Fluid Consistency: Thin (IDDSI 0)     Assessment/Plan:   Warfarin dosing for DVT  Goal INR: 2-3  5/25 INR - 1.22  5/25 H/H - 13.2/42.0    Patient previously was on warfarin 5mg daily, which was d/c'ed >1yr due to " hematoma    Will start warfarin 5mg daily   Continue heparin drip until INR >2  Monitor for s/s bleeding, clinical status, dietary intake and drug-drug interactions  Follow daily INR and adjust dose accordingly       Thank you  Paul Acuna [facility]  5/26/2023  11:57 EDT

## 2023-05-26 NOTE — CASE MANAGEMENT/SOCIAL WORK
Continued Stay Note  [facility]     Patient Name: Martha Harrell  MRN: [id number]  Today's Date: 5/26/2023    Admit Date: 5/25/2023    Plan: Home   Discharge Plan     Row Name 05/26/23 1125       Plan    Plan Home    Patient/Family in Agreement with Plan yes    Plan Comments CM called [facility] retail pharmacy and check to see if Xarelto was covered by insurance. Xarelto is covered by her insurance.    Final Discharge Disposition Code 01 - home or self-care    Row Name 05/26/23 0958       Plan    Plan Home    Patient/Family in Agreement with Plan yes    Plan Comments Spoke with patient at bedside. Patient lives with son in [facility]. Patient is independent with ADL's. She uses a cane and walker for mobility. She is not current with  services. She denies any other DME needs.  PCP is Ginger Ward. Insurance is [employer] [facility] and [employer]. Her discharge plan for now is home with private transport. CM will follow for discharge needs.    Final Discharge Disposition Code 01 - home or self-care               Discharge Codes    No documentation.               Expected Discharge Date and Time     Expected Discharge Date Expected Discharge Time    May 29, 2023             Jocelyn Hassan RN

## 2023-05-26 NOTE — CASE MANAGEMENT/SOCIAL WORK
Discharge Planning Assessment  [facility]     Patient Name: Martha Harrell  MRN: [id number]  Today's Date: 5/26/2023    Admit Date: 5/25/2023    Plan: Home   Discharge Needs Assessment     Row Name 05/26/23 0958       Living Environment    People in Home child(mansi), adult    Current Living Arrangements home    Potentially Unsafe Housing Conditions none    Primary Care Provided by self       Resource/Environmental Concerns    Resource/Environmental Concerns none       Transition Planning    Patient/Family Anticipates Transition to home with family    Patient/Family Anticipated Services at Transition none       Discharge Needs Assessment    Equipment Currently Used at Home cane, straight;glucometer;shower chair    Equipment Needed After Discharge none               Discharge Plan     Row Name 05/26/23 0958       Plan    Plan Home    Patient/Family in Agreement with Plan yes    Plan Comments Spoke with patient at bedside. Patient lives with son in [facility]. Patient is independent with ADL's. She uses a cane and walker for mobility. She is not current with  services. She denies any other DME needs.  PCP is Ginger Ward. Insurance is [employer] [facility] and [employer]. Faxed insurance cards to registration. Her discharge plan for now is home with private transport. CM will follow for discharge needs.    Final Discharge Disposition Code 01 - home or self-care              Continued Care and Services - Admitted Since 5/25/2023    Coordination has not been started for this encounter.       Expected Discharge Date and Time     Expected Discharge Date Expected Discharge Time    May 29, 2023          Demographic Summary     Row Name 05/26/23 0957       General Information    Admission Type     Preferred Language English               Functional Status     Row Name 05/26/23 0957       Functional Status    Usual Activity Tolerance fair    Current Activity Tolerance fair       Functional Status, IADL     Medications independent    Meal Preparation assistive equipment    Housekeeping assistive equipment    Laundry assistive equipment    Shopping assistive equipment               Psychosocial    No documentation.                Abuse/Neglect    No documentation.                Legal    No documentation.                Substance Abuse    No documentation.                Patient Forms    No documentation.                   Jocelyn Hassan RN

## 2023-05-27 LAB
GLUCOSE BLDC GLUCOMTR-MCNC: 304 MG/DL (ref 70–130)
GLUCOSE BLDC GLUCOMTR-MCNC: 338 MG/DL (ref 70–130)
GLUCOSE BLDC GLUCOMTR-MCNC: 348 MG/DL (ref 70–130)
GLUCOSE BLDC GLUCOMTR-MCNC: 388 MG/DL (ref 70–130)
INR PPP: 1.21 (ref 0.89–1.12)
PROTHROMBIN TIME: 15.4 SECONDS (ref 12.2–14.5)
UFH PPP CHRO-ACNC: 0.56 IU/ML (ref 0.3–0.7)

## 2023-05-27 PROCEDURE — 85610 PROTHROMBIN TIME: CPT | Performed by: FAMILY MEDICINE

## 2023-05-27 PROCEDURE — 82948 REAGENT STRIP/BLOOD GLUCOSE: CPT

## 2023-05-27 PROCEDURE — 99232 SBSQ HOSP IP/OBS MODERATE 35: CPT | Performed by: FAMILY MEDICINE

## 2023-05-27 PROCEDURE — 85520 HEPARIN ASSAY: CPT

## 2023-05-27 PROCEDURE — 25010000002 HEPARIN (PORCINE) 25000-0.45 UT/250ML-% SOLUTION

## 2023-05-27 PROCEDURE — 63710000001 INSULIN LISPRO (HUMAN) PER 5 UNITS: Performed by: NURSE PRACTITIONER

## 2023-05-27 PROCEDURE — 63710000001 INSULIN DETEMIR PER 5 UNITS: Performed by: FAMILY MEDICINE

## 2023-05-27 RX ORDER — WARFARIN SODIUM 7.5 MG/1
7.5 TABLET ORAL
Status: COMPLETED | OUTPATIENT
Start: 2023-05-27 | End: 2023-05-27

## 2023-05-27 RX ORDER — WARFARIN SODIUM 5 MG/1
5 TABLET ORAL
Status: DISCONTINUED | OUTPATIENT
Start: 2023-05-28 | End: 2023-05-31 | Stop reason: HOSPADM

## 2023-05-27 RX ORDER — HEPARIN SODIUM 10000 [USP'U]/100ML
16 INJECTION, SOLUTION INTRAVENOUS
Status: DISCONTINUED | OUTPATIENT
Start: 2023-05-27 | End: 2023-05-28

## 2023-05-27 RX ADMIN — INSULIN LISPRO 7 UNITS: 100 INJECTION, SOLUTION INTRAVENOUS; SUBCUTANEOUS at 13:05

## 2023-05-27 RX ADMIN — METOPROLOL TARTRATE 50 MG: 50 TABLET ORAL at 20:58

## 2023-05-27 RX ADMIN — DULOXETINE 60 MG: 60 CAPSULE, DELAYED RELEASE ORAL at 08:42

## 2023-05-27 RX ADMIN — BUSPIRONE HYDROCHLORIDE 15 MG: 15 TABLET ORAL at 08:43

## 2023-05-27 RX ADMIN — Medication 10 ML: at 08:46

## 2023-05-27 RX ADMIN — PROPAFENONE HYDROCHLORIDE 150 MG: 150 TABLET, FILM COATED ORAL at 21:00

## 2023-05-27 RX ADMIN — PHENYTOIN SODIUM 200 MG: 100 CAPSULE ORAL at 08:46

## 2023-05-27 RX ADMIN — DOCUSATE SODIUM 50 MG AND SENNOSIDES 8.6 MG 2 TABLET: 8.6; 5 TABLET, FILM COATED ORAL at 20:57

## 2023-05-27 RX ADMIN — PANTOPRAZOLE SODIUM 40 MG: 40 TABLET, DELAYED RELEASE ORAL at 05:09

## 2023-05-27 RX ADMIN — INSULIN DETEMIR 30 UNITS: 100 INJECTION, SOLUTION SUBCUTANEOUS at 08:43

## 2023-05-27 RX ADMIN — INSULIN LISPRO 8 UNITS: 100 INJECTION, SOLUTION INTRAVENOUS; SUBCUTANEOUS at 20:57

## 2023-05-27 RX ADMIN — INSULIN LISPRO 7 UNITS: 100 INJECTION, SOLUTION INTRAVENOUS; SUBCUTANEOUS at 17:31

## 2023-05-27 RX ADMIN — MORPHINE SULFATE 30 MG: 30 TABLET, FILM COATED, EXTENDED RELEASE ORAL at 08:43

## 2023-05-27 RX ADMIN — INSULIN DETEMIR 30 UNITS: 100 INJECTION, SOLUTION SUBCUTANEOUS at 20:57

## 2023-05-27 RX ADMIN — PROPAFENONE HYDROCHLORIDE 150 MG: 150 TABLET, FILM COATED ORAL at 05:09

## 2023-05-27 RX ADMIN — MORPHINE SULFATE 30 MG: 30 TABLET, FILM COATED, EXTENDED RELEASE ORAL at 20:57

## 2023-05-27 RX ADMIN — DOCUSATE SODIUM 50 MG AND SENNOSIDES 8.6 MG 2 TABLET: 8.6; 5 TABLET, FILM COATED ORAL at 08:42

## 2023-05-27 RX ADMIN — Medication 5000 UNITS: at 08:42

## 2023-05-27 RX ADMIN — CETIRIZINE HYDROCHLORIDE 5 MG: 10 TABLET, FILM COATED ORAL at 08:42

## 2023-05-27 RX ADMIN — BUSPIRONE HYDROCHLORIDE 15 MG: 15 TABLET ORAL at 20:58

## 2023-05-27 RX ADMIN — GABAPENTIN 100 MG: 100 CAPSULE ORAL at 20:57

## 2023-05-27 RX ADMIN — INSULIN LISPRO 7 UNITS: 100 INJECTION, SOLUTION INTRAVENOUS; SUBCUTANEOUS at 08:43

## 2023-05-27 RX ADMIN — ATORVASTATIN CALCIUM 40 MG: 40 TABLET, FILM COATED ORAL at 08:42

## 2023-05-27 RX ADMIN — HEPARIN SODIUM 16 UNITS/KG/HR: 10000 INJECTION, SOLUTION INTRAVENOUS at 18:43

## 2023-05-27 RX ADMIN — PHENYTOIN SODIUM 200 MG: 100 CAPSULE ORAL at 20:58

## 2023-05-27 RX ADMIN — Medication 10 ML: at 20:58

## 2023-05-27 RX ADMIN — WARFARIN SODIUM 7.5 MG: 7.5 TABLET ORAL at 17:30

## 2023-05-27 RX ADMIN — PROPAFENONE HYDROCHLORIDE 150 MG: 150 TABLET, FILM COATED ORAL at 14:22

## 2023-05-27 NOTE — PROGRESS NOTES
HEPARIN INFUSION  Martha Harrell is a  68 y.o. female receiving heparin infusion.     Therapy for (VTE/Cardiac):   VTE  Patient Weight: 80 kg  Initial Bolus (Y/N): Yes  Any Bolus (Y/N): Yes        Signs or Symptoms of Bleeding: None noted    Recommend Xa every 6 hours.   VTE (PE/DVT)   Initial Bolus: 80 units/kg (Max 10,000 units)  Initial rate: 18 units/kg/hr (Max 1,500 units/hr)    Anti Xa Rebolus Infusion Hold time Change infusion Dose (Units/kg/hr) Next Anti Xa Level Due   < 0.11 50 Units/kg  (4000 Units Max) None Increase by  4 Units/kg/hr 6 hours   0.11 - 0.19 25 Units/kg  (2000 Units Max) None Increase by  3 Units/kg/hr 6 hours   0.2 - 0.29 0 None Increase by  2 Units/kg/hr 6 hours   0.3 - 0.7 0 None No Change 6 hours (after 2 consecutive levels in range check qAM)   0.71 - 0.8 0 None Decrease by  1 Units/kg/hr 6 hours   0.81 - 0.9 0 None Decrease by  2 Units/kg/hr 6 hours   0.91 - 1 0 60 Minutes Decrease by  3 Units/kg/hr 6 hours   >1 0 Hold  After Anti Xa less than 0.7 decrease previous rate by  4 Units/kg/hr  Every 2 hours until Anti Xa is less than 0.7 then when infusion restarts in 6 hours     Results from last 7 days   Lab Units 05/25/23  1549 05/22/23  1416   INR  1.22* 1.04   HEMOGLOBIN g/dL 13.9  --    HEMATOCRIT % 43.9  --    PLATELETS 10*3/mm3 198  --           Date   Time   Anti-Xa Current Rate (Unit/kg/hr) Bolus   (Units) Rate Change   (Unit/kg/hr) New Rate (Unit/kg/hr) Next   Anti-Xa Comments  Pump Check Daily   5/25 1549 0.1 New Start 6310 +18 18 0000 D/w RN   5/26 0210 >1.1 18 -- off off 0400 Patient on eliquis, but initial Xa 0.1. D/w RN, hold 2 hours.   5/26 0630 0.21 off -- +14 14 1200 D/w RN   5/26 1235 0.26 14 -- +2 16 2100 DW Ros, RN   5/26 2026 0.54 16 -- -- 16 0300 SALLY ANGULO   5/27 0400 0.56 16 -- -- 16 0600 5/28 D/w RN  Pump checked, weight 80 kg. Order updated, d/w RN Devin. ES                                                                                                                                                                           Livan Abarca, PharmD, BCPS  5/27/2023  09:38 EDT

## 2023-05-27 NOTE — PAYOR COMM NOTE
"Martha Harrell (68 y.o. Female)     Date of Birth   1955    Social Security Number       Address   [address] [place] [address] [facility] [address]    Home Phone   [phone]    MRN   [id number]       Yarsani   None    Marital Status                               Admission Date   5/25/23    Admission Type   Emergency    Admitting Provider   Blanca Fowler MD    Attending Provider   Blanca Fowler MD    Department, Room/Bed   [facility] 6B, N646/1       Discharge Date       Discharge Disposition       Discharge Destination                               Attending Provider: Blanca Fowler MD    Allergies: Aleve [Naproxen]    Isolation: None   Infection: None   Code Status: CPR    Ht: 157.5 cm (62.01\")   Wt: 80.6 kg (177 lb 9.6 oz)    Admission Cmt: None   Principal Problem: Acute deep vein thrombosis (DVT) of femoral vein of right lower extremity [I82.411]                 Active Insurance as of 5/25/2023     Primary Coverage     Payor Plan Insurance Group Employer/Plan Group    [facility] BY [facility] BY HENRY [id number]     Payor Plan Address Payor Plan Phone Number Payor Plan Fax Number Effective Dates    [address] [address]   1/1/2021 - None Entered    [facility] [address]       Subscriber Name Subscriber Birth Date Member ID       MARTHA HARRELL 1955 [id number]                 Emergency Contacts      (Rel.) Home Phone Work Phone Mobile Phone    alida harrell (Son) [phone] -- [phone]    anthony harrell (Son) [phone] -- [phone]               History & Physical      Evette Keyes APRN at 05/25/23 1931     Attestation signed by Jenni Leblanc MD at 05/25/23 2320 (Updated)    I have reviewed this documentation and agree. Consider hem/onc eval, however noncompliance may be the culprit. Looks like a 30 day script lasts about 40 days.  Jenni Leblanc MD 05/25/23 23:19 EDT                         [facility] " Hospital Medicine Services  HISTORY AND PHYSICAL    Patient Name: Martha Harrell  : 1955  MRN: [id number]  Primary Care Physician: Ginger Ward, SHERRY  Date of admission: 2023    Subjective    Subjective     Chief Complaint:  RLE swelling/pain     HPI:  Martha Harrell is a 68 y.o. female with a past medical history significant for atrial fibrillation on eliquis, LLE DVT, pulmonary embolism, epilepsy, hyperlipidemia, fibromyalgia, diabetes mellitus type 2, GERD, depression and asthma presents to the ED with complaints of RLE swelling and pain that began on Monday.  Patient states that she was evaluated at [facility] on Monday and underwent RLE US that was negative.  She was started on doxycycline for RLE cellulitis.  Patient states that she was evaluated by her PCP today due to worsening pain/swelling and she was referred to the ED for further evaluation.  Patient reports compliance with all of her medications including eliquis however she states she did miss the last two days.  She denies any fever, chills, nausea, vomiting, diarrhea, cough, shortness of air, dizziness, headache, weight loss or recent injury.  Ultrasound obtained tonight shows almost all veins in the RLE are clogged with DVT. A contralateral scan was done as well that showed the left femoral vein also has a DVT.  Patient was started on a heparin drip.  Patient will be admitted to [facility] under the care of the Hospitalist for further evaluation and treatment.         Review of Systems   Constitutional: Positive for activity change. Negative for appetite change, chills, diaphoresis, fatigue, fever and unexpected weight change.   HENT: Negative.    Eyes: Negative for photophobia and visual disturbance.   Respiratory: Negative for shortness of breath.    Cardiovascular: Positive for leg swelling. Negative for chest pain and palpitations.   Gastrointestinal: Negative for abdominal distention, abdominal pain, blood in stool, constipation,  diarrhea and vomiting.   Genitourinary: Negative.    Musculoskeletal: Positive for gait problem. Negative for back pain, neck pain and neck stiffness.   Neurological: Negative for dizziness, speech difficulty, weakness, light-headedness, numbness and headaches.   Psychiatric/Behavioral: Negative.             Personal History     Past Medical History:   Diagnosis Date   • Anemia    • Asthma    • Chronic bronchitis    • Depression    • Diabetes mellitus    • Epilepsy    • Fibromyalgia, primary    • GERD (gastroesophageal reflux disease)    • Hyperlipidemia    • Leg DVT (deep venous thromboembolism), acute, left 6/22/2019             Past Surgical History:   Procedure Laterality Date   • BACK SURGERY     • NECK SURGERY     • TUBAL ABDOMINAL LIGATION         Family History:  family history includes Arthritis in her father, maternal aunt, maternal uncle, mother, and sister; Cancer in her father and maternal uncle; Heart disease in her mother; Hyperlipidemia in her father; Hypertension in her father; Obesity in her father, mother, and sister.     Social History:  reports that she quit smoking about 13 years ago. Her smoking use included cigarettes. She has never used smokeless tobacco. She reports that she does not drink alcohol and does not use drugs.  Social History     Social History Narrative   • Not on file       Medications:  DULoxetine, FreeStyle Pauly 2 Sensor, HYDROcodone-acetaminophen, Insulin Glargine (2 Unit Dial), Insulin Lispro (1 Unit Dial), Insulin Pen Needle, Magnesium Oxide, Morphine, OneTouch Delica Lancets Fine, RA Fish Oil, albuterol sulfate HFA, apixaban, atorvastatin, busPIRone, empagliflozin, fenofibrate, fish oil, furosemide, gabapentin, glucose blood, loratadine, magnesium oxide, metFORMIN, metoprolol tartrate, omeprazole, phenytoin ER, propafenone, silver sulfadiazine, and vitamin D3    Allergies   Allergen Reactions   • Aleve [Naproxen] Itching       Objective    Objective     Vital Signs:    Temp:  [97.7 °F (36.5 °C)] 97.7 °F (36.5 °C)  Heart Rate:  [75-85] 75  Resp:  [16] 16  BP: (111-123)/(66-77) 111/66    Physical Exam  Vitals and nursing note reviewed.   Constitutional:       General: She is not in acute distress.     Appearance: Normal appearance. She is not ill-appearing, toxic-appearing or diaphoretic.      Comments: Sitting up eating in bed    HENT:      Head: Normocephalic.      Nose: Nose normal.      Mouth/Throat:      Mouth: Mucous membranes are dry.      Pharynx: Oropharynx is clear.   Eyes:      Extraocular Movements: Extraocular movements intact.      Conjunctiva/sclera: Conjunctivae normal.      Pupils: Pupils are equal, round, and reactive to light.   Cardiovascular:      Rate and Rhythm: Normal rate and regular rhythm.      Pulses: Normal pulses.      Heart sounds: Normal heart sounds.   Pulmonary:      Effort: Pulmonary effort is normal.      Breath sounds: Normal breath sounds.   Abdominal:      General: Bowel sounds are normal. There is no distension.      Palpations: Abdomen is soft. There is no mass.      Tenderness: There is no abdominal tenderness. There is no right CVA tenderness, left CVA tenderness, guarding or rebound.      Hernia: No hernia is present.   Musculoskeletal:         General: Swelling and tenderness present. No deformity or signs of injury.      Cervical back: Normal range of motion and neck supple.      Right lower leg: No edema.      Left lower leg: No edema.      Comments: Extensive swelling to RLE compared to LLE extending up to right thigh.  Tender to touch.  No erythema or warmth noted.    Skin:     General: Skin is warm and dry.   Neurological:      General: No focal deficit present.      Mental Status: She is alert and oriented to person, place, and time. Mental status is at baseline.   Psychiatric:         Mood and Affect: Mood normal.         Behavior: Behavior normal.         Thought Content: Thought content normal.         Judgment: Judgment  normal.          Result Review:  I have personally reviewed the results from the time of this admission to 5/25/2023 19:31 EDT and agree with these findings:  [x]  Laboratory list / accordion  [x]  Microbiology  [x]  Radiology  [x]  EKG/Telemetry   []  Cardiology/Vascular   []  Pathology  []  Old records  []  Other:  Most notable findings include:     LAB RESULTS:      Lab 05/25/23  1549 05/22/23  1416   WBC 10.63  --    HEMOGLOBIN 13.9  --    HEMATOCRIT 43.9  --    PLATELETS 198  --    NEUTROS ABS 8.55*  --    IMMATURE GRANS (ABS) 0.03  --    LYMPHS ABS 1.18  --    MONOS ABS 0.77  --    EOS ABS 0.07  --    MCV 83.9  --    PROTIME 15.6* 10.4   APTT 25.8* 24.7   HEPARIN ANTI-XA 0.10*  --          Lab 05/25/23  1549   SODIUM 131*   POTASSIUM 5.1   CHLORIDE 94*   CO2 25.0   ANION GAP 12.0   BUN 37*   CREATININE 0.72   EGFR 90.6   GLUCOSE 392*   CALCIUM 9.4         Lab 05/25/23  1549   TOTAL PROTEIN 7.8   ALBUMIN 3.8   GLOBULIN 4.0   ALT (SGPT) 10   AST (SGOT) 20   BILIRUBIN 0.7   ALK PHOS 99         Lab 05/25/23  1549 05/22/23  1416   PROTIME 15.6* 10.4   INR 1.22* 1.04                 Brief Urine Lab Results     None        Microbiology Results (last 10 days)     ** No results found for the last 240 hours. **          No radiology results from the last 24 hrs    Results for orders placed during the hospital encounter of 03/17/22    Adult Transthoracic Echo Complete W/ Cont if Necessary Per Protocol    Interpretation Summary  · Left ventricular ejection fraction appears to be 61 - 65%. Left ventricular systolic function is normal.  · Left ventricular diastolic function was normal.  · Calculated right ventricular systolic pressure from tricuspid regurgitation is 20 mmHg.      Assessment & Plan   Assessment & Plan       Acute deep vein thrombosis (DVT) of femoral vein of right lower extremity    Diabetic peripheral neuropathy associated with type 2 diabetes mellitus (HCC)    Depression    Epilepsy (HCC)    GERD  (gastroesophageal reflux disease)    Hyperlipidemia    Chronic anticoagulation    Acute deep vein thrombosis (DVT) of left femoral vein      68 year old female presents to the ED with RLE swelling/pain that began Monday.      1) Acute DVT of RLE and left femoral vein       Failed outpatient therapy: on eliquis   -Ultrasound as mentioned above  -continue heparin drip with pharmacy to dose  -CT of abdomen/pelvis pending   -CTA of chest pending   -pain control   -bedrest  -consider vascular consult     2) Paroxysmal atrial fibrillation   -on eliquis/rhythmol   -follows with Dr. Yañez   -obtain EKG     3) Diabetes mellitus type 2  -check hgb A1c  -start ldssi   -fingersticks achs     4) Hyperlipidemia  -on lipitor     5) GERD  -PPI     6) Depression   -on cymbalta     7) Peripheral neuropathy   -continue gabapentin    8) Hx of LLE and PE        DVT prophylaxis:  Heparin drip     CODE STATUS:  Full Code        Expected Discharge  TBD     This note has been completed as part of a split-shared workflow.     Signature: Electronically signed by SHERRY Nuñez, 23, 7:43 PM EDT.    Total time spent: 65 mins  Time spent includes time reviewing chart, face-to-face time, counseling patient/family/caregiver, ordering medications/tests/procedures, communicating with other health care professionals, documenting clinical information in the electronic health record, and coordination of care.                  Electronically signed by Jenni Leblanc MD at 23 2320          Physician Progress Notes (all)      Blanca Fowler MD at 23 1138              [facility] Medicine Services  PROGRESS NOTE    Patient Name: Martha Harrell  : 1955  MRN: [id number]    Date of Admission: 2023  Primary Care Physician: Ginger Ward APRN    Subjective   Subjective     CC:  DVTs    HPI:  Patient is a 68-year-old who was admitted with acute bilateral lower extremity DVTs who has failed  outpatient therapy with Eliquis.  She reports she was previously on Coumadin but had a fall with a significant hematoma and was taken off of Coumadin.  She was diagnosed with a DVT and placed on Eliquis approximately 1 month ago.  She presented with worsening pain and swelling and was diagnosed with another DVT, being admitted for further anticoagulation with heparin drip.  We discussed options for anticoagulation including potentially Xarelto however it interacts with Dilantin as well as Eliquis interacts with Dilantin.  I suggested we could consider discussing with neurology changing her Dilantin she takes for epilepsy but she is very reluctant to change the Dilantin as she has had good response to this treatment.  She would rather go back on warfarin.  I did explain that this will require heparin drip until her INR is therapeutic and she is agreeable.  She states it has been about a year since she had a seizure.  She used to have grand mal seizures but her most recent seizures have just been having difficulty with word finding.    ROS:  Gen- No fevers, chills  CV- No chest pain, palpitations  Resp- No cough, dyspnea  GI- No N/V/D, abd pain         Objective   Objective     Vital Signs:   Temp:  [97.1 °F (36.2 °C)-98.7 °F (37.1 °C)] 97.2 °F (36.2 °C)  Heart Rate:  [66-85] 66  Resp:  [16] 16  BP: (111-124)/(59-77) 113/62     Physical Exam:  Constitutional: No acute distress, awake, alert  HENT: NCAT, mucous membranes moist  Respiratory: Clear to auscultation bilaterally, respiratory effort normal   Cardiovascular: RRR  Gastrointestinal: Positive bowel sounds, soft, nontender, nondistended  Musculoskeletal: Bilateral lower extremity edema and erythema  Psychiatric: Appropriate affect, cooperative  Neurologic: Oriented x 3, strength symmetric in all extremities, Cranial Nerves grossly intact to confrontation, speech clear  Skin: No rashes      Results Reviewed:  LAB RESULTS:      Lab 05/26/23  0414 05/26/23  0101  05/25/23  1549 05/22/23  1416   WBC 11.52*  --  10.63  --    HEMOGLOBIN 13.2  --  13.9  --    HEMATOCRIT 42.0  --  43.9  --    PLATELETS 218  --  198  --    NEUTROS ABS 6.24  --  8.55*  --    IMMATURE GRANS (ABS) 0.04  --  0.03  --    LYMPHS ABS 3.81*  --  1.18  --    MONOS ABS 0.97*  --  0.77  --    EOS ABS 0.39  --  0.07  --    MCV 83.3  --  83.9  --    PROTIME  --   --  15.6* 10.4   APTT  --   --  25.8* 24.7   HEPARIN ANTI-XA 0.21* 1.10* 0.10*  --          Lab 05/26/23  0414 05/25/23  1549   SODIUM 137 131*   POTASSIUM 3.8 5.1   CHLORIDE 100 94*   CO2 24.0 25.0   ANION GAP 13.0 12.0   BUN 36* 37*   CREATININE 0.57 0.72   EGFR 98.5 90.6   GLUCOSE 171* 392*   CALCIUM 9.4 9.4   HEMOGLOBIN A1C 10.30*  --          Lab 05/25/23  1549   TOTAL PROTEIN 7.8   ALBUMIN 3.8   GLOBULIN 4.0   ALT (SGPT) 10   AST (SGOT) 20   BILIRUBIN 0.7   ALK PHOS 99         Lab 05/26/23  0703 05/26/23  0414 05/25/23  1549 05/22/23  1416   HSTROP T 14* 15*  --   --    PROTIME  --   --  15.6* 10.4   INR  --   --  1.22* 1.04                 Brief Urine Lab Results     None          Microbiology Results Abnormal     None          CT Angiogram Chest    Result Date: 5/25/2023  CT ANGIOGRAM CHEST Date of Exam: 5/25/2023 7:56 PM EDT Indication: extensive DVT's rule out PE. Comparison: Chest radiograph from March 17, 2022 Technique: CTA of the chest was performed before and after the uneventful intravenous administration of 56 mL Isovue-370. Reconstructed coronal and sagittal images were also obtained. In addition, a 3-D volume rendered image was created for interpretation. Automated exposure control and iterative reconstruction methods were used. Findings: The central tracheobronchial tree is clear. A couple benign calcified granulomas are noted. The lungs are clear. There is no pleural effusion. The heart size appears normal. The great vessels are normal in caliber. There is no evidence of pulmonary embolus. No abnormally enlarged lymph nodes are  identified. Partial evaluation of the upper abdomen is unremarkable. No aggressive osseous lesions are identified.     Impression: Impression: 1.No evidence of pulmonary embolus. 2.No acute cardiopulmonary process. Electronically Signed: Harris Eugenio  5/25/2023 8:29 PM EDT  Workstation ID: [id number]    CT Abdomen Pelvis With Contrast    Result Date: 5/25/2023  CT ABDOMEN PELVIS W CONTRAST Date of Exam: 5/25/2023 6:29 PM EDT Indication: Metastatic disease evaluation. Extensive right lower extremity DVT. Left common femoral DVT. Comparison: None available. Technique: Axial CT images were obtained of the abdomen and pelvis following the uneventful intravenous administration of 85 mL Isovue-300. Reconstructed coronal and sagittal images were also obtained. Automated exposure control and iterative construction methods were used. Findings: No suspicious infiltrate is seen in the lung bases. There is a small hiatal hernia. Heart size appears within normal limits. No pericardial effusion. There is suspected calcific atherosclerosis of the coronary arteries. No splenomegaly or focal splenic lesion is identified. No suspicious adrenal lesion is seen. No definite acute pancreatic abnormality is seen. No definite hepatic lesion is identified. Aorta appears normal in caliber. There is calcific atherosclerosis of  the aorta and branch vessels. No significant abdominal lymphadenopathy is seen. No hydronephrosis. No definite obstructing ureteral calculus is seen. There is expected excretion of contrast from the kidneys on delayed phase images. There appears to be a  nonenhancing cyst at the upper pole of the right kidney. There are also small nonenhancing posterior left mid/lower pole and medial lower pole cysts. No definite enhancing renal lesion is identified. The gallbladder is mildly distended. No definite biliary ductal dilatation or acute gallbladder inflammation is seen. No acute gastric abnormality is seen. Small bowel  loops appear nondilated. The appendix appears within normal limits. The colon is minimally distended. There is diverticulosis of the distal colon. No definite acute colonic or rectal abnormality is seen. Urinary bladder appears grossly unremarkable. No definite suspicious uterine or adnexal abnormality is identified. There are stents in the left external iliac and common iliac veins terminating in the inferior vena cava. The stent does appear to cross the origin of the right common iliac vein. There there does not appear to be significant enhancement of the IVC below  the renal veins. There appears to be extensive edema in the soft tissues of the visualized right proximal thigh and right pelvis. There appear to be small fat-containing inguinal hernias. No significant pelvic lymphadenopathy is seen. There is severe left hip osteoarthritis. There is mild to moderate right hip osteoarthritis. There are degenerative changes in the spine. There is grade 1 anterolisthesis of L4 on L5. No definite aggressive osseous lesion is seen.     Impression: Impression: 1.Extensive edema in the soft tissues of the visualized right thigh and pelvis. This could be related to patient's reported right DVT or soft tissue infection. 2.Left external iliac and common iliac artery stents. The common iliac stent does extend into the inferior vena cava and crosses the origin of the right common iliac vein which could predispose to right lower extremity DVT. 3.No definitive findings of malignancy or metastatic disease in the abdomen or pelvis on this exam. PET/CT may be more sensitive for occult malignancy. 4.Calcific atherosclerosis. 5.Diverticulosis. Colonoscopy following be considered as colonic malignancy could be occult on CT. 6.Severe left hip osteoarthritis. Electronically Signed: Davian Manzanares  5/25/2023 7:52 PM EDT  Workstation ID: [id number]    Duplex Venous Lower Extremity - Right CAR    Result Date: 5/25/2023  •  Extensive right lower  extremity DVT as detailed below •  Acute right lower extremity deep vein thrombosis noted in the external iliac, common femoral, deep femoral, proximal femoral, mid femoral, distal femoral, popliteal, posterior tibial, peroneal and gastrocnemius. •  Acute right lower extremity superficial thrombophlebitis noted in the saphenofemoral junction and great saphenous (above knee). •  Acute left lower extremity deep vein thrombosis noted in the common femoral.       Results for orders placed during the hospital encounter of 03/17/22    Adult Transthoracic Echo Complete W/ Cont if Necessary Per Protocol    Interpretation Summary  · Left ventricular ejection fraction appears to be 61 - 65%. Left ventricular systolic function is normal.  · Left ventricular diastolic function was normal.  · Calculated right ventricular systolic pressure from tricuspid regurgitation is 20 mmHg.      Current medications:  Scheduled Meds:!NOT ORDERED- warfarin (COUMADIN), , Does not apply, Daily  Pharmacy Consult, , Does not apply, Daily  atorvastatin, 40 mg, Oral, Daily  busPIRone, 15 mg, Oral, Q12H  cetirizine, 5 mg, Oral, Daily  DULoxetine, 60 mg, Oral, Daily  gabapentin, 100 mg, Oral, Nightly  insulin lispro, 2-9 Units, Subcutaneous, 4x Daily AC & at Bedtime  metoprolol tartrate, 50 mg, Oral, Q12H  Morphine, 30 mg, Oral, BID  pantoprazole, 40 mg, Oral, Q AM  phenytoin ER, 200 mg, Oral, Q12H  propafenone, 150 mg, Oral, Q8H  senna-docusate sodium, 2 tablet, Oral, BID  sodium chloride, 10 mL, Intravenous, Q12H  vitamin D3, 5,000 Units, Oral, Daily      Continuous Infusions:heparin, 14 Units/kg/hr, Last Rate: 18 Units/kg/hr (05/25/23 3292)  Pharmacy to Dose Heparin,   Pharmacy to dose warfarin,       PRN Meds:.•  acetaminophen **OR** acetaminophen **OR** acetaminophen  •  albuterol  •  senna-docusate sodium **AND** polyethylene glycol **AND** bisacodyl **AND** bisacodyl  •  dextrose  •  dextrose  •  glucagon (human recombinant)  •   nitroglycerin  •  Pharmacy to Dose Heparin  •  Pharmacy to dose warfarin  •  sodium chloride  •  sodium chloride    Assessment & Plan   Assessment & Plan     Active Hospital Problems    Diagnosis  POA   • **Acute deep vein thrombosis (DVT) of femoral vein of right lower extremity [I82.411]  Yes   • Acute deep vein thrombosis (DVT) of left femoral vein [I82.412]  Yes   • Paroxysmal atrial fibrillation [I48.0]  Unknown   • Chronic anticoagulation [Z79.01]  Not Applicable   • Depression [F32.A]  Yes   • Epilepsy (HCC) [G40.909]  Yes   • GERD (gastroesophageal reflux disease) [K21.9]  Yes   • Hyperlipidemia [E78.5]  Yes   • Diabetic peripheral neuropathy associated with type 2 diabetes mellitus (HCC) [E11.42]  Yes      Resolved Hospital Problems   No resolved problems to display.        Brief Hospital Course to date:  Martha Harrell is a 68 y.o. female presented with acute bilateral lower extremity DVTs and failed outpatient therapy with Eliquis.  Anticoagulation is challenging given interactions with antiepileptic drug Dilantin and history of large hematoma after a fall while on Coumadin.    Acute bilateral lower extremity DVTs  -Continue heparin drip  -Initiate warfarin therapy, pharmacy to consult-goal INR 2-3  -Discussed risk of bleeding and patient is agreeable  -She will need frequent outpatient monitoring, she is aware  -Considered Xarelto however it interacts with Dilantin (decreased effectiveness of Xarelto)  -Noted Eliquis also interacts with Dilantin (decreased effectiveness of Eliquis)    Paroxysmal A-fib  -We will go back on warfarin as above    Diabetes mellitus type 2  -Diabetic diet, sliding scale insulin as needed    Peripheral neuropathy  -Continue gabapentin    GERD  -PPI    Hyperlipidemia  -Continue Lipitor    Epilepsy  -Continue Dilantin  -Follow-up with neurology outpatient to consider alternatives to Dilantin in the event she needs other options for anticoagulation    Anticipate patient will  need 3 to 5 days of IV heparin drip while dosing warfarin until INR is therapeutic with a goal of INR 2-3        Expected Discharge Location and Transportation: Home, private car  Expected Discharge   Expected Discharge Date: 5/29/2023; Expected Discharge Time:      DVT prophylaxis:  Medical DVT prophylaxis orders are present.          CODE STATUS:   Code Status and Medical Interventions:   Ordered at: 05/25/23 1948     Level Of Support Discussed With:    Patient     Code Status (Patient has no pulse and is not breathing):    CPR (Attempt to Resuscitate)     Medical Interventions (Patient has pulse or is breathing):    Full Support       Blanca Fowler MD  05/26/23        Electronically signed by Blanca Fowler MD at 05/26/23 [id number]       Consult Notes (all)    No notes of this type exist for this encounter.

## 2023-05-27 NOTE — PROGRESS NOTES
"Pharmacy Consult  -  Warfarin    Martha Harrell is a  68 y.o. female   Height - 157.5 cm (62.01\")  Weight - 80.6 kg (177 lb 9.6 oz)    Consulting Provider: - Blanca Fowler MD  Indication: - DVT/PE  Goal INR: - 2-3  Home Regimen:   -n/a, new start       Bridge Therapy: Yes   and unfractionated heparin    Drug-Drug Interactions with current regimen:   Pantoprazole - may increase INR              Phenytoin - may increase or decrease warfarin effect    Warfarin Dosing During Admission:    Date  5/25 5/26 5/27         INR  1.22 1.27 1.21         Dose  -- 5mg 7.5 mg              Education Provided: Warfarin education provided on 5/26/23 verbally and in writing.  Discussed effects of warfarin, importance of checking INR, drug-drug and drug-food interactions, and signs/symptoms of bleeding and clotting.  Patient verbalized understanding through teach back.  All pertinent questions were answered.        Discharge Follow up:   Following Provider - SHERRY Hawkins   Follow up time range or appointment - 2-3 days following discharge      Labs:    Results from last 7 days   Lab Units 05/27/23  0245 05/26/23  1235 05/26/23  0414 05/25/23  1549 05/22/23  1416   INR  1.21* 1.27*  --  1.22* 1.04   APTT seconds  --   --   --  25.8* 24.7   HEMOGLOBIN g/dL  --   --  13.2 13.9  --    HEMATOCRIT %  --   --  42.0 43.9  --      Results from last 7 days   Lab Units 05/26/23  0414 05/25/23  1549   SODIUM mmol/L 137 131*   POTASSIUM mmol/L 3.8 5.1   CHLORIDE mmol/L 100 94*   CO2 mmol/L 24.0 25.0   BUN mg/dL 36* 37*   CREATININE mg/dL 0.57 0.72   CALCIUM mg/dL 9.4 9.4   BILIRUBIN mg/dL  --  0.7   ALK PHOS U/L  --  99   ALT (SGPT) U/L  --  10   AST (SGOT) U/L  --  20   GLUCOSE mg/dL 171* 392*       Current dietary intake: 75% of documented meals over past 24 hours  Diet Order   Procedures    Diet: Regular/House Diet; Texture: Regular Texture (IDDSI 7); Fluid Consistency: Thin (IDDSI 0)     Assessment/Plan:   Warfarin dosing for " DVT  Goal INR: 2-3  5/27 INR - 1.21  5/26 H/H - 13.2/42.0    Patient previously was on warfarin 5mg daily, which was d/c'ed >1yr due to hematoma    Will give one-time boosted dose of warfarin 7.5 mg today and then tentatively plan to continue warfarin 5mg daily starting tomorrow  Continue heparin drip until INR >2  Monitor for s/s bleeding, clinical status, dietary intake and drug-drug interactions  Follow daily INR and adjust dose accordingly       Thank you  Livan Abarca, PharmD  5/27/2023  10:26 EDT

## 2023-05-28 LAB
GLUCOSE BLDC GLUCOMTR-MCNC: 255 MG/DL (ref 70–130)
GLUCOSE BLDC GLUCOMTR-MCNC: 310 MG/DL (ref 70–130)
GLUCOSE BLDC GLUCOMTR-MCNC: 318 MG/DL (ref 70–130)
GLUCOSE BLDC GLUCOMTR-MCNC: 384 MG/DL (ref 70–130)
INR PPP: 1.32 (ref 0.89–1.12)
PROTHROMBIN TIME: 16.5 SECONDS (ref 12.2–14.5)
UFH PPP CHRO-ACNC: 0.59 IU/ML (ref 0.3–0.7)

## 2023-05-28 PROCEDURE — 25010000002 ENOXAPARIN PER 10 MG: Performed by: FAMILY MEDICINE

## 2023-05-28 PROCEDURE — 85520 HEPARIN ASSAY: CPT

## 2023-05-28 PROCEDURE — 99232 SBSQ HOSP IP/OBS MODERATE 35: CPT | Performed by: FAMILY MEDICINE

## 2023-05-28 PROCEDURE — 63710000001 INSULIN DETEMIR PER 5 UNITS: Performed by: FAMILY MEDICINE

## 2023-05-28 PROCEDURE — 82948 REAGENT STRIP/BLOOD GLUCOSE: CPT

## 2023-05-28 PROCEDURE — 63710000001 INSULIN LISPRO (HUMAN) PER 5 UNITS: Performed by: NURSE PRACTITIONER

## 2023-05-28 PROCEDURE — 85610 PROTHROMBIN TIME: CPT | Performed by: FAMILY MEDICINE

## 2023-05-28 RX ORDER — ENOXAPARIN SODIUM 100 MG/ML
80 INJECTION SUBCUTANEOUS ONCE
Status: COMPLETED | OUTPATIENT
Start: 2023-05-28 | End: 2023-05-28

## 2023-05-28 RX ORDER — ENOXAPARIN SODIUM 100 MG/ML
80 INJECTION SUBCUTANEOUS EVERY 12 HOURS SCHEDULED
Status: DISCONTINUED | OUTPATIENT
Start: 2023-05-28 | End: 2023-05-31 | Stop reason: HOSPADM

## 2023-05-28 RX ADMIN — INSULIN LISPRO 8 UNITS: 100 INJECTION, SOLUTION INTRAVENOUS; SUBCUTANEOUS at 11:58

## 2023-05-28 RX ADMIN — INSULIN DETEMIR 40 UNITS: 100 INJECTION, SOLUTION SUBCUTANEOUS at 22:00

## 2023-05-28 RX ADMIN — Medication 10 ML: at 10:01

## 2023-05-28 RX ADMIN — CETIRIZINE HYDROCHLORIDE 5 MG: 10 TABLET, FILM COATED ORAL at 09:55

## 2023-05-28 RX ADMIN — WARFARIN SODIUM 5 MG: 5 TABLET ORAL at 18:12

## 2023-05-28 RX ADMIN — METOPROLOL TARTRATE 50 MG: 50 TABLET ORAL at 21:36

## 2023-05-28 RX ADMIN — PROPAFENONE HYDROCHLORIDE 150 MG: 150 TABLET, FILM COATED ORAL at 13:15

## 2023-05-28 RX ADMIN — PHENYTOIN SODIUM 200 MG: 100 CAPSULE ORAL at 09:54

## 2023-05-28 RX ADMIN — EMPAGLIFLOZIN 25 MG: 25 TABLET, FILM COATED ORAL at 09:55

## 2023-05-28 RX ADMIN — INSULIN LISPRO 7 UNITS: 100 INJECTION, SOLUTION INTRAVENOUS; SUBCUTANEOUS at 22:00

## 2023-05-28 RX ADMIN — PHENYTOIN SODIUM 200 MG: 100 CAPSULE ORAL at 21:39

## 2023-05-28 RX ADMIN — PROPAFENONE HYDROCHLORIDE 150 MG: 150 TABLET, FILM COATED ORAL at 21:36

## 2023-05-28 RX ADMIN — BUSPIRONE HYDROCHLORIDE 15 MG: 15 TABLET ORAL at 21:36

## 2023-05-28 RX ADMIN — BUSPIRONE HYDROCHLORIDE 15 MG: 15 TABLET ORAL at 09:55

## 2023-05-28 RX ADMIN — Medication 10 ML: at 21:36

## 2023-05-28 RX ADMIN — METFORMIN HYDROCHLORIDE 1000 MG: 1000 TABLET ORAL at 09:55

## 2023-05-28 RX ADMIN — DOCUSATE SODIUM 50 MG AND SENNOSIDES 8.6 MG 2 TABLET: 8.6; 5 TABLET, FILM COATED ORAL at 09:55

## 2023-05-28 RX ADMIN — PANTOPRAZOLE SODIUM 40 MG: 40 TABLET, DELAYED RELEASE ORAL at 05:51

## 2023-05-28 RX ADMIN — ATORVASTATIN CALCIUM 40 MG: 40 TABLET, FILM COATED ORAL at 09:54

## 2023-05-28 RX ADMIN — INSULIN LISPRO 6 UNITS: 100 INJECTION, SOLUTION INTRAVENOUS; SUBCUTANEOUS at 17:19

## 2023-05-28 RX ADMIN — DULOXETINE 60 MG: 60 CAPSULE, DELAYED RELEASE ORAL at 09:55

## 2023-05-28 RX ADMIN — MORPHINE SULFATE 30 MG: 30 TABLET, FILM COATED, EXTENDED RELEASE ORAL at 09:55

## 2023-05-28 RX ADMIN — PROPAFENONE HYDROCHLORIDE 150 MG: 150 TABLET, FILM COATED ORAL at 05:51

## 2023-05-28 RX ADMIN — GABAPENTIN 100 MG: 100 CAPSULE ORAL at 21:36

## 2023-05-28 RX ADMIN — INSULIN DETEMIR 40 UNITS: 100 INJECTION, SOLUTION SUBCUTANEOUS at 09:56

## 2023-05-28 RX ADMIN — ENOXAPARIN SODIUM 80 MG: 80 INJECTION SUBCUTANEOUS at 21:36

## 2023-05-28 RX ADMIN — INSULIN LISPRO 7 UNITS: 100 INJECTION, SOLUTION INTRAVENOUS; SUBCUTANEOUS at 09:55

## 2023-05-28 RX ADMIN — MORPHINE SULFATE 30 MG: 30 TABLET, FILM COATED, EXTENDED RELEASE ORAL at 21:36

## 2023-05-28 RX ADMIN — ENOXAPARIN SODIUM 80 MG: 80 INJECTION SUBCUTANEOUS at 11:59

## 2023-05-28 RX ADMIN — METFORMIN HYDROCHLORIDE 1000 MG: 1000 TABLET ORAL at 17:19

## 2023-05-28 RX ADMIN — Medication 5000 UNITS: at 09:55

## 2023-05-28 NOTE — PROGRESS NOTES
[facility] Medicine Services  PROGRESS NOTE    Patient Name: Martha Harrell  : 1955  MRN: [id number]    Date of Admission: 2023  Primary Care Physician: Ginger Ward APRN    Subjective   Subjective     CC:  DVTs    HPI:   - Patient is a 68-year-old who was admitted with acute bilateral lower extremity DVTs who has failed outpatient therapy with Eliquis.  She reports she was previously on Coumadin but had a fall with a significant hematoma and was taken off of Coumadin.  She was diagnosed with a DVT and placed on Eliquis approximately 1 month ago.  She presented with worsening pain and swelling and was diagnosed with another DVT, being admitted for further anticoagulation with heparin drip.  We discussed options for anticoagulation including potentially Xarelto however it interacts with Dilantin as well as Eliquis interacts with Dilantin.  I suggested we could consider discussing with neurology changing her Dilantin she takes for epilepsy but she is very reluctant to change the Dilantin as she has had good response to this treatment.  She would rather go back on warfarin.  I did explain that this will require heparin drip until her INR is therapeutic and she is agreeable.  She states it has been about a year since she had a seizure.  She used to have grand mal seizures but her most recent seizures have just been having difficulty with word finding.     -tolerating heparin drip without any active bleeding.  Was able to get up yesterday and ambulate to the bedside commode.  Tolerating p.o.  Denies headache chest pain nausea or vomiting     -blood sugars increased yesterday in the 300s despite increasing Levemir up to 30 units twice daily.  Her home dose is usually 100 units of long-acting insulin daily.  Today I have resumed her metformin and Jardiance.  Also increasing Levemir to 40 units twice daily.  Tolerating the heparin drip.  No active bleeding noted.  INR  slightly up today at 1.3 with goal 2-3.  Denies chest pain, tolerating p.o. well.    ROS:  Gen- No fevers, chills  CV- No chest pain, palpitations  Resp- No cough, dyspnea  GI- No N/V/D, abd pain         Objective   Objective     Vital Signs:   Temp:  [96.6 °F (35.9 °C)-98.7 °F (37.1 °C)] 96.6 °F (35.9 °C)  Heart Rate:  [57-70] 58  Resp:  [16-18] 16  BP: (106-114)/(48-60) 108/48     Physical Exam:  Constitutional: No acute distress, awake, alert  HENT: NCAT, mucous membranes moist  Respiratory: Clear to auscultation bilaterally, respiratory effort normal   Cardiovascular: RRR  Gastrointestinal: Positive bowel sounds, soft, nontender, nondistended  Musculoskeletal: Bilateral lower extremity edema and erythema, right greater than left  Psychiatric: Appropriate affect, cooperative  Neurologic: Oriented x 3, strength symmetric in all extremities, Cranial Nerves grossly intact to confrontation, speech clear  Skin: No rashes      Results Reviewed:  LAB RESULTS:      Lab 05/28/23  0341 05/27/23  0245 05/26/23  2026 05/26/23  1235 05/26/23  0414 05/26/23  0101 05/25/23  1549 05/22/23  1416   WBC  --   --   --   --  11.52*  --  10.63  --    HEMOGLOBIN  --   --   --   --  13.2  --  13.9  --    HEMATOCRIT  --   --   --   --  42.0  --  43.9  --    PLATELETS  --   --   --   --  218  --  198  --    NEUTROS ABS  --   --   --   --  6.24  --  8.55*  --    IMMATURE GRANS (ABS)  --   --   --   --  0.04  --  0.03  --    LYMPHS ABS  --   --   --   --  3.81*  --  1.18  --    MONOS ABS  --   --   --   --  0.97*  --  0.77  --    EOS ABS  --   --   --   --  0.39  --  0.07  --    MCV  --   --   --   --  83.3  --  83.9  --    PROTIME 16.5* 15.4*  --  16.0*  --   --  15.6* 10.4   APTT  --   --   --   --   --   --  25.8* 24.7   HEPARIN ANTI-XA 0.59 0.56 0.54 0.26* 0.21*   < > 0.10*  --     < > = values in this interval not displayed.         Lab 05/26/23  0414 05/25/23  1549   SODIUM 137 131*   POTASSIUM 3.8 5.1   CHLORIDE 100 94*   CO2 24.0  25.0   ANION GAP 13.0 12.0   BUN 36* 37*   CREATININE 0.57 0.72   EGFR 98.5 90.6   GLUCOSE 171* 392*   CALCIUM 9.4 9.4   HEMOGLOBIN A1C 10.30*  --          Lab 05/25/23  1549   TOTAL PROTEIN 7.8   ALBUMIN 3.8   GLOBULIN 4.0   ALT (SGPT) 10   AST (SGOT) 20   BILIRUBIN 0.7   ALK PHOS 99         Lab 05/28/23  0341 05/27/23  0245 05/26/23  1235 05/26/23  0703 05/26/23  0414 05/25/23  1549 05/22/23  1416   HSTROP T  --   --   --  14* 15*  --   --    PROTIME 16.5* 15.4* 16.0*  --   --  15.6* 10.4   INR 1.32* 1.21* 1.27*  --   --  1.22* 1.04                 Brief Urine Lab Results     None          Microbiology Results Abnormal     None          No radiology results from the last 24 hrs    Results for orders placed during the hospital encounter of 03/17/22    Adult Transthoracic Echo Complete W/ Cont if Necessary Per Protocol    Interpretation Summary  · Left ventricular ejection fraction appears to be 61 - 65%. Left ventricular systolic function is normal.  · Left ventricular diastolic function was normal.  · Calculated right ventricular systolic pressure from tricuspid regurgitation is 20 mmHg.      Current medications:  Scheduled Meds:Pharmacy Consult, , Does not apply, Daily  atorvastatin, 40 mg, Oral, Daily  busPIRone, 15 mg, Oral, Q12H  cetirizine, 5 mg, Oral, Daily  DULoxetine, 60 mg, Oral, Daily  empagliflozin, 25 mg, Oral, Daily  enoxaparin, 80 mg, Subcutaneous, Q12H  gabapentin, 100 mg, Oral, Nightly  insulin detemir, 40 Units, Subcutaneous, Q12H  insulin lispro, 2-9 Units, Subcutaneous, 4x Daily AC & at Bedtime  metFORMIN, 1,000 mg, Oral, BID With Meals  metoprolol tartrate, 50 mg, Oral, Q12H  Morphine, 30 mg, Oral, BID  pantoprazole, 40 mg, Oral, Q AM  phenytoin ER, 200 mg, Oral, Q12H  propafenone, 150 mg, Oral, Q8H  senna-docusate sodium, 2 tablet, Oral, BID  sodium chloride, 10 mL, Intravenous, Q12H  vitamin D3, 5,000 Units, Oral, Daily  warfarin, 5 mg, Oral, Daily      Continuous Infusions:Pharmacy to Dose  enoxaparin (LOVENOX),   Pharmacy to dose warfarin,       PRN Meds:.•  acetaminophen **OR** acetaminophen **OR** acetaminophen  •  albuterol  •  senna-docusate sodium **AND** polyethylene glycol **AND** bisacodyl **AND** bisacodyl  •  dextrose  •  dextrose  •  glucagon (human recombinant)  •  nitroglycerin  •  Pharmacy to Dose enoxaparin (LOVENOX)  •  Pharmacy to dose warfarin  •  sodium chloride  •  sodium chloride    Assessment & Plan   Assessment & Plan     Active Hospital Problems    Diagnosis  POA   • **Acute deep vein thrombosis (DVT) of femoral vein of right lower extremity [I82.411]  Yes   • Acute deep vein thrombosis (DVT) of left femoral vein [I82.412]  Yes   • Paroxysmal atrial fibrillation [I48.0]  Unknown   • Chronic anticoagulation [Z79.01]  Not Applicable   • Depression [F32.A]  Yes   • Epilepsy (HCC) [G40.909]  Yes   • GERD (gastroesophageal reflux disease) [K21.9]  Yes   • Hyperlipidemia [E78.5]  Yes   • Diabetic peripheral neuropathy associated with type 2 diabetes mellitus (HCC) [E11.42]  Yes      Resolved Hospital Problems   No resolved problems to display.        Brief Hospital Course to date:  Martha Harrell is a 68 y.o. female presented with acute bilateral lower extremity DVTs and failed outpatient therapy with Eliquis.  Anticoagulation is challenging given interactions with antiepileptic drug Dilantin and history of large hematoma after a fall while on Coumadin in the past.    Acute bilateral lower extremity DVTs  -Continue heparin drip, consider transition to Lovenox-we will discuss with pharmacy  -Initiated warfarin therapy 5/26/2023, pharmacy to consult-goal INR 2-3  -Discussed risk of bleeding and patient is agreeable  -She will need frequent outpatient monitoring, she is aware  -Considered Xarelto however it interacts with Dilantin (decreased effectiveness of Xarelto)  -Noted Eliquis also interacts with Dilantin (decreased effectiveness of Eliquis)    Paroxysmal A-fib  -We will go back  on warfarin as above    Diabetes mellitus type 2  -Diabetic diet, sliding scale insulin as needed for correction  -Resumed long-acting insulin 5/26/2023 at lower dose than home regimen   -Home regimen is 100 units nightly  -5/28/2023 increasing Levemir to 40 units twice daily, resuming Jardiance and metformin  -Fingerstick blood sugars in the 300s in the past 24 hours    Peripheral neuropathy  -Continue gabapentin    GERD  -PPI    Hyperlipidemia  -Continue Lipitor    Epilepsy  -Continue Dilantin  -Follow-up with neurology outpatient to consider alternatives to Dilantin in the event she needs other options for anticoagulation    Anticipate patient will need 3 to 5 days of IV heparin drip/Lovenox while dosing warfarin until INR is therapeutic with a goal of INR 2-3        Expected Discharge Location and Transportation: Home, private car  Expected Discharge   Expected Discharge Date: 5/29/2023; Expected Discharge Time:      DVT prophylaxis:  Medical DVT prophylaxis orders are present.          CODE STATUS:   Code Status and Medical Interventions:   Ordered at: 05/25/23 1948     Level Of Support Discussed With:    Patient     Code Status (Patient has no pulse and is not breathing):    CPR (Attempt to Resuscitate)     Medical Interventions (Patient has pulse or is breathing):    Full Support       Blanca Fowler MD  05/28/23

## 2023-05-28 NOTE — PLAN OF CARE
Goal Outcome Evaluation:  Plan of Care Reviewed With: patient        Progress: no change  Outcome Evaluation: Evening coreg given per order. SBP in 90's after evening medications. Patient non symptomatic with map greater themn 65mmHg. Patient experiencing continous anal leakage throughout the shift. Frequently cleaned and repositioned. Patient c/o rectal pain and itching frequently. External hemarrhoids noted and patient noted to have small amount bright red blood per rectum with cleaning.  Provided called and anusol suppository ordered and given. Patiemt alert and oriented x2-3 this shift. Initally could not recall his last name on assessment but then able to state name and birthday. Frequently forgetful. Patient c/o soa this shift. Lungs clear and no noted tachypnea. patient states soa occassionally occuring over last several days. Patient on 2L NC. Respiratory therapy  notified and prn nebulizer given.

## 2023-05-28 NOTE — PROGRESS NOTES
"Pharmacy Consult  -  Warfarin    Martha Harrell is a  68 y.o. female   Height - 157.5 cm (62.01\")  Weight - 80.6 kg (177 lb 9.6 oz)    Consulting Provider: - Blanca Fowler MD  Indication: - DVT/PE  Goal INR: - 2-3  Home Regimen:   -n/a, new start       Bridge Therapy: Yes   and unfractionated heparin    Drug-Drug Interactions with current regimen:   Pantoprazole - may increase INR              Phenytoin - may increase or decrease warfarin effect    Warfarin Dosing During Admission:    Date  5/25 5/26 5/27 5/28        INR  1.22 1.27 1.21 1.32        Dose  -- 5mg 7.5 mg 5 mg             Education Provided: Warfarin education provided on 5/26/23 verbally and in writing.  Discussed effects of warfarin, importance of checking INR, drug-drug and drug-food interactions, and signs/symptoms of bleeding and clotting.  Patient verbalized understanding through teach back.  All pertinent questions were answered.        Discharge Follow up:   Following Provider - SHERRY Hawkins   Follow up time range or appointment - 2-3 days following discharge      Labs:    Results from last 7 days   Lab Units 05/28/23  0341 05/27/23  0245 05/26/23  1235 05/26/23  0414 05/25/23  1549 05/22/23  1416   INR  1.32* 1.21* 1.27*  --  1.22* 1.04   APTT seconds  --   --   --   --  25.8* 24.7   HEMOGLOBIN g/dL  --   --   --  13.2 13.9  --    HEMATOCRIT %  --   --   --  42.0 43.9  --      Results from last 7 days   Lab Units 05/26/23  0414 05/25/23  1549   SODIUM mmol/L 137 131*   POTASSIUM mmol/L 3.8 5.1   CHLORIDE mmol/L 100 94*   CO2 mmol/L 24.0 25.0   BUN mg/dL 36* 37*   CREATININE mg/dL 0.57 0.72   CALCIUM mg/dL 9.4 9.4   BILIRUBIN mg/dL  --  0.7   ALK PHOS U/L  --  99   ALT (SGPT) U/L  --  10   AST (SGOT) U/L  --  20   GLUCOSE mg/dL 171* 392*       Current dietary intake: % of documented meals over past 24 hours  Diet Order   Procedures    Diet: Regular/House Diet; Texture: Regular Texture (IDDSI 7); Fluid Consistency: Thin (IDDSI 0) "     Assessment/Plan:   Warfarin dosing for DVT  Goal INR: 2-3  5/28 INR - 1.32  5/26 H/H - 13.2/42.0    Patient previously was on warfarin 5mg daily, which was d/c'ed >1yr due to hematoma    Will resume historical dose of warfarin 5mg daily tonight  Continue heparin drip until INR >2  Monitor for s/s bleeding, clinical status, dietary intake and drug-drug interactions  Follow daily INR and adjust dose accordingly       Thank you  Livan Abarca, PharmD  5/28/2023  09:40 EDT

## 2023-05-28 NOTE — PLAN OF CARE
Goal Outcome Evaluation:           Progress: improving  Outcome Evaluation: pt on room air. pt was on chair almost all day. VS stable. No complains of pain. Continue POC.

## 2023-05-29 LAB
ANION GAP SERPL CALCULATED.3IONS-SCNC: 7 MMOL/L (ref 5–15)
BASOPHILS # BLD AUTO: 0.06 10*3/MM3 (ref 0–0.2)
BASOPHILS NFR BLD AUTO: 0.7 % (ref 0–1.5)
BUN SERPL-MCNC: 30 MG/DL (ref 8–23)
BUN/CREAT SERPL: 38 (ref 7–25)
CALCIUM SPEC-SCNC: 9.5 MG/DL (ref 8.6–10.5)
CHLORIDE SERPL-SCNC: 104 MMOL/L (ref 98–107)
CO2 SERPL-SCNC: 32 MMOL/L (ref 22–29)
CREAT SERPL-MCNC: 0.79 MG/DL (ref 0.57–1)
DEPRECATED RDW RBC AUTO: 49.7 FL (ref 37–54)
EGFRCR SERPLBLD CKD-EPI 2021: 81.1 ML/MIN/1.73
EOSINOPHIL # BLD AUTO: 0.54 10*3/MM3 (ref 0–0.4)
EOSINOPHIL NFR BLD AUTO: 5.9 % (ref 0.3–6.2)
ERYTHROCYTE [DISTWIDTH] IN BLOOD BY AUTOMATED COUNT: 16 % (ref 12.3–15.4)
GLUCOSE BLDC GLUCOMTR-MCNC: 246 MG/DL (ref 70–130)
GLUCOSE BLDC GLUCOMTR-MCNC: 265 MG/DL (ref 70–130)
GLUCOSE BLDC GLUCOMTR-MCNC: 295 MG/DL (ref 70–130)
GLUCOSE BLDC GLUCOMTR-MCNC: 300 MG/DL (ref 70–130)
GLUCOSE SERPL-MCNC: 210 MG/DL (ref 65–99)
HCT VFR BLD AUTO: 39.6 % (ref 34–46.6)
HGB BLD-MCNC: 12.2 G/DL (ref 12–15.9)
IMM GRANULOCYTES # BLD AUTO: 0.03 10*3/MM3 (ref 0–0.05)
IMM GRANULOCYTES NFR BLD AUTO: 0.3 % (ref 0–0.5)
INR PPP: 1.54 (ref 0.89–1.12)
LYMPHOCYTES # BLD AUTO: 3.51 10*3/MM3 (ref 0.7–3.1)
LYMPHOCYTES NFR BLD AUTO: 38.2 % (ref 19.6–45.3)
MCH RBC QN AUTO: 26.5 PG (ref 26.6–33)
MCHC RBC AUTO-ENTMCNC: 30.8 G/DL (ref 31.5–35.7)
MCV RBC AUTO: 86.1 FL (ref 79–97)
MONOCYTES # BLD AUTO: 0.61 10*3/MM3 (ref 0.1–0.9)
MONOCYTES NFR BLD AUTO: 6.6 % (ref 5–12)
NEUTROPHILS NFR BLD AUTO: 4.45 10*3/MM3 (ref 1.7–7)
NEUTROPHILS NFR BLD AUTO: 48.3 % (ref 42.7–76)
NRBC BLD AUTO-RTO: 0 /100 WBC (ref 0–0.2)
PLATELET # BLD AUTO: 250 10*3/MM3 (ref 140–450)
PMV BLD AUTO: 9.7 FL (ref 6–12)
POTASSIUM SERPL-SCNC: 5.3 MMOL/L (ref 3.5–5.2)
PROTHROMBIN TIME: 18.7 SECONDS (ref 12.2–14.5)
RBC # BLD AUTO: 4.6 10*6/MM3 (ref 3.77–5.28)
SODIUM SERPL-SCNC: 143 MMOL/L (ref 136–145)
WBC NRBC COR # BLD: 9.2 10*3/MM3 (ref 3.4–10.8)

## 2023-05-29 PROCEDURE — 80048 BASIC METABOLIC PNL TOTAL CA: CPT | Performed by: FAMILY MEDICINE

## 2023-05-29 PROCEDURE — 63710000001 INSULIN LISPRO (HUMAN) PER 5 UNITS: Performed by: NURSE PRACTITIONER

## 2023-05-29 PROCEDURE — 85610 PROTHROMBIN TIME: CPT | Performed by: FAMILY MEDICINE

## 2023-05-29 PROCEDURE — 25010000002 ENOXAPARIN PER 10 MG: Performed by: FAMILY MEDICINE

## 2023-05-29 PROCEDURE — 82948 REAGENT STRIP/BLOOD GLUCOSE: CPT

## 2023-05-29 PROCEDURE — 63710000001 INSULIN DETEMIR PER 5 UNITS: Performed by: FAMILY MEDICINE

## 2023-05-29 PROCEDURE — 85025 COMPLETE CBC W/AUTO DIFF WBC: CPT | Performed by: INTERNAL MEDICINE

## 2023-05-29 PROCEDURE — 99232 SBSQ HOSP IP/OBS MODERATE 35: CPT | Performed by: FAMILY MEDICINE

## 2023-05-29 RX ADMIN — DOCUSATE SODIUM 50 MG AND SENNOSIDES 8.6 MG 2 TABLET: 8.6; 5 TABLET, FILM COATED ORAL at 08:47

## 2023-05-29 RX ADMIN — METFORMIN HYDROCHLORIDE 1000 MG: 1000 TABLET ORAL at 08:47

## 2023-05-29 RX ADMIN — MORPHINE SULFATE 30 MG: 30 TABLET, FILM COATED, EXTENDED RELEASE ORAL at 21:57

## 2023-05-29 RX ADMIN — INSULIN DETEMIR 40 UNITS: 100 INJECTION, SOLUTION SUBCUTANEOUS at 21:58

## 2023-05-29 RX ADMIN — ATORVASTATIN CALCIUM 40 MG: 40 TABLET, FILM COATED ORAL at 08:47

## 2023-05-29 RX ADMIN — Medication 10 ML: at 08:52

## 2023-05-29 RX ADMIN — EMPAGLIFLOZIN 25 MG: 25 TABLET, FILM COATED ORAL at 08:46

## 2023-05-29 RX ADMIN — INSULIN LISPRO 6 UNITS: 100 INJECTION, SOLUTION INTRAVENOUS; SUBCUTANEOUS at 21:58

## 2023-05-29 RX ADMIN — PROPAFENONE HYDROCHLORIDE 150 MG: 150 TABLET, FILM COATED ORAL at 15:18

## 2023-05-29 RX ADMIN — MORPHINE SULFATE 30 MG: 30 TABLET, FILM COATED, EXTENDED RELEASE ORAL at 08:46

## 2023-05-29 RX ADMIN — INSULIN LISPRO 7 UNITS: 100 INJECTION, SOLUTION INTRAVENOUS; SUBCUTANEOUS at 17:39

## 2023-05-29 RX ADMIN — Medication 5000 UNITS: at 09:03

## 2023-05-29 RX ADMIN — DULOXETINE 60 MG: 60 CAPSULE, DELAYED RELEASE ORAL at 08:46

## 2023-05-29 RX ADMIN — CETIRIZINE HYDROCHLORIDE 5 MG: 10 TABLET, FILM COATED ORAL at 08:47

## 2023-05-29 RX ADMIN — DOCUSATE SODIUM 50 MG AND SENNOSIDES 8.6 MG 2 TABLET: 8.6; 5 TABLET, FILM COATED ORAL at 21:57

## 2023-05-29 RX ADMIN — ENOXAPARIN SODIUM 80 MG: 80 INJECTION SUBCUTANEOUS at 21:57

## 2023-05-29 RX ADMIN — METFORMIN HYDROCHLORIDE 1000 MG: 1000 TABLET ORAL at 17:40

## 2023-05-29 RX ADMIN — INSULIN LISPRO 4 UNITS: 100 INJECTION, SOLUTION INTRAVENOUS; SUBCUTANEOUS at 08:46

## 2023-05-29 RX ADMIN — INSULIN LISPRO 6 UNITS: 100 INJECTION, SOLUTION INTRAVENOUS; SUBCUTANEOUS at 12:48

## 2023-05-29 RX ADMIN — ENOXAPARIN SODIUM 80 MG: 80 INJECTION SUBCUTANEOUS at 08:46

## 2023-05-29 RX ADMIN — BUSPIRONE HYDROCHLORIDE 15 MG: 15 TABLET ORAL at 21:57

## 2023-05-29 RX ADMIN — PHENYTOIN SODIUM 200 MG: 100 CAPSULE ORAL at 21:57

## 2023-05-29 RX ADMIN — GABAPENTIN 100 MG: 100 CAPSULE ORAL at 21:57

## 2023-05-29 RX ADMIN — BUSPIRONE HYDROCHLORIDE 15 MG: 15 TABLET ORAL at 08:46

## 2023-05-29 RX ADMIN — INSULIN DETEMIR 40 UNITS: 100 INJECTION, SOLUTION SUBCUTANEOUS at 08:46

## 2023-05-29 RX ADMIN — PANTOPRAZOLE SODIUM 40 MG: 40 TABLET, DELAYED RELEASE ORAL at 05:13

## 2023-05-29 RX ADMIN — WARFARIN SODIUM 5 MG: 5 TABLET ORAL at 17:40

## 2023-05-29 RX ADMIN — PHENYTOIN SODIUM 200 MG: 100 CAPSULE ORAL at 12:18

## 2023-05-29 NOTE — PROGRESS NOTES
[facility]  PROGRESS NOTE    Patient Name: Martha Harrell  : 1955  MRN: [id number]    Date of Admission: 2023  Primary Care Physician: Ginger Ward APRN    Subjective   Subjective     CC:  DVTs    HPI:   - Patient is a 68-year-old who was admitted with acute bilateral lower extremity DVTs who has failed outpatient therapy with Eliquis.  She reports she was previously on Coumadin but had a fall with a significant hematoma and was taken off of Coumadin.  She was diagnosed with a DVT and placed on Eliquis approximately 1 month ago.  She presented with worsening pain and swelling and was diagnosed with another DVT, being admitted for further anticoagulation with heparin drip.  We discussed options for anticoagulation including potentially Xarelto however it interacts with Dilantin as well as Eliquis interacts with Dilantin.  I suggested we could consider discussing with neurology changing her Dilantin she takes for epilepsy but she is very reluctant to change the Dilantin as she has had good response to this treatment.  She would rather go back on warfarin.  I did explain that this will require heparin drip until her INR is therapeutic and she is agreeable.  She states it has been about a year since she had a seizure.  She used to have grand mal seizures but her most recent seizures have just been having difficulty with word finding.     -tolerating heparin drip without any active bleeding.  Was able to get up yesterday and ambulate to the bedside commode.  Tolerating p.o.  Denies headache chest pain nausea or vomiting     -blood sugars increased yesterday in the 300s despite increasing Levemir up to 30 units twice daily.  Her home dose is usually 100 units of long-acting insulin daily.  Today I have resumed her metformin and Jardiance.  Also increasing Levemir to 40 units twice daily.  Tolerating the heparin drip.  No active bleeding noted.  INR  slightly up today at 1.3 with goal 2-3.  Denies chest pain, tolerating p.o. well.    5/29 -patient feeling well and her leg swelling has improved some.  Her pain is improved with the pain medications and she is able to ambulate without as much difficulty.  She is tolerating p.o.  She cannot recall the name of the doctor who managed her Coumadin before.    ROS:  Gen- No fevers, chills  CV- No chest pain, palpitations  Resp- No cough, dyspnea  GI- No N/V/D, abd pain         Objective   Objective     Vital Signs:   Temp:  [96.6 °F (35.9 °C)-98.1 °F (36.7 °C)] 97.4 °F (36.3 °C)  Heart Rate:  [56-63] 58  Resp:  [16] 16  BP: ()/(40-64) 104/57     Physical Exam:  Constitutional: No acute distress, awake, alert  HENT: NCAT, mucous membranes moist  Respiratory: Clear to auscultation bilaterally, respiratory effort normal   Cardiovascular: RRR  Gastrointestinal: Positive bowel sounds, soft, nontender, nondistended  Musculoskeletal: Bilateral lower extremity edema and erythema, right greater than left  Psychiatric: Appropriate affect, cooperative  Neurologic: Oriented x 3, strength symmetric in all extremities, Cranial Nerves grossly intact to confrontation, speech clear  Skin: No rashes      Results Reviewed:  LAB RESULTS:      Lab 05/29/23  0732 05/28/23  0341 05/27/23  0245 05/26/23  2026 05/26/23  1235 05/26/23  0414 05/26/23  0101 05/25/23  1549 05/22/23  1416   WBC 9.20  --   --   --   --  11.52*  --  10.63  --    HEMOGLOBIN 12.2  --   --   --   --  13.2  --  13.9  --    HEMATOCRIT 39.6  --   --   --   --  42.0  --  43.9  --    PLATELETS 250  --   --   --   --  218  --  198  --    NEUTROS ABS 4.45  --   --   --   --  6.24  --  8.55*  --    IMMATURE GRANS (ABS) 0.03  --   --   --   --  0.04  --  0.03  --    LYMPHS ABS 3.51*  --   --   --   --  3.81*  --  1.18  --    MONOS ABS 0.61  --   --   --   --  0.97*  --  0.77  --    EOS ABS 0.54*  --   --   --   --  0.39  --  0.07  --    MCV 86.1  --   --   --   --  83.3  --   83.9  --    PROTIME 18.7* 16.5* 15.4*  --  16.0*  --   --  15.6* 10.4   APTT  --   --   --   --   --   --   --  25.8* 24.7   HEPARIN ANTI-XA  --  0.59 0.56 0.54 0.26* 0.21*   < > 0.10*  --     < > = values in this interval not displayed.         Lab 05/29/23  0732 05/26/23  0414 05/25/23  1549   SODIUM 143 137 131*   POTASSIUM 5.3* 3.8 5.1   CHLORIDE 104 100 94*   CO2 32.0* 24.0 25.0   ANION GAP 7.0 13.0 12.0   BUN 30* 36* 37*   CREATININE 0.79 0.57 0.72   EGFR 81.1 98.5 90.6   GLUCOSE 210* 171* 392*   CALCIUM 9.5 9.4 9.4   HEMOGLOBIN A1C  --  10.30*  --          Lab 05/25/23  1549   TOTAL PROTEIN 7.8   ALBUMIN 3.8   GLOBULIN 4.0   ALT (SGPT) 10   AST (SGOT) 20   BILIRUBIN 0.7   ALK PHOS 99         Lab 05/29/23  0732 05/28/23  0341 05/27/23  0245 05/26/23  1235 05/26/23  0703 05/26/23  0414 05/25/23  1549   HSTROP T  --   --   --   --  14* 15*  --    PROTIME 18.7* 16.5* 15.4* 16.0*  --   --  15.6*   INR 1.54* 1.32* 1.21* 1.27*  --   --  1.22*                 Brief Urine Lab Results     None          Microbiology Results Abnormal     None          No radiology results from the last 24 hrs    Results for orders placed during the hospital encounter of 03/17/22    Adult Transthoracic Echo Complete W/ Cont if Necessary Per Protocol    Interpretation Summary  · Left ventricular ejection fraction appears to be 61 - 65%. Left ventricular systolic function is normal.  · Left ventricular diastolic function was normal.  · Calculated right ventricular systolic pressure from tricuspid regurgitation is 20 mmHg.      Current medications:  Scheduled Meds:Pharmacy Consult, , Does not apply, Daily  atorvastatin, 40 mg, Oral, Daily  busPIRone, 15 mg, Oral, Q12H  cetirizine, 5 mg, Oral, Daily  DULoxetine, 60 mg, Oral, Daily  empagliflozin, 25 mg, Oral, Daily  enoxaparin, 80 mg, Subcutaneous, Q12H  gabapentin, 100 mg, Oral, Nightly  insulin detemir, 40 Units, Subcutaneous, Q12H  insulin lispro, 2-9 Units, Subcutaneous, 4x Daily AC & at  Bedtime  metFORMIN, 1,000 mg, Oral, BID With Meals  metoprolol tartrate, 50 mg, Oral, Q12H  Morphine, 30 mg, Oral, BID  pantoprazole, 40 mg, Oral, Q AM  phenytoin ER, 200 mg, Oral, Q12H  propafenone, 150 mg, Oral, Q8H  senna-docusate sodium, 2 tablet, Oral, BID  sodium chloride, 10 mL, Intravenous, Q12H  vitamin D3, 5,000 Units, Oral, Daily  warfarin, 5 mg, Oral, Daily      Continuous Infusions:Pharmacy to Dose enoxaparin (LOVENOX),   Pharmacy to dose warfarin,       PRN Meds:.•  acetaminophen **OR** acetaminophen **OR** acetaminophen  •  albuterol  •  senna-docusate sodium **AND** polyethylene glycol **AND** bisacodyl **AND** bisacodyl  •  dextrose  •  dextrose  •  glucagon (human recombinant)  •  nitroglycerin  •  Pharmacy to Dose enoxaparin (LOVENOX)  •  Pharmacy to dose warfarin  •  sodium chloride  •  sodium chloride    Assessment & Plan   Assessment & Plan     Active Hospital Problems    Diagnosis  POA   • **Acute deep vein thrombosis (DVT) of femoral vein of right lower extremity [I82.411]  Yes   • Acute deep vein thrombosis (DVT) of left femoral vein [I82.412]  Yes   • Paroxysmal atrial fibrillation [I48.0]  Unknown   • Chronic anticoagulation [Z79.01]  Not Applicable   • Depression [F32.A]  Yes   • Epilepsy (HCC) [G40.909]  Yes   • GERD (gastroesophageal reflux disease) [K21.9]  Yes   • Hyperlipidemia [E78.5]  Yes   • Diabetic peripheral neuropathy associated with type 2 diabetes mellitus (HCC) [E11.42]  Yes      Resolved Hospital Problems   No resolved problems to display.        Brief Hospital Course to date:  Martha Harrell is a 68 y.o. female presented with acute bilateral lower extremity DVTs and failed outpatient therapy with Eliquis.  Anticoagulation is challenging given interactions with antiepileptic drug Dilantin and history of large hematoma after a fall while on Coumadin in the past.    Acute bilateral lower extremity DVTs  -Initially treated with heparin drip, then transition to Lovenox on  5/28/2023  -Initiated warfarin therapy 5/26/2023, pharmacy to consult-goal INR 2-3  -Discussed risk of bleeding and patient is agreeable  -She will need frequent outpatient monitoring, she is aware  -Considered Xarelto however it interacts with Dilantin (decreased effectiveness of Xarelto)  -Noted Eliquis also interacts with Dilantin (decreased effectiveness of Eliquis)  -INR 1.54    Paroxysmal A-fib  -We will go back on warfarin as above    Diabetes mellitus type 2  -Diabetic diet, sliding scale insulin as needed for correction  -Resumed long-acting insulin 5/26/2023 at lower dose than home regimen   -Home regimen is 100 units nightly  -5/28/2023 increasing Levemir to 40 units twice daily, resuming Jardiance and metformin  -Fingerstick blood sugars in the 300s in the past 24 hours    Peripheral neuropathy  -Continue gabapentin    GERD  -PPI    Hyperlipidemia  -Continue Lipitor    Epilepsy  -Continue Dilantin  -Follow-up with neurology outpatient to consider alternatives to Dilantin in the event she needs other options for anticoagulation    Anticipate patient will need 3 to 5 days of IV heparin drip/Lovenox while dosing warfarin until INR is therapeutic with a goal of INR 2-3        Expected Discharge Location and Transportation: Home, private car  Expected Discharge   Expected Discharge Date: 5/31/2023; Expected Discharge Time:      DVT prophylaxis:  Medical DVT prophylaxis orders are present.          CODE STATUS:   Code Status and Medical Interventions:   Ordered at: 05/25/23 1948     Level Of Support Discussed With:    Patient     Code Status (Patient has no pulse and is not breathing):    CPR (Attempt to Resuscitate)     Medical Interventions (Patient has pulse or is breathing):    Full Support       Blanca Fowler MD  05/29/23

## 2023-05-29 NOTE — PLAN OF CARE
Goal Outcome Evaluation:  Plan of Care Reviewed With: patient   Pt. Alert and oriented throughout shift. Resting comfortably in bed. No issues overnight. Will continue to monitor and treat according to care plan and orders.      Progress: improving

## 2023-05-29 NOTE — PROGRESS NOTES
"Pharmacy Consult  -  Warfarin    Martha Harrell is a  68 y.o. female   Height - 157.5 cm (62.01\")  Weight - 80.6 kg (177 lb 9.6 oz)    Consulting Provider: - Blanca Fowler MD  Indication: - DVT/PE  Goal INR: - 2-3  Home Regimen:   -n/a, new start   -Patient previously was on warfarin 5mg daily, which was d/c'ed >1yr due to hematoma    Bridge Therapy: Yes   and enoxapain    Drug-Drug Interactions with current regimen:   Pantoprazole - may increase INR              Phenytoin - may increase or decrease warfarin effect              Lovenox - may increase risk of bleeding    Warfarin Dosing During Admission:    Date  5/25 5/26 5/27 5/28 5/29       INR  1.22 1.27 1.21 1.32 1.54       Dose  -- 5mg 7.5 mg 5 mg 5 mg            Education Provided: Warfarin education provided on 5/26/23 verbally and in writing.  Discussed effects of warfarin, importance of checking INR, drug-drug and drug-food interactions, and signs/symptoms of bleeding and clotting.  Patient verbalized understanding through teach back.  All pertinent questions were answered.        Discharge Follow up:   Following Provider - SHERRY Hawkins   Follow up time range or appointment - 2-3 days following discharge      Labs:    Results from last 7 days   Lab Units 05/29/23  0732 05/28/23  0341 05/27/23  0245 05/26/23  1235 05/26/23  0414 05/25/23  1549 05/22/23  1416   INR  1.54* 1.32* 1.21* 1.27*  --  1.22* 1.04   APTT seconds  --   --   --   --   --  25.8* 24.7   HEMOGLOBIN g/dL 12.2  --   --   --  13.2 13.9  --    HEMATOCRIT % 39.6  --   --   --  42.0 43.9  --      Results from last 7 days   Lab Units 05/29/23  0732 05/26/23  0414 05/25/23  1549   SODIUM mmol/L 143 137 131*   POTASSIUM mmol/L 5.3* 3.8 5.1   CHLORIDE mmol/L 104 100 94*   CO2 mmol/L 32.0* 24.0 25.0   BUN mg/dL 30* 36* 37*   CREATININE mg/dL 0.79 0.57 0.72   CALCIUM mg/dL 9.5 9.4 9.4   BILIRUBIN mg/dL  --   --  0.7   ALK PHOS U/L  --   --  99   ALT (SGPT) U/L  --   --  10   AST (SGOT) U/L  " --   --  20   GLUCOSE mg/dL 210* 171* 392*       Current dietary intake: % of documented meals over past 24 hours  Diet Order   Procedures   • Diet: Regular/House Diet, Diabetic Diets; Consistent Carbohydrate; Texture: Regular Texture (IDDSI 7); Fluid Consistency: Thin (IDDSI 0)     Assessment/Plan:   Warfarin dosing for DVT  Goal INR: 2-3  5/29 INR - 1.54  5/29 H/H - 12.2/39.6    Patient previously was on warfarin 5mg daily, which was d/c'ed >1yr due to hematoma    Will continue historical dose of warfarin 5mg daily tonight  Continue Lovenox bridge until INR >2  Monitor for s/s bleeding, clinical status, dietary intake and drug-drug interactions  Follow daily INR and adjust dose accordingly     Discharge plan:  Possible discharge in coming days. As of today, would recommend patient continue warfarin 5 mg daily (patient's historical dose when taking ~1 yr ago) at discharge until INR follow up. Patient to also bridge with Lovenox 80 mg q12h (1 mg/kg) until INR >2.0. Patient prefers to follow up with PCP Ginger POWELL (PCP) for warfarin management, d/w patient today. Would recommend INR recheck ~2 days after discharge since patient is a new start and currently on Lovenox bridge.      Thank you  Livan Abarca, PharmD  5/29/2023  11:11 EDT

## 2023-05-29 NOTE — PLAN OF CARE
Goal Outcome Evaluation:  Plan of Care Reviewed With: patient        Progress: improving  Outcome Evaluation: pt on room air. pt was on chair almost all day. VS stable. No complains of pain. Continue POC

## 2023-05-30 LAB
ANION GAP SERPL CALCULATED.3IONS-SCNC: 8 MMOL/L (ref 5–15)
BUN SERPL-MCNC: 33 MG/DL (ref 8–23)
BUN/CREAT SERPL: 55 (ref 7–25)
CALCIUM SPEC-SCNC: 9.5 MG/DL (ref 8.6–10.5)
CHLORIDE SERPL-SCNC: 103 MMOL/L (ref 98–107)
CO2 SERPL-SCNC: 32 MMOL/L (ref 22–29)
CREAT SERPL-MCNC: 0.6 MG/DL (ref 0.57–1)
EGFRCR SERPLBLD CKD-EPI 2021: 97.3 ML/MIN/1.73
GLUCOSE BLDC GLUCOMTR-MCNC: 177 MG/DL (ref 70–130)
GLUCOSE BLDC GLUCOMTR-MCNC: 194 MG/DL (ref 70–130)
GLUCOSE BLDC GLUCOMTR-MCNC: 216 MG/DL (ref 70–130)
GLUCOSE BLDC GLUCOMTR-MCNC: 251 MG/DL (ref 70–130)
GLUCOSE BLDC GLUCOMTR-MCNC: 324 MG/DL (ref 70–130)
GLUCOSE SERPL-MCNC: 276 MG/DL (ref 65–99)
INR PPP: 1.72 (ref 0.89–1.12)
POTASSIUM SERPL-SCNC: 4.9 MMOL/L (ref 3.5–5.2)
PROTHROMBIN TIME: 20.3 SECONDS (ref 12.2–14.5)
SODIUM SERPL-SCNC: 143 MMOL/L (ref 136–145)

## 2023-05-30 PROCEDURE — 63710000001 INSULIN DETEMIR PER 5 UNITS: Performed by: FAMILY MEDICINE

## 2023-05-30 PROCEDURE — 80048 BASIC METABOLIC PNL TOTAL CA: CPT | Performed by: INTERNAL MEDICINE

## 2023-05-30 PROCEDURE — 99232 SBSQ HOSP IP/OBS MODERATE 35: CPT | Performed by: INTERNAL MEDICINE

## 2023-05-30 PROCEDURE — 82948 REAGENT STRIP/BLOOD GLUCOSE: CPT

## 2023-05-30 PROCEDURE — 25010000002 ENOXAPARIN PER 10 MG: Performed by: FAMILY MEDICINE

## 2023-05-30 PROCEDURE — 63710000001 INSULIN LISPRO (HUMAN) PER 5 UNITS: Performed by: NURSE PRACTITIONER

## 2023-05-30 PROCEDURE — 85610 PROTHROMBIN TIME: CPT | Performed by: FAMILY MEDICINE

## 2023-05-30 RX ADMIN — PHENYTOIN SODIUM 200 MG: 100 CAPSULE ORAL at 09:44

## 2023-05-30 RX ADMIN — INSULIN LISPRO 2 UNITS: 100 INJECTION, SOLUTION INTRAVENOUS; SUBCUTANEOUS at 09:44

## 2023-05-30 RX ADMIN — PHENYTOIN SODIUM 200 MG: 100 CAPSULE ORAL at 21:17

## 2023-05-30 RX ADMIN — DOCUSATE SODIUM 50 MG AND SENNOSIDES 8.6 MG 2 TABLET: 8.6; 5 TABLET, FILM COATED ORAL at 09:44

## 2023-05-30 RX ADMIN — INSULIN LISPRO 4 UNITS: 100 INJECTION, SOLUTION INTRAVENOUS; SUBCUTANEOUS at 17:15

## 2023-05-30 RX ADMIN — PROPAFENONE HYDROCHLORIDE 150 MG: 150 TABLET, FILM COATED ORAL at 15:21

## 2023-05-30 RX ADMIN — BUSPIRONE HYDROCHLORIDE 15 MG: 15 TABLET ORAL at 09:43

## 2023-05-30 RX ADMIN — BUSPIRONE HYDROCHLORIDE 15 MG: 15 TABLET ORAL at 21:17

## 2023-05-30 RX ADMIN — INSULIN DETEMIR 40 UNITS: 100 INJECTION, SOLUTION SUBCUTANEOUS at 09:44

## 2023-05-30 RX ADMIN — INSULIN LISPRO 2 UNITS: 100 INJECTION, SOLUTION INTRAVENOUS; SUBCUTANEOUS at 21:18

## 2023-05-30 RX ADMIN — INSULIN DETEMIR 40 UNITS: 100 INJECTION, SOLUTION SUBCUTANEOUS at 21:18

## 2023-05-30 RX ADMIN — METFORMIN HYDROCHLORIDE 1000 MG: 1000 TABLET ORAL at 17:15

## 2023-05-30 RX ADMIN — Medication 5000 UNITS: at 09:43

## 2023-05-30 RX ADMIN — CETIRIZINE HYDROCHLORIDE 5 MG: 10 TABLET, FILM COATED ORAL at 09:44

## 2023-05-30 RX ADMIN — MORPHINE SULFATE 30 MG: 30 TABLET, FILM COATED, EXTENDED RELEASE ORAL at 21:16

## 2023-05-30 RX ADMIN — ATORVASTATIN CALCIUM 40 MG: 40 TABLET, FILM COATED ORAL at 09:44

## 2023-05-30 RX ADMIN — METOPROLOL TARTRATE 50 MG: 50 TABLET ORAL at 21:17

## 2023-05-30 RX ADMIN — INSULIN LISPRO 6 UNITS: 100 INJECTION, SOLUTION INTRAVENOUS; SUBCUTANEOUS at 11:39

## 2023-05-30 RX ADMIN — ENOXAPARIN SODIUM 80 MG: 80 INJECTION SUBCUTANEOUS at 21:17

## 2023-05-30 RX ADMIN — DULOXETINE 60 MG: 60 CAPSULE, DELAYED RELEASE ORAL at 09:44

## 2023-05-30 RX ADMIN — PROPAFENONE HYDROCHLORIDE 150 MG: 150 TABLET, FILM COATED ORAL at 21:17

## 2023-05-30 RX ADMIN — DOCUSATE SODIUM 50 MG AND SENNOSIDES 8.6 MG 2 TABLET: 8.6; 5 TABLET, FILM COATED ORAL at 21:17

## 2023-05-30 RX ADMIN — METFORMIN HYDROCHLORIDE 1000 MG: 1000 TABLET ORAL at 09:44

## 2023-05-30 RX ADMIN — PANTOPRAZOLE SODIUM 40 MG: 40 TABLET, DELAYED RELEASE ORAL at 07:01

## 2023-05-30 RX ADMIN — ENOXAPARIN SODIUM 80 MG: 80 INJECTION SUBCUTANEOUS at 09:43

## 2023-05-30 RX ADMIN — GABAPENTIN 100 MG: 100 CAPSULE ORAL at 21:17

## 2023-05-30 RX ADMIN — MORPHINE SULFATE 30 MG: 30 TABLET, FILM COATED, EXTENDED RELEASE ORAL at 09:44

## 2023-05-30 RX ADMIN — WARFARIN SODIUM 5 MG: 5 TABLET ORAL at 17:15

## 2023-05-30 RX ADMIN — EMPAGLIFLOZIN 25 MG: 25 TABLET, FILM COATED ORAL at 09:44

## 2023-05-30 NOTE — CASE MANAGEMENT/SOCIAL WORK
Continued Stay Note  [facility]     Patient Name: Martha Harrell  MRN: [id number]  Today's Date: 5/30/2023    Admit Date: 5/25/2023    Plan: Home   Discharge Plan     Row Name 05/30/23 1431       Plan    Plan Home    Patient/Family in Agreement with Plan yes    Plan Comments Spoke with patient at bedside. Patient discharge plan is home with private transport. No discharge needs identifed. CM will follow.    Final Discharge Disposition Code 01 - home or self-care               Discharge Codes    No documentation.               Expected Discharge Date and Time     Expected Discharge Date Expected Discharge Time    May 31, 2023             Jocelyn Hassan RN

## 2023-05-30 NOTE — PROGRESS NOTES
"Pharmacy Consult  -  Warfarin    Martha Harrell is a  68 y.o. female   Height - 157.5 cm (62.01\")  Weight - 80.6 kg (177 lb 9.6 oz)    Consulting Provider: - Blanca Fowler MD  Indication: - DVT/PE  Goal INR: - 2-3  Home Regimen:   -n/a, new start   -Patient previously was on warfarin 5mg daily, which was d/c'ed >1yr due to hematoma    Bridge Therapy: Yes   and enoxapain    Drug-Drug Interactions with current regimen:   Pantoprazole - may increase INR              Phenytoin - may increase or decrease warfarin effect              Lovenox - may increase risk of bleeding    Warfarin Dosing During Admission:    Date  5/25 5/26 5/27 5/28 5/29 5/30      INR  1.22 1.27 1.21 1.32 1.54 1.72      Dose  -- 5mg 7.5 mg 5 mg 5 mg  5 mg          Education Provided: Warfarin education provided on 5/26/23 verbally and in writing.  Discussed effects of warfarin, importance of checking INR, drug-drug and drug-food interactions, and signs/symptoms of bleeding and clotting.  Patient verbalized understanding through teach back.  All pertinent questions were answered.        Discharge Follow up:   Following Provider - SHERRY Hawkins   Follow up time range or appointment - 2-3 days following discharge      Labs:    Results from last 7 days   Lab Units 05/30/23  0705 05/29/23  0732 05/28/23  0341 05/27/23  0245 05/26/23  1235 05/26/23  0414 05/25/23  1549   INR  1.72* 1.54* 1.32* 1.21* 1.27*  --  1.22*   APTT seconds  --   --   --   --   --   --  25.8*   HEMOGLOBIN g/dL  --  12.2  --   --   --  13.2 13.9   HEMATOCRIT %  --  39.6  --   --   --  42.0 43.9     Results from last 7 days   Lab Units 05/30/23  1036 05/29/23  0732 05/26/23  0414 05/25/23  1549   SODIUM mmol/L 143 143 137 131*   POTASSIUM mmol/L 4.9 5.3* 3.8 5.1   CHLORIDE mmol/L 103 104 100 94*   CO2 mmol/L 32.0* 32.0* 24.0 25.0   BUN mg/dL 33* 30* 36* 37*   CREATININE mg/dL 0.60 0.79 0.57 0.72   CALCIUM mg/dL 9.5 9.5 9.4 9.4   BILIRUBIN mg/dL  --   --   --  0.7   ALK PHOS " U/L  --   --   --  99   ALT (SGPT) U/L  --   --   --  10   AST (SGOT) U/L  --   --   --  20   GLUCOSE mg/dL 276* 210* 171* 392*       Current dietary intake: % of documented meals over past 24 hours  Diet Order   Procedures    Diet: Regular/House Diet, Diabetic Diets; Consistent Carbohydrate; Texture: Regular Texture (IDDSI 7); Fluid Consistency: Thin (IDDSI 0)     Assessment/Plan:   Warfarin dosing for DVT  Goal INR: 2-3  5/30 INR - 1.72  5/29 H/H - 12.2/39.6    Patient previously was on warfarin 5mg daily, which was d/c'ed >1yr due to hematoma    Will continue historical dose of warfarin 5mg daily tonight  Continue Lovenox bridge until INR >2  Monitor for s/s bleeding, clinical status, dietary intake and drug-drug interactions  Follow daily INR and adjust dose accordingly     Discharge plan:  Possible discharge in coming days. As of today, would recommend patient continue warfarin 5 mg daily (patient's historical dose when taking ~1 yr ago) at discharge until INR follow up. Patient to also bridge with Lovenox 80 mg q12h (1 mg/kg) until INR >2.0. Patient prefers to follow up with PCP Ginger POWELL (PCP) for warfarin management, d/w patient today. Would recommend INR recheck ~2 days after discharge since patient is a new start and currently on Lovenox bridge.    Thank you,    Paula Gil, Pharmacy Intern  5/30/2023  11:29 EDT

## 2023-05-30 NOTE — PAYOR COMM NOTE
"Martha Harrell (68 y.o. Female)     Date of Birth   1955    Social Security Number       Address   [address] [place] [address] [facility] [address]    Home Phone   [phone]    MRN   [id number]       Pentecostalism   None    Marital Status                               Admission Date   5/25/23    Admission Type   Emergency    Admitting Provider   James Cordova MD    Attending Provider   James Cordova MD    Department, Room/Bed   [facility] 6B, N646/1       Discharge Date       Discharge Disposition       Discharge Destination                               Attending Provider: James Cordova MD    Allergies: Aleve [Naproxen]    Isolation: None   Infection: None   Code Status: CPR    Ht: 157.5 cm (62.01\")   Wt: 80.6 kg (177 lb 9.6 oz)    Admission Cmt: None   Principal Problem: Acute deep vein thrombosis (DVT) of femoral vein of right lower extremity [I82.411]                 Active Insurance as of 5/25/2023     Primary Coverage     Payor Plan Insurance Group Employer/Plan Group    ANTH MEDICARE REPLACEMENT ANTHEM MEDICARE ADVANTAGE [id number]     Payor Plan Address Payor Plan Phone Number Payor Plan Fax Number Effective Dates    [address] [phone]  4/1/2023 - None Entered    [facility] [id number]       Subscriber Name Subscriber Birth Date Member ID       MARTHA HARRELL 1955 [id number]           Secondary Coverage     Payor Plan Insurance Group Employer/Plan Group    [facility] BY ELAINE [facility] BY ELAINE [id number]     Payor Plan Address Payor Plan Phone Number Payor Plan Fax Number Effective Dates    [address] [address]   1/1/2021 - None Entered    [facility] [id number]       Subscriber Name Subscriber Birth Date Member ID       MARTHA HARRELL 1955 [id number]                 Emergency Contacts      (Rel.) Home Phone Work Phone Mobile Phone    alida harrell (Son) [phone] -- [phone]    anthony harrell (Son) [phone] " -- [phone]               History & Physical      Evette KeyesSHERRY at 23 193     Attestation signed by Jenni Leblanc MD at 23 [phone] (Updated)    I have reviewed this documentation and agree. Consider hem/onc eval, however noncompliance may be the culprit. Looks like a 30 day script lasts about 40 days.  Jenni Leblanc MD 23 23:19 EDT                         [facility] Medicine Services  HISTORY AND PHYSICAL    Patient Name: Martha Harrell  : 1955  MRN: [id number]  Primary Care Physician: Ginger Ward APRN  Date of admission: 2023    Subjective    Subjective     Chief Complaint:  RLE swelling/pain     HPI:  Martha Harrell is a 68 y.o. female with a past medical history significant for atrial fibrillation on eliquis, LLE DVT, pulmonary embolism, epilepsy, hyperlipidemia, fibromyalgia, diabetes mellitus type 2, GERD, depression and asthma presents to the ED with complaints of RLE swelling and pain that began on Monday.  Patient states that she was evaluated at [facility] on Monday and underwent RLE US that was negative.  She was started on doxycycline for RLE cellulitis.  Patient states that she was evaluated by her PCP today due to worsening pain/swelling and she was referred to the ED for further evaluation.  Patient reports compliance with all of her medications including eliquis however she states she did miss the last two days.  She denies any fever, chills, nausea, vomiting, diarrhea, cough, shortness of air, dizziness, headache, weight loss or recent injury.  Ultrasound obtained tonight shows almost all veins in the RLE are clogged with DVT. A contralateral scan was done as well that showed the left femoral vein also has a DVT.  Patient was started on a heparin drip.  Patient will be admitted to [facility] under the care of the Hospitalist for further evaluation and treatment.         Review of Systems   Constitutional: Positive for activity  change. Negative for appetite change, chills, diaphoresis, fatigue, fever and unexpected weight change.   HENT: Negative.    Eyes: Negative for photophobia and visual disturbance.   Respiratory: Negative for shortness of breath.    Cardiovascular: Positive for leg swelling. Negative for chest pain and palpitations.   Gastrointestinal: Negative for abdominal distention, abdominal pain, blood in stool, constipation, diarrhea and vomiting.   Genitourinary: Negative.    Musculoskeletal: Positive for gait problem. Negative for back pain, neck pain and neck stiffness.   Neurological: Negative for dizziness, speech difficulty, weakness, light-headedness, numbness and headaches.   Psychiatric/Behavioral: Negative.             Personal History     Past Medical History:   Diagnosis Date   • Anemia    • Asthma    • Chronic bronchitis    • Depression    • Diabetes mellitus    • Epilepsy    • Fibromyalgia, primary    • GERD (gastroesophageal reflux disease)    • Hyperlipidemia    • Leg DVT (deep venous thromboembolism), acute, left 6/22/2019             Past Surgical History:   Procedure Laterality Date   • BACK SURGERY     • NECK SURGERY     • TUBAL ABDOMINAL LIGATION         Family History:  family history includes Arthritis in her father, maternal aunt, maternal uncle, mother, and sister; Cancer in her father and maternal uncle; Heart disease in her mother; Hyperlipidemia in her father; Hypertension in her father; Obesity in her father, mother, and sister.     Social History:  reports that she quit smoking about 13 years ago. Her smoking use included cigarettes. She has never used smokeless tobacco. She reports that she does not drink alcohol and does not use drugs.  Social History     Social History Narrative   • Not on file       Medications:  DULoxetine, FreeStyle Pauly 2 Sensor, HYDROcodone-acetaminophen, Insulin Glargine (2 Unit Dial), Insulin Lispro (1 Unit Dial), Insulin Pen Needle, Magnesium Oxide, Morphine, OneTouch  Jesús Lugo, RA Fish Oil, albuterol sulfate HFA, apixaban, atorvastatin, busPIRone, empagliflozin, fenofibrate, fish oil, furosemide, gabapentin, glucose blood, loratadine, magnesium oxide, metFORMIN, metoprolol tartrate, omeprazole, phenytoin ER, propafenone, silver sulfadiazine, and vitamin D3    Allergies   Allergen Reactions   • Aleve [Naproxen] Itching       Objective    Objective     Vital Signs:   Temp:  [97.7 °F (36.5 °C)] 97.7 °F (36.5 °C)  Heart Rate:  [75-85] 75  Resp:  [16] 16  BP: (111-123)/(66-77) 111/66    Physical Exam  Vitals and nursing note reviewed.   Constitutional:       General: She is not in acute distress.     Appearance: Normal appearance. She is not ill-appearing, toxic-appearing or diaphoretic.      Comments: Sitting up eating in bed    HENT:      Head: Normocephalic.      Nose: Nose normal.      Mouth/Throat:      Mouth: Mucous membranes are dry.      Pharynx: Oropharynx is clear.   Eyes:      Extraocular Movements: Extraocular movements intact.      Conjunctiva/sclera: Conjunctivae normal.      Pupils: Pupils are equal, round, and reactive to light.   Cardiovascular:      Rate and Rhythm: Normal rate and regular rhythm.      Pulses: Normal pulses.      Heart sounds: Normal heart sounds.   Pulmonary:      Effort: Pulmonary effort is normal.      Breath sounds: Normal breath sounds.   Abdominal:      General: Bowel sounds are normal. There is no distension.      Palpations: Abdomen is soft. There is no mass.      Tenderness: There is no abdominal tenderness. There is no right CVA tenderness, left CVA tenderness, guarding or rebound.      Hernia: No hernia is present.   Musculoskeletal:         General: Swelling and tenderness present. No deformity or signs of injury.      Cervical back: Normal range of motion and neck supple.      Right lower leg: No edema.      Left lower leg: No edema.      Comments: Extensive swelling to RLE compared to LLE extending up to right thigh.  Tender  to touch.  No erythema or warmth noted.    Skin:     General: Skin is warm and dry.   Neurological:      General: No focal deficit present.      Mental Status: She is alert and oriented to person, place, and time. Mental status is at baseline.   Psychiatric:         Mood and Affect: Mood normal.         Behavior: Behavior normal.         Thought Content: Thought content normal.         Judgment: Judgment normal.          Result Review:  I have personally reviewed the results from the time of this admission to 5/25/2023 19:31 EDT and agree with these findings:  [x]  Laboratory list / accordion  [x]  Microbiology  [x]  Radiology  [x]  EKG/Telemetry   []  Cardiology/Vascular   []  Pathology  []  Old records  []  Other:  Most notable findings include:     LAB RESULTS:      Lab 05/25/23  1549 05/22/23  1416   WBC 10.63  --    HEMOGLOBIN 13.9  --    HEMATOCRIT 43.9  --    PLATELETS 198  --    NEUTROS ABS 8.55*  --    IMMATURE GRANS (ABS) 0.03  --    LYMPHS ABS 1.18  --    MONOS ABS 0.77  --    EOS ABS 0.07  --    MCV 83.9  --    PROTIME 15.6* 10.4   APTT 25.8* 24.7   HEPARIN ANTI-XA 0.10*  --          Lab 05/25/23  1549   SODIUM 131*   POTASSIUM 5.1   CHLORIDE 94*   CO2 25.0   ANION GAP 12.0   BUN 37*   CREATININE 0.72   EGFR 90.6   GLUCOSE 392*   CALCIUM 9.4         Lab 05/25/23  1549   TOTAL PROTEIN 7.8   ALBUMIN 3.8   GLOBULIN 4.0   ALT (SGPT) 10   AST (SGOT) 20   BILIRUBIN 0.7   ALK PHOS 99         Lab 05/25/23  1549 05/22/23  1416   PROTIME 15.6* 10.4   INR 1.22* 1.04                 Brief Urine Lab Results     None        Microbiology Results (last 10 days)     ** No results found for the last 240 hours. **          No radiology results from the last 24 hrs    Results for orders placed during the hospital encounter of 03/17/22    Adult Transthoracic Echo Complete W/ Cont if Necessary Per Protocol    Interpretation Summary  · Left ventricular ejection fraction appears to be 61 - 65%. Left ventricular systolic  function is normal.  · Left ventricular diastolic function was normal.  · Calculated right ventricular systolic pressure from tricuspid regurgitation is 20 mmHg.      Assessment & Plan   Assessment & Plan       Acute deep vein thrombosis (DVT) of femoral vein of right lower extremity    Diabetic peripheral neuropathy associated with type 2 diabetes mellitus (HCC)    Depression    Epilepsy (HCC)    GERD (gastroesophageal reflux disease)    Hyperlipidemia    Chronic anticoagulation    Acute deep vein thrombosis (DVT) of left femoral vein      68 year old female presents to the ED with RLE swelling/pain that began Monday.      1) Acute DVT of RLE and left femoral vein       Failed outpatient therapy: on eliquis   -Ultrasound as mentioned above  -continue heparin drip with pharmacy to dose  -CT of abdomen/pelvis pending   -CTA of chest pending   -pain control   -bedrest  -consider vascular consult     2) Paroxysmal atrial fibrillation   -on eliquis/rhythmol   -follows with Dr. Yañez   -obtain EKG     3) Diabetes mellitus type 2  -check hgb A1c  -start ldssi   -fingersticks achs     4) Hyperlipidemia  -on lipitor     5) GERD  -PPI     6) Depression   -on cymbalta     7) Peripheral neuropathy   -continue gabapentin    8) Hx of LLE and PE        DVT prophylaxis:  Heparin drip     CODE STATUS:  Full Code        Expected Discharge  TBD     This note has been completed as part of a split-shared workflow.     Signature: Electronically signed by SHERRY Nuñez, 05/25/23, 7:43 PM EDT.    Total time spent: 65 mins  Time spent includes time reviewing chart, face-to-face time, counseling patient/family/caregiver, ordering medications/tests/procedures, communicating with other health care professionals, documenting clinical information in the electronic health record, and coordination of care.                  Electronically signed by Jenni Leblanc MD at 05/25/23 3850          Physician Progress Notes (all)      Malcolm  Blanca SEGAL MD at 23 1227              [facility] Medicine Services  PROGRESS NOTE    Patient Name: Martha Harrell  : 1955  MRN: [id number]    Date of Admission: 2023  Primary Care Physician: Ginger Ward APRN    Subjective   Subjective     CC:  DVTs    HPI:   - Patient is a 68-year-old who was admitted with acute bilateral lower extremity DVTs who has failed outpatient therapy with Eliquis.  She reports she was previously on Coumadin but had a fall with a significant hematoma and was taken off of Coumadin.  She was diagnosed with a DVT and placed on Eliquis approximately 1 month ago.  She presented with worsening pain and swelling and was diagnosed with another DVT, being admitted for further anticoagulation with heparin drip.  We discussed options for anticoagulation including potentially Xarelto however it interacts with Dilantin as well as Eliquis interacts with Dilantin.  I suggested we could consider discussing with neurology changing her Dilantin she takes for epilepsy but she is very reluctant to change the Dilantin as she has had good response to this treatment.  She would rather go back on warfarin.  I did explain that this will require heparin drip until her INR is therapeutic and she is agreeable.  She states it has been about a year since she had a seizure.  She used to have grand mal seizures but her most recent seizures have just been having difficulty with word finding.     -tolerating heparin drip without any active bleeding.  Was able to get up yesterday and ambulate to the bedside commode.  Tolerating p.o.  Denies headache chest pain nausea or vomiting     -blood sugars increased yesterday in the 300s despite increasing Levemir up to 30 units twice daily.  Her home dose is usually 100 units of long-acting insulin daily.  Today I have resumed her metformin and Jardiance.  Also increasing Levemir to 40 units twice daily.  Tolerating the heparin  drip.  No active bleeding noted.  INR slightly up today at 1.3 with goal 2-3.  Denies chest pain, tolerating p.o. well.    5/29 -patient feeling well and her leg swelling has improved some.  Her pain is improved with the pain medications and she is able to ambulate without as much difficulty.  She is tolerating p.o.  She cannot recall the name of the doctor who managed her Coumadin before.    ROS:  Gen- No fevers, chills  CV- No chest pain, palpitations  Resp- No cough, dyspnea  GI- No N/V/D, abd pain         Objective   Objective     Vital Signs:   Temp:  [96.6 °F (35.9 °C)-98.1 °F (36.7 °C)] 97.4 °F (36.3 °C)  Heart Rate:  [56-63] 58  Resp:  [16] 16  BP: ()/(40-64) 104/57     Physical Exam:  Constitutional: No acute distress, awake, alert  HENT: NCAT, mucous membranes moist  Respiratory: Clear to auscultation bilaterally, respiratory effort normal   Cardiovascular: RRR  Gastrointestinal: Positive bowel sounds, soft, nontender, nondistended  Musculoskeletal: Bilateral lower extremity edema and erythema, right greater than left  Psychiatric: Appropriate affect, cooperative  Neurologic: Oriented x 3, strength symmetric in all extremities, Cranial Nerves grossly intact to confrontation, speech clear  Skin: No rashes      Results Reviewed:  LAB RESULTS:      Lab 05/29/23  0732 05/28/23  0341 05/27/23  0245 05/26/23  2026 05/26/23  1235 05/26/23  0414 05/26/23  0101 05/25/23  1549 05/22/23  1416   WBC 9.20  --   --   --   --  11.52*  --  10.63  --    HEMOGLOBIN 12.2  --   --   --   --  13.2  --  13.9  --    HEMATOCRIT 39.6  --   --   --   --  42.0  --  43.9  --    PLATELETS 250  --   --   --   --  218  --  198  --    NEUTROS ABS 4.45  --   --   --   --  6.24  --  8.55*  --    IMMATURE GRANS (ABS) 0.03  --   --   --   --  0.04  --  0.03  --    LYMPHS ABS 3.51*  --   --   --   --  3.81*  --  1.18  --    MONOS ABS 0.61  --   --   --   --  0.97*  --  0.77  --    EOS ABS 0.54*  --   --   --   --  0.39  --  0.07  --     MCV 86.1  --   --   --   --  83.3  --  83.9  --    PROTIME 18.7* 16.5* 15.4*  --  16.0*  --   --  15.6* 10.4   APTT  --   --   --   --   --   --   --  25.8* 24.7   HEPARIN ANTI-XA  --  0.59 0.56 0.54 0.26* 0.21*   < > 0.10*  --     < > = values in this interval not displayed.         Lab 05/29/23  0732 05/26/23  0414 05/25/23  1549   SODIUM 143 137 131*   POTASSIUM 5.3* 3.8 5.1   CHLORIDE 104 100 94*   CO2 32.0* 24.0 25.0   ANION GAP 7.0 13.0 12.0   BUN 30* 36* 37*   CREATININE 0.79 0.57 0.72   EGFR 81.1 98.5 90.6   GLUCOSE 210* 171* 392*   CALCIUM 9.5 9.4 9.4   HEMOGLOBIN A1C  --  10.30*  --          Lab 05/25/23  1549   TOTAL PROTEIN 7.8   ALBUMIN 3.8   GLOBULIN 4.0   ALT (SGPT) 10   AST (SGOT) 20   BILIRUBIN 0.7   ALK PHOS 99         Lab 05/29/23  0732 05/28/23  0341 05/27/23  0245 05/26/23  1235 05/26/23  0703 05/26/23  0414 05/25/23  1549   HSTROP T  --   --   --   --  14* 15*  --    PROTIME 18.7* 16.5* 15.4* 16.0*  --   --  15.6*   INR 1.54* 1.32* 1.21* 1.27*  --   --  1.22*                 Brief Urine Lab Results     None          Microbiology Results Abnormal     None          No radiology results from the last 24 hrs    Results for orders placed during the hospital encounter of 03/17/22    Adult Transthoracic Echo Complete W/ Cont if Necessary Per Protocol    Interpretation Summary  · Left ventricular ejection fraction appears to be 61 - 65%. Left ventricular systolic function is normal.  · Left ventricular diastolic function was normal.  · Calculated right ventricular systolic pressure from tricuspid regurgitation is 20 mmHg.      Current medications:  Scheduled Meds:Pharmacy Consult, , Does not apply, Daily  atorvastatin, 40 mg, Oral, Daily  busPIRone, 15 mg, Oral, Q12H  cetirizine, 5 mg, Oral, Daily  DULoxetine, 60 mg, Oral, Daily  empagliflozin, 25 mg, Oral, Daily  enoxaparin, 80 mg, Subcutaneous, Q12H  gabapentin, 100 mg, Oral, Nightly  insulin detemir, 40 Units, Subcutaneous, Q12H  insulin lispro,  2-9 Units, Subcutaneous, 4x Daily AC & at Bedtime  metFORMIN, 1,000 mg, Oral, BID With Meals  metoprolol tartrate, 50 mg, Oral, Q12H  Morphine, 30 mg, Oral, BID  pantoprazole, 40 mg, Oral, Q AM  phenytoin ER, 200 mg, Oral, Q12H  propafenone, 150 mg, Oral, Q8H  senna-docusate sodium, 2 tablet, Oral, BID  sodium chloride, 10 mL, Intravenous, Q12H  vitamin D3, 5,000 Units, Oral, Daily  warfarin, 5 mg, Oral, Daily      Continuous Infusions:Pharmacy to Dose enoxaparin (LOVENOX),   Pharmacy to dose warfarin,       PRN Meds:.•  acetaminophen **OR** acetaminophen **OR** acetaminophen  •  albuterol  •  senna-docusate sodium **AND** polyethylene glycol **AND** bisacodyl **AND** bisacodyl  •  dextrose  •  dextrose  •  glucagon (human recombinant)  •  nitroglycerin  •  Pharmacy to Dose enoxaparin (LOVENOX)  •  Pharmacy to dose warfarin  •  sodium chloride  •  sodium chloride    Assessment & Plan   Assessment & Plan     Active Hospital Problems    Diagnosis  POA   • **Acute deep vein thrombosis (DVT) of femoral vein of right lower extremity [I82.411]  Yes   • Acute deep vein thrombosis (DVT) of left femoral vein [I82.412]  Yes   • Paroxysmal atrial fibrillation [I48.0]  Unknown   • Chronic anticoagulation [Z79.01]  Not Applicable   • Depression [F32.A]  Yes   • Epilepsy (HCC) [G40.909]  Yes   • GERD (gastroesophageal reflux disease) [K21.9]  Yes   • Hyperlipidemia [E78.5]  Yes   • Diabetic peripheral neuropathy associated with type 2 diabetes mellitus (HCC) [E11.42]  Yes      Resolved Hospital Problems   No resolved problems to display.        Brief Hospital Course to date:  Martha Harrell is a 68 y.o. female presented with acute bilateral lower extremity DVTs and failed outpatient therapy with Eliquis.  Anticoagulation is challenging given interactions with antiepileptic drug Dilantin and history of large hematoma after a fall while on Coumadin in the past.    Acute bilateral lower extremity DVTs  -Initially treated with  heparin drip, then transition to Lovenox on 5/28/2023  -Initiated warfarin therapy 5/26/2023, pharmacy to consult-goal INR 2-3  -Discussed risk of bleeding and patient is agreeable  -She will need frequent outpatient monitoring, she is aware  -Considered Xarelto however it interacts with Dilantin (decreased effectiveness of Xarelto)  -Noted Eliquis also interacts with Dilantin (decreased effectiveness of Eliquis)  -INR 1.54    Paroxysmal A-fib  -We will go back on warfarin as above    Diabetes mellitus type 2  -Diabetic diet, sliding scale insulin as needed for correction  -Resumed long-acting insulin 5/26/2023 at lower dose than home regimen   -Home regimen is 100 units nightly  -5/28/2023 increasing Levemir to 40 units twice daily, resuming Jardiance and metformin  -Fingerstick blood sugars in the 300s in the past 24 hours    Peripheral neuropathy  -Continue gabapentin    GERD  -PPI    Hyperlipidemia  -Continue Lipitor    Epilepsy  -Continue Dilantin  -Follow-up with neurology outpatient to consider alternatives to Dilantin in the event she needs other options for anticoagulation    Anticipate patient will need 3 to 5 days of IV heparin drip/Lovenox while dosing warfarin until INR is therapeutic with a goal of INR 2-3        Expected Discharge Location and Transportation: Home, private car  Expected Discharge   Expected Discharge Date: 5/31/2023; Expected Discharge Time:      DVT prophylaxis:  Medical DVT prophylaxis orders are present.          CODE STATUS:   Code Status and Medical Interventions:   Ordered at: 05/25/23 1948     Level Of Support Discussed With:    Patient     Code Status (Patient has no pulse and is not breathing):    CPR (Attempt to Resuscitate)     Medical Interventions (Patient has pulse or is breathing):    Full Support       Blanca Fowler MD  05/29/23        Electronically signed by Blanca Fowler MD at 05/29/23 8784     Blanca Fowler MD at 05/28/23 4479              [facility]  [facility] Medicine Services  PROGRESS NOTE    Patient Name: Martha Harrell  : 1955  MRN: [id number]    Date of Admission: 2023  Primary Care Physician: Ginger Ward APRN    Subjective   Subjective     CC:  DVTs    HPI:   - Patient is a 68-year-old who was admitted with acute bilateral lower extremity DVTs who has failed outpatient therapy with Eliquis.  She reports she was previously on Coumadin but had a fall with a significant hematoma and was taken off of Coumadin.  She was diagnosed with a DVT and placed on Eliquis approximately 1 month ago.  She presented with worsening pain and swelling and was diagnosed with another DVT, being admitted for further anticoagulation with heparin drip.  We discussed options for anticoagulation including potentially Xarelto however it interacts with Dilantin as well as Eliquis interacts with Dilantin.  I suggested we could consider discussing with neurology changing her Dilantin she takes for epilepsy but she is very reluctant to change the Dilantin as she has had good response to this treatment.  She would rather go back on warfarin.  I did explain that this will require heparin drip until her INR is therapeutic and she is agreeable.  She states it has been about a year since she had a seizure.  She used to have grand mal seizures but her most recent seizures have just been having difficulty with word finding.     -tolerating heparin drip without any active bleeding.  Was able to get up yesterday and ambulate to the bedside commode.  Tolerating p.o.  Denies headache chest pain nausea or vomiting     -blood sugars increased yesterday in the 300s despite increasing Levemir up to 30 units twice daily.  Her home dose is usually 100 units of long-acting insulin daily.  Today I have resumed her metformin and Jardiance.  Also increasing Levemir to 40 units twice daily.  Tolerating the heparin drip.  No active bleeding noted.  INR slightly up today  at 1.3 with goal 2-3.  Denies chest pain, tolerating p.o. well.    ROS:  Gen- No fevers, chills  CV- No chest pain, palpitations  Resp- No cough, dyspnea  GI- No N/V/D, abd pain         Objective   Objective     Vital Signs:   Temp:  [96.6 °F (35.9 °C)-98.7 °F (37.1 °C)] 96.6 °F (35.9 °C)  Heart Rate:  [57-70] 58  Resp:  [16-18] 16  BP: (106-114)/(48-60) 108/48     Physical Exam:  Constitutional: No acute distress, awake, alert  HENT: NCAT, mucous membranes moist  Respiratory: Clear to auscultation bilaterally, respiratory effort normal   Cardiovascular: RRR  Gastrointestinal: Positive bowel sounds, soft, nontender, nondistended  Musculoskeletal: Bilateral lower extremity edema and erythema, right greater than left  Psychiatric: Appropriate affect, cooperative  Neurologic: Oriented x 3, strength symmetric in all extremities, Cranial Nerves grossly intact to confrontation, speech clear  Skin: No rashes      Results Reviewed:  LAB RESULTS:      Lab 05/28/23  0341 05/27/23  0245 05/26/23  2026 05/26/23  1235 05/26/23  0414 05/26/23  0101 05/25/23  1549 05/22/23  1416   WBC  --   --   --   --  11.52*  --  10.63  --    HEMOGLOBIN  --   --   --   --  13.2  --  13.9  --    HEMATOCRIT  --   --   --   --  42.0  --  43.9  --    PLATELETS  --   --   --   --  218  --  198  --    NEUTROS ABS  --   --   --   --  6.24  --  8.55*  --    IMMATURE GRANS (ABS)  --   --   --   --  0.04  --  0.03  --    LYMPHS ABS  --   --   --   --  3.81*  --  1.18  --    MONOS ABS  --   --   --   --  0.97*  --  0.77  --    EOS ABS  --   --   --   --  0.39  --  0.07  --    MCV  --   --   --   --  83.3  --  83.9  --    PROTIME 16.5* 15.4*  --  16.0*  --   --  15.6* 10.4   APTT  --   --   --   --   --   --  25.8* 24.7   HEPARIN ANTI-XA 0.59 0.56 0.54 0.26* 0.21*   < > 0.10*  --     < > = values in this interval not displayed.         Lab 05/26/23  0414 05/25/23  1549   SODIUM 137 131*   POTASSIUM 3.8 5.1   CHLORIDE 100 94*   CO2 24.0 25.0   ANION GAP  13.0 12.0   BUN 36* 37*   CREATININE 0.57 0.72   EGFR 98.5 90.6   GLUCOSE 171* 392*   CALCIUM 9.4 9.4   HEMOGLOBIN A1C 10.30*  --          Lab 05/25/23  1549   TOTAL PROTEIN 7.8   ALBUMIN 3.8   GLOBULIN 4.0   ALT (SGPT) 10   AST (SGOT) 20   BILIRUBIN 0.7   ALK PHOS 99         Lab 05/28/23  0341 05/27/23  0245 05/26/23  1235 05/26/23  0703 05/26/23  0414 05/25/23  1549 05/22/23  1416   HSTROP T  --   --   --  14* 15*  --   --    PROTIME 16.5* 15.4* 16.0*  --   --  15.6* 10.4   INR 1.32* 1.21* 1.27*  --   --  1.22* 1.04                 Brief Urine Lab Results     None          Microbiology Results Abnormal     None          No radiology results from the last 24 hrs    Results for orders placed during the hospital encounter of 03/17/22    Adult Transthoracic Echo Complete W/ Cont if Necessary Per Protocol    Interpretation Summary  · Left ventricular ejection fraction appears to be 61 - 65%. Left ventricular systolic function is normal.  · Left ventricular diastolic function was normal.  · Calculated right ventricular systolic pressure from tricuspid regurgitation is 20 mmHg.      Current medications:  Scheduled Meds:Pharmacy Consult, , Does not apply, Daily  atorvastatin, 40 mg, Oral, Daily  busPIRone, 15 mg, Oral, Q12H  cetirizine, 5 mg, Oral, Daily  DULoxetine, 60 mg, Oral, Daily  empagliflozin, 25 mg, Oral, Daily  enoxaparin, 80 mg, Subcutaneous, Q12H  gabapentin, 100 mg, Oral, Nightly  insulin detemir, 40 Units, Subcutaneous, Q12H  insulin lispro, 2-9 Units, Subcutaneous, 4x Daily AC & at Bedtime  metFORMIN, 1,000 mg, Oral, BID With Meals  metoprolol tartrate, 50 mg, Oral, Q12H  Morphine, 30 mg, Oral, BID  pantoprazole, 40 mg, Oral, Q AM  phenytoin ER, 200 mg, Oral, Q12H  propafenone, 150 mg, Oral, Q8H  senna-docusate sodium, 2 tablet, Oral, BID  sodium chloride, 10 mL, Intravenous, Q12H  vitamin D3, 5,000 Units, Oral, Daily  warfarin, 5 mg, Oral, Daily      Continuous Infusions:Pharmacy to Dose enoxaparin  (LOVENOX),   Pharmacy to dose warfarin,       PRN Meds:.•  acetaminophen **OR** acetaminophen **OR** acetaminophen  •  albuterol  •  senna-docusate sodium **AND** polyethylene glycol **AND** bisacodyl **AND** bisacodyl  •  dextrose  •  dextrose  •  glucagon (human recombinant)  •  nitroglycerin  •  Pharmacy to Dose enoxaparin (LOVENOX)  •  Pharmacy to dose warfarin  •  sodium chloride  •  sodium chloride    Assessment & Plan   Assessment & Plan     Active Hospital Problems    Diagnosis  POA   • **Acute deep vein thrombosis (DVT) of femoral vein of right lower extremity [I82.411]  Yes   • Acute deep vein thrombosis (DVT) of left femoral vein [I82.412]  Yes   • Paroxysmal atrial fibrillation [I48.0]  Unknown   • Chronic anticoagulation [Z79.01]  Not Applicable   • Depression [F32.A]  Yes   • Epilepsy (HCC) [G40.909]  Yes   • GERD (gastroesophageal reflux disease) [K21.9]  Yes   • Hyperlipidemia [E78.5]  Yes   • Diabetic peripheral neuropathy associated with type 2 diabetes mellitus (HCC) [E11.42]  Yes      Resolved Hospital Problems   No resolved problems to display.        Brief Hospital Course to date:  Martha Harrell is a 68 y.o. female presented with acute bilateral lower extremity DVTs and failed outpatient therapy with Eliquis.  Anticoagulation is challenging given interactions with antiepileptic drug Dilantin and history of large hematoma after a fall while on Coumadin in the past.    Acute bilateral lower extremity DVTs  -Continue heparin drip, consider transition to Lovenox-we will discuss with pharmacy  -Initiated warfarin therapy 5/26/2023, pharmacy to consult-goal INR 2-3  -Discussed risk of bleeding and patient is agreeable  -She will need frequent outpatient monitoring, she is aware  -Considered Xarelto however it interacts with Dilantin (decreased effectiveness of Xarelto)  -Noted Eliquis also interacts with Dilantin (decreased effectiveness of Eliquis)    Paroxysmal A-fib  -We will go back on  warfarin as above    Diabetes mellitus type 2  -Diabetic diet, sliding scale insulin as needed for correction  -Resumed long-acting insulin 2023 at lower dose than home regimen   -Home regimen is 100 units nightly  -2023 increasing Levemir to 40 units twice daily, resuming Jardiance and metformin  -Fingerstick blood sugars in the 300s in the past 24 hours    Peripheral neuropathy  -Continue gabapentin    GERD  -PPI    Hyperlipidemia  -Continue Lipitor    Epilepsy  -Continue Dilantin  -Follow-up with neurology outpatient to consider alternatives to Dilantin in the event she needs other options for anticoagulation    Anticipate patient will need 3 to 5 days of IV heparin drip/Lovenox while dosing warfarin until INR is therapeutic with a goal of INR 2-3        Expected Discharge Location and Transportation: Home, private car  Expected Discharge   Expected Discharge Date: 2023; Expected Discharge Time:      DVT prophylaxis:  Medical DVT prophylaxis orders are present.          CODE STATUS:   Code Status and Medical Interventions:   Ordered at: 23 1948     Level Of Support Discussed With:    Patient     Code Status (Patient has no pulse and is not breathing):    CPR (Attempt to Resuscitate)     Medical Interventions (Patient has pulse or is breathing):    Full Support       Blanca Fowler MD  23        Electronically signed by Blanca Fowler MD at 23 1248     Blanca Fowler MD at 23 0817              [facility] Medicine Services  PROGRESS NOTE    Patient Name: Martha Harrell  : 1955  MRN: [id number]    Date of Admission: 2023  Primary Care Physician: Ginger Ward, APRN    Subjective   Subjective     CC:  DVTs    HPI:   - Patient is a 68-year-old who was admitted with acute bilateral lower extremity DVTs who has failed outpatient therapy with Eliquis.  She reports she was previously on Coumadin but had a fall with a significant  hematoma and was taken off of Coumadin.  She was diagnosed with a DVT and placed on Eliquis approximately 1 month ago.  She presented with worsening pain and swelling and was diagnosed with another DVT, being admitted for further anticoagulation with heparin drip.  We discussed options for anticoagulation including potentially Xarelto however it interacts with Dilantin as well as Eliquis interacts with Dilantin.  I suggested we could consider discussing with neurology changing her Dilantin she takes for epilepsy but she is very reluctant to change the Dilantin as she has had good response to this treatment.  She would rather go back on warfarin.  I did explain that this will require heparin drip until her INR is therapeutic and she is agreeable.  She states it has been about a year since she had a seizure.  She used to have grand mal seizures but her most recent seizures have just been having difficulty with word finding.    5/27 -tolerating heparin drip without any active bleeding.  Was able to get up yesterday and ambulate to the bedside commode.  Tolerating p.o.  Denies headache chest pain nausea or vomiting    ROS:  Gen- No fevers, chills  CV- No chest pain, palpitations  Resp- No cough, dyspnea  GI- No N/V/D, abd pain         Objective   Objective     Vital Signs:   Temp:  [97.3 °F (36.3 °C)-97.9 °F (36.6 °C)] 97.6 °F (36.4 °C)  Heart Rate:  [59-68] 59  Resp:  [16] 16  BP: (104-114)/(43-57) 110/43     Physical Exam:  Constitutional: No acute distress, awake, alert  HENT: NCAT, mucous membranes moist  Respiratory: Clear to auscultation bilaterally, respiratory effort normal   Cardiovascular: RRR  Gastrointestinal: Positive bowel sounds, soft, nontender, nondistended  Musculoskeletal: Bilateral lower extremity edema and erythema  Psychiatric: Appropriate affect, cooperative  Neurologic: Oriented x 3, strength symmetric in all extremities, Cranial Nerves grossly intact to confrontation, speech clear  Skin: No  gia      Results Reviewed:  LAB RESULTS:      Lab 05/27/23 0245 05/26/23 2026 05/26/23  1235 05/26/23  0414 05/26/23  0101 05/25/23  1549 05/25/23  1549 05/22/23  1416   WBC  --   --   --  11.52*  --   --  10.63  --    HEMOGLOBIN  --   --   --  13.2  --   --  13.9  --    HEMATOCRIT  --   --   --  42.0  --   --  43.9  --    PLATELETS  --   --   --  218  --   --  198  --    NEUTROS ABS  --   --   --  6.24  --   --  8.55*  --    IMMATURE GRANS (ABS)  --   --   --  0.04  --   --  0.03  --    LYMPHS ABS  --   --   --  3.81*  --   --  1.18  --    MONOS ABS  --   --   --  0.97*  --   --  0.77  --    EOS ABS  --   --   --  0.39  --   --  0.07  --    MCV  --   --   --  83.3  --   --  83.9  --    PROTIME 15.4*  --  16.0*  --   --   --  15.6* 10.4   APTT  --   --   --   --   --   --  25.8* 24.7   HEPARIN ANTI-XA 0.56 0.54 0.26* 0.21* 1.10*   < > 0.10*  --     < > = values in this interval not displayed.         Lab 05/26/23 0414 05/25/23  1549   SODIUM 137 131*   POTASSIUM 3.8 5.1   CHLORIDE 100 94*   CO2 24.0 25.0   ANION GAP 13.0 12.0   BUN 36* 37*   CREATININE 0.57 0.72   EGFR 98.5 90.6   GLUCOSE 171* 392*   CALCIUM 9.4 9.4   HEMOGLOBIN A1C 10.30*  --          Lab 05/25/23  1549   TOTAL PROTEIN 7.8   ALBUMIN 3.8   GLOBULIN 4.0   ALT (SGPT) 10   AST (SGOT) 20   BILIRUBIN 0.7   ALK PHOS 99         Lab 05/27/23 0245 05/26/23 1235 05/26/23  0703 05/26/23 0414 05/25/23  1549 05/22/23  1416   HSTROP T  --   --  14* 15*  --   --    PROTIME 15.4* 16.0*  --   --  15.6* 10.4   INR 1.21* 1.27*  --   --  1.22* 1.04                 Brief Urine Lab Results     None          Microbiology Results Abnormal     None          CT Angiogram Chest    Result Date: 5/25/2023  CT ANGIOGRAM CHEST Date of Exam: 5/25/2023 7:56 PM EDT Indication: extensive DVT's rule out PE. Comparison: Chest radiograph from March 17, 2022 Technique: CTA of the chest was performed before and after the uneventful intravenous administration of 56 mL Isovue-370.  Reconstructed coronal and sagittal images were also obtained. In addition, a 3-D volume rendered image was created for interpretation. Automated exposure control and iterative reconstruction methods were used. Findings: The central tracheobronchial tree is clear. A couple benign calcified granulomas are noted. The lungs are clear. There is no pleural effusion. The heart size appears normal. The great vessels are normal in caliber. There is no evidence of pulmonary embolus. No abnormally enlarged lymph nodes are identified. Partial evaluation of the upper abdomen is unremarkable. No aggressive osseous lesions are identified.     Impression: Impression: 1.No evidence of pulmonary embolus. 2.No acute cardiopulmonary process. Electronically Signed: Harris Becker  5/25/2023 8:29 PM EDT  Workstation ID: [id number]    CT Abdomen Pelvis With Contrast    Result Date: 5/25/2023  CT ABDOMEN PELVIS W CONTRAST Date of Exam: 5/25/2023 6:29 PM EDT Indication: Metastatic disease evaluation. Extensive right lower extremity DVT. Left common femoral DVT. Comparison: None available. Technique: Axial CT images were obtained of the abdomen and pelvis following the uneventful intravenous administration of 85 mL Isovue-300. Reconstructed coronal and sagittal images were also obtained. Automated exposure control and iterative construction methods were used. Findings: No suspicious infiltrate is seen in the lung bases. There is a small hiatal hernia. Heart size appears within normal limits. No pericardial effusion. There is suspected calcific atherosclerosis of the coronary arteries. No splenomegaly or focal splenic lesion is identified. No suspicious adrenal lesion is seen. No definite acute pancreatic abnormality is seen. No definite hepatic lesion is identified. Aorta appears normal in caliber. There is calcific atherosclerosis of  the aorta and branch vessels. No significant abdominal lymphadenopathy is seen. No hydronephrosis. No  definite obstructing ureteral calculus is seen. There is expected excretion of contrast from the kidneys on delayed phase images. There appears to be a  nonenhancing cyst at the upper pole of the right kidney. There are also small nonenhancing posterior left mid/lower pole and medial lower pole cysts. No definite enhancing renal lesion is identified. The gallbladder is mildly distended. No definite biliary ductal dilatation or acute gallbladder inflammation is seen. No acute gastric abnormality is seen. Small bowel loops appear nondilated. The appendix appears within normal limits. The colon is minimally distended. There is diverticulosis of the distal colon. No definite acute colonic or rectal abnormality is seen. Urinary bladder appears grossly unremarkable. No definite suspicious uterine or adnexal abnormality is identified. There are stents in the left external iliac and common iliac veins terminating in the inferior vena cava. The stent does appear to cross the origin of the right common iliac vein. There there does not appear to be significant enhancement of the IVC below  the renal veins. There appears to be extensive edema in the soft tissues of the visualized right proximal thigh and right pelvis. There appear to be small fat-containing inguinal hernias. No significant pelvic lymphadenopathy is seen. There is severe left hip osteoarthritis. There is mild to moderate right hip osteoarthritis. There are degenerative changes in the spine. There is grade 1 anterolisthesis of L4 on L5. No definite aggressive osseous lesion is seen.     Impression: Impression: 1.Extensive edema in the soft tissues of the visualized right thigh and pelvis. This could be related to patient's reported right DVT or soft tissue infection. 2.Left external iliac and common iliac artery stents. The common iliac stent does extend into the inferior vena cava and crosses the origin of the right common iliac vein which could predispose to  right lower extremity DVT. 3.No definitive findings of malignancy or metastatic disease in the abdomen or pelvis on this exam. PET/CT may be more sensitive for occult malignancy. 4.Calcific atherosclerosis. 5.Diverticulosis. Colonoscopy following be considered as colonic malignancy could be occult on CT. 6.Severe left hip osteoarthritis. Electronically Signed: Davian Glenna  5/25/2023 7:52 PM EDT  Workstation ID: [id number]    Duplex Venous Lower Extremity - Right CAR    Result Date: 5/25/2023  •  Extensive right lower extremity DVT as detailed below •  Acute right lower extremity deep vein thrombosis noted in the external iliac, common femoral, deep femoral, proximal femoral, mid femoral, distal femoral, popliteal, posterior tibial, peroneal and gastrocnemius. •  Acute right lower extremity superficial thrombophlebitis noted in the saphenofemoral junction and great saphenous (above knee). •  Acute left lower extremity deep vein thrombosis noted in the common femoral.       Results for orders placed during the hospital encounter of 03/17/22    Adult Transthoracic Echo Complete W/ Cont if Necessary Per Protocol    Interpretation Summary  · Left ventricular ejection fraction appears to be 61 - 65%. Left ventricular systolic function is normal.  · Left ventricular diastolic function was normal.  · Calculated right ventricular systolic pressure from tricuspid regurgitation is 20 mmHg.      Current medications:  Scheduled Meds:Pharmacy Consult, , Does not apply, Daily  atorvastatin, 40 mg, Oral, Daily  busPIRone, 15 mg, Oral, Q12H  cetirizine, 5 mg, Oral, Daily  DULoxetine, 60 mg, Oral, Daily  gabapentin, 100 mg, Oral, Nightly  insulin detemir, 20 Units, Subcutaneous, Q12H  insulin lispro, 2-9 Units, Subcutaneous, 4x Daily AC & at Bedtime  metoprolol tartrate, 50 mg, Oral, Q12H  Morphine, 30 mg, Oral, BID  pantoprazole, 40 mg, Oral, Q AM  phenytoin ER, 200 mg, Oral, Q12H  propafenone, 150 mg, Oral, Q8H  senna-docusate  sodium, 2 tablet, Oral, BID  sodium chloride, 10 mL, Intravenous, Q12H  vitamin D3, 5,000 Units, Oral, Daily  warfarin, 5 mg, Oral, Daily      Continuous Infusions:heparin, 16 Units/kg/hr, Last Rate: 16 Units/kg/hr (05/26/23 1746)  Pharmacy to Dose Heparin,   Pharmacy to dose warfarin,       PRN Meds:.•  acetaminophen **OR** acetaminophen **OR** acetaminophen  •  albuterol  •  senna-docusate sodium **AND** polyethylene glycol **AND** bisacodyl **AND** bisacodyl  •  dextrose  •  dextrose  •  glucagon (human recombinant)  •  nitroglycerin  •  Pharmacy to Dose Heparin  •  Pharmacy to dose warfarin  •  sodium chloride  •  sodium chloride    Assessment & Plan   Assessment & Plan     Active Hospital Problems    Diagnosis  POA   • **Acute deep vein thrombosis (DVT) of femoral vein of right lower extremity [I82.411]  Yes   • Acute deep vein thrombosis (DVT) of left femoral vein [I82.412]  Yes   • Paroxysmal atrial fibrillation [I48.0]  Unknown   • Chronic anticoagulation [Z79.01]  Not Applicable   • Depression [F32.A]  Yes   • Epilepsy (HCC) [G40.909]  Yes   • GERD (gastroesophageal reflux disease) [K21.9]  Yes   • Hyperlipidemia [E78.5]  Yes   • Diabetic peripheral neuropathy associated with type 2 diabetes mellitus (HCC) [E11.42]  Yes      Resolved Hospital Problems   No resolved problems to display.        Brief Hospital Course to date:  Martha Harrell is a 68 y.o. female presented with acute bilateral lower extremity DVTs and failed outpatient therapy with Eliquis.  Anticoagulation is challenging given interactions with antiepileptic drug Dilantin and history of large hematoma after a fall while on Coumadin.    Acute bilateral lower extremity DVTs  -Continue heparin drip, consider transition to Lovenox  -Initiated warfarin therapy 5/26/2023, pharmacy to consult-goal INR 2-3  -Discussed risk of bleeding and patient is agreeable  -She will need frequent outpatient monitoring, she is aware  -Considered Xarelto however it  interacts with Dilantin (decreased effectiveness of Xarelto)  -Noted Eliquis also interacts with Dilantin (decreased effectiveness of Eliquis)    Paroxysmal A-fib  -We will go back on warfarin as above    Diabetes mellitus type 2  -Diabetic diet, sliding scale insulin as needed  -Resumed long-acting insulin 2023 at lower dose than home regimen and blood sugars elevated so will increase dosing today-increase to 30 twice daily (takes up to 100 units daily at home)    Peripheral neuropathy  -Continue gabapentin    GERD  -PPI    Hyperlipidemia  -Continue Lipitor    Epilepsy  -Continue Dilantin  -Follow-up with neurology outpatient to consider alternatives to Dilantin in the event she needs other options for anticoagulation    Anticipate patient will need 3 to 5 days of IV heparin drip while dosing warfarin until INR is therapeutic with a goal of INR 2-3        Expected Discharge Location and Transportation: Home, private car  Expected Discharge   Expected Discharge Date: 2023; Expected Discharge Time:      DVT prophylaxis:  Medical DVT prophylaxis orders are present.          CODE STATUS:   Code Status and Medical Interventions:   Ordered at: 23 194     Level Of Support Discussed With:    Patient     Code Status (Patient has no pulse and is not breathing):    CPR (Attempt to Resuscitate)     Medical Interventions (Patient has pulse or is breathing):    Full Support       Blanca Fowler MD  23        Electronically signed by Blanca Fowler MD at 23 1301     Blanca Fowler MD at 23 1138              [facility] Medicine Services  PROGRESS NOTE    Patient Name: Martha Harrell  : 1955  MRN: [id number]    Date of Admission: 2023  Primary Care Physician: Ginger Ward, SHERRY    Subjective   Subjective     CC:  DVTs    HPI:  Patient is a 68-year-old who was admitted with acute bilateral lower extremity DVTs who has failed outpatient therapy with  Eliquis.  She reports she was previously on Coumadin but had a fall with a significant hematoma and was taken off of Coumadin.  She was diagnosed with a DVT and placed on Eliquis approximately 1 month ago.  She presented with worsening pain and swelling and was diagnosed with another DVT, being admitted for further anticoagulation with heparin drip.  We discussed options for anticoagulation including potentially Xarelto however it interacts with Dilantin as well as Eliquis interacts with Dilantin.  I suggested we could consider discussing with neurology changing her Dilantin she takes for epilepsy but she is very reluctant to change the Dilantin as she has had good response to this treatment.  She would rather go back on warfarin.  I did explain that this will require heparin drip until her INR is therapeutic and she is agreeable.  She states it has been about a year since she had a seizure.  She used to have grand mal seizures but her most recent seizures have just been having difficulty with word finding.    ROS:  Gen- No fevers, chills  CV- No chest pain, palpitations  Resp- No cough, dyspnea  GI- No N/V/D, abd pain         Objective   Objective     Vital Signs:   Temp:  [97.1 °F (36.2 °C)-98.7 °F (37.1 °C)] 97.2 °F (36.2 °C)  Heart Rate:  [66-85] 66  Resp:  [16] 16  BP: (111-124)/(59-77) 113/62     Physical Exam:  Constitutional: No acute distress, awake, alert  HENT: NCAT, mucous membranes moist  Respiratory: Clear to auscultation bilaterally, respiratory effort normal   Cardiovascular: RRR  Gastrointestinal: Positive bowel sounds, soft, nontender, nondistended  Musculoskeletal: Bilateral lower extremity edema and erythema  Psychiatric: Appropriate affect, cooperative  Neurologic: Oriented x 3, strength symmetric in all extremities, Cranial Nerves grossly intact to confrontation, speech clear  Skin: No rashes      Results Reviewed:  LAB RESULTS:      Lab 05/26/23  0414 05/26/23  0101 05/25/23  1547  05/22/23  1416   WBC 11.52*  --  10.63  --    HEMOGLOBIN 13.2  --  13.9  --    HEMATOCRIT 42.0  --  43.9  --    PLATELETS 218  --  198  --    NEUTROS ABS 6.24  --  8.55*  --    IMMATURE GRANS (ABS) 0.04  --  0.03  --    LYMPHS ABS 3.81*  --  1.18  --    MONOS ABS 0.97*  --  0.77  --    EOS ABS 0.39  --  0.07  --    MCV 83.3  --  83.9  --    PROTIME  --   --  15.6* 10.4   APTT  --   --  25.8* 24.7   HEPARIN ANTI-XA 0.21* 1.10* 0.10*  --          Lab 05/26/23  0414 05/25/23  1549   SODIUM 137 131*   POTASSIUM 3.8 5.1   CHLORIDE 100 94*   CO2 24.0 25.0   ANION GAP 13.0 12.0   BUN 36* 37*   CREATININE 0.57 0.72   EGFR 98.5 90.6   GLUCOSE 171* 392*   CALCIUM 9.4 9.4   HEMOGLOBIN A1C 10.30*  --          Lab 05/25/23  1549   TOTAL PROTEIN 7.8   ALBUMIN 3.8   GLOBULIN 4.0   ALT (SGPT) 10   AST (SGOT) 20   BILIRUBIN 0.7   ALK PHOS 99         Lab 05/26/23  0703 05/26/23  0414 05/25/23  1549 05/22/23  1416   HSTROP T 14* 15*  --   --    PROTIME  --   --  15.6* 10.4   INR  --   --  1.22* 1.04                 Brief Urine Lab Results     None          Microbiology Results Abnormal     None          CT Angiogram Chest    Result Date: 5/25/2023  CT ANGIOGRAM CHEST Date of Exam: 5/25/2023 7:56 PM EDT Indication: extensive DVT's rule out PE. Comparison: Chest radiograph from March 17, 2022 Technique: CTA of the chest was performed before and after the uneventful intravenous administration of 56 mL Isovue-370. Reconstructed coronal and sagittal images were also obtained. In addition, a 3-D volume rendered image was created for interpretation. Automated exposure control and iterative reconstruction methods were used. Findings: The central tracheobronchial tree is clear. A couple benign calcified granulomas are noted. The lungs are clear. There is no pleural effusion. The heart size appears normal. The great vessels are normal in caliber. There is no evidence of pulmonary embolus. No abnormally enlarged lymph nodes are identified.  Partial evaluation of the upper abdomen is unremarkable. No aggressive osseous lesions are identified.     Impression: Impression: 1.No evidence of pulmonary embolus. 2.No acute cardiopulmonary process. Electronically Signed: Harris Eugenio  5/25/2023 8:29 PM EDT  Workstation ID: [id number]    CT Abdomen Pelvis With Contrast    Result Date: 5/25/2023  CT ABDOMEN PELVIS W CONTRAST Date of Exam: 5/25/2023 6:29 PM EDT Indication: Metastatic disease evaluation. Extensive right lower extremity DVT. Left common femoral DVT. Comparison: None available. Technique: Axial CT images were obtained of the abdomen and pelvis following the uneventful intravenous administration of 85 mL Isovue-300. Reconstructed coronal and sagittal images were also obtained. Automated exposure control and iterative construction methods were used. Findings: No suspicious infiltrate is seen in the lung bases. There is a small hiatal hernia. Heart size appears within normal limits. No pericardial effusion. There is suspected calcific atherosclerosis of the coronary arteries. No splenomegaly or focal splenic lesion is identified. No suspicious adrenal lesion is seen. No definite acute pancreatic abnormality is seen. No definite hepatic lesion is identified. Aorta appears normal in caliber. There is calcific atherosclerosis of  the aorta and branch vessels. No significant abdominal lymphadenopathy is seen. No hydronephrosis. No definite obstructing ureteral calculus is seen. There is expected excretion of contrast from the kidneys on delayed phase images. There appears to be a  nonenhancing cyst at the upper pole of the right kidney. There are also small nonenhancing posterior left mid/lower pole and medial lower pole cysts. No definite enhancing renal lesion is identified. The gallbladder is mildly distended. No definite biliary ductal dilatation or acute gallbladder inflammation is seen. No acute gastric abnormality is seen. Small bowel loops appear  nondilated. The appendix appears within normal limits. The colon is minimally distended. There is diverticulosis of the distal colon. No definite acute colonic or rectal abnormality is seen. Urinary bladder appears grossly unremarkable. No definite suspicious uterine or adnexal abnormality is identified. There are stents in the left external iliac and common iliac veins terminating in the inferior vena cava. The stent does appear to cross the origin of the right common iliac vein. There there does not appear to be significant enhancement of the IVC below  the renal veins. There appears to be extensive edema in the soft tissues of the visualized right proximal thigh and right pelvis. There appear to be small fat-containing inguinal hernias. No significant pelvic lymphadenopathy is seen. There is severe left hip osteoarthritis. There is mild to moderate right hip osteoarthritis. There are degenerative changes in the spine. There is grade 1 anterolisthesis of L4 on L5. No definite aggressive osseous lesion is seen.     Impression: Impression: 1.Extensive edema in the soft tissues of the visualized right thigh and pelvis. This could be related to patient's reported right DVT or soft tissue infection. 2.Left external iliac and common iliac artery stents. The common iliac stent does extend into the inferior vena cava and crosses the origin of the right common iliac vein which could predispose to right lower extremity DVT. 3.No definitive findings of malignancy or metastatic disease in the abdomen or pelvis on this exam. PET/CT may be more sensitive for occult malignancy. 4.Calcific atherosclerosis. 5.Diverticulosis. Colonoscopy following be considered as colonic malignancy could be occult on CT. 6.Severe left hip osteoarthritis. Electronically Signed: Davian Manzanares  5/25/2023 7:52 PM EDT  Workstation ID: [id number]    Duplex Venous Lower Extremity - Right CAR    Result Date: 5/25/2023  •  Extensive right lower extremity DVT  as detailed below •  Acute right lower extremity deep vein thrombosis noted in the external iliac, common femoral, deep femoral, proximal femoral, mid femoral, distal femoral, popliteal, posterior tibial, peroneal and gastrocnemius. •  Acute right lower extremity superficial thrombophlebitis noted in the saphenofemoral junction and great saphenous (above knee). •  Acute left lower extremity deep vein thrombosis noted in the common femoral.       Results for orders placed during the hospital encounter of 03/17/22    Adult Transthoracic Echo Complete W/ Cont if Necessary Per Protocol    Interpretation Summary  · Left ventricular ejection fraction appears to be 61 - 65%. Left ventricular systolic function is normal.  · Left ventricular diastolic function was normal.  · Calculated right ventricular systolic pressure from tricuspid regurgitation is 20 mmHg.      Current medications:  Scheduled Meds:!NOT ORDERED- warfarin (COUMADIN), , Does not apply, Daily  Pharmacy Consult, , Does not apply, Daily  atorvastatin, 40 mg, Oral, Daily  busPIRone, 15 mg, Oral, Q12H  cetirizine, 5 mg, Oral, Daily  DULoxetine, 60 mg, Oral, Daily  gabapentin, 100 mg, Oral, Nightly  insulin lispro, 2-9 Units, Subcutaneous, 4x Daily AC & at Bedtime  metoprolol tartrate, 50 mg, Oral, Q12H  Morphine, 30 mg, Oral, BID  pantoprazole, 40 mg, Oral, Q AM  phenytoin ER, 200 mg, Oral, Q12H  propafenone, 150 mg, Oral, Q8H  senna-docusate sodium, 2 tablet, Oral, BID  sodium chloride, 10 mL, Intravenous, Q12H  vitamin D3, 5,000 Units, Oral, Daily      Continuous Infusions:heparin, 14 Units/kg/hr, Last Rate: 18 Units/kg/hr (05/25/23 8312)  Pharmacy to Dose Heparin,   Pharmacy to dose warfarin,       PRN Meds:.•  acetaminophen **OR** acetaminophen **OR** acetaminophen  •  albuterol  •  senna-docusate sodium **AND** polyethylene glycol **AND** bisacodyl **AND** bisacodyl  •  dextrose  •  dextrose  •  glucagon (human recombinant)  •  nitroglycerin  •  Pharmacy  to Dose Heparin  •  Pharmacy to dose warfarin  •  sodium chloride  •  sodium chloride    Assessment & Plan   Assessment & Plan     Active Hospital Problems    Diagnosis  POA   • **Acute deep vein thrombosis (DVT) of femoral vein of right lower extremity [I82.411]  Yes   • Acute deep vein thrombosis (DVT) of left femoral vein [I82.412]  Yes   • Paroxysmal atrial fibrillation [I48.0]  Unknown   • Chronic anticoagulation [Z79.01]  Not Applicable   • Depression [F32.A]  Yes   • Epilepsy (HCC) [G40.909]  Yes   • GERD (gastroesophageal reflux disease) [K21.9]  Yes   • Hyperlipidemia [E78.5]  Yes   • Diabetic peripheral neuropathy associated with type 2 diabetes mellitus (HCC) [E11.42]  Yes      Resolved Hospital Problems   No resolved problems to display.        Brief Hospital Course to date:  Martha Harrell is a 68 y.o. female presented with acute bilateral lower extremity DVTs and failed outpatient therapy with Eliquis.  Anticoagulation is challenging given interactions with antiepileptic drug Dilantin and history of large hematoma after a fall while on Coumadin.    Acute bilateral lower extremity DVTs  -Continue heparin drip  -Initiate warfarin therapy, pharmacy to consult-goal INR 2-3  -Discussed risk of bleeding and patient is agreeable  -She will need frequent outpatient monitoring, she is aware  -Considered Xarelto however it interacts with Dilantin (decreased effectiveness of Xarelto)  -Noted Eliquis also interacts with Dilantin (decreased effectiveness of Eliquis)    Paroxysmal A-fib  -We will go back on warfarin as above    Diabetes mellitus type 2  -Diabetic diet, sliding scale insulin as needed    Peripheral neuropathy  -Continue gabapentin    GERD  -PPI    Hyperlipidemia  -Continue Lipitor    Epilepsy  -Continue Dilantin  -Follow-up with neurology outpatient to consider alternatives to Dilantin in the event she needs other options for anticoagulation    Anticipate patient will need 3 to 5 days of IV heparin  drip while dosing warfarin until INR is therapeutic with a goal of INR 2-3        Expected Discharge Location and Transportation: Home, private car  Expected Discharge   Expected Discharge Date: 5/29/2023; Expected Discharge Time:      DVT prophylaxis:  Medical DVT prophylaxis orders are present.          CODE STATUS:   Code Status and Medical Interventions:   Ordered at: 05/25/23 1948     Level Of Support Discussed With:    Patient     Code Status (Patient has no pulse and is not breathing):    CPR (Attempt to Resuscitate)     Medical Interventions (Patient has pulse or is breathing):    Full Support       Blanca Fowler MD  05/26/23        Electronically signed by Blanca Fowler MD at 05/26/23 2670       Consult Notes (all)    No notes of this type exist for this encounter.

## 2023-05-30 NOTE — PLAN OF CARE
Goal Outcome Evaluation:           Progress: improving  Outcome Evaluation: Pt remains on room air. VSS. No complaints. INR better today. Up in chair today. Will continue poc.

## 2023-05-30 NOTE — PROGRESS NOTES
[facility]  PROGRESS NOTE    Patient Name: Martha Harrell  : 1955  MRN: [id number]    Date of Admission: 2023  Primary Care Physician: Ginger Ward APRN    Subjective   Subjective     CC:  DVTs    HPI:  No pain, right leg less edematous. No fever. No dyspnea  ROS:  Gen- No fevers, chills  CV- No chest pain, palpitations  Resp- No cough, dyspnea  GI- No N/V/D, abd pain         Objective   Objective     Vital Signs:   Temp:  [97.2 °F (36.2 °C)-97.8 °F (36.6 °C)] 97.3 °F (36.3 °C)  Heart Rate:  [57-70] 70  Resp:  [16] 16  BP: ()/(55-68) 104/58     Physical Exam:  Constitutional: No acute distress, awake, alert, no distress, up in chair comfortable appearing  HENT: NCAT, mucous membranes moist  Respiratory: Clear to auscultation bilaterally, respiratory effort normal   Cardiovascular: RRR  Gastrointestinal: Positive bowel sounds, soft, nontender, nondistended  Musculoskeletal: mild RLE edema  Psychiatric: Appropriate affect, cooperative  Neurologic: Oriented x 3, strength symmetric in all extremities, Cranial Nerves grossly intact to confrontation, speech clear  Skin: No rashes      Results Reviewed:  LAB RESULTS:      Lab 23  0705 23  0732 23  0341 23  0245 23  2026 23  1235 23  0414 23  0101 23  1549   WBC  --  9.20  --   --   --   --  11.52*  --  10.63   HEMOGLOBIN  --  12.2  --   --   --   --  13.2  --  13.9   HEMATOCRIT  --  39.6  --   --   --   --  42.0  --  43.9   PLATELETS  --  250  --   --   --   --  218  --  198   NEUTROS ABS  --  4.45  --   --   --   --  6.24  --  8.55*   IMMATURE GRANS (ABS)  --  0.03  --   --   --   --  0.04  --  0.03   LYMPHS ABS  --  3.51*  --   --   --   --  3.81*  --  1.18   MONOS ABS  --  0.61  --   --   --   --  0.97*  --  0.77   EOS ABS  --  0.54*  --   --   --   --  0.39  --  0.07   MCV  --  86.1  --   --   --   --  83.3  --  83.9   PROTIME 20.3* 18.7* 16.5*  15.4*  --  16.0*  --   --  15.6*   APTT  --   --   --   --   --   --   --   --  25.8*   HEPARIN ANTI-XA  --   --  0.59 0.56 0.54 0.26* 0.21*   < > 0.10*    < > = values in this interval not displayed.         Lab 05/29/23  0732 05/26/23  0414 05/25/23  1549   SODIUM 143 137 131*   POTASSIUM 5.3* 3.8 5.1   CHLORIDE 104 100 94*   CO2 32.0* 24.0 25.0   ANION GAP 7.0 13.0 12.0   BUN 30* 36* 37*   CREATININE 0.79 0.57 0.72   EGFR 81.1 98.5 90.6   GLUCOSE 210* 171* 392*   CALCIUM 9.5 9.4 9.4   HEMOGLOBIN A1C  --  10.30*  --          Lab 05/25/23  1549   TOTAL PROTEIN 7.8   ALBUMIN 3.8   GLOBULIN 4.0   ALT (SGPT) 10   AST (SGOT) 20   BILIRUBIN 0.7   ALK PHOS 99         Lab 05/30/23  0705 05/29/23  0732 05/28/23  0341 05/27/23  0245 05/26/23  1235 05/26/23  0703 05/26/23  0414   HSTROP T  --   --   --   --   --  14* 15*   PROTIME 20.3* 18.7* 16.5* 15.4* 16.0*  --   --    INR 1.72* 1.54* 1.32* 1.21* 1.27*  --   --                  Brief Urine Lab Results     None          Microbiology Results Abnormal     None          No radiology results from the last 24 hrs    Results for orders placed during the hospital encounter of 03/17/22    Adult Transthoracic Echo Complete W/ Cont if Necessary Per Protocol    Interpretation Summary  · Left ventricular ejection fraction appears to be 61 - 65%. Left ventricular systolic function is normal.  · Left ventricular diastolic function was normal.  · Calculated right ventricular systolic pressure from tricuspid regurgitation is 20 mmHg.      Current medications:  Scheduled Meds:Pharmacy Consult, , Does not apply, Daily  atorvastatin, 40 mg, Oral, Daily  busPIRone, 15 mg, Oral, Q12H  cetirizine, 5 mg, Oral, Daily  DULoxetine, 60 mg, Oral, Daily  empagliflozin, 25 mg, Oral, Daily  enoxaparin, 80 mg, Subcutaneous, Q12H  gabapentin, 100 mg, Oral, Nightly  insulin detemir, 40 Units, Subcutaneous, Q12H  insulin lispro, 2-9 Units, Subcutaneous, 4x Daily AC & at Bedtime  metFORMIN, 1,000 mg, Oral,  BID With Meals  metoprolol tartrate, 50 mg, Oral, Q12H  Morphine, 30 mg, Oral, BID  pantoprazole, 40 mg, Oral, Q AM  phenytoin ER, 200 mg, Oral, Q12H  propafenone, 150 mg, Oral, Q8H  senna-docusate sodium, 2 tablet, Oral, BID  sodium chloride, 10 mL, Intravenous, Q12H  vitamin D3, 5,000 Units, Oral, Daily  warfarin, 5 mg, Oral, Daily      Continuous Infusions:Pharmacy to dose warfarin,       PRN Meds:.•  acetaminophen **OR** acetaminophen **OR** acetaminophen  •  albuterol  •  senna-docusate sodium **AND** polyethylene glycol **AND** bisacodyl **AND** bisacodyl  •  dextrose  •  dextrose  •  glucagon (human recombinant)  •  nitroglycerin  •  Pharmacy to dose warfarin  •  sodium chloride  •  sodium chloride    Assessment & Plan   Assessment & Plan     Active Hospital Problems    Diagnosis  POA   • **Acute deep vein thrombosis (DVT) of femoral vein of right lower extremity [I82.411]  Yes   • Acute deep vein thrombosis (DVT) of left femoral vein [I82.412]  Yes   • Paroxysmal atrial fibrillation [I48.0]  Unknown   • Chronic anticoagulation [Z79.01]  Not Applicable   • Depression [F32.A]  Yes   • Epilepsy (HCC) [G40.909]  Yes   • GERD (gastroesophageal reflux disease) [K21.9]  Yes   • Hyperlipidemia [E78.5]  Yes   • Diabetic peripheral neuropathy associated with type 2 diabetes mellitus (HCC) [E11.42]  Yes      Resolved Hospital Problems   No resolved problems to display.        Brief Hospital Course to date:  Martha Harrell is a 68 y.o. female presented with acute bilateral lower extremity DVTs and failed outpatient therapy with Eliquis.  Anticoagulation is challenging given interactions with antiepileptic drug Dilantin and history of large hematoma after a fall while on Coumadin in the past.    This patient's problems and plans were partially entered by my partner and updated as appropriate by me 05/30/23. Today is my first day evaluating this patient's active medical problems. I Personally reviewed chart and adjusted  "note to reflect daily changes in management/clinical condition.       Acute Bilateral lower extremity DVT  Suspect Eliquis \"failure\" (was compliant w/ eliquis prior to this admission)   -patient was on eliquis as outpatient and reported compliance; thus have switched to lovenox/coumadin bridge  -RLE duplex revealed extensive RLE dvt and incidental LLE dvt noted on left common femoral  -Initially treated with heparin drip, then transition to Lovenox on 5/28/2023  -Initiated warfarin therapy 5/26/2023, pharmacy to consult-goal INR 2-3  -Discussed risk of bleeding and patient is agreeable  -She will need frequent outpatient monitoring, she is aware  -continue lovenox/coumadin bridge, goal inr 2-3; inr 1.7 today. Patient wishest to stay overnight, hopefully inr ~2 tomorrow (if not d/c on lovenox & coumadin); will need outpatient inr monitoring    Paroxysmal A-fib  -metoprolol, lovenox/coumadin    Diabetes mellitus type 2  -Diabetic diet, sliding scale insulin as needed for correction  -Resumed long-acting insulin 5/26/2023 at lower dose than home regimen   -Home regimen is 100 units nightly  -5/28/2023 increasing Levemir to 40 units twice daily, resuming Jardiance and metformin  -Fingerstick blood sugars in the 300s in the past 24 hours    Peripheral neuropathy  -Continue gabapentin    GERD  -PPI    Hyperlipidemia  -Continue Lipitor    Epilepsy  -Continue Dilantin (of note if ever changed to different anticonvulsant could then consider switching to NOAC, since dilantin can decrease therapeutic levels of eliquis and xarelto)  -Follow-up with neurology outpatient to consider alternatives to Dilantin in the event she needs other options for anticoagulation    -check bmp now    -daily inr (goal 2-3)        Expected Discharge Location and Transportation: Home, private car  Expected Discharge   Expected Discharge Date: 5/31/2023; Expected Discharge Time:      DVT prophylaxis:  Medical DVT prophylaxis orders are present.      "     CODE STATUS:   Code Status and Medical Interventions:   Ordered at: 05/25/23 1948     Level Of Support Discussed With:    Patient     Code Status (Patient has no pulse and is not breathing):    CPR (Attempt to Resuscitate)     Medical Interventions (Patient has pulse or is breathing):    Full Support       James Cordova MD  05/30/23

## 2023-05-30 NOTE — PLAN OF CARE
Goal Outcome Evaluation:      Pt's VSS, slightly hypotensive. Metoprolol and Rythmol not given. Other VSS, RA. Continue POC.       Problem: Adult Inpatient Plan of Care  Goal: Plan of Care Review  Outcome: Ongoing, Progressing  Goal: Patient-Specific Goal (Individualized)  Outcome: Ongoing, Progressing  Goal: Absence of Hospital-Acquired Illness or Injury  Outcome: Ongoing, Progressing  Intervention: Identify and Manage Fall Risk  Recent Flowsheet Documentation  Taken 5/30/2023 0600 by Sondra Turner RN  Safety Promotion/Fall Prevention: safety round/check completed  Taken 5/30/2023 0400 by Sondra Turner RN  Safety Promotion/Fall Prevention: safety round/check completed  Taken 5/30/2023 0200 by Sondra Turner RN  Safety Promotion/Fall Prevention: safety round/check completed  Taken 5/30/2023 0000 by Yue, Sondra, RN  Safety Promotion/Fall Prevention: safety round/check completed  Taken 5/29/2023 2200 by Sondra Turner RN  Safety Promotion/Fall Prevention: safety round/check completed  Taken 5/29/2023 2000 by Yue, Sondra, RN  Safety Promotion/Fall Prevention:   activity supervised   clutter free environment maintained   fall prevention program maintained   nonskid shoes/slippers when out of bed   room organization consistent   safety round/check completed  Intervention: Prevent Skin Injury  Recent Flowsheet Documentation  Taken 5/30/2023 0600 by Sondra Turner RN  Body Position: position changed independently  Skin Protection:   adhesive use limited   incontinence pads utilized   tubing/devices free from skin contact  Taken 5/30/2023 0400 by Sondra Turner RN  Body Position: position changed independently  Skin Protection:   adhesive use limited   incontinence pads utilized   tubing/devices free from skin contact  Taken 5/30/2023 0200 by Sondra Turner RN  Body Position: position changed independently  Skin Protection:   adhesive use limited   incontinence  pads utilized   tubing/devices free from skin contact  Taken 5/30/2023 0000 by Sondra Turner RN  Body Position:   turned   right  Skin Protection:   adhesive use limited   incontinence pads utilized   tubing/devices free from skin contact  Taken 5/29/2023 2200 by Sondra Turner RN  Body Position: position changed independently  Skin Protection:   adhesive use limited   incontinence pads utilized   tubing/devices free from skin contact  Taken 5/29/2023 2000 by Sondra Turner RN  Body Position: position changed independently  Skin Protection:   adhesive use limited   incontinence pads utilized   tubing/devices free from skin contact  Intervention: Prevent and Manage VTE (Venous Thromboembolism) Risk  Recent Flowsheet Documentation  Taken 5/29/2023 2200 by Sondra Turner RN  Activity Management: up in chair  Taken 5/29/2023 2000 by Sondra Turner RN  Activity Management: up in chair  VTE Prevention/Management: (lovenox, coumadin) other (see comments)  Intervention: Prevent Infection  Recent Flowsheet Documentation  Taken 5/30/2023 0600 by Sondra Turner RN  Infection Prevention:   cohorting utilized   rest/sleep promoted   single patient room provided  Taken 5/30/2023 0400 by Sondra Turner RN  Infection Prevention:   cohorting utilized   rest/sleep promoted   single patient room provided  Taken 5/30/2023 0200 by Sondra Turner RN  Infection Prevention:   cohorting utilized   rest/sleep promoted   single patient room provided  Taken 5/30/2023 0000 by Sondra Turner RN  Infection Prevention:   cohorting utilized   rest/sleep promoted   single patient room provided  Taken 5/29/2023 2200 by Sondra Turner RN  Infection Prevention:   cohorting utilized   rest/sleep promoted   single patient room provided  Taken 5/29/2023 2000 by Sondra Turner RN  Infection Prevention:   cohorting utilized   rest/sleep promoted   single patient room provided  Goal: Optimal  Comfort and Wellbeing  Outcome: Ongoing, Progressing  Intervention: Provide Person-Centered Care  Recent Flowsheet Documentation  Taken 5/29/2023 2000 by Sondra Turner RN  Trust Relationship/Rapport:   care explained   questions answered   questions encouraged   thoughts/feelings acknowledged  Goal: Readiness for Transition of Care  Outcome: Ongoing, Progressing     Problem: VTE (Venous Thromboembolism)  Goal: VTE (Venous Thromboembolism) Symptom Resolution  Outcome: Ongoing, Progressing  Intervention: Prevent or Manage VTE (Venous Thromboembolism)  Recent Flowsheet Documentation  Taken 5/29/2023 2000 by Sondra Turner RN  VTE Prevention/Management: (lovenox, coumadin) other (see comments)     Problem: Pain Acute  Goal: Acceptable Pain Control and Functional Ability  Outcome: Ongoing, Progressing  Intervention: Prevent or Manage Pain  Recent Flowsheet Documentation  Taken 5/29/2023 2200 by Sondra Turner RN  Medication Review/Management: medications reviewed  Taken 5/29/2023 2000 by Sondra Turner RN  Medication Review/Management: medications reviewed  Intervention: Optimize Psychosocial Wellbeing  Recent Flowsheet Documentation  Taken 5/29/2023 2200 by oSndra Turner RN  Diversional Activities: television  Taken 5/29/2023 2000 by Sondra Turner RN  Diversional Activities: television     Problem: Fall Injury Risk  Goal: Absence of Fall and Fall-Related Injury  Outcome: Ongoing, Progressing  Intervention: Identify and Manage Contributors  Recent Flowsheet Documentation  Taken 5/29/2023 2200 by Sondra Turner RN  Medication Review/Management: medications reviewed  Taken 5/29/2023 2000 by Sondra Turner RN  Medication Review/Management: medications reviewed  Intervention: Promote Injury-Free Environment  Recent Flowsheet Documentation  Taken 5/30/2023 0600 by Sondra Turner RN  Safety Promotion/Fall Prevention: safety round/check completed  Taken 5/30/2023 0400 by  Yue, Sondra, RN  Safety Promotion/Fall Prevention: safety round/check completed  Taken 5/30/2023 0200 by Sondra Turner RN  Safety Promotion/Fall Prevention: safety round/check completed  Taken 5/30/2023 0000 by Yue, Sondra, RN  Safety Promotion/Fall Prevention: safety round/check completed  Taken 5/29/2023 2200 by Sondra Turner RN  Safety Promotion/Fall Prevention: safety round/check completed  Taken 5/29/2023 2000 by Yue, Sondra, RN  Safety Promotion/Fall Prevention:   activity supervised   clutter free environment maintained   fall prevention program maintained   nonskid shoes/slippers when out of bed   room organization consistent   safety round/check completed     Problem: Diabetes Comorbidity  Goal: Blood Glucose Level Within Targeted Range  Outcome: Ongoing, Progressing     Problem: Seizure Disorder Comorbidity  Goal: Maintenance of Seizure Control  Outcome: Ongoing, Progressing     Problem: Skin Injury Risk Increased  Goal: Skin Health and Integrity  Outcome: Ongoing, Progressing  Intervention: Optimize Skin Protection  Recent Flowsheet Documentation  Taken 5/30/2023 0600 by Sondra Turner RN  Pressure Reduction Techniques:   frequent weight shift encouraged   positioned off wounds   pressure points protected  Head of Bed (HOB) Positioning: HOB at 20 degrees  Pressure Reduction Devices: positioning supports utilized  Skin Protection:   adhesive use limited   incontinence pads utilized   tubing/devices free from skin contact  Taken 5/30/2023 0400 by Sondra Turner RN  Pressure Reduction Techniques:   positioned off wounds   pressure points protected   frequent weight shift encouraged  Head of Bed (HOB) Positioning: HOB at 20 degrees  Pressure Reduction Devices: positioning supports utilized  Skin Protection:   adhesive use limited   incontinence pads utilized   tubing/devices free from skin contact  Taken 5/30/2023 0200 by Sondra Turner RN  Pressure  Reduction Techniques:   frequent weight shift encouraged   positioned off wounds   pressure points protected  Head of Bed (HOB) Positioning: HOB at 20 degrees  Pressure Reduction Devices: positioning supports utilized  Skin Protection:   adhesive use limited   incontinence pads utilized   tubing/devices free from skin contact  Taken 5/30/2023 0000 by Sondra Turner RN  Pressure Reduction Techniques:   frequent weight shift encouraged   positioned off wounds   pressure points protected  Head of Bed (HOB) Positioning: HOB at 20 degrees  Pressure Reduction Devices: positioning supports utilized  Skin Protection:   adhesive use limited   incontinence pads utilized   tubing/devices free from skin contact  Taken 5/29/2023 2200 by Sondra Turner RN  Pressure Reduction Techniques:   frequent weight shift encouraged   pressure points protected   positioned off wounds  Head of Bed (HOB) Positioning: HOB at 90 degrees  Pressure Reduction Devices: chair cushion utilized  Skin Protection:   adhesive use limited   incontinence pads utilized   tubing/devices free from skin contact  Taken 5/29/2023 2000 by Sondra Turner RN  Pressure Reduction Techniques:   frequent weight shift encouraged   positioned off wounds   pressure points protected  Head of Bed (HOB) Positioning: HOB at 90 degrees  Pressure Reduction Devices:   positioning supports utilized   chair cushion utilized  Skin Protection:   adhesive use limited   incontinence pads utilized   tubing/devices free from skin contact

## 2023-05-31 ENCOUNTER — READMISSION MANAGEMENT (OUTPATIENT)
Dept: CALL CENTER | Facility: HOSPITAL | Age: 68
End: 2023-05-31

## 2023-05-31 VITALS
RESPIRATION RATE: 16 BRPM | HEART RATE: 70 BPM | DIASTOLIC BLOOD PRESSURE: 57 MMHG | TEMPERATURE: 97.2 F | HEIGHT: 62 IN | WEIGHT: 177.6 LBS | OXYGEN SATURATION: 97 % | SYSTOLIC BLOOD PRESSURE: 101 MMHG | BODY MASS INDEX: 32.68 KG/M2

## 2023-05-31 LAB
GLUCOSE BLDC GLUCOMTR-MCNC: 136 MG/DL (ref 70–130)
GLUCOSE BLDC GLUCOMTR-MCNC: 213 MG/DL (ref 70–130)
INR PPP: 1.8 (ref 0.89–1.12)
PROTHROMBIN TIME: 21.1 SECONDS (ref 12.2–14.5)

## 2023-05-31 PROCEDURE — 63710000001 INSULIN LISPRO (HUMAN) PER 5 UNITS: Performed by: NURSE PRACTITIONER

## 2023-05-31 PROCEDURE — 25010000002 ENOXAPARIN PER 10 MG: Performed by: FAMILY MEDICINE

## 2023-05-31 PROCEDURE — 63710000001 INSULIN DETEMIR PER 5 UNITS: Performed by: FAMILY MEDICINE

## 2023-05-31 PROCEDURE — 82948 REAGENT STRIP/BLOOD GLUCOSE: CPT

## 2023-05-31 PROCEDURE — 85610 PROTHROMBIN TIME: CPT | Performed by: FAMILY MEDICINE

## 2023-05-31 PROCEDURE — 99239 HOSP IP/OBS DSCHRG MGMT >30: CPT | Performed by: NURSE PRACTITIONER

## 2023-05-31 RX ORDER — WARFARIN SODIUM 5 MG/1
TABLET ORAL
Qty: 90 TABLET | Refills: 0 | Status: SHIPPED | OUTPATIENT
Start: 2023-05-31

## 2023-05-31 RX ORDER — ENOXAPARIN SODIUM 100 MG/ML
80 INJECTION SUBCUTANEOUS EVERY 12 HOURS SCHEDULED
Qty: 4.8 ML | Refills: 0 | Status: SHIPPED | OUTPATIENT
Start: 2023-05-31 | End: 2023-05-31 | Stop reason: SDUPTHER

## 2023-05-31 RX ORDER — ENOXAPARIN SODIUM 100 MG/ML
80 INJECTION SUBCUTANEOUS EVERY 12 HOURS SCHEDULED
Qty: 4.8 ML | Refills: 0 | Status: SHIPPED | OUTPATIENT
Start: 2023-05-31 | End: 2023-06-03

## 2023-05-31 RX ORDER — WARFARIN SODIUM 5 MG/1
TABLET ORAL
Qty: 90 TABLET | Refills: 0 | Status: SHIPPED | OUTPATIENT
Start: 2023-05-31 | End: 2023-05-31 | Stop reason: SDUPTHER

## 2023-05-31 RX ORDER — METOPROLOL TARTRATE 50 MG/1
50 TABLET, FILM COATED ORAL EVERY 12 HOURS SCHEDULED
Qty: 60 TABLET | Refills: 0 | Status: SHIPPED | OUTPATIENT
Start: 2023-05-31

## 2023-05-31 RX ADMIN — BUSPIRONE HYDROCHLORIDE 15 MG: 15 TABLET ORAL at 08:19

## 2023-05-31 RX ADMIN — CETIRIZINE HYDROCHLORIDE 5 MG: 10 TABLET, FILM COATED ORAL at 08:19

## 2023-05-31 RX ADMIN — Medication 10 ML: at 08:23

## 2023-05-31 RX ADMIN — EMPAGLIFLOZIN 25 MG: 25 TABLET, FILM COATED ORAL at 08:19

## 2023-05-31 RX ADMIN — ENOXAPARIN SODIUM 80 MG: 80 INJECTION SUBCUTANEOUS at 08:23

## 2023-05-31 RX ADMIN — MORPHINE SULFATE 30 MG: 30 TABLET, FILM COATED, EXTENDED RELEASE ORAL at 08:18

## 2023-05-31 RX ADMIN — PROPAFENONE HYDROCHLORIDE 150 MG: 150 TABLET, FILM COATED ORAL at 05:53

## 2023-05-31 RX ADMIN — METOPROLOL TARTRATE 50 MG: 50 TABLET ORAL at 08:18

## 2023-05-31 RX ADMIN — METFORMIN HYDROCHLORIDE 1000 MG: 1000 TABLET ORAL at 08:18

## 2023-05-31 RX ADMIN — PANTOPRAZOLE SODIUM 40 MG: 40 TABLET, DELAYED RELEASE ORAL at 05:53

## 2023-05-31 RX ADMIN — INSULIN LISPRO 4 UNITS: 100 INJECTION, SOLUTION INTRAVENOUS; SUBCUTANEOUS at 12:13

## 2023-05-31 RX ADMIN — ATORVASTATIN CALCIUM 40 MG: 40 TABLET, FILM COATED ORAL at 08:18

## 2023-05-31 RX ADMIN — PHENYTOIN SODIUM 200 MG: 100 CAPSULE ORAL at 08:18

## 2023-05-31 RX ADMIN — INSULIN DETEMIR 40 UNITS: 100 INJECTION, SOLUTION SUBCUTANEOUS at 08:19

## 2023-05-31 RX ADMIN — DULOXETINE 60 MG: 60 CAPSULE, DELAYED RELEASE ORAL at 08:18

## 2023-05-31 RX ADMIN — Medication 5000 UNITS: at 08:18

## 2023-05-31 RX ADMIN — DOCUSATE SODIUM 50 MG AND SENNOSIDES 8.6 MG 2 TABLET: 8.6; 5 TABLET, FILM COATED ORAL at 08:18

## 2023-05-31 NOTE — PLAN OF CARE
Goal Outcome Evaluation:  Plan of Care Reviewed With: patient        Progress: no change  Outcome Evaluation: No acute overnight events. VSS. Placed on 2L NC while sleeping. Continue current POC.

## 2023-05-31 NOTE — PLAN OF CARE
Goal Outcome Evaluation:      Patient discharge. Education provided on lovenox, coumadin, and INR check tomorrow

## 2023-05-31 NOTE — DISCHARGE SUMMARY
"    [facility] Medicine Services  DISCHARGE SUMMARY    Patient Name: Martha Harrell  : 1955  MRN: [id number]    Date of Admission: 2023  5:52 PM  Date of Discharge:  2023  Primary Care Physician: Ginger Ward APRN    Consults     Date and Time Order Name Status Description    2023  2:21 AM Inpatient Cardiothoracic Surgery Consult            Hospital Course     Presenting Problem:    Active Hospital Problems    Diagnosis  POA   • **Acute deep vein thrombosis (DVT) of femoral vein of right lower extremity [I82.411]  Yes   • Acute deep vein thrombosis (DVT) of left femoral vein [I82.412]  Yes   • Paroxysmal atrial fibrillation [I48.0]  Unknown   • Chronic anticoagulation [Z79.01]  Not Applicable   • Depression [F32.A]  Yes   • Epilepsy (HCC) [G40.909]  Yes   • GERD (gastroesophageal reflux disease) [K21.9]  Yes   • Hyperlipidemia [E78.5]  Yes   • Diabetic peripheral neuropathy associated with type 2 diabetes mellitus (HCC) [E11.42]  Yes      Resolved Hospital Problems   No resolved problems to display.          Hospital Course:  Martha Harrell is a 68 y.o. female  presented with acute bilateral lower extremity DVTs and failed outpatient therapy with Eliquis.  Anticoagulation is challenging given interactions with antiepileptic drug Dilantin and history of large hematoma after a fall while on Coumadin in the past.        Acute Bilateral lower extremity DVT  Suspect Eliquis \"failure\" (was compliant w/ eliquis prior to this admission)   -patient was on eliquis as outpatient and reported compliance; thus have switched to lovenox/coumadin bridge.   -RLE duplex revealed extensive RLE dvt and incidental LLE dvt noted on left common femoral  -Initially treated with heparin drip, then transition to Lovenox on 2023  -Initiated warfarin therapy 2023, goal INR 2-3. Today 1.8  -Discussed frequent monitoring with patient. Patient's PCP, Dr. Ward, able to monitor as " outpatient. Plan follow up tomorrow for INR check in office with further adjustments per PCP. She will continue 5mg Coumadin this evening and BID lovenox until therapeutic     Paroxysmal A-fib  -metoprolol, lovenox/coumadin     Diabetes mellitus type 2  -Diabetic diet, sliding scale insulin as needed for correction inpatient  -Resume home regimen at [place]     Peripheral neuropathy  -Continue gabapentin     GERD  -PPI     Hyperlipidemia  -Continue Lipitor     Epilepsy  -Continue Dilantin (of note if ever changed to different anticonvulsant could then consider switching to NOAC, since dilantin can decrease therapeutic levels of eliquis and xarelto)  -Follow-up with neurology outpatient to consider alternatives to Dilantin in the event she needs other options for anticoagulation         Discharge Follow Up Recommendations for outpatient labs/diagnostics:   PCP to follow for INR/ coumadin adjustments. Follow up tomorrow in office for INR and further adjustment    Day of Discharge     HPI:   Patient feeling well. Still some tenderness and swelling in RLE, but improved    Review of Systems  Gen- No fevers, chills  CV- No chest pain, palpitations  Resp- No cough, dyspnea  GI- No N/V/D, abd pain        Vital Signs:   Temp:  [96.5 °F (35.8 °C)-98.1 °F (36.7 °C)] 96.5 °F (35.8 °C)  Heart Rate:  [56-74] 60  Resp:  [16] 16  BP: (100-130)/(43-86) 104/43  Flow (L/min):  [2] 2      Physical Exam:  Constitutional: No acute distress, awake, alert  HENT: NCAT, mucous membranes moist  Respiratory: Clear to auscultation bilaterally, respiratory effort normal   Cardiovascular: RRR, no murmurs, rubs, or gallops  Gastrointestinal: Positive bowel sounds, soft, nontender, nondistended  Musculoskeletal: RLE edema  Psychiatric: Appropriate affect, cooperative  Neurologic: Oriented x 3, strength symmetric in all extremities, Cranial Nerves grossly intact to confrontation, speech clear  Skin: No rashes      Pertinent  and/or Most Recent Results      LAB RESULTS:      Lab 05/31/23  0636 05/30/23  0705 05/29/23  0732 05/28/23  0341 05/27/23  0245 05/26/23 2026 05/26/23  1235 05/26/23 0414 05/26/23  0101 05/25/23  1549   WBC  --   --  9.20  --   --   --   --  11.52*  --  10.63   HEMOGLOBIN  --   --  12.2  --   --   --   --  13.2  --  13.9   HEMATOCRIT  --   --  39.6  --   --   --   --  42.0  --  43.9   PLATELETS  --   --  250  --   --   --   --  218  --  198   NEUTROS ABS  --   --  4.45  --   --   --   --  6.24  --  8.55*   IMMATURE GRANS (ABS)  --   --  0.03  --   --   --   --  0.04  --  0.03   LYMPHS ABS  --   --  3.51*  --   --   --   --  3.81*  --  1.18   MONOS ABS  --   --  0.61  --   --   --   --  0.97*  --  0.77   EOS ABS  --   --  0.54*  --   --   --   --  0.39  --  0.07   MCV  --   --  86.1  --   --   --   --  83.3  --  83.9   PROTIME 21.1* 20.3* 18.7* 16.5* 15.4*  --  16.0*  --   --  15.6*   APTT  --   --   --   --   --   --   --   --   --  25.8*   HEPARIN ANTI-XA  --   --   --  0.59 0.56 0.54 0.26* 0.21*   < > 0.10*    < > = values in this interval not displayed.         Lab 05/30/23  1036 05/29/23  0732 05/26/23 0414 05/25/23  1549   SODIUM 143 143 137 131*   POTASSIUM 4.9 5.3* 3.8 5.1   CHLORIDE 103 104 100 94*   CO2 32.0* 32.0* 24.0 25.0   ANION GAP 8.0 7.0 13.0 12.0   BUN 33* 30* 36* 37*   CREATININE 0.60 0.79 0.57 0.72   EGFR 97.3 81.1 98.5 90.6   GLUCOSE 276* 210* 171* 392*   CALCIUM 9.5 9.5 9.4 9.4   HEMOGLOBIN A1C  --   --  10.30*  --          Lab 05/25/23  1549   TOTAL PROTEIN 7.8   ALBUMIN 3.8   GLOBULIN 4.0   ALT (SGPT) 10   AST (SGOT) 20   BILIRUBIN 0.7   ALK PHOS 99         Lab 05/31/23  0636 05/30/23  0705 05/29/23  0732 05/28/23  0341 05/27/23  0245 05/26/23  1235 05/26/23  0703 05/26/23  0414   HSTROP T  --   --   --   --   --   --  14* 15*   PROTIME 21.1* 20.3* 18.7* 16.5* 15.4*   < >  --   --    INR 1.80* 1.72* 1.54* 1.32* 1.21*   < >  --   --     < > = values in this interval not displayed.                 Brief Urine Lab  Results     None        Microbiology Results (last 10 days)     ** No results found for the last 240 hours. **          CT Angiogram Chest    Result Date: 5/25/2023  CT ANGIOGRAM CHEST Date of Exam: 5/25/2023 7:56 PM EDT Indication: extensive DVT's rule out PE. Comparison: Chest radiograph from March 17, 2022 Technique: CTA of the chest was performed before and after the uneventful intravenous administration of 56 mL Isovue-370. Reconstructed coronal and sagittal images were also obtained. In addition, a 3-D volume rendered image was created for interpretation. Automated exposure control and iterative reconstruction methods were used. Findings: The central tracheobronchial tree is clear. A couple benign calcified granulomas are noted. The lungs are clear. There is no pleural effusion. The heart size appears normal. The great vessels are normal in caliber. There is no evidence of pulmonary embolus. No abnormally enlarged lymph nodes are identified. Partial evaluation of the upper abdomen is unremarkable. No aggressive osseous lesions are identified.     Impression: 1.No evidence of pulmonary embolus. 2.No acute cardiopulmonary process. Electronically Signed: Harris Becker  5/25/2023 8:29 PM EDT  Workstation ID: [id number]    CT Abdomen Pelvis With Contrast    Result Date: 5/25/2023  CT ABDOMEN PELVIS W CONTRAST Date of Exam: 5/25/2023 6:29 PM EDT Indication: Metastatic disease evaluation. Extensive right lower extremity DVT. Left common femoral DVT. Comparison: None available. Technique: Axial CT images were obtained of the abdomen and pelvis following the uneventful intravenous administration of 85 mL Isovue-300. Reconstructed coronal and sagittal images were also obtained. Automated exposure control and iterative construction methods were used. Findings: No suspicious infiltrate is seen in the lung bases. There is a small hiatal hernia. Heart size appears within normal limits. No pericardial effusion. There is  suspected calcific atherosclerosis of the coronary arteries. No splenomegaly or focal splenic lesion is identified. No suspicious adrenal lesion is seen. No definite acute pancreatic abnormality is seen. No definite hepatic lesion is identified. Aorta appears normal in caliber. There is calcific atherosclerosis of  the aorta and branch vessels. No significant abdominal lymphadenopathy is seen. No hydronephrosis. No definite obstructing ureteral calculus is seen. There is expected excretion of contrast from the kidneys on delayed phase images. There appears to be a  nonenhancing cyst at the upper pole of the right kidney. There are also small nonenhancing posterior left mid/lower pole and medial lower pole cysts. No definite enhancing renal lesion is identified. The gallbladder is mildly distended. No definite biliary ductal dilatation or acute gallbladder inflammation is seen. No acute gastric abnormality is seen. Small bowel loops appear nondilated. The appendix appears within normal limits. The colon is minimally distended. There is diverticulosis of the distal colon. No definite acute colonic or rectal abnormality is seen. Urinary bladder appears grossly unremarkable. No definite suspicious uterine or adnexal abnormality is identified. There are stents in the left external iliac and common iliac veins terminating in the inferior vena cava. The stent does appear to cross the origin of the right common iliac vein. There there does not appear to be significant enhancement of the IVC below  the renal veins. There appears to be extensive edema in the soft tissues of the visualized right proximal thigh and right pelvis. There appear to be small fat-containing inguinal hernias. No significant pelvic lymphadenopathy is seen. There is severe left hip osteoarthritis. There is mild to moderate right hip osteoarthritis. There are degenerative changes in the spine. There is grade 1 anterolisthesis of L4 on L5. No definite  aggressive osseous lesion is seen.     Impression: 1.Extensive edema in the soft tissues of the visualized right thigh and pelvis. This could be related to patient's reported right DVT or soft tissue infection. 2.Left external iliac and common iliac artery stents. The common iliac stent does extend into the inferior vena cava and crosses the origin of the right common iliac vein which could predispose to right lower extremity DVT. 3.No definitive findings of malignancy or metastatic disease in the abdomen or pelvis on this exam. PET/CT may be more sensitive for occult malignancy. 4.Calcific atherosclerosis. 5.Diverticulosis. Colonoscopy following be considered as colonic malignancy could be occult on CT. 6.Severe left hip osteoarthritis. Electronically Signed: Davian Manzanares  5/25/2023 7:52 PM EDT  Workstation ID: [id number]    Duplex Venous Lower Extremity - Right CAR    Result Date: 5/25/2023  •  Extensive right lower extremity DVT as detailed below •  Acute right lower extremity deep vein thrombosis noted in the external iliac, common femoral, deep femoral, proximal femoral, mid femoral, distal femoral, popliteal, posterior tibial, peroneal and gastrocnemius. •  Acute right lower extremity superficial thrombophlebitis noted in the saphenofemoral junction and great saphenous (above knee). •  Acute left lower extremity deep vein thrombosis noted in the common femoral.     US DOPPLER VENOUS LEG RIGHT    Result Date: 5/22/2023  RIGHT LOWER EXTREMITY VENOUS DUPLEX DOPPLER EXAMINATION HISTORY: Right lower extremity swelling . PROCEDURE: Multiple transverse and longitudinal scans were performed of the femoropopliteal deep venous system, with augmentation and compression maneuvers.  Analysis was performed using gray scale, Doppler color imaging and spectral analysis. FINDINGS:  Normal phasic flow was noted in the visualized deep venous system. No intraluminal increased echogenicity is noted to suggest thrombus. There is  normal compression and augmentation of the venous structures. No abnormal venous collaterals are seen. No venous reflux is demonstrated .    No evidence of right lower extremity DVT . Images reviewed, interpreted, and dictated by Dr. Mc Acosta. Transcribed by Samuel Neumann PA-C.      Results for orders placed during the hospital encounter of 05/25/23    Duplex Venous Lower Extremity - Right CAR    Interpretation Summary  •  Extensive right lower extremity DVT as detailed below  •  Acute right lower extremity deep vein thrombosis noted in the external iliac, common femoral, deep femoral, proximal femoral, mid femoral, distal femoral, popliteal, posterior tibial, peroneal and gastrocnemius.  •  Acute right lower extremity superficial thrombophlebitis noted in the saphenofemoral junction and great saphenous (above knee).  •  Acute left lower extremity deep vein thrombosis noted in the common femoral.      Results for orders placed during the hospital encounter of 05/25/23    Duplex Venous Lower Extremity - Right CAR    Interpretation Summary  •  Extensive right lower extremity DVT as detailed below  •  Acute right lower extremity deep vein thrombosis noted in the external iliac, common femoral, deep femoral, proximal femoral, mid femoral, distal femoral, popliteal, posterior tibial, peroneal and gastrocnemius.  •  Acute right lower extremity superficial thrombophlebitis noted in the saphenofemoral junction and great saphenous (above knee).  •  Acute left lower extremity deep vein thrombosis noted in the common femoral.      Results for orders placed during the hospital encounter of 03/17/22    Adult Transthoracic Echo Complete W/ Cont if Necessary Per Protocol    Interpretation Summary  · Left ventricular ejection fraction appears to be 61 - 65%. Left ventricular systolic function is normal.  · Left ventricular diastolic function was normal.  · Calculated right ventricular systolic pressure from tricuspid  regurgitation is 20 mmHg.      Plan for Follow-up of Pending Labs/Results:     Discharge Details        Discharge Medications      New Medications      Instructions Start Date   Enoxaparin Sodium 80 MG/0.8ML solution prefilled syringe syringe  Commonly known as: LOVENOX   80 mg, Subcutaneous, Every 12 Hours Scheduled      warfarin 5 MG tablet  Commonly known as: COUMADIN   Take 5mg 5/31 evening. Follow up with PCP for INR 6/1 and further dosage adjustments         Continue These Medications      Instructions Start Date   albuterol sulfate  (90 Base) MCG/ACT inhaler  Commonly known as: PROVENTIL HFA;VENTOLIN HFA;PROAIR HFA   1 puff, Inhalation      atorvastatin 40 MG tablet  Commonly known as: LIPITOR   1 tablet, Oral, Daily      B-D UF III MINI PEN NEEDLES 31G X 5 MM misc  Generic drug: Insulin Pen Needle   Use daily with insulin      busPIRone 15 MG tablet  Commonly known as: BUSPAR   No dose, route, or frequency recorded.      DULoxetine 60 MG capsule  Commonly known as: CYMBALTA   60 mg, Oral, Daily      empagliflozin 25 MG tablet tablet  Commonly known as: Jardiance   25 mg, Oral, Daily      fenofibrate 160 MG tablet   No dose, route, or frequency recorded.      FreeStyle Pauly 2 Sensor misc   SEE ADMIN INSTRUCTIONS EVERY 14 DAYS      furosemide 40 MG tablet  Commonly known as: LASIX   1 tablet, Oral, Every 12 Hours Scheduled      gabapentin 100 MG capsule  Commonly known as: NEURONTIN   No dose, route, or frequency recorded.      HYDROcodone-acetaminophen 7.5-325 MG per tablet  Commonly known as: NORCO   1 tablet, Oral, 3 Times Daily PRN      Insulin Glargine (2 Unit Dial) 300 UNIT/ML solution pen-injector injection  Commonly known as: Toujeo Max SoloStar   Take 100 units at bedtime, after a week go up to 110 units      Insulin Lispro (1 Unit Dial) 100 UNIT/ML solution pen-injector  Commonly known as: HumaLOG KwikPen   Take 20u before lunch (if you eat lunch) and take 40 units before supper       magnesium oxide 400 MG tablet  Commonly known as: MAG-OX   1 tablet, Oral, 2 Times Daily      metFORMIN 1000 MG tablet  Commonly known as: GLUCOPHAGE   1,000 mg, Oral, 2 Times Daily With Meals      metFORMIN 1000 MG tablet  Commonly known as: GLUCOPHAGE   1,000 mg, Oral      metoprolol tartrate 50 MG tablet  Commonly known as: LOPRESSOR   50 mg, Oral, Every 12 Hours Scheduled      Morphine 30 MG 12 hr tablet  Commonly known as: MS CONTIN   30 mg, Oral, 2 Times Daily      omeprazole 20 MG capsule  Commonly known as: priLOSEC   20 mg, Oral, Daily      ONE TOUCH ULTRA TEST test strip  Generic drug: glucose blood   Test BS TID      OneTouch Delica Lancets Fine misc   Test BS TID      phenytoin  MG capsule  Commonly known as: DILANTIN   take 2 capsule by mouth twice a day      propafenone 225 MG tablet  Commonly known as: RYTHMOL   No dose, route, or frequency recorded.      RA Vitamin D-3 125 MCG (5000 UT) capsule capsule  Generic drug: vitamin D3   5,000 Units, Oral, Daily      silver sulfadiazine 1 % cream  Commonly known as: SILVADENE, SSD   APPLY OVER ULCER EVERY DAY UNTIL HEALED         Stop These Medications    Eliquis 5 MG tablet tablet  Generic drug: apixaban            Allergies   Allergen Reactions   • Aleve [Naproxen] Itching         Discharge Disposition:  Home or Self Care    Diet:  Hospital:  Diet Order   Procedures   • Diet: Regular/House Diet, Diabetic Diets; Consistent Carbohydrate; Texture: Regular Texture (IDDSI 7); Fluid Consistency: Thin (IDDSI 0)       Activity:  Activity Instructions     Activity as Tolerated            Restrictions or Other Recommendations:         CODE STATUS:    Code Status and Medical Interventions:   Ordered at: 05/25/23 1948     Level Of Support Discussed With:    Patient     Code Status (Patient has no pulse and is not breathing):    CPR (Attempt to Resuscitate)     Medical Interventions (Patient has pulse or is breathing):    Full Support       No future  appointments.    Additional Instructions for the Follow-ups that You Need to Schedule     Discharge Follow-up with PCP   As directed       Currently Documented PCP:    Ginger Ward APRN    PCP Phone Number:    [phone]     Follow Up Details: 1 week         Discharge Follow-up with Specified Provider: follow up tmro in PCP office for INR check   As directed      To: follow up tmro in PCP office for INR check                     SHERRY Cooney  05/31/23      Time Spent on Discharge:  I spent 45  minutes on this discharge activity which included: face-to-face encounter with the patient, reviewing the data in the system, coordination of the care with the nursing staff as well as consultants, documentation, and entering orders.        Electronically signed by SHERRY Cooney, 05/31/23, 10:48 AM EDT.

## 2023-05-31 NOTE — DISCHARGE INSTR - APPOINTMENTS
Ginger Ward, APRN  PCP - General  Nurse Practitioner  [phone]  [phone]  [address] [place] [address] [address] CHELE CHAVARRIA [id number]  Next Steps: Appointment: Wednesday, June 7th@ 10:45     For INR check,   6/01 at 9:40 am.   [facility]

## 2023-05-31 NOTE — PROGRESS NOTES
"Pharmacy Consult  -  Warfarin    Martha Harrell is a  68 y.o. female   Height - 157.5 cm (62.01\")  Weight - 80.6 kg (177 lb 9.6 oz)    Consulting Provider: - Blanca Fowler MD  Indication: - DVT/PE  Goal INR: - 2-3  Home Regimen:   -n/a, new start   -Patient previously was on warfarin 5mg daily, which was d/c'ed >1yr due to hematoma    Bridge Therapy: Yes   and enoxapain    Drug-Drug Interactions with current regimen:   Pantoprazole - may increase INR              Phenytoin - may increase or decrease warfarin effect              Lovenox - may increase risk of bleeding    Warfarin Dosing During Admission:    Date  5/25 5/26 5/27 5/28 5/29 5/30 5/31     INR  1.22 1.27 1.21 1.32 1.54 1.72 1.8     Dose  -- 5mg 7.5 mg 5 mg 5 mg  5 mg 5 mg         Education Provided: Warfarin education provided on 5/26/23 verbally and in writing.  Discussed effects of warfarin, importance of checking INR, drug-drug and drug-food interactions, and signs/symptoms of bleeding and clotting.  Patient verbalized understanding through teach back.  All pertinent questions were answered.        Discharge Follow up:   Following Provider - SHERRY Hawkins   Follow up time range or appointment - 2-3 days following discharge      Labs:    Results from last 7 days   Lab Units 05/31/23  0636 05/30/23  0705 05/29/23  0732 05/28/23  0341 05/27/23  0245 05/26/23  1235 05/26/23  0414 05/25/23  1549   INR  1.80* 1.72* 1.54* 1.32* 1.21* 1.27*  --  1.22*   APTT seconds  --   --   --   --   --   --   --  25.8*   HEMOGLOBIN g/dL  --   --  12.2  --   --   --  13.2 13.9   HEMATOCRIT %  --   --  39.6  --   --   --  42.0 43.9     Results from last 7 days   Lab Units 05/30/23  1036 05/29/23  0732 05/26/23  0414 05/25/23  1549   SODIUM mmol/L 143 143 137 131*   POTASSIUM mmol/L 4.9 5.3* 3.8 5.1   CHLORIDE mmol/L 103 104 100 94*   CO2 mmol/L 32.0* 32.0* 24.0 25.0   BUN mg/dL 33* 30* 36* 37*   CREATININE mg/dL 0.60 0.79 0.57 0.72   CALCIUM mg/dL 9.5 9.5 9.4 9.4 "   BILIRUBIN mg/dL  --   --   --  0.7   ALK PHOS U/L  --   --   --  99   ALT (SGPT) U/L  --   --   --  10   AST (SGOT) U/L  --   --   --  20   GLUCOSE mg/dL 276* 210* 171* 392*       Current dietary intake: % of documented meals over past 24 hours  Diet Order   Procedures    Diet: Regular/House Diet, Diabetic Diets; Consistent Carbohydrate; Texture: Regular Texture (IDDSI 7); Fluid Consistency: Thin (IDDSI 0)     Assessment/Plan:   Warfarin dosing for DVT  Goal INR: 2-3  5/31 INR - 1.8  5/29 H/H - 12.2/39.6    Patient previously was on warfarin 5mg daily, which was d/c'ed >1yr due to hematoma    Will continue historical dose of warfarin 5mg daily tonight  Continue Lovenox bridge until INR >2  Monitor for s/s bleeding, clinical status, dietary intake and drug-drug interactions  Follow daily INR and adjust dose accordingly    Thank you,    Paula Gil, Pharmacy Intern  5/31/2023  11:38 EDT

## 2023-05-31 NOTE — CASE MANAGEMENT/SOCIAL WORK
Case Management Discharge Note      Final Note: Patient getting discharge today. CM made appointment for PCP to check INR tomorrow at 9:40 am. CM checked on Lovenox injection and there was no cost for injection. CM had lovenox switch to [facility] pharmacy due to the [facility] not having a supple of Lovenox. No other discharge needs identified.         Selected Continued Care - Admitted Since 5/25/2023     Destination    No services have been selected for the patient.              Durable Medical Equipment    No services have been selected for the patient.              Dialysis/Infusion    No services have been selected for the patient.              Home Medical Care    No services have been selected for the patient.              Therapy    No services have been selected for the patient.              Community Resources    No services have been selected for the patient.              Community & DME    No services have been selected for the patient.                       Final Discharge Disposition Code: 01 - home or self-care

## 2023-06-01 NOTE — OUTREACH NOTE
Prep Survey      Flowsheet Row Responses   [facility] facility patient discharged from? [facility]   Is LACE score < 7 ? No   Eligibility Readm Mgmt   Discharge diagnosis Acute deep vein thrombosis (DVT) of femoral vein of right lower extremity   Does the patient have one of the following disease processes/diagnoses(primary or secondary)? Other   Does the patient have Home health ordered? No   Is there a DME ordered? No   Medication alerts for this patient see AVS   Prep survey completed? Yes            Zina LANCASTER - Registered Nurse

## 2023-06-05 ENCOUNTER — HOSPITAL ENCOUNTER (EMERGENCY)
Facility: HOSPITAL | Age: 68
Discharge: HOME | End: 2023-06-05
Attending: EMERGENCY MEDICINE | Admitting: EMERGENCY MEDICINE
Payer: MEDICARE

## 2023-06-05 ENCOUNTER — APPOINTMENT (OUTPATIENT)
Dept: CT IMAGING | Facility: HOSPITAL | Age: 68
End: 2023-06-05
Payer: MEDICARE

## 2023-06-05 ENCOUNTER — READMISSION MANAGEMENT (OUTPATIENT)
Dept: CALL CENTER | Facility: HOSPITAL | Age: 68
End: 2023-06-05
Payer: MEDICARE

## 2023-06-05 VITALS
HEART RATE: 60 BPM | SYSTOLIC BLOOD PRESSURE: 109 MMHG | HEIGHT: 62 IN | BODY MASS INDEX: 33.86 KG/M2 | TEMPERATURE: 97.6 F | DIASTOLIC BLOOD PRESSURE: 58 MMHG | WEIGHT: 184 LBS | RESPIRATION RATE: 18 BRPM | OXYGEN SATURATION: 94 %

## 2023-06-05 DIAGNOSIS — I82.4Z1 ACUTE DEEP VEIN THROMBOSIS (DVT) OF DISTAL VEIN OF RIGHT LOWER EXTREMITY: Primary | ICD-10-CM

## 2023-06-05 DIAGNOSIS — R79.1 SUBTHERAPEUTIC INTERNATIONAL NORMALIZED RATIO (INR): ICD-10-CM

## 2023-06-05 DIAGNOSIS — M79.89 RIGHT LEG SWELLING: ICD-10-CM

## 2023-06-05 LAB
ALBUMIN SERPL-MCNC: 3.4 G/DL (ref 3.5–5.2)
ALBUMIN/GLOB SERPL: 1.1 G/DL
ALP SERPL-CCNC: 94 U/L (ref 39–117)
ALT SERPL W P-5'-P-CCNC: 22 U/L (ref 1–33)
ANION GAP SERPL CALCULATED.3IONS-SCNC: 9 MMOL/L (ref 5–15)
AST SERPL-CCNC: 32 U/L (ref 1–32)
BASOPHILS # BLD AUTO: 0.04 10*3/MM3 (ref 0–0.2)
BASOPHILS NFR BLD AUTO: 0.6 % (ref 0–1.5)
BILIRUB SERPL-MCNC: 0.2 MG/DL (ref 0–1.2)
BUN SERPL-MCNC: 14 MG/DL (ref 8–23)
BUN/CREAT SERPL: 20.3 (ref 7–25)
CALCIUM SPEC-SCNC: 8.5 MG/DL (ref 8.6–10.5)
CHLORIDE SERPL-SCNC: 103 MMOL/L (ref 98–107)
CO2 SERPL-SCNC: 28 MMOL/L (ref 22–29)
CREAT BLDA-MCNC: 0.8 MG/DL (ref 0.6–1.3)
CREAT SERPL-MCNC: 0.69 MG/DL (ref 0.57–1)
DEPRECATED RDW RBC AUTO: 48.8 FL (ref 37–54)
EGFRCR SERPLBLD CKD-EPI 2021: 94.1 ML/MIN/1.73
EOSINOPHIL # BLD AUTO: 0.38 10*3/MM3 (ref 0–0.4)
EOSINOPHIL NFR BLD AUTO: 5.3 % (ref 0.3–6.2)
ERYTHROCYTE [DISTWIDTH] IN BLOOD BY AUTOMATED COUNT: 15.4 % (ref 12.3–15.4)
GLOBULIN UR ELPH-MCNC: 3.2 GM/DL
GLUCOSE SERPL-MCNC: 302 MG/DL (ref 65–99)
HCT VFR BLD AUTO: 37.7 % (ref 34–46.6)
HGB BLD-MCNC: 11.3 G/DL (ref 12–15.9)
IMM GRANULOCYTES # BLD AUTO: 0.01 10*3/MM3 (ref 0–0.05)
IMM GRANULOCYTES NFR BLD AUTO: 0.1 % (ref 0–0.5)
INR PPP: 1.94 (ref 0.89–1.12)
LYMPHOCYTES # BLD AUTO: 1.83 10*3/MM3 (ref 0.7–3.1)
LYMPHOCYTES NFR BLD AUTO: 25.7 % (ref 19.6–45.3)
MCH RBC QN AUTO: 25.9 PG (ref 26.6–33)
MCHC RBC AUTO-ENTMCNC: 30 G/DL (ref 31.5–35.7)
MCV RBC AUTO: 86.5 FL (ref 79–97)
MONOCYTES # BLD AUTO: 0.4 10*3/MM3 (ref 0.1–0.9)
MONOCYTES NFR BLD AUTO: 5.6 % (ref 5–12)
NEUTROPHILS NFR BLD AUTO: 4.47 10*3/MM3 (ref 1.7–7)
NEUTROPHILS NFR BLD AUTO: 62.7 % (ref 42.7–76)
NRBC BLD AUTO-RTO: 0 /100 WBC (ref 0–0.2)
PLATELET # BLD AUTO: 274 10*3/MM3 (ref 140–450)
PMV BLD AUTO: 9.3 FL (ref 6–12)
POTASSIUM SERPL-SCNC: 4.6 MMOL/L (ref 3.5–5.2)
PROT SERPL-MCNC: 6.6 G/DL (ref 6–8.5)
PROTHROMBIN TIME: 22.3 SECONDS (ref 12.2–14.5)
RBC # BLD AUTO: 4.36 10*6/MM3 (ref 3.77–5.28)
SODIUM SERPL-SCNC: 140 MMOL/L (ref 136–145)
WBC NRBC COR # BLD: 7.13 10*3/MM3 (ref 3.4–10.8)

## 2023-06-05 PROCEDURE — 71275 CT ANGIOGRAPHY CHEST: CPT

## 2023-06-05 PROCEDURE — 25510000001 IOPAMIDOL PER 1 ML: Performed by: EMERGENCY MEDICINE

## 2023-06-05 PROCEDURE — 85025 COMPLETE CBC W/AUTO DIFF WBC: CPT | Performed by: EMERGENCY MEDICINE

## 2023-06-05 PROCEDURE — 99283 EMERGENCY DEPT VISIT LOW MDM: CPT

## 2023-06-05 PROCEDURE — 85610 PROTHROMBIN TIME: CPT | Performed by: EMERGENCY MEDICINE

## 2023-06-05 PROCEDURE — 82565 ASSAY OF CREATININE: CPT

## 2023-06-05 PROCEDURE — 80053 COMPREHEN METABOLIC PANEL: CPT | Performed by: EMERGENCY MEDICINE

## 2023-06-05 RX ORDER — SODIUM CHLORIDE 0.9 % (FLUSH) 0.9 %
10 SYRINGE (ML) INJECTION AS NEEDED
Status: DISCONTINUED | OUTPATIENT
Start: 2023-06-05 | End: 2023-06-05 | Stop reason: HOSPADM

## 2023-06-05 RX ADMIN — IOPAMIDOL 75 ML: 755 INJECTION, SOLUTION INTRAVENOUS at 19:54

## 2023-06-05 NOTE — ED PROVIDER NOTES
Subjective   History of Present Illness  68-year-old female who presents for evaluation of continued right leg pain and continued right lower extremity swelling.  The patient reports that she was recently admitted to our hospital and was diagnosed with a DVT.  She has a previous history of a DVT in the past and was taking Eliquis at that given time.  Despite taking Eliquis she developed the DVT and therefore was actually transitioned to Coumadin while here in the hospital.  She reports that her last 2 INR checks have been greater than 2 therefore she was discontinued off Lovenox and is no longer taking that.  She was informed that her right leg swelling should improve within 2 to 3 days and because it had not she has now represented to the ER.  She actually denies chest pain or shortness of breath.  She denies fever or infectious symptoms.  No reported sick contacts.  No chest or abdominal pain.  No other medication changes.  No other acute complaints.    Review of Systems   Constitutional:  Negative for chills, fatigue and fever.   HENT:  Negative for congestion, ear pain, postnasal drip, sinus pressure and sore throat.    Eyes:  Negative for pain, redness and visual disturbance.   Respiratory:  Negative for cough, chest tightness and shortness of breath.    Cardiovascular:  Positive for leg swelling. Negative for chest pain and palpitations.   Gastrointestinal:  Negative for abdominal pain, anal bleeding, blood in stool, diarrhea, nausea and vomiting.   Endocrine: Negative for polydipsia and polyuria.   Genitourinary:  Negative for difficulty urinating, dysuria, frequency and urgency.   Musculoskeletal:  Negative for arthralgias, back pain and neck pain.   Skin:  Negative for pallor and rash.   Allergic/Immunologic: Negative for environmental allergies and immunocompromised state.   Neurological:  Negative for dizziness, weakness and headaches.   Hematological:  Negative for adenopathy.   Psychiatric/Behavioral:   Negative for confusion, self-injury and suicidal ideas. The patient is not nervous/anxious.    All other systems reviewed and are negative.    Past Medical History:   Diagnosis Date    Anemia     Asthma     Chronic bronchitis     Depression     Diabetes mellitus     DVT (deep venous thrombosis) 2023    Epilepsy     Fibromyalgia, primary     GERD (gastroesophageal reflux disease)     Hyperlipidemia     Leg DVT (deep venous thromboembolism), acute, left 2019       Allergies   Allergen Reactions    Aleve [Naproxen] Itching       Past Surgical History:   Procedure Laterality Date    BACK SURGERY      NECK SURGERY      TUBAL ABDOMINAL LIGATION         Family History   Problem Relation Age of Onset    Arthritis Mother     Heart disease Mother     Obesity Mother     Arthritis Father     Cancer Father     Hypertension Father     Hyperlipidemia Father     Obesity Father     Arthritis Sister     Obesity Sister     Arthritis Maternal Aunt     Arthritis Maternal Uncle     Cancer Maternal Uncle        Social History     Socioeconomic History    Marital status:    Tobacco Use    Smoking status: Former     Types: Cigarettes     Quit date: 2010     Years since quittin.3    Smokeless tobacco: Never   Vaping Use    Vaping Use: Never used   Substance and Sexual Activity    Alcohol use: No    Drug use: No    Sexual activity: Defer           Objective   Physical Exam  Vitals and nursing note reviewed.   Constitutional:       General: She is not in acute distress.     Appearance: Normal appearance. She is well-developed. She is not toxic-appearing or diaphoretic.   HENT:      Head: Normocephalic and atraumatic.      Right Ear: External ear normal.      Left Ear: External ear normal.      Nose: Nose normal.   Eyes:      General: Lids are normal.      Pupils: Pupils are equal, round, and reactive to light.   Neck:      Trachea: No tracheal deviation.   Cardiovascular:      Rate and Rhythm: Normal rate and  regular rhythm.      Pulses: No decreased pulses.      Heart sounds: Normal heart sounds. No murmur heard.    No friction rub. No gallop.   Pulmonary:      Effort: Pulmonary effort is normal. No respiratory distress.      Breath sounds: Normal breath sounds. Examination of the right-middle field reveals rales. Examination of the left-middle field reveals rales. Examination of the right-lower field reveals rales. Examination of the left-lower field reveals rales. No decreased breath sounds, wheezing, rhonchi or rales.   Abdominal:      General: Bowel sounds are normal.      Palpations: Abdomen is soft.      Tenderness: There is no abdominal tenderness. There is no guarding or rebound.   Musculoskeletal:         General: No deformity. Normal range of motion.      Cervical back: Normal range of motion and neck supple.      Right lower leg: 3+ Pitting   Lymphadenopathy:      Cervical: No cervical adenopathy.   Skin:     General: Skin is warm and dry.      Findings: No rash.   Neurological:      Mental Status: She is alert and oriented to person, place, and time.      Cranial Nerves: No cranial nerve deficit.      Sensory: No sensory deficit.   Psychiatric:         Speech: Speech normal.         Behavior: Behavior normal.         Thought Content: Thought content normal.         Judgment: Judgment normal.       Procedures           ED Course                                           Medical Decision Making  Differential diagnosis includes worsened DVT, pulmonary embolism, subtherapeutic INR, renal insufficiency, other unspecified etiology.    The patient states that the right lower extremity swelling and pain have continued to be persistently present.  When clarifying if it has worsened she denies that it has worsened it is just continued to persist at the same level.  She was informed that it would dramatically improve in 2 or 3 days but has not.  This is what prompted the current presentation.  She continues to deny  chest pain or shortness of breath.    I reviewed previous work-up the patient did not have a pulmonary embolism on CT angiogram of the chest.  The patient has been transitioned off of Lovenox and currently is only taking Coumadin as anticoagulation.  Both of her most recent INRs have been greater than 2.  She currently is taking 5 mg of Coumadin daily.    Current lab evaluation shows no acute abnormality.    INR is subtherapeutic at 1.94.    CT angiogram the chest is negative for pulmonary embolism or other acute abnormalities.    The patient feels well with normal and stable vital signs and desires to go home and I feel this is reasonable.    I have advised her to take 5 mg of Coumadin Monday Wednesday Friday and Sunday.  I have advised her to increase her Coumadin to 7.5 mg on Tuesday Thursday and Saturday to try to achieve therapeutic range.    I have advised her to follow-up with her primary care physician for repeat INR check in 1 week.    Advised to return to the ER with any further concern.    Problems Addressed:  Acute deep vein thrombosis (DVT) of distal vein of right lower extremity: complicated acute illness or injury that poses a threat to life or bodily functions  Right leg swelling: complicated acute illness or injury that poses a threat to life or bodily functions  Subtherapeutic international normalized ratio (INR): complicated acute illness or injury    Amount and/or Complexity of Data Reviewed  Independent Historian: friend     Details: daughter provides additional details  External Data Reviewed: labs, radiology, ECG and notes.  Labs: ordered. Decision-making details documented in ED Course.  Radiology: ordered and independent interpretation performed. Decision-making details documented in ED Course.     Details: I independentaly reviewed the CTA of chest and it is negative to pulmonary embolism.    Risk  Prescription drug management.        Final diagnoses:   Acute deep vein thrombosis (DVT) of  distal vein of right lower extremity   Right leg swelling   Subtherapeutic international normalized ratio (INR)       ED Disposition  ED Disposition       ED Disposition   Discharge    Condition   Stable    Comment   --               Ginger Ward, APRN  [address] [place] [id number] [phone]    In 1 week           Medication List      No changes were made to your prescriptions during this visit.            Jaime Husain MD  06/06/23 8997

## 2023-06-05 NOTE — OUTREACH NOTE
"Medical Week 1 Survey      Flowsheet Row Responses   [facility] patient discharged from? [facility]   Does the patient have one of the following disease processes/diagnoses(primary or secondary)? Other   Week 1 attempt successful? Yes   Call start time 1052   Call end time 1056   Discharge diagnosis Acute deep vein thrombosis (DVT) of femoral vein of right lower extremity   Is patient permission given to speak with other caregiver? Yes   List who call center can speak with Aguila chacon   Person spoke with today (if not patient) and relationship Aguila chacon   Meds reviewed with patient/caregiver? Yes   Is the patient having any side effects they believe may be caused by any medication additions or changes? No   Does the patient have all medications ordered at discharge? Yes   Is the patient taking all medications as directed (includes completed medication regime)? Yes   Does the patient have a primary care provider?  Yes   Comments Son states he believes pt has another doctor's apt \"maybe this week\"   Did the patient receive a copy of their discharge instructions? Yes   Nursing interventions Reviewed instructions with patient   What is the patient's perception of their health status since discharge? Same  [right foot is still pale-pt's son states pt states it was pale in the hospital ]   Is the patient/caregiver able to teach back signs and symptoms related to disease process for when to call PCP? Yes   Is the patient/caregiver able to teach back signs and symptoms related to disease process for when to call 911? Yes   Is the patient/caregiver able to teach back the hierarchy of who to call/visit for symptoms/problems? PCP, Specialist, Home health nurse, Urgent Care, ED, 911 Yes   If the patient is a current smoker, are they able to teach back resources for cessation? Not a smoker   Week 1 call completed? Yes   Wrap up additional comments Son believes pt is about the same            [facility] - Registered Nurse  "

## 2023-06-06 NOTE — DISCHARGE INSTRUCTIONS
Take 5 mg of Coumadin on Monday, Wednesday, Friday and Sunday.    Take 7.5 mg of Coumadin on Tuesday Thursday and Saturday.    Follow-up with primary care physician for repeat INR check in 1 week.    Return to the ER with any further concern.

## 2023-06-15 ENCOUNTER — READMISSION MANAGEMENT (OUTPATIENT)
Dept: CALL CENTER | Facility: HOSPITAL | Age: 68
End: 2023-06-15
Payer: MEDICARE

## 2023-06-15 NOTE — OUTREACH NOTE
Medical Week 2 Survey      Flowsheet Row Responses   [facility] patient discharged from? [place]   Does the patient have one of the following disease processes/diagnoses(primary or secondary)? Other   Week 2 attempt successful? Yes   Call start time 1614   Discharge diagnosis Acute deep vein thrombosis (DVT) of femoral vein of right lower extremity   Call end time 1617   Meds reviewed with patient/caregiver? Yes   Is the patient having any side effects they believe may be caused by any medication additions or changes? No   Is the patient taking all medications as directed (includes completed medication regime)? Yes   Does the patient have a primary care provider?  Yes   Does the patient have an appointment with their PCP within 7 days of discharge? Yes   Has the patient kept scheduled appointments due by today? Yes   Has home health visited the patient within 72 hours of discharge? N/A   Psychosocial issues? No   What is the patient's perception of their health status since discharge? Same  [right leg is still swollen, and pain in both legs]   Is the patient/caregiver able to teach back the hierarchy of who to call/visit for symptoms/problems? PCP, Specialist, Home health nurse, Urgent Care, ED, 911 Yes   Week 2 Call Completed? Yes   Is the patient interested in additional calls from an ambulatory ?  NOTE:  applies to high risk patients requiring additional follow-up. No            Anh STEINER - Registered Nurse

## 2023-08-07 ENCOUNTER — LAB (OUTPATIENT)
Dept: LAB | Facility: HOSPITAL | Age: 68
End: 2023-08-07
Payer: MEDICARE

## 2023-08-07 ENCOUNTER — CONSULT (OUTPATIENT)
Dept: ONCOLOGY | Facility: CLINIC | Age: 68
End: 2023-08-07
Payer: MEDICARE

## 2023-08-07 ENCOUNTER — TELEPHONE (OUTPATIENT)
Dept: ONCOLOGY | Facility: CLINIC | Age: 68
End: 2023-08-07

## 2023-08-07 VITALS
WEIGHT: 169 LBS | OXYGEN SATURATION: 91 % | BODY MASS INDEX: 31.1 KG/M2 | TEMPERATURE: 97.5 F | HEIGHT: 62 IN | DIASTOLIC BLOOD PRESSURE: 64 MMHG | HEART RATE: 78 BPM | RESPIRATION RATE: 18 BRPM | SYSTOLIC BLOOD PRESSURE: 129 MMHG

## 2023-08-07 DIAGNOSIS — I82.411 ACUTE DEEP VEIN THROMBOSIS (DVT) OF FEMORAL VEIN OF RIGHT LOWER EXTREMITY: Primary | ICD-10-CM

## 2023-08-07 DIAGNOSIS — I82.411 ACUTE DEEP VEIN THROMBOSIS (DVT) OF FEMORAL VEIN OF RIGHT LOWER EXTREMITY: ICD-10-CM

## 2023-08-07 PROCEDURE — 1126F AMNT PAIN NOTED NONE PRSNT: CPT | Performed by: INTERNAL MEDICINE

## 2023-08-07 PROCEDURE — 85670 THROMBIN TIME PLASMA: CPT

## 2023-08-07 PROCEDURE — 1159F MED LIST DOCD IN RCRD: CPT | Performed by: INTERNAL MEDICINE

## 2023-08-07 PROCEDURE — 85705 THROMBOPLASTIN INHIBITION: CPT

## 2023-08-07 PROCEDURE — 81241 F5 GENE: CPT

## 2023-08-07 PROCEDURE — 99215 OFFICE O/P EST HI 40 MIN: CPT | Performed by: INTERNAL MEDICINE

## 2023-08-07 PROCEDURE — 36415 COLL VENOUS BLD VENIPUNCTURE: CPT

## 2023-08-07 PROCEDURE — 81240 F2 GENE: CPT

## 2023-08-07 PROCEDURE — 1160F RVW MEDS BY RX/DR IN RCRD: CPT | Performed by: INTERNAL MEDICINE

## 2023-08-07 PROCEDURE — 85613 RUSSELL VIPER VENOM DILUTED: CPT

## 2023-08-07 PROCEDURE — 85732 THROMBOPLASTIN TIME PARTIAL: CPT

## 2023-08-07 RX ORDER — SEMAGLUTIDE 1.34 MG/ML
1 INJECTION, SOLUTION SUBCUTANEOUS WEEKLY
COMMUNITY
Start: 2023-08-03

## 2023-08-07 RX ORDER — ATORVASTATIN CALCIUM 20 MG/1
20 TABLET, FILM COATED ORAL DAILY
COMMUNITY
Start: 2023-07-21

## 2023-08-07 RX ORDER — OMEGA-3-ACID ETHYL ESTERS 1 G/1
1 CAPSULE, LIQUID FILLED ORAL EVERY 12 HOURS SCHEDULED
COMMUNITY
Start: 2023-07-07

## 2023-08-07 RX ORDER — ALENDRONATE SODIUM AND CHOLECALCIFEROL 70; 2800 MG/1; [IU]/1
1 TABLET ORAL
COMMUNITY
Start: 2023-06-15

## 2023-08-07 RX ORDER — CHLORAL HYDRATE 500 MG
1 CAPSULE ORAL EVERY 12 HOURS SCHEDULED
COMMUNITY
Start: 2023-07-21

## 2023-08-07 RX ORDER — ENOXAPARIN SODIUM 100 MG/ML
80 INJECTION SUBCUTANEOUS EVERY 12 HOURS SCHEDULED
COMMUNITY
Start: 2023-06-05

## 2023-08-07 NOTE — PROGRESS NOTES
CHIEF COMPLAINT: Chronic recurrent DVTs    REASON FOR REFERRAL: Same      RECORDS OBTAINED  Records of the patients history including those obtained from primary care were reviewed and summarized in detail.    Oncology/Hematology History Overview Note   1.  Recurrent bilateral lower extremity DVTs  2.  Seizures  3.  Renal disease  4.  Asthma  5.  Arthritis  6.  History of back and neck surgery  7.  Parathyroid surgery 2017 Dr. Valerio at       Hematology history timeline:  -6/21/2019 through 6/25/2019 [facility] inpatient hospitalization for acute left lower extremity DVT.  History of paroxysmal atrial fibrillation and DVT status post IVC filter.  Colonoscopy May 2019 had held Eliquis for the procedure and discharged after the colonoscopy with progressive weakness and malaise and found to have metal piece in her rectum secondary to colonoscopy that was removed.  On 6/3/2019 was found to have left lower extremity DVT with cellulitis.  Sent home on Eliquis and doxycycline for cellulitis and discharged on rehab 6/20/2019.  On Eliquis for approximately 4 years due to history of DVT.  Had been on Coumadin but reported that she had some sort of bleeding in her lungs and was transitioned to Eliquis and had an IVC filter placed 4 years prior to this emergency room/hospitalization.  Presented 6/21/2019 to emergency room at [facility] with leg swelling tenderness and duplex showing left lower extremity DVT common femoral down into the calf vessels with superficial thrombophlebitis of the saphenous vein with concerns for superimposed cellulitis.  Treated with Bactrim and a heparin drip and a Lovenox bridge and then was evaluated by Dr. Hall.  He is suspected chronic left iliac stent occlusion which caused extensive scarring in the iliac and femoral veins and did not recommend thrombolysis and with multiple episodes of DVT he recommended leg elevation with daily compressions and transition from Eliquis to Coumadin with an INR  goal 2-3.  -11/4/2022 anticardiolipin antibodies negative at .  Antithrombin III greater than 140.  Factor VIII activity greater than 300.  Beta-2 glycoprotein 1 negative.  CBC unremarkable.D-dimer normal 0.3.  -12/16/2022 hematology APRN office note from  note indicates several blood clots over the years.  Patient cannot remember specifics.  Came to this visit for evaluation prior to hip surgery.  Wanted hypercoagulable work-up before hip surgery.  Nurse practitioner stated they found nothing to account for her clotting tendency.  -5/25/2023 through 5/31/2023 [facility] inpatient hospitalization for bilateral lower extremity DVTs failing Eliquis while outpatient.  Right lower extremity duplex extensive right lower extremity DVT and incidental left lower extremity DVT noted on the left common femoral.  Started on heparin drip and switch to Lovenox and subsequent bridging of Lovenox upon discharge on Coumadin.  Has paroxysmal atrial fibrillation as well for which she needs anticoagulation.  On metoprolol.  -6/6/2023 [facility] emergency room visit.  Reports that she had been admitted to [facility] with a recent diagnosis of DVT with a history of DVT in the past for which she was taking Eliquis.  Despite Eliquis she developed the DVT and therefore transition to Coumadin while in the hospital.  With the INR is over 2, I had stopped her Lovenox.  Due to right leg swelling not improving on Lovenox and Coumadin and she was told to go back to the emergency room for further evaluation.  CT angiogram of chest negative for pulmonary embolus.  INR 1.94.  Emergency room physician advised her to take 5 mg Monday Wednesday Friday with Coumadin 7.5 mg on the other days.    -8/7/2023 [facility] hematology consult: Patient has a very complex history that she cannot actually repeat call in any good detail and has had multiple visits to multiple physicians in multiple institutions with only fragments of information in the medical records for  me to assess.  She states that she thinks her first clot was around 1995 spontaneously left lower extremity started on Coumadin until she fell around she thinks 2020 causing the hematoma but obviously that is not accurate in light of the history above that I am able to ascertain from [facility] but at some point in the past she had had IVC filters placed and removed twice that she thinks.  She had a partial hypercoagulable work-up done as outlined at [facility] by a nurse practitioner but does not appear to have seen the hematologist MD.  I will complete her hypercoagulable work-up but I cannot check her protein C or protein S levels while on Coumadin.  Those are vitamin K dependent factors as well.  We will check her lupus anticoagulant but she did not appear to have anticardiolipin or beta-2 glycoprotein related clotting and antiphospholipid antibody would be the prothrombotic condition that would be most likely to fail Eliquis.  Nonetheless she clearly failed Eliquis and she continues with Dr. Nguyen who monitors her protimes every couple of weeks with her last INR just a couple of days ago according to the patient of 2.2.  Clinically her leg is back down to normal and her cellulitis has nearly resolved so I would keep her on lifetime Coumadin.  Previously she was taken off Coumadin when she fell and had a chest contusion and that is when she switched to the Eliquis but now with marco a failure of the Eliquis but clinical improvement on Coumadin I am inclined to keep her on Coumadin indefinitely rather than trying to switch to alternate DOAC as I am doubtful that would be beneficial given the multiplicity of clot she has had.  She does certainly have some propensity to clot given the above history that she developed clot while off anticoagulation for colonoscopy preparation.  In the future should she need any procedures I would put her through a Lovenox bridge and not just stop Coumadin.  This is all true no matter what her  hypercoagulable work-up shows.  I will check her factor V Leiden mutation, prothrombin gene mutation and lupus anticoagulant today along with a D-dimer to assess for ongoing evidence of active clot.  We will see her back in a few months to go over the results of this is ultimately my plan would not change regardless of the results and that would be to keep her on lifetime Coumadin.  I will try to get records from Dr. Ricardo Hall who had seen her in the past.According to consult note from Dr. Fonseca in June 2019 while inpatient he also states she was an incomplete historian but relates at least 4 episodes of DVT involving both lower extremities.  Based on records from [facility] per his note, she apparently had an inferior vena cava up filter placed at 1 point which was subsequently removed.  She subsequently had a left common iliac venous stent placed for unclear reasons and no mention of antecedent thrombolysis.  Had been on warfarin for years.  Most recently she had undergone a colonoscopy for Endo Clip removal while off of anticoagulants and developed DVT as outlined above in 2019.       Chronic anticoagulation   Paroxysmal atrial fibrillation       HISTORY OF PRESENT ILLNESS:  The patient is a 68 y.o.  female, referred for recurrent lower extremity DVTs that are now on Coumadin.  See history as outlined in my hematology history timeline    REVIEW OF SYSTEMS:  Right lower extremity swelling improving on Coumadin with INR 2.2    Past Medical History:   Diagnosis Date    Anemia     Asthma     Chronic bronchitis     Depression     Diabetes mellitus     DVT (deep venous thrombosis) 05/25/2023    Epilepsy     Fibromyalgia, primary     GERD (gastroesophageal reflux disease)     Hyperlipidemia     Leg DVT (deep venous thromboembolism), acute, left 06/22/2019     Past Surgical History:   Procedure Laterality Date    BACK SURGERY      NECK SURGERY      TUBAL ABDOMINAL LIGATION         Current Outpatient Medications on  File Prior to Visit   Medication Sig Dispense Refill    albuterol sulfate  (90 Base) MCG/ACT inhaler Inhale 1 puff.      atorvastatin (LIPITOR) 20 MG tablet Take 1 tablet by mouth Daily.      B-D UF III MINI PEN NEEDLES 31G X 5 MM misc Use daily with insulin 50 each 11    busPIRone (BUSPAR) 15 MG tablet       Continuous Blood Gluc Sensor (FreeStyle Pauly 2 Sensor) misc SEE ADMIN INSTRUCTIONS EVERY 14 DAYS      DULoxetine (CYMBALTA) 60 MG capsule Take 1 capsule by mouth Daily.  0    Empagliflozin (JARDIANCE) 25 MG tablet Take 25 mg by mouth Daily. 30 tablet 6    Enoxaparin Sodium (LOVENOX) 80 MG/0.8ML solution prefilled syringe syringe Inject 0.8 mL under the skin into the appropriate area as directed Every 12 (Twelve) Hours.      fenofibrate 160 MG tablet       Fosamax Plus D  MG-UNIT per tablet Take 1 tablet by mouth Every 7 (Seven) Days.      furosemide (LASIX) 40 MG tablet Take 1 tablet by mouth Every 12 (Twelve) Hours.      gabapentin (NEURONTIN) 100 MG capsule       HYDROcodone-acetaminophen (NORCO) 7.5-325 MG per tablet Take 1 tablet by mouth 3 (Three) Times a Day As Needed.      Insulin Glargine, 2 Unit Dial, (TOUJEO MAX SOLOSTAR) 300 UNIT/ML solution pen-injector injection Take 100 units at bedtime, after a week go up to 110 units 4 pen 6    Insulin Lispro, 1 Unit Dial, (HUMALOG KWIKPEN) 100 UNIT/ML solution pen-injector Take 20u before lunch (if you eat lunch) and take 40 units before supper 6 pen 6    magnesium oxide (MAG-OX) 400 MG tablet Take 1 tablet by mouth 2 (Two) Times a Day.      metFORMIN (GLUCOPHAGE) 1000 MG tablet Take 1 tablet by mouth 2 (Two) Times a Day With Meals. 60 tablet 6    metFORMIN (GLUCOPHAGE) 1000 MG tablet Take 1 tablet by mouth.      metoprolol tartrate (LOPRESSOR) 50 MG tablet Take 1 tablet by mouth Every 12 (Twelve) Hours. 60 tablet 0    Morphine (MS CONTIN) 30 MG 12 hr tablet Take 1 tablet by mouth 2 (Two) Times a Day.  0    omega-3 acid ethyl esters (LOVAZA) 1 g  capsule Take 1 capsule by mouth Every 12 (Twelve) Hours.      Omega-3 Fatty Acids (fish oil) 1000 MG capsule capsule Take 1 capsule by mouth Every 12 (Twelve) Hours.      omeprazole (priLOSEC) 20 MG capsule Take 1 capsule by mouth Daily.      ONE TOUCH ULTRA TEST test strip Test BS  each 11    ONETOUCH DELICA LANCETS FINE misc Test BS  each 11    Ozempic, 1 MG/DOSE, 4 MG/3ML solution pen-injector Inject 1 mg under the skin into the appropriate area as directed 1 (One) Time Per Week.      phenytoin (DILANTIN) 100 MG ER capsule take 2 capsule by mouth twice a day  0    propafenone (RYTHMOL) 225 MG tablet       RA VITAMIN D-3 5000 units capsule Take 1 capsule by mouth Daily.  0    silver sulfadiazine (SILVADENE, SSD) 1 % cream APPLY OVER ULCER EVERY DAY UNTIL HEALED      warfarin (COUMADIN) 5 MG tablet Take 1 tablet by mouth the evening of 23. Follow-up with PCP for INR on 23 for further dosage adjustments 90 tablet 0    [DISCONTINUED] atorvastatin (LIPITOR) 40 MG tablet Take 1 tablet by mouth Daily.       No current facility-administered medications on file prior to visit.       Allergies   Allergen Reactions    Aleve [Naproxen] Itching       Social History     Socioeconomic History    Marital status:    Tobacco Use    Smoking status: Former     Types: Cigarettes     Quit date: 2010     Years since quittin.5    Smokeless tobacco: Never   Vaping Use    Vaping Use: Never used   Substance and Sexual Activity    Alcohol use: No    Drug use: No    Sexual activity: Defer       Family History   Problem Relation Age of [place]    Arthritis Mother     Heart disease Mother     Obesity Mother     Arthritis Father     Cancer Father     Hypertension Father     Hyperlipidemia Father     Obesity Father     Arthritis Sister     Obesity Sister     Arthritis Maternal Aunt     Arthritis Maternal Uncle     Cancer Maternal Uncle        PHYSICAL EXAM:  Minimal residual swelling right lower extremity  "and no swelling in the left lower extremity and no Homans' sign.  No significant cellulitis in either extremity.  No respiratory distress.    /64   Pulse 78   Temp 97.5 øF (36.4 øC)   Resp 18   Ht 157.5 cm (62\")   Wt 76.7 kg (169 lb)   SpO2 91%   BMI 30.91 kg/mý     ECOG score: 0           ECOG: (0) Fully Active - Able to Carry On All Pre-disease Performance Without Restriction    Lab Results   Component Value Date    HGB 11.3 (L) 06/05/2023    HCT 37.7 06/05/2023    MCV 86.5 06/05/2023     06/05/2023    WBC 7.13 06/05/2023    NEUTROABS 4.47 06/05/2023    LYMPHSABS 1.83 06/05/2023    MONOSABS 0.40 06/05/2023    EOSABS 0.38 06/05/2023    BASOSABS 0.04 06/05/2023     Lab Results   Component Value Date    GLUCOSE 302 (H) 06/05/2023    BUN 14 06/05/2023    CREATININE 0.80 06/05/2023     06/05/2023    K 4.6 06/05/2023     06/05/2023    CO2 28.0 06/05/2023    CALCIUM 8.5 (L) 06/05/2023    PROTEINTOT 6.6 06/05/2023    ALBUMIN 3.4 (L) 06/05/2023    BILITOT 0.2 06/05/2023    ALKPHOS 94 06/05/2023    AST 32 06/05/2023    ALT 22 06/05/2023         Assessment & Plan   1.  Acute bilateral lower extremity DVTs  2.  Seizures  3.  Renal disease  4.  Asthma  5.  Arthritis  6.  History of back and neck surgery  7.  Parathyroid surgery 2017 Dr. Valerio at       Hematology history timeline:  -6/21/2019 through 6/25/2019 [facility] inpatient hospitalization for acute left lower extremity DVT.  History of paroxysmal atrial fibrillation and DVT status post IVC filter.  Colonoscopy May 2019 had held Eliquis for the procedure and discharged after the colonoscopy with progressive weakness and malaise and found to have metal piece in her rectum secondary to colonoscopy that was removed.  On 6/3/2019 was found to have left lower extremity DVT with cellulitis.  Sent home on Eliquis and doxycycline for cellulitis and discharged on rehab 6/20/2019.  On Eliquis for approximately 4 years due to history of DVT.  Had been " on Coumadin but reported that she had some sort of bleeding in her lungs and was transitioned to Eliquis and had an IVC filter placed 4 years prior to this emergency room/hospitalization.  Presented 6/21/2019 to emergency room at [facility] with leg swelling tenderness and duplex showing left lower extremity DVT common femoral down into the calf vessels with superficial thrombophlebitis of the saphenous vein with concerns for superimposed cellulitis.  Treated with Bactrim and a heparin drip and a Lovenox bridge and then was evaluated by Dr. Hall.  He is suspected chronic left iliac stent occlusion which caused extensive scarring in the iliac and femoral veins and did not recommend thrombolysis and with multiple episodes of DVT he recommended leg elevation with daily compressions and transition from Eliquis to Coumadin with an INR goal 2-3.  -11/4/2022 anticardiolipin antibodies negative at .  Antithrombin III greater than 140.  Factor VIII activity greater than 300.  Beta-2 glycoprotein 1 negative.  CBC unremarkable.D-dimer normal 0.3.  -12/16/2022 hematology APRN office note from  note indicates several blood clots over the years.  Patient cannot remember specifics.  Came to this visit for evaluation prior to hip surgery.  Wanted hypercoagulable work-up before hip surgery.  Nurse practitioner stated they found nothing to account for her clotting tendency.  -5/25/2023 through 5/31/2023 [facility] inpatient hospitalization for bilateral lower extremity DVTs failing Eliquis while outpatient.  Right lower extremity duplex extensive right lower extremity DVT and incidental left lower extremity DVT noted on the left common femoral.  Started on heparin drip and switch to Lovenox and subsequent bridging of Lovenox upon discharge on Coumadin.  Has paroxysmal atrial fibrillation as well for which she needs anticoagulation.  On metoprolol.  -6/6/2023 [facility] emergency room visit.  Reports that she had been admitted to  [facility] with a recent diagnosis of DVT with a history of DVT in the past for which she was taking Eliquis.  Despite Eliquis she developed the DVT and therefore transition to Coumadin while in the hospital.  With the INR is over 2, I had stopped her Lovenox.  Due to right leg swelling not improving on Lovenox and Coumadin and she was told to go back to the emergency room for further evaluation.  CT angiogram of chest negative for pulmonary embolus.  INR 1.94.  Emergency room physician advised her to take 5 mg Monday Wednesday Friday with Coumadin 7.5 mg on the other days.    -8/7/2023 [facility] hematology consult: Patient has a very complex history that she cannot actually repeat call in any good detail and has had multiple visits to multiple physicians in multiple institutions with only fragments of information in the medical records for me to assess.  She states that she thinks her first clot was around 1995 spontaneously left lower extremity started on Coumadin until she fell around she thinks 2020 causing the hematoma but obviously that is not accurate in light of the history above that I am able to ascertain from [facility] but at some point in the past she had had IVC filters placed and removed twice that she thinks.  She had a partial hypercoagulable work-up done as outlined at [facility] by a nurse practitioner but does not appear to have seen the hematologist MD.  I will complete her hypercoagulable work-up but I cannot check her protein C or protein S levels while on Coumadin.  Those are vitamin K dependent factors as well.  We will check her lupus anticoagulant but she did not appear to have anticardiolipin or beta-2 glycoprotein related clotting and antiphospholipid antibody would be the prothrombotic condition that would be most likely to fail Eliquis.  Nonetheless she clearly failed Eliquis and she continues with Dr. Nguyen who monitors her protimes every couple of weeks with her last INR just a couple of days ago  according to the patient of 2.2.  Clinically her leg is back down to normal and her cellulitis has nearly resolved so I would keep her on lifetime Coumadin.  Previously she was taken off Coumadin when she fell and had a chest contusion and that is when she switched to the Eliquis but now with marco a failure of the Eliquis but clinical improvement on Coumadin I am inclined to keep her on Coumadin indefinitely rather than trying to switch to alternate DOAC as I am doubtful that would be beneficial given the multiplicity of clot she has had.  She does certainly have some propensity to clot given the above history that she developed clot while off anticoagulation for colonoscopy preparation.  In the future should she need any procedures I would put her through a Lovenox bridge and not just stop Coumadin.  This is all true no matter what her hypercoagulable work-up shows.  I will check her factor V Leiden mutation, prothrombin gene mutation and lupus anticoagulant today along with a D-dimer to assess for ongoing evidence of active clot.  We will see her back in a few months to go over the results of this is ultimately my plan would not change regardless of the results and that would be to keep her on lifetime Coumadin.  I will try to get records from Dr. Ricardo Hall who had seen her in the past.According to consult note from Dr. Fonseca in June 2019 while inpatient he also states she was an incomplete historian but relates at least 4 episodes of DVT involving both lower extremities.  Based on records from [facility] per his note, she apparently had an inferior vena cava up filter placed at 1 point which was subsequently removed.  She subsequently had a left common iliac venous stent placed for unclear reasons and no mention of antecedent thrombolysis.  Had been on warfarin for years.  Most recently she had undergone a colonoscopy for Endo Clip removal while off of anticoagulants and developed DVT as outlined above in  2019.    Total time of care today inclusive of time spent today prior to her arrival trying to extricate old records as best I could from the electronic records as well as from the patient who is not a great historian and during office visit trying to clarify the various events of clotting and what might have provoked or not provoked and what testing had or had not been done in the past and what anticoagulant she was or was not on at various times throughout the decades and after visit instituting plan as outlined above took 90 minutes of patient care time throughout the day today.  Rich Arreola MD    8/7/2023

## 2023-08-07 NOTE — TELEPHONE ENCOUNTER
Caller: ROSAURA    Relationship: Other Relative    Best call back number: [phone]    What is the best time to reach you: ASAP    Who are you requesting to speak with (clinical staff, provider,  specific staff member): CLINICAL OR       What was the call regarding: PT LEFT HER CELL PHONE WITH HER DAUGHTER IN LAW, HER DAUGHTER IN LAW NEEDS TO FIND OUT WHEN TO PICK HER UP, SHE WANTS TO MAKE SURE SHE IS NOT STILL IN THE OFFICE WAITING ON HER, HUB UNABLE TO WARM TRANSFER.  PLEASE CALL TO LET HER KNOW WHEN THE PT SHOULD BE PICKED UP.  SHE IS NOT LISTED ON THE  VERBAL.

## 2023-08-07 NOTE — LETTER
August 7, 2023       No Recipients    Patient: Martha Harrell   YOB: 1955   Date of Visit: 8/7/2023     Dear SHERRY Matthews:       Thank you for referring Martha Harrell to me for evaluation. Below are the relevant portions of my assessment and plan of care.    If you have questions, please do not hesitate to call me. I look forward to following Martha along with you.         Sincerely,        Rich Arreola MD        CC:   No Recipients    Rich Arreola MD  08/07/23 1528  Sign when Signing Visit  CHIEF COMPLAINT: Chronic recurrent DVTs    REASON FOR REFERRAL: Same      RECORDS OBTAINED  Records of the patients history including those obtained from primary care were reviewed and summarized in detail.    Oncology/Hematology History Overview Note   1.  Recurrent bilateral lower extremity DVTs  2.  Seizures  3.  Renal disease  4.  Asthma  5.  Arthritis  6.  History of back and neck surgery  7.  Parathyroid surgery 2017 Dr. Valerio at       Hematology history timeline:  -6/21/2019 through 6/25/2019 [facility] inpatient hospitalization for acute left lower extremity DVT.  History of paroxysmal atrial fibrillation and DVT status post IVC filter.  Colonoscopy May 2019 had held Eliquis for the procedure and discharged after the colonoscopy with progressive weakness and malaise and found to have metal piece in her rectum secondary to colonoscopy that was removed.  On 6/3/2019 was found to have left lower extremity DVT with cellulitis.  Sent home on Eliquis and doxycycline for cellulitis and discharged on rehab 6/20/2019.  On Eliquis for approximately 4 years due to history of DVT.  Had been on Coumadin but reported that she had some sort of bleeding in her lungs and was transitioned to Eliquis and had an IVC filter placed 4 years prior to this emergency room/hospitalization.  Presented 6/21/2019 to emergency room at [facility] with leg swelling tenderness and duplex showing left lower extremity DVT common  femoral down into the calf vessels with superficial thrombophlebitis of the saphenous vein with concerns for superimposed cellulitis.  Treated with Bactrim and a heparin drip and a Lovenox bridge and then was evaluated by Dr. Hall.  He is suspected chronic left iliac stent occlusion which caused extensive scarring in the iliac and femoral veins and did not recommend thrombolysis and with multiple episodes of DVT he recommended leg elevation with daily compressions and transition from Eliquis to Coumadin with an INR goal 2-3.  -11/4/2022 anticardiolipin antibodies negative at .  Antithrombin III greater than 140.  Factor VIII activity greater than 300.  Beta-2 glycoprotein 1 negative.  CBC unremarkable.D-dimer normal 0.3.  -12/16/2022 hematology APRN office note from  note indicates several blood clots over the years.  Patient cannot remember specifics.  Came to this visit for evaluation prior to hip surgery.  Wanted hypercoagulable work-up before hip surgery.  Nurse practitioner stated they found nothing to account for her clotting tendency.  -5/25/2023 through 5/31/2023 [facility] inpatient hospitalization for bilateral lower extremity DVTs failing Eliquis while outpatient.  Right lower extremity duplex extensive right lower extremity DVT and incidental left lower extremity DVT noted on the left common femoral.  Started on heparin drip and switch to Lovenox and subsequent bridging of Lovenox upon discharge on Coumadin.  Has paroxysmal atrial fibrillation as well for which she needs anticoagulation.  On metoprolol.  -6/6/2023 [facility] emergency room visit.  Reports that she had been admitted to [facility] with a recent diagnosis of DVT with a history of DVT in the past for which she was taking Eliquis.  Despite Eliquis she developed the DVT and therefore transition to Coumadin while in the hospital.  With the INR is over 2, I had stopped her Lovenox.  Due to right leg swelling not improving on Lovenox and Coumadin  and she was told to go back to the emergency room for further evaluation.  CT angiogram of chest negative for pulmonary embolus.  INR 1.94.  Emergency room physician advised her to take 5 mg Monday Wednesday Friday with Coumadin 7.5 mg on the other days.    -8/7/2023 [facility] hematology consult: Patient has a very complex history that she cannot actually repeat call in any good detail and has had multiple visits to multiple physicians in multiple institutions with only fragments of information in the medical records for me to assess.  She states that she thinks her first clot was around 1995 spontaneously left lower extremity started on Coumadin until she fell around she thinks 2020 causing the hematoma but obviously that is not accurate in light of the history above that I am able to ascertain from [facility] but at some point in the past she had had IVC filters placed and removed twice that she thinks.  She had a partial hypercoagulable work-up done as outlined at  by a nurse practitioner but does not appear to have seen the hematologist MD.  I will complete her hypercoagulable work-up but I cannot check her protein C or protein S levels while on Coumadin.  Those are vitamin K dependent factors as well.  We will check her lupus anticoagulant but she did not appear to have anticardiolipin or beta-2 glycoprotein related clotting and antiphospholipid antibody would be the prothrombotic condition that would be most likely to fail Eliquis.  Nonetheless she clearly failed Eliquis and she continues with Dr. Nguyen who monitors her protimes every couple of weeks with her last INR just a couple of days ago according to the patient of 2.2.  Clinically her leg is back down to normal and her cellulitis has nearly resolved so I would keep her on lifetime Coumadin.  Previously she was taken off Coumadin when she fell and had a chest contusion and that is when she switched to the Eliquis but now with marco a failure of the Eliquis  but clinical improvement on Coumadin I am inclined to keep her on Coumadin indefinitely rather than trying to switch to alternate DOAC as I am doubtful that would be beneficial given the multiplicity of clot she has had.  She does certainly have some propensity to clot given the above history that she developed clot while off anticoagulation for colonoscopy preparation.  In the future should she need any procedures I would put her through a Lovenox bridge and not just stop Coumadin.  This is all true no matter what her hypercoagulable work-up shows.  I will check her factor V Leiden mutation, prothrombin gene mutation and lupus anticoagulant today along with a D-dimer to assess for ongoing evidence of active clot.  We will see her back in a few months to go over the results of this is ultimately my plan would not change regardless of the results and that would be to keep her on lifetime Coumadin.  I will try to get records from Dr. Ricardo Hall who had seen her in the past.According to consult note from Dr. Fonseca in June 2019 while inpatient he also states she was an incomplete historian but relates at least 4 episodes of DVT involving both lower extremities.  Based on records from [place] per his note, she apparently had an inferior vena cava up filter placed at 1 point which was subsequently removed.  She subsequently had a left common iliac venous stent placed for unclear reasons and no mention of antecedent thrombolysis.  Had been on warfarin for years.  Most recently she had undergone a colonoscopy for Endo Clip removal while off of anticoagulants and developed DVT as outlined above in 2019.       Chronic anticoagulation   Paroxysmal atrial fibrillation       HISTORY OF PRESENT ILLNESS:  The patient is a 68 y.o.  female, referred for recurrent lower extremity DVTs that are now on Coumadin.  See history as outlined in my hematology history timeline    REVIEW OF SYSTEMS:  Right lower extremity swelling  improving on Coumadin with INR 2.2    Past Medical History:   Diagnosis Date    Anemia     Asthma     Chronic bronchitis     Depression     Diabetes mellitus     DVT (deep venous thrombosis) 05/25/2023    Epilepsy     Fibromyalgia, primary     GERD (gastroesophageal reflux disease)     Hyperlipidemia     Leg DVT (deep venous thromboembolism), acute, left 06/22/2019     Past Surgical History:   Procedure Laterality Date    BACK SURGERY      NECK SURGERY      TUBAL ABDOMINAL LIGATION         Current Outpatient Medications on File Prior to Visit   Medication Sig Dispense Refill    albuterol sulfate  (90 Base) MCG/ACT inhaler Inhale 1 puff.      atorvastatin (LIPITOR) 20 MG tablet Take 1 tablet by mouth Daily.      B-D UF III MINI PEN NEEDLES 31G X 5 MM misc Use daily with insulin 50 each 11    busPIRone (BUSPAR) 15 MG tablet       Continuous Blood Gluc Sensor (FreeStyle Pauly 2 Sensor) misc SEE ADMIN INSTRUCTIONS EVERY 14 DAYS      DULoxetine (CYMBALTA) 60 MG capsule Take 1 capsule by mouth Daily.  0    Empagliflozin (JARDIANCE) 25 MG tablet Take 25 mg by mouth Daily. 30 tablet 6    Enoxaparin Sodium (LOVENOX) 80 MG/0.8ML solution prefilled syringe syringe Inject 0.8 mL under the skin into the appropriate area as directed Every 12 (Twelve) Hours.      fenofibrate 160 MG tablet       Fosamax Plus D  MG-UNIT per tablet Take 1 tablet by mouth Every 7 (Seven) Days.      furosemide (LASIX) 40 MG tablet Take 1 tablet by mouth Every 12 (Twelve) Hours.      gabapentin (NEURONTIN) 100 MG capsule       HYDROcodone-acetaminophen (NORCO) 7.5-325 MG per tablet Take 1 tablet by mouth 3 (Three) Times a Day As Needed.      Insulin Glargine, 2 Unit Dial, (TOUJEO MAX SOLOSTAR) 300 UNIT/ML solution pen-injector injection Take 100 units at bedtime, after a week go up to 110 units 4 pen 6    Insulin Lispro, 1 Unit Dial, (HUMALOG KWIKPEN) 100 UNIT/ML solution pen-injector Take 20u before lunch (if  you eat lunch) and take 40 units before supper 6 pen 6    magnesium oxide (MAG-OX) 400 MG tablet Take 1 tablet by mouth 2 (Two) Times a Day.      metFORMIN (GLUCOPHAGE) 1000 MG tablet Take 1 tablet by mouth 2 (Two) Times a Day With Meals. 60 tablet 6    metFORMIN (GLUCOPHAGE) 1000 MG tablet Take 1 tablet by mouth.      metoprolol tartrate (LOPRESSOR) 50 MG tablet Take 1 tablet by mouth Every 12 (Twelve) Hours. 60 tablet 0    Morphine (MS CONTIN) 30 MG 12 hr tablet Take 1 tablet by mouth 2 (Two) Times a Day.  0    omega-3 acid ethyl esters (LOVAZA) 1 g capsule Take 1 capsule by mouth Every 12 (Twelve) Hours.      Omega-3 Fatty Acids (fish oil) 1000 MG capsule capsule Take 1 capsule by mouth Every 12 (Twelve) Hours.      omeprazole (priLOSEC) 20 MG capsule Take 1 capsule by mouth Daily.      ONE TOUCH ULTRA TEST test strip Test BS  each 11    ONETOUCH DELICA LANCETS FINE misc Test BS  each 11    Ozempic, 1 MG/DOSE, 4 MG/3ML solution pen-injector Inject 1 mg under the skin into the appropriate area as directed 1 (One) Time Per Week.      phenytoin (DILANTIN) 100 MG ER capsule take 2 capsule by mouth twice a day  0    propafenone (RYTHMOL) 225 MG tablet       RA VITAMIN D-3 5000 units capsule Take 1 capsule by mouth Daily.  0    silver sulfadiazine (SILVADENE, SSD) 1 % cream APPLY OVER ULCER EVERY DAY UNTIL HEALED      warfarin (COUMADIN) 5 MG tablet Take 1 tablet by mouth the evening of 23. Follow-up with PCP for INR on 23 for further dosage adjustments 90 tablet 0    [DISCONTINUED] atorvastatin (LIPITOR) 40 MG tablet Take 1 tablet by mouth Daily.       No current facility-administered medications on file prior to visit.       Allergies   Allergen Reactions    Aleve [Naproxen] Itching       Social History     Socioeconomic History    Marital status:    Tobacco Use    Smoking status: Former     Types: Cigarettes     Quit date: 2010     Years since quittin.5  "   Smokeless tobacco: Never   Vaping Use    Vaping Use: Never used   Substance and Sexual Activity    Alcohol use: No    Drug use: No    Sexual activity: Defer       Family History   Problem Relation Age of Onset    Arthritis Mother     Heart disease Mother     Obesity Mother     Arthritis Father     Cancer Father     Hypertension Father     Hyperlipidemia Father     Obesity Father     Arthritis Sister     Obesity Sister     Arthritis Maternal Aunt     Arthritis Maternal Uncle     Cancer Maternal Uncle        PHYSICAL EXAM:  Minimal residual swelling right lower extremity and no swelling in the left lower extremity and no Homans' sign.  No significant cellulitis in either extremity.  No respiratory distress.    /64   Pulse 78   Temp 97.5 øF (36.4 øC)   Resp 18   Ht 157.5 cm (62\")   Wt 76.7 kg (169 lb)   SpO2 91%   BMI 30.91 kg/mý     ECOG score: 0           ECOG: (0) Fully Active - Able to Carry On All Pre-disease Performance Without Restriction    Lab Results   Component Value Date    HGB 11.3 (L) 06/05/2023    HCT 37.7 06/05/2023    MCV 86.5 06/05/2023     06/05/2023    WBC 7.13 06/05/2023    NEUTROABS 4.47 06/05/2023    LYMPHSABS 1.83 06/05/2023    MONOSABS 0.40 06/05/2023    EOSABS 0.38 06/05/2023    BASOSABS 0.04 06/05/2023     Lab Results   Component Value Date    GLUCOSE 302 (H) 06/05/2023    BUN 14 06/05/2023    CREATININE 0.80 06/05/2023     06/05/2023    K 4.6 06/05/2023     06/05/2023    CO2 28.0 06/05/2023    CALCIUM 8.5 (L) 06/05/2023    PROTEINTOT 6.6 06/05/2023    ALBUMIN 3.4 (L) 06/05/2023    BILITOT 0.2 06/05/2023    ALKPHOS 94 06/05/2023    AST 32 06/05/2023    ALT 22 06/05/2023         Assessment & Plan   1.  Acute bilateral lower extremity DVTs  2.  Seizures  3.  Renal disease  4.  Asthma  5.  Arthritis  6.  History of back and neck surgery  7.  Parathyroid surgery 2017 Dr. Valerio at       Hematology history timeline:  -6/21/2019 through " 6/25/2019 [facility] inpatient hospitalization for acute left lower extremity DVT.  History of paroxysmal atrial fibrillation and DVT status post IVC filter.  Colonoscopy May 2019 had held Eliquis for the procedure and discharged after the colonoscopy with progressive weakness and malaise and found to have metal piece in her rectum secondary to colonoscopy that was removed.  On 6/3/2019 was found to have left lower extremity DVT with cellulitis.  Sent home on Eliquis and doxycycline for cellulitis and discharged on rehab 6/20/2019.  On Eliquis for approximately 4 years due to history of DVT.  Had been on Coumadin but reported that she had some sort of bleeding in her lungs and was transitioned to Eliquis and had an IVC filter placed 4 years prior to this emergency room/hospitalization.  Presented 6/21/2019 to emergency room at [facility] with leg swelling tenderness and duplex showing left lower extremity DVT common femoral down into the calf vessels with superficial thrombophlebitis of the saphenous vein with concerns for superimposed cellulitis.  Treated with Bactrim and a heparin drip and a Lovenox bridge and then was evaluated by Dr. Hall.  He is suspected chronic left iliac stent occlusion which caused extensive scarring in the iliac and femoral veins and did not recommend thrombolysis and with multiple episodes of DVT he recommended leg elevation with daily compressions and transition from Eliquis to Coumadin with an INR goal 2-3.  -11/4/2022 anticardiolipin antibodies negative at .  Antithrombin III greater than 140.  Factor VIII activity greater than 300.  Beta-2 glycoprotein 1 negative.  CBC unremarkable.D-dimer normal 0.3.  -12/16/2022 hematology APRN office note from  note indicates several blood clots over the years.  Patient cannot remember specifics.  Came to this visit for evaluation prior to hip surgery.  Wanted hypercoagulable work-up before hip surgery.  Nurse practitioner stated they found  nothing to account for her clotting tendency.  -5/25/2023 through 5/31/2023 [facility] inpatient hospitalization for bilateral lower extremity DVTs failing Eliquis while outpatient.  Right lower extremity duplex extensive right lower extremity DVT and incidental left lower extremity DVT noted on the left common femoral.  Started on heparin drip and switch to Lovenox and subsequent bridging of Lovenox upon discharge on Coumadin.  Has paroxysmal atrial fibrillation as well for which she needs anticoagulation.  On metoprolol.  -6/6/2023 [facility] emergency room visit.  Reports that she had been admitted to [facility] with a recent diagnosis of DVT with a history of DVT in the past for which she was taking Eliquis.  Despite Eliquis she developed the DVT and therefore transition to Coumadin while in the hospital.  With the INR is over 2, I had stopped her Lovenox.  Due to right leg swelling not improving on Lovenox and Coumadin and she was told to go back to the emergency room for further evaluation.  CT angiogram of chest negative for pulmonary embolus.  INR 1.94.  Emergency room physician advised her to take 5 mg Monday Wednesday Friday with Coumadin 7.5 mg on the other days.    -8/7/2023 [facility] hematology consult: Patient has a very complex history that she cannot actually repeat call in any good detail and has had multiple visits to multiple physicians in multiple institutions with only fragments of information in the medical records for me to assess.  She states that she thinks her first clot was around 1995 spontaneously left lower extremity started on Coumadin until she fell around she thinks 2020 causing the hematoma but obviously that is not accurate in light of the history above that I am able to ascertain from [facility] but at some point in the past she had had IVC filters placed and removed twice that she thinks.  She had a partial hypercoagulable work-up done as outlined at [facility] by a nurse practitioner but does not  appear to have seen the hematologist MD.  I will complete her hypercoagulable work-up but I cannot check her protein C or protein S levels while on Coumadin.  Those are vitamin K dependent factors as well.  We will check her lupus anticoagulant but she did not appear to have anticardiolipin or beta-2 glycoprotein related clotting and antiphospholipid antibody would be the prothrombotic condition that would be most likely to fail Eliquis.  Nonetheless she clearly failed Eliquis and she continues with Dr. Nguyen who monitors her protimes every couple of weeks with her last INR just a couple of days ago according to the patient of 2.2.  Clinically her leg is back down to normal and her cellulitis has nearly resolved so I would keep her on lifetime Coumadin.  Previously she was taken off Coumadin when she fell and had a chest contusion and that is when she switched to the Eliquis but now with marco a failure of the Eliquis but clinical improvement on Coumadin I am inclined to keep her on Coumadin indefinitely rather than trying to switch to alternate DOAC as I am doubtful that would be beneficial given the multiplicity of clot she has had.  She does certainly have some propensity to clot given the above history that she developed clot while off anticoagulation for colonoscopy preparation.  In the future should she need any procedures I would put her through a Lovenox bridge and not just stop Coumadin.  This is all true no matter what her hypercoagulable work-up shows.  I will check her factor V Leiden mutation, prothrombin gene mutation and lupus anticoagulant today along with a D-dimer to assess for ongoing evidence of active clot.  We will see her back in a few months to go over the results of this is ultimately my plan would not change regardless of the results and that would be to keep her on lifetime Coumadin.  I will try to get records from Dr. Ricardo Hall who had seen her in the past.According to consult note from  Dr. Fonseca in June 2019 while inpatient he also states she was an incomplete historian but relates at least 4 episodes of DVT involving both lower extremities.  Based on records from [place] per his note, she apparently had an inferior vena cava up filter placed at 1 point which was subsequently removed.  She subsequently had a left common iliac venous stent placed for unclear reasons and no mention of antecedent thrombolysis.  Had been on warfarin for years.  Most recently she had undergone a colonoscopy for Endo Clip removal while off of anticoagulants and developed DVT as outlined above in 2019.    Total time of care today inclusive of time spent today prior to her arrival trying to extricate old records as best I could from the electronic records as well as from the patient who is not a great historian and during office visit trying to clarify the various events of clotting and what might have provoked or not provoked and what testing had or had not been done in the past and what anticoagulant she was or was not on at various times throughout the decades and after visit instituting plan as outlined above took 90 minutes of patient care time throughout the day today.  Rich Arreola MD    8/7/2023

## 2023-08-09 LAB
F5 GENE MUT ANL BLD/T: NORMAL
FACTOR II, DNA ANALYSIS: NORMAL

## 2023-08-10 LAB
APTT SCREEN TO CONFIRM RATIO: 1.05 RATIO (ref 0–1.34)
CONFIRM APTT/NORMAL: 44.7 SEC (ref 0–47.6)
LA 2 SCREEN W REFLEX-IMP: ABNORMAL
SCREEN APTT: 31.4 SEC (ref 0–43.5)
SCREEN DRVVT: 41 SEC (ref 0–47)
THROMBIN TIME: 25.1 SEC (ref 0–23)

## 2023-09-06 ENCOUNTER — TELEPHONE (OUTPATIENT)
Dept: ONCOLOGY | Facility: CLINIC | Age: 68
End: 2023-09-06
Payer: MEDICARE

## 2023-09-06 NOTE — TELEPHONE ENCOUNTER
Caller: SHELLIE BONILLA/SANJUANA SURG        Best call back number: [phone]      Who are you requesting to speak with (clinical staff, provider,  specific staff member): DEE       What was the call regarding: OFFICE CALLING DEE BACK, PATIENT WAS NEVER SEEN IN THE OFFICE ONLY CONSULT IN THE HOSPITAL WHICH THEY WILL FAX OVER TODAY       Courtesy refill provided  Please contact pt, due for annual with Dr. Ridley or me.

## 2023-09-08 ENCOUNTER — OFFICE VISIT (OUTPATIENT)
Dept: ONCOLOGY | Facility: CLINIC | Age: 68
End: 2023-09-08
Payer: MEDICARE

## 2023-09-08 VITALS
HEIGHT: 62 IN | RESPIRATION RATE: 18 BRPM | BODY MASS INDEX: 30.55 KG/M2 | HEART RATE: 81 BPM | SYSTOLIC BLOOD PRESSURE: 135 MMHG | OXYGEN SATURATION: 97 % | TEMPERATURE: 97.4 F | WEIGHT: 166 LBS | DIASTOLIC BLOOD PRESSURE: 56 MMHG

## 2023-09-08 DIAGNOSIS — Z79.01 CHRONIC ANTICOAGULATION: Primary | ICD-10-CM

## 2023-09-08 NOTE — LETTER
September 8, 2023       No Recipients    Patient: Martha Harrell   YOB: 1955   Date of Visit: 9/8/2023     Dear SHERRY Matthews:       Thank you for referring Martha Harrell to me for evaluation. Below are the relevant portions of my assessment and plan of care.    If you have questions, please do not hesitate to call me. I look forward to following Martha along with you.         Sincerely,        Rich Arreola MD        CC:   No Recipients    Rich Arreola MD  09/08/23 1502  Sign when Signing Visit  CHIEF COMPLAINT: No new somatic complaints    Problem List:  Oncology/Hematology History Overview Note   1.  Recurrent bilateral lower extremity DVTs  2.  Seizures  3.  Renal disease  4.  Asthma  5.  Arthritis  6.  History of back and neck surgery  7.  Parathyroid surgery 2017 Dr. Valerio at       Hematology history timeline:  -6/21/2019 through 6/25/2019 [facility] inpatient hospitalization for acute left lower extremity DVT.  History of paroxysmal atrial fibrillation and DVT status post IVC filter.  Colonoscopy May 2019 had held Eliquis for the procedure and discharged after the colonoscopy with progressive weakness and malaise and found to have metal piece in her rectum secondary to colonoscopy that was removed.  On 6/3/2019 was found to have left lower extremity DVT with cellulitis.  Sent home on Eliquis and doxycycline for cellulitis and discharged on rehab 6/20/2019.  On Eliquis for approximately 4 years due to history of DVT.  Had been on Coumadin but reported that she had some sort of bleeding in her lungs and was transitioned to Eliquis and had an IVC filter placed 4 years prior to this emergency room/hospitalization.  Presented 6/21/2019 to emergency room at [facility] with leg swelling tenderness and duplex showing left lower extremity DVT common femoral down into the calf vessels with superficial thrombophlebitis of the saphenous vein with concerns for superimposed cellulitis.  Treated with  Bactrim and a heparin drip and a Lovenox bridge and then was evaluated by Dr. Hall.  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  -11/4/2022 anticardiolipin antibodies negative at .  Antithrombin III greater than 140.  Factor VIII activity greater than 300.  Beta-2 glycoprotein 1 negative.  CBC unremarkable.D-dimer normal 0.3.  -12/16/2022 hematology APRN office note from  note indicates several blood clots over the years.  Patient cannot remember specifics.  Came to this visit for evaluation prior to hip surgery.  Wanted hypercoagulable work-up before hip surgery.  Nurse practitioner stated they found nothing to account for her clotting tendency.  -5/22/2023 right lower extremity venous Doppler done at [facility] showed no thrombus and no reflux.  -5/25/2023 through 5/31/2023 [facility] inpatient hospitalization for bilateral lower extremity DVTs failing Eliquis while outpatient.  5/25/2023 right lower extremity duplex extensive right lower extremity DVT and incidental left lower extremity DVT noted on the left common femoral.  Started on heparin drip and switch to Lovenox and subsequent bridging of Lovenox upon discharge on Coumadin.  Has paroxysmal atrial fibrillation as well for which she needs anticoagulation.  On metoprolol.  -6/6/2023 [facility] emergency room visit.  Reports that she had been admitted to [facility] with a recent diagnosis of DVT with a history of DVT in the past for which she was taking Eliquis.  Despite Eliquis she developed the DVT and therefore transition to Coumadin while in the hospital.  With the INR is over 2, I had stopped her Lovenox.  Due to right leg swelling not improving on Lovenox and Coumadin and she was told to go back to the  emergency room for further evaluation.  CT angiogram of chest negative for pulmonary embolus.  INR 1.94.  Emergency room physician advised her to take 5 mg Monday Wednesday Friday with Coumadin 7.5 mg on the other days.    -7/17/2023 CT lung screening [place] showed no suspicious nodule.    -8/7/2023 [facility] hematology consult: Patient has a very complex history that she cannot actually recall in any good detail and has had multiple visits to multiple physicians in multiple institutions with only fragments of information in the medical records for me to assess.  She states that she thinks her first clot was around 1995 spontaneously left lower extremity started on Coumadin until she fell around she thinks 2020 causing the hematoma but obviously that is not accurate in light of the history above that I am able to ascertain from [facility] but at some point in the past she had had IVC filters placed and removed twice that she thinks.  She had a partial hypercoagulable work-up done as outlined at  by a nurse practitioner but does not appear to have seen the hematologist MD.  I will complete her hypercoagulable work-up but I cannot check her protein C or protein S levels while on Coumadin.  Those are vitamin K dependent factors as well.  We will check her lupus anticoagulant but she did not appear to have anticardiolipin or beta-2 glycoprotein related clotting and antiphospholipid antibody would be the prothrombotic condition that would be most likely to fail Eliquis.  Nonetheless she clearly failed Eliquis and she continues with Dr. Nguyen who monitors her protimes every couple of weeks with her last INR just a couple of days ago according to the patient of 2.2.  Clinically her leg is back down to normal and her cellulitis has nearly resolved so I would keep her on lifetime Coumadin.  Previously she was taken off Coumadin when she fell and had a chest contusion and that is when she switched to the Eliquis but now  with marco a failure of the Eliquis but clinical improvement on Coumadin I am inclined to keep her on Coumadin indefinitely rather than trying to switch to alternate DOAC as I am doubtful that would be beneficial given the multiplicity of clot she has had.  She does certainly have some propensity to clot given the above history that she developed clot while off anticoagulation for colonoscopy preparation.  In the future should she need any procedures I would put her through a Lovenox bridge and not just stop Coumadin.  This is all true no matter what her hypercoagulable work-up shows.  I will check her factor V Leiden mutation, prothrombin gene mutation and lupus anticoagulant today along with a D-dimer to assess for ongoing evidence of active clot.  We will see her back in a few months to go over the results of this is ultimately my plan would not change regardless of the results and that would be to keep her on lifetime Coumadin.  I will try to get records from Dr. Ricardo Hall who had seen her in the past.According to consult note from Dr. Fonseca in June 2019 while inpatient he also states she was an incomplete historian but relates at least 4 episodes of DVT involving both lower extremities.  Based on records from [place] per his note, she apparently had an inferior vena cava up filter placed at 1 point which was subsequently removed.  She subsequently had a left common iliac venous stent placed for unclear reasons and no mention of antecedent thrombolysis.  Had been on warfarin for years.  Most recently she had undergone a colonoscopy for Endo Clip removal while off of anticoagulants and developed DVT as outlined above in 2019.    -8/7/2023 prothrombin gene mutation and factor V Leiden mutations negative.  Lupus anticoagulant negative.    -9/8/2023 [facility] hematology follow-up: No clear-cut hypercoagulable disorder with negative lupus anticoagulant prothrombin gene mutation and factor V Leiden mutations.  The  "only remaining hypercoagulable work-up that has not been done are the protein C and protein S left fulls reference last visit which I cannot check while on Coumadin.  As she is doing well on Coumadin and with the above scenario, I would have her continue indefinitely on her Coumadin and we will see her on an as-needed basis.  Should she need procedures I would do a Lovenox bridge and stop Coumadin 5 days in advance of procedures.     Chronic anticoagulation   Paroxysmal atrial fibrillation       HISTORY OF PRESENT ILLNESS:  The patient is a 68 y.o. female, here for follow up on management of recurrent VTE failed Eliquis on Coumadin with no discernible hypercoagulable disorder albeit incomplete due to confounders as outlined    Past Medical History:   Diagnosis Date   • Anemia    • Asthma    • Chronic bronchitis    • Depression    • Diabetes mellitus    • DVT (deep venous thrombosis) 05/25/2023   • Epilepsy    • Fibromyalgia, primary    • GERD (gastroesophageal reflux disease)    • Hyperlipidemia    • Leg DVT (deep venous thromboembolism), acute, left 06/22/2019     Past Surgical History:   Procedure Laterality Date   • BACK SURGERY     • NECK SURGERY     • TUBAL ABDOMINAL LIGATION         Allergies   Allergen Reactions   • Aleve [Naproxen] Itching       Family History and Social History reviewed and changed as necessary    REVIEW OF SYSTEM:   No new somatic complaints    PHYSICAL EXAM:  No lower extremity edema.  No Homans' sign.  No respiratory distress.    Vitals:    09/08/23 1324   BP: 135/56   Pulse: 81   Resp: 18   Temp: 97.4 °F (36.3 °C)   SpO2: 97%   Weight: 75.3 kg (166 lb)   Height: 157.5 cm (62\")     Vitals:    09/08/23 1324   PainSc: 0-No pain          ECOG score: 0           Vitals reviewed.    ECOG: (0) Fully Active - Able to Carry On All Pre-disease Performance Without Restriction    Lab Results   Component Value Date    HGB 11.3 (L) 06/05/2023    HCT 37.7 06/05/2023    MCV 86.5 06/05/2023     " 06/05/2023    WBC 7.13 06/05/2023    NEUTROABS 4.47 06/05/2023    LYMPHSABS 1.83 06/05/2023    MONOSABS 0.40 06/05/2023    EOSABS 0.38 06/05/2023    BASOSABS 0.04 06/05/2023       Lab Results   Component Value Date    GLUCOSE 302 (H) 06/05/2023    BUN 14 06/05/2023    CREATININE 0.80 06/05/2023     06/05/2023    K 4.6 06/05/2023     06/05/2023    CO2 28.0 06/05/2023    CALCIUM 8.5 (L) 06/05/2023    PROTEINTOT 6.6 06/05/2023    ALBUMIN 3.4 (L) 06/05/2023    BILITOT 0.2 06/05/2023    ALKPHOS 94 06/05/2023    AST 32 06/05/2023    ALT 22 06/05/2023             ASSESSMENT & PLAN:  1.  Recurrent bilateral lower extremity DVTs  2.  Seizures  3.  Renal disease  4.  Asthma  5.  Arthritis  6.  History of back and neck surgery  7.  Parathyroid surgery 2017 Dr. Valerio at       Hematology history timeline:  -6/21/2019 through 6/25/2019 [facility] inpatient hospitalization for acute left lower extremity DVT.  History of paroxysmal atrial fibrillation and DVT status post IVC filter.  Colonoscopy May 2019 had held Eliquis for the procedure and discharged after the colonoscopy with progressive weakness and malaise and found to have metal piece in her rectum secondary to colonoscopy that was removed.  On 6/3/2019 was found to have left lower extremity DVT with cellulitis.  Sent home on Eliquis and doxycycline for cellulitis and discharged on rehab 6/20/2019.  On Eliquis for approximately 4 years due to history of DVT.  Had been on Coumadin but reported that she had some sort of bleeding in her lungs and was transitioned to Eliquis and had an IVC filter placed 4 years prior to this emergency room/hospitalization.  Presented 6/21/2019 to emergency room at [facility] with leg swelling tenderness and duplex showing left lower extremity DVT common femoral down into the calf vessels with superficial thrombophlebitis of the saphenous vein with concerns for superimposed cellulitis.  Treated with Bactrim and a heparin drip and a  Lovenox bridge and then was evaluated by Dr. Hall.  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  -11/4/2022 anticardiolipin antibodies negative at .  Antithrombin III greater than 140.  Factor VIII activity greater than 300.  Beta-2 glycoprotein 1 negative.  CBC unremarkable.D-dimer normal 0.3.  -12/16/2022 hematology APRN office note from  note indicates several blood clots over the years.  Patient cannot remember specifics.  Came to this visit for evaluation prior to hip surgery.  Wanted hypercoagulable work-up before hip surgery.  Nurse practitioner stated they found nothing to account for her clotting tendency.  -5/22/2023 right lower extremity venous Doppler done at [facility] showed no thrombus and no reflux.  -5/25/2023 through 5/31/2023 [facility] inpatient hospitalization for bilateral lower extremity DVTs failing Eliquis while outpatient.  5/25/2023 right lower extremity duplex extensive right lower extremity DVT and incidental left lower extremity DVT noted on the left common femoral.  Started on heparin drip and switch to Lovenox and subsequent bridging of Lovenox upon discharge on Coumadin.  Has paroxysmal atrial fibrillation as well for which she needs anticoagulation.  On metoprolol.  -6/6/2023 [facility] emergency room visit.  Reports that she had been admitted to [facility] with a recent diagnosis of DVT with a history of DVT in the past for which she was taking Eliquis.  Despite Eliquis she developed the DVT and therefore transition to Coumadin while in the hospital.  With the INR is over 2, I had stopped her Lovenox.  Due to right leg swelling not improving on Lovenox and Coumadin and she was told to go back to the emergency room for further  evaluation.  CT angiogram of chest negative for pulmonary embolus.  INR 1.94.  Emergency room physician advised her to take 5 mg Monday Wednesday Friday with Coumadin 7.5 mg on the other days.    -7/17/2023 CT lung screening [facility] showed no suspicious nodule.    -8/7/2023 [facility] hematology consult: Patient has a very complex history that she cannot actually recall in any good detail and has had multiple visits to multiple physicians in multiple institutions with only fragments of information in the medical records for me to assess.  She states that she thinks her first clot was around 1995 spontaneously left lower extremity started on Coumadin until she fell around she thinks 2020 causing the hematoma but obviously that is not accurate in light of the history above that I am able to ascertain from [facility] but at some point in the past she had had IVC filters placed and removed twice that she thinks.  She had a partial hypercoagulable work-up done as outlined at  by a nurse practitioner but does not appear to have seen the hematologist MD.  I will complete her hypercoagulable work-up but I cannot check her protein C or protein S levels while on Coumadin.  Those are vitamin K dependent factors as well.  We will check her lupus anticoagulant but she did not appear to have anticardiolipin or beta-2 glycoprotein related clotting and antiphospholipid antibody would be the prothrombotic condition that would be most likely to fail Eliquis.  Nonetheless she clearly failed Eliquis and she continues with Dr. Nguyen who monitors her protimes every couple of weeks with her last INR just a couple of days ago according to the patient of 2.2.  Clinically her leg is back down to normal and her cellulitis has nearly resolved so I would keep her on lifetime Coumadin.  Previously she was taken off Coumadin when she fell and had a chest contusion and that is when she switched to the Eliquis but now with marco a failure of the  Eliquis but clinical improvement on Coumadin I am inclined to keep her on Coumadin indefinitely rather than trying to switch to alternate DOAC as I am doubtful that would be beneficial given the multiplicity of clot she has had.  She does certainly have some propensity to clot given the above history that she developed clot while off anticoagulation for colonoscopy preparation.  In the future should she need any procedures I would put her through a Lovenox bridge and not just stop Coumadin.  This is all true no matter what her hypercoagulable work-up shows.  I will check her factor V Leiden mutation, prothrombin gene mutation and lupus anticoagulant today along with a D-dimer to assess for ongoing evidence of active clot.  We will see her back in a few months to go over the results of this is ultimately my plan would not change regardless of the results and that would be to keep her on lifetime Coumadin.  I will try to get records from Dr. Ricardo Hall who had seen her in the past.According to consult note from Dr. Fonseca in June 2019 while inpatient he also states she was an incomplete historian but relates at least 4 episodes of DVT involving both lower extremities.  Based on records from [place] per his note, she apparently had an inferior vena cava up filter placed at 1 point which was subsequently removed.  She subsequently had a left common iliac venous stent placed for unclear reasons and no mention of antecedent thrombolysis.  Had been on warfarin for years.  Most recently she had undergone a colonoscopy for Endo Clip removal while off of anticoagulants and developed DVT as outlined above in 2019.    -8/7/2023 prothrombin gene mutation and factor V Leiden mutations negative.  Lupus anticoagulant negative.    -9/8/2023 [facility] hematology follow-up: No clear-cut hypercoagulable disorder with negative lupus anticoagulant prothrombin gene mutation and factor V Leiden mutations.  The only remaining  hypercoagulable work-up that has not been done are the protein C and protein S left fulls reference last visit which I cannot check while on Coumadin.  As she is doing well on Coumadin and with the above scenario, I would have her continue indefinitely on her Coumadin and we will see her on an as-needed basis.  Should she need procedures I would do a Lovenox bridge and stop Coumadin 5 days in advance of procedures.    Total time of care today inclusive of time spent today prior to her arrival reviewing interval data and during visit translating that information to the patient and putting forth a plan as outlined above and after visit communicating with primary care took 30 minutes of patient care time throughout the day today.  Rich Arreola MD    09/08/2023

## 2023-09-08 NOTE — PROGRESS NOTES
CHIEF COMPLAINT: No new somatic complaints    Problem List:  Oncology/Hematology History Overview Note   1.  Recurrent bilateral lower extremity DVTs  2.  Seizures  3.  Renal disease  4.  Asthma  5.  Arthritis  6.  History of back and neck surgery  7.  Parathyroid surgery 2017 Dr. Valerio at       Hematology history timeline:  -6/21/2019 through 6/25/2019 [facility] inpatient hospitalization for acute left lower extremity DVT.  History of paroxysmal atrial fibrillation and DVT status post IVC filter.  Colonoscopy May 2019 had held Eliquis for the procedure and discharged after the colonoscopy with progressive weakness and malaise and found to have metal piece in her rectum secondary to colonoscopy that was removed.  On 6/3/2019 was found to have left lower extremity DVT with cellulitis.  Sent home on Eliquis and doxycycline for cellulitis and discharged on rehab 6/20/2019.  On Eliquis for approximately 4 years due to history of DVT.  Had been on Coumadin but reported that she had some sort of bleeding in her lungs and was transitioned to Eliquis and had an IVC filter placed 4 years prior to this emergency room/hospitalization.  Presented 6/21/2019 to emergency room at [facility] with leg swelling tenderness and duplex showing left lower extremity DVT common femoral down into the calf vessels with superficial thrombophlebitis of the saphenous vein with concerns for superimposed cellulitis.  Treated with Bactrim and a heparin drip and a Lovenox bridge and then was evaluated by Dr. Hall.  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  -11/4/2022 anticardiolipin antibodies negative at .  Antithrombin III greater than 140.  Factor VIII activity greater than 300.  Beta-2  glycoprotein 1 negative.  CBC unremarkable.D-dimer normal 0.3.  -12/16/2022 hematology APRN office note from  note indicates several blood clots over the years.  Patient cannot remember specifics.  Came to this visit for evaluation prior to hip surgery.  Wanted hypercoagulable work-up before hip surgery.  Nurse practitioner stated they found nothing to account for her clotting tendency.  -5/22/2023 right lower extremity venous Doppler done at [facility] showed no thrombus and no reflux.  -5/25/2023 through 5/31/2023 [facility] inpatient hospitalization for bilateral lower extremity DVTs failing Eliquis while outpatient.  5/25/2023 right lower extremity duplex extensive right lower extremity DVT and incidental left lower extremity DVT noted on the left common femoral.  Started on heparin drip and switch to Lovenox and subsequent bridging of Lovenox upon discharge on Coumadin.  Has paroxysmal atrial fibrillation as well for which she needs anticoagulation.  On metoprolol.  -6/6/2023 [facility] emergency room visit.  Reports that she had been admitted to [facility] with a recent diagnosis of DVT with a history of DVT in the past for which she was taking Eliquis.  Despite Eliquis she developed the DVT and therefore transition to Coumadin while in the hospital.  With the INR is over 2, I had stopped her Lovenox.  Due to right leg swelling not improving on Lovenox and Coumadin and she was told to go back to the emergency room for further evaluation.  CT angiogram of chest negative for pulmonary embolus.  INR 1.94.  Emergency room physician advised her to take 5 mg Monday Wednesday Friday with Coumadin 7.5 mg on the other days.    -7/17/2023 CT lung screening [facility] showed no suspicious nodule.    -8/7/2023 [facility] hematology consult: Patient has a very complex history that she cannot actually recall in any good detail and has had multiple visits to multiple physicians in multiple institutions with only fragments of  information in the medical records for me to assess.  She states that she thinks her first clot was around 1995 spontaneously left lower extremity started on Coumadin until she fell around she thinks 2020 causing the hematoma but obviously that is not accurate in light of the history above that I am able to ascertain from [place] but at some point in the past she had had IVC filters placed and removed twice that she thinks.  She had a partial hypercoagulable work-up done as outlined at [facility] by a nurse practitioner but does not appear to have seen the hematologist MD.  I will complete her hypercoagulable work-up but I cannot check her protein C or protein S levels while on Coumadin.  Those are vitamin K dependent factors as well.  We will check her lupus anticoagulant but she did not appear to have anticardiolipin or beta-2 glycoprotein related clotting and antiphospholipid antibody would be the prothrombotic condition that would be most likely to fail Eliquis.  Nonetheless she clearly failed Eliquis and she continues with Dr. Nguyen who monitors her protimes every couple of weeks with her last INR just a couple of days ago according to the patient of 2.2.  Clinically her leg is back down to normal and her cellulitis has nearly resolved so I would keep her on lifetime Coumadin.  Previously she was taken off Coumadin when she fell and had a chest contusion and that is when she switched to the Eliquis but now with marco a failure of the Eliquis but clinical improvement on Coumadin I am inclined to keep her on Coumadin indefinitely rather than trying to switch to alternate DOAC as I am doubtful that would be beneficial given the multiplicity of clot she has had.  She does certainly have some propensity to clot given the above history that she developed clot while off anticoagulation for colonoscopy preparation.  In the future should she need any procedures I would put her through a Lovenox bridge and not just stop Coumadin.   This is all true no matter what her hypercoagulable work-up shows.  I will check her factor V Leiden mutation, prothrombin gene mutation and lupus anticoagulant today along with a D-dimer to assess for ongoing evidence of active clot.  We will see her back in a few months to go over the results of this is ultimately my plan would not change regardless of the results and that would be to keep her on lifetime Coumadin.  I will try to get records from Dr. Ricardo Hall who had seen her in the past.According to consult note from Dr. Fonseca in June 2019 while inpatient he also states she was an incomplete historian but relates at least 4 episodes of DVT involving both lower extremities.  Based on records from [place] per his note, she apparently had an inferior vena cava up filter placed at 1 point which was subsequently removed.  She subsequently had a left common iliac venous stent placed for unclear reasons and no mention of antecedent thrombolysis.  Had been on warfarin for years.  Most recently she had undergone a colonoscopy for Endo Clip removal while off of anticoagulants and developed DVT as outlined above in 2019.    -8/7/2023 prothrombin gene mutation and factor V Leiden mutations negative.  Lupus anticoagulant negative.    -9/8/2023 [facility] [place] hematology follow-up: No clear-cut hypercoagulable disorder with negative lupus anticoagulant prothrombin gene mutation and factor V Leiden mutations.  The only remaining hypercoagulable work-up that has not been done are the protein C and protein S left fulls reference last visit which I cannot check while on Coumadin.  As she is doing well on Coumadin and with the above scenario, I would have her continue indefinitely on her Coumadin and we will see her on an as-needed basis.  Should she need procedures I would do a Lovenox bridge and stop Coumadin 5 days in advance of procedures.     Chronic anticoagulation   Paroxysmal atrial fibrillation       HISTORY OF PRESENT  "ILLNESS:  The patient is a 68 y.o. female, here for follow up on management of recurrent VTE failed Eliquis on Coumadin with no discernible hypercoagulable disorder albeit incomplete due to confounders as outlined    Past Medical History:   Diagnosis Date    Anemia     Asthma     Chronic bronchitis     Depression     Diabetes mellitus     DVT (deep venous thrombosis) 05/25/2023    Epilepsy     Fibromyalgia, primary     GERD (gastroesophageal reflux disease)     Hyperlipidemia     Leg DVT (deep venous thromboembolism), acute, left 06/22/2019     Past Surgical History:   Procedure Laterality Date    BACK SURGERY      NECK SURGERY      TUBAL ABDOMINAL LIGATION         Allergies   Allergen Reactions    Aleve [Naproxen] Itching       Family History and Social History reviewed and changed as necessary    REVIEW OF SYSTEM:   No new somatic complaints    PHYSICAL EXAM:  No lower extremity edema.  No Homans' sign.  No respiratory distress.    Vitals:    09/08/23 1324   BP: 135/56   Pulse: 81   Resp: 18   Temp: 97.4 °F (36.3 °C)   SpO2: 97%   Weight: 75.3 kg (166 lb)   Height: 157.5 cm (62\")     Vitals:    09/08/23 1324   PainSc: 0-No pain          ECOG score: 0           Vitals reviewed.    ECOG: (0) Fully Active - Able to Carry On All Pre-disease Performance Without Restriction    Lab Results   Component Value Date    HGB 11.3 (L) 06/05/2023    HCT 37.7 06/05/2023    MCV 86.5 06/05/2023     06/05/2023    WBC 7.13 06/05/2023    NEUTROABS 4.47 06/05/2023    LYMPHSABS 1.83 06/05/2023    MONOSABS 0.40 06/05/2023    EOSABS 0.38 06/05/2023    BASOSABS 0.04 06/05/2023       Lab Results   Component Value Date    GLUCOSE 302 (H) 06/05/2023    BUN 14 06/05/2023    CREATININE 0.80 06/05/2023     06/05/2023    K 4.6 06/05/2023     06/05/2023    CO2 28.0 06/05/2023    CALCIUM 8.5 (L) 06/05/2023    PROTEINTOT 6.6 06/05/2023    ALBUMIN 3.4 (L) 06/05/2023    BILITOT 0.2 06/05/2023    ALKPHOS 94 06/05/2023    AST 32 " 06/05/2023    ALT 22 06/05/2023             ASSESSMENT & PLAN:  1.  Recurrent bilateral lower extremity DVTs  2.  Seizures  3.  Renal disease  4.  Asthma  5.  Arthritis  6.  History of back and neck surgery  7.  Parathyroid surgery 2017 Dr. Valerio at       Hematology history timeline:  -6/21/2019 through 6/25/2019 [facility] inpatient hospitalization for acute left lower extremity DVT.  History of paroxysmal atrial fibrillation and DVT status post IVC filter.  Colonoscopy May 2019 had held Eliquis for the procedure and discharged after the colonoscopy with progressive weakness and malaise and found to have metal piece in her rectum secondary to colonoscopy that was removed.  On 6/3/2019 was found to have left lower extremity DVT with cellulitis.  Sent home on Eliquis and doxycycline for cellulitis and discharged on rehab 6/20/2019.  On Eliquis for approximately 4 years due to history of DVT.  Had been on Coumadin but reported that she had some sort of bleeding in her lungs and was transitioned to Eliquis and had an IVC filter placed 4 years prior to this emergency room/hospitalization.  Presented 6/21/2019 to emergency room at [facility] with leg swelling tenderness and duplex showing left lower extremity DVT common femoral down into the calf vessels with superficial thrombophlebitis of the saphenous vein with concerns for superimposed cellulitis.  Treated with Bactrim and a heparin drip and a Lovenox bridge and then was evaluated by Dr. Hall.  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  -11/4/2022 anticardiolipin antibodies negative at .  Antithrombin III greater than 140.  Factor VIII activity greater than 300.  Beta-2 glycoprotein 1 negative.  CBC  unremarkable.D-dimer normal 0.3.  -12/16/2022 hematology APRN office note from  note indicates several blood clots over the years.  Patient cannot remember specifics.  Came to this visit for evaluation prior to hip surgery.  Wanted hypercoagulable work-up before hip surgery.  Nurse practitioner stated they found nothing to account for her clotting tendency.  -5/22/2023 right lower extremity venous Doppler done at [facility] showed no thrombus and no reflux.  -5/25/2023 through 5/31/2023 [facility] inpatient hospitalization for bilateral lower extremity DVTs failing Eliquis while outpatient.  5/25/2023 right lower extremity duplex extensive right lower extremity DVT and incidental left lower extremity DVT noted on the left common femoral.  Started on heparin drip and switch to Lovenox and subsequent bridging of Lovenox upon discharge on Coumadin.  Has paroxysmal atrial fibrillation as well for which she needs anticoagulation.  On metoprolol.  -6/6/2023 [facility] emergency room visit.  Reports that she had been admitted to [facility] with a recent diagnosis of DVT with a history of DVT in the past for which she was taking Eliquis.  Despite Eliquis she developed the DVT and therefore transition to Coumadin while in the hospital.  With the INR is over 2, I had stopped her Lovenox.  Due to right leg swelling not improving on Lovenox and Coumadin and she was told to go back to the emergency room for further evaluation.  CT angiogram of chest negative for pulmonary embolus.  INR 1.94.  Emergency room physician advised her to take 5 mg Monday Wednesday Friday with Coumadin 7.5 mg on the other days.    -7/17/2023 CT lung screening [facility] showed no suspicious nodule.    -8/7/2023 [facility] hematology consult: Patient has a very complex history that she cannot actually recall in any good detail and has had multiple visits to multiple physicians in multiple institutions with only fragments of information in the medical records  for me to assess.  She states that she thinks her first clot was around 1995 spontaneously left lower extremity started on Coumadin until she fell around she thinks 2020 causing the hematoma but obviously that is not accurate in light of the history above that I am able to ascertain from [place] but at some point in the past she had had IVC filters placed and removed twice that she thinks.  She had a partial hypercoagulable work-up done as outlined at [facility] by a nurse practitioner but does not appear to have seen the hematologist MD.  I will complete her hypercoagulable work-up but I cannot check her protein C or protein S levels while on Coumadin.  Those are vitamin K dependent factors as well.  We will check her lupus anticoagulant but she did not appear to have anticardiolipin or beta-2 glycoprotein related clotting and antiphospholipid antibody would be the prothrombotic condition that would be most likely to fail Eliquis.  Nonetheless she clearly failed Eliquis and she continues with Dr. Nguyen who monitors her protimes every couple of weeks with her last INR just a couple of days ago according to the patient of 2.2.  Clinically her leg is back down to normal and her cellulitis has nearly resolved so I would keep her on lifetime Coumadin.  Previously she was taken off Coumadin when she fell and had a chest contusion and that is when she switched to the Eliquis but now with marco a failure of the Eliquis but clinical improvement on Coumadin I am inclined to keep her on Coumadin indefinitely rather than trying to switch to alternate DOAC as I am doubtful that would be beneficial given the multiplicity of clot she has had.  She does certainly have some propensity to clot given the above history that she developed clot while off anticoagulation for colonoscopy preparation.  In the future should she need any procedures I would put her through a Lovenox bridge and not just stop Coumadin.  This is all true no matter what  her hypercoagulable work-up shows.  I will check her factor V Leiden mutation, prothrombin gene mutation and lupus anticoagulant today along with a D-dimer to assess for ongoing evidence of active clot.  We will see her back in a few months to go over the results of this is ultimately my plan would not change regardless of the results and that would be to keep her on lifetime Coumadin.  I will try to get records from Dr. Ricardo Hall who had seen her in the past.According to consult note from Dr. Fonseca in June 2019 while inpatient he also states she was an incomplete historian but relates at least 4 episodes of DVT involving both lower extremities.  Based on records from [place] per his note, she apparently had an inferior vena cava up filter placed at 1 point which was subsequently removed.  She subsequently had a left common iliac venous stent placed for unclear reasons and no mention of antecedent thrombolysis.  Had been on warfarin for years.  Most recently she had undergone a colonoscopy for Endo Clip removal while off of anticoagulants and developed DVT as outlined above in 2019.    -8/7/2023 prothrombin gene mutation and factor V Leiden mutations negative.  Lupus anticoagulant negative.    -9/8/2023 [facility] hematology follow-up: No clear-cut hypercoagulable disorder with negative lupus anticoagulant prothrombin gene mutation and factor V Leiden mutations.  The only remaining hypercoagulable work-up that has not been done are the protein C and protein S left fulls reference last visit which I cannot check while on Coumadin.  As she is doing well on Coumadin and with the above scenario, I would have her continue indefinitely on her Coumadin and we will see her on an as-needed basis.  Should she need procedures I would do a Lovenox bridge and stop Coumadin 5 days in advance of procedures.    Total time of care today inclusive of time spent today prior to her arrival reviewing interval data and during visit  translating that information to the patient and putting forth a plan as outlined above and after visit communicating with primary care took 30 minutes of patient care time throughout the day today.  Rich Arreola MD    09/08/2023

## 2024-02-20 ENCOUNTER — OFFICE VISIT (OUTPATIENT)
Dept: UROLOGY | Facility: CLINIC | Age: 69
End: 2024-02-20
Payer: MEDICARE

## 2024-02-20 VITALS
SYSTOLIC BLOOD PRESSURE: 114 MMHG | WEIGHT: 166 LBS | BODY MASS INDEX: 30.55 KG/M2 | DIASTOLIC BLOOD PRESSURE: 68 MMHG | HEART RATE: 76 BPM | OXYGEN SATURATION: 96 % | HEIGHT: 62 IN

## 2024-02-20 DIAGNOSIS — N39.41 URGE URINARY INCONTINENCE: Primary | ICD-10-CM

## 2024-02-20 LAB
BILIRUB BLD-MCNC: NEGATIVE MG/DL
CLARITY, POC: CLEAR
COLOR UR: YELLOW
EXPIRATION DATE: ABNORMAL
GLUCOSE UR STRIP-MCNC: ABNORMAL MG/DL
KETONES UR QL: NEGATIVE
LEUKOCYTE EST, POC: NEGATIVE
Lab: ABNORMAL
NITRITE UR-MCNC: NEGATIVE MG/ML
PH UR: 6 [PH] (ref 5–8)
PROT UR STRIP-MCNC: NEGATIVE MG/DL
RBC # UR STRIP: NEGATIVE /UL
SP GR UR: 1.01 (ref 1–1.03)
UROBILINOGEN UR QL: NORMAL

## 2024-02-20 NOTE — PROGRESS NOTES
Office Visit New Urology      Patient Name: Martha Harrell  : 1955   MRN: [id number]     Chief Complaint:    Chief Complaint   Patient presents with    Urinary Incontinence       Referring Provider: Ginger Ward APRN    History of Present Illness: Martha Harrell is a 68 y.o. female who presents to Urology today for evaluation of urinary incontinence.  Prior medical history significant for hypertension, history of DVT, A-fib, chronic anticoagulation with warfarin, type 2 diabetes, fibromyalgia, epilepsy, GERD.  She has had progressive issues with urinary urgency/urge incontinence and has been followed by Dr. Bo for this.  She has failed multiple medications and it sounds like Dr. Bo had a discussion regarding surgical options including sacral neuromodulation.  She presents today for second opinion.    Endorses long history of progressive issues with urinary frequency, urgency, urge incontinence.  Denies any stress urinary incontinence symptoms.  She goes through about 5 pull-ups per day due to incontinence.  Drinks 1-2 diet Cokes daily.  Rarely drinks coffee.  Some milk but mostly water throughout the day.  She endorses somewhat of a weak stream and double voids to ensure emptying of her bladder.  She denies any issues with recurrent UTIs, dysuria, flank pain/kidney stones, gross hematuria.  States that she has been on 3 prior medications for her symptoms, including Myrbetriq and Gemtesa.  She trialed each of these medications for up to 4 weeks and states that she received no help from them.    PVR: 61 mL  Bowel and bladder symptom questionnaire: 25  Urinalysis: Unremarkable    Subjective      Review of System:   Review of Systems   Constitutional: Negative.    HENT: Negative.     Eyes: Negative.    Respiratory: Negative.     Cardiovascular: Negative.    Gastrointestinal: Negative.    Endocrine: Negative.    Genitourinary: Negative.  Positive for frequency and urgency.   Musculoskeletal:  Negative.    Skin: Negative.    Allergic/Immunologic: Negative.    Neurological: Negative.    Hematological: Negative.    Psychiatric/Behavioral: Negative.        I have reviewed the ROS documented by my clinical staff, I have updated appropriately and I agree. Ancelmo Campo PA-C    Past Medical History:   Past Medical History:   Diagnosis Date    Anemia     Asthma     Chronic bronchitis     Depression     Diabetes mellitus     DVT (deep venous thrombosis) 2023    Epilepsy     Fibromyalgia, primary     GERD (gastroesophageal reflux disease)     Hyperlipidemia     Leg DVT (deep venous thromboembolism), acute, left 2019       Past Surgical History:   Past Surgical History:   Procedure Laterality Date    BACK SURGERY      NECK SURGERY      TUBAL ABDOMINAL LIGATION         Family History:   Family History   Problem Relation Age of Onset    Arthritis Mother     Heart disease Mother     Obesity Mother     Arthritis Father     Cancer Father     Hypertension Father     Hyperlipidemia Father     Obesity Father     Arthritis Sister     Obesity Sister     Arthritis Sister     Arthritis Sister     Arthritis Maternal Aunt     Arthritis Maternal Uncle     Cancer Maternal Uncle     Arthritis Son     Arthritis Son        Social History:   Social History     Socioeconomic History    Marital status:    Tobacco Use    Smoking status: Former     Types: Cigarettes     Quit date: 2010     Years since quittin.0     Passive exposure: Past    Smokeless tobacco: Never   Vaping Use    Vaping Use: Never used   Substance and Sexual Activity    Alcohol use: No    Drug use: No    Sexual activity: Defer       Medications:     Current Outpatient Medications:     albuterol sulfate  (90 Base) MCG/ACT inhaler, Inhale 1 puff., Disp: , Rfl:     atorvastatin (LIPITOR) 20 MG tablet, Take 1 tablet by mouth Daily., Disp: , Rfl:     B-D UF III MINI PEN NEEDLES 31G X 5 MM misc, Use daily with insulin, Disp: 50 each, Rfl:  11    busPIRone (BUSPAR) 15 MG tablet, , Disp: , Rfl:     Continuous Blood Gluc Sensor (FreeStyle Pauly 2 Sensor) misc, SEE ADMIN INSTRUCTIONS EVERY 14 DAYS, Disp: , Rfl:     DULoxetine (CYMBALTA) 60 MG capsule, Take 1 capsule by mouth Daily., Disp: , Rfl: 0    Empagliflozin (JARDIANCE) 25 MG tablet, Take 25 mg by mouth Daily., Disp: 30 tablet, Rfl: 6    fenofibrate 160 MG tablet, , Disp: , Rfl:     Fosamax Plus D  MG-UNIT per tablet, Take 1 tablet by mouth Every 7 (Seven) Days., Disp: , Rfl:     furosemide (LASIX) 40 MG tablet, Take 1 tablet by mouth Every 12 (Twelve) Hours., Disp: , Rfl:     gabapentin (NEURONTIN) 100 MG capsule, , Disp: , Rfl:     HYDROcodone-acetaminophen (NORCO) 7.5-325 MG per tablet, Take 1 tablet by mouth 3 (Three) Times a Day As Needed., Disp: , Rfl:     Insulin Glargine, 2 Unit Dial, (TOUJEO MAX SOLOSTAR) 300 UNIT/ML solution pen-injector injection, Take 100 units at bedtime, after a week go up to 110 units, Disp: 4 pen, Rfl: 6    Insulin Lispro, 1 Unit Dial, (HUMALOG KWIKPEN) 100 UNIT/ML solution pen-injector, Take 20u before lunch (if you eat lunch) and take 40 units before supper, Disp: 6 pen, Rfl: 6    magnesium oxide (MAG-OX) 400 MG tablet, Take 1 tablet by mouth 2 (Two) Times a Day., Disp: , Rfl:     metFORMIN (GLUCOPHAGE) 1000 MG tablet, Take 1 tablet by mouth 2 (Two) Times a Day With Meals., Disp: 60 tablet, Rfl: 6    metFORMIN (GLUCOPHAGE) 1000 MG tablet, Take 1 tablet by mouth., Disp: , Rfl:     metoprolol tartrate (LOPRESSOR) 50 MG tablet, Take 1 tablet by mouth Every 12 (Twelve) Hours., Disp: 60 tablet, Rfl: 0    Morphine (MS CONTIN) 30 MG 12 hr tablet, Take 1 tablet by mouth 2 (Two) Times a Day., Disp: , Rfl: 0    omega-3 acid ethyl esters (LOVAZA) 1 g capsule, Take 1 capsule by mouth Every 12 (Twelve) Hours., Disp: , Rfl:     Omega-3 Fatty Acids (fish oil) 1000 MG capsule capsule, Take 1 capsule by mouth Every 12 (Twelve) Hours., Disp: , Rfl:     omeprazole (priLOSEC)  20 MG capsule, Take 1 capsule by mouth Daily., Disp: , Rfl:     ONE TOUCH ULTRA TEST test strip, Test BS TID, Disp: 100 each, Rfl: 11    ONETOUCH DELICA LANCETS FINE misc, Test BS TID, Disp: 100 each, Rfl: 11    Ozempic, 1 MG/DOSE, 4 MG/3ML solution pen-injector, Inject 1 mg under the skin into the appropriate area as directed 1 (One) Time Per Week., Disp: , Rfl:     phenytoin (DILANTIN) 100 MG ER capsule, take 2 capsule by mouth twice a day, Disp: , Rfl: 0    propafenone (RYTHMOL) 225 MG tablet, , Disp: , Rfl:     RA VITAMIN D-3 5000 units capsule, Take 1 capsule by mouth Daily., Disp: , Rfl: 0    silver sulfadiazine (SILVADENE, SSD) 1 % cream, APPLY OVER ULCER EVERY DAY UNTIL HEALED, Disp: , Rfl:     warfarin (COUMADIN) 5 MG tablet, Take 1 tablet by mouth the evening of 5/31/23. Follow-up with PCP for INR on 6/1/23 for further dosage adjustments, Disp: 90 tablet, Rfl: 0    Allergies:   Allergies   Allergen Reactions    Aleve [Naproxen] Itching       IPSS Questionnaire (AUA-7):Bladder & Bowel Symptom Questionnaire    How often do you usually urinate during the day ?   4 - At least once an hour    2.   How many timed do you urinate at night?   2 - 3 times at night   3.   What is the reason that you usually urinate?   4 - Desperate urge (must go immediately)   4.   Once you get the urge to go, how long can you     comfortably delay?   4 - Must go immediately    5.   How often do you get a sudden urge that makes you rush to the bathroom?   4 - At least once a day   6.   How often does a sudden urge to urinate result in you leaking urine or wetting pads?   4 - At least once a day   7.  In your opinion, how good is your bladder control?   3 - Poor   8.  Do you have accidental bowel leakage?   no   9.  Do you have difficulty fully emptying your bladder?   yes   10.  Do you experience accidental leakage when performing some physical activity such as coughing, sneezing, laughing or exercise?   yes   11. Have you tried  "medications to help improve your symptoms?   yes   12. Would you be interested in learning about a long-lasting option that may help you with your symptoms?   yes                                                                             Total Score   25       Post void residual bladder scan:   61ml     Objective     Physical Exam:   Vital Signs:   Vitals:    02/20/24 1431   BP: 114/68   Pulse: 76   SpO2: 96%   Weight: 75.3 kg (166 lb)   Height: 157.5 cm (62.01\")     Body mass index is 30.35 kg/m².     Physical Exam  Constitutional:       Appearance: Normal appearance.   HENT:      Head: Normocephalic and atraumatic.      Nose: Nose normal.      Mouth/Throat:      Mouth: Mucous membranes are moist.      Pharynx: Oropharynx is clear.   Eyes:      Extraocular Movements: Extraocular movements intact.      Pupils: Pupils are equal, round, and reactive to light.   Pulmonary:      Effort: Pulmonary effort is normal. No respiratory distress.   Musculoskeletal:         General: No swelling or deformity. Normal range of motion.      Cervical back: Normal range of motion and neck supple.   Skin:     General: Skin is warm and dry.   Neurological:      General: No focal deficit present.      Mental Status: She is alert and oriented to person, place, and time. Mental status is at baseline.   Psychiatric:         Mood and Affect: Mood normal.         Behavior: Behavior normal.         Labs:   Brief Urine Lab Results       None                 Lab Results   Component Value Date    GLUCOSE 302 (H) 06/05/2023    CALCIUM 8.5 (L) 06/05/2023     06/05/2023    K 4.6 06/05/2023    CO2 28.0 06/05/2023     06/05/2023    BUN 14 06/05/2023    CREATININE 0.80 06/05/2023    EGFRIFNONA 83 07/18/2019    BCR 20.3 06/05/2023    ANIONGAP 9.0 06/05/2023       Lab Results   Component Value Date    WBC 7.13 06/05/2023    HGB 11.3 (L) 06/05/2023    HCT 37.7 06/05/2023    MCV 86.5 06/05/2023     06/05/2023       No results found for: " "\"PSA\"     Images:   XR Shoulder 2+ View Right    Result Date: 12/8/2023  Degenerative changes as above. Images reviewed, interpreted, and dictated by Dr. Ervin Mcdonough. Transcribed by Samuel Neumann PA-C.      Measures:   Tobacco:   Martha Harrell  reports that she quit smoking about 14 years ago. Her smoking use included cigarettes. She has been exposed to tobacco smoke. She has never used smokeless tobacco..     Urine Incontinence: Patient reports that she is not currently experiencing any symptoms of urinary incontinence.      Assessment / Plan      Assessment/Plan:   Martha Harrell is a 68 y.o. female who presented today for second opinion regarding urge incontinence.  Previously followed by Dr. Bo.  She has failed medical management including adequate trials of Myrbetriq and Gemtesa.  Sounds like there was some discussion about sacral neuromodulation.  We had her wash the [employer] video today.  Unfortunately, she does have multiple medical comorbidities including A-fib, chronic anticoagulation as well as poorly controlled diabetes.  Her last A1c from 9 months ago was 10.3, which places her at a higher risk of surgical complications, including infection.  She states she has better control of her diabetes and is getting this rechecked soon with her PCP.  Discussed that getting better control of the diabetes may help with some of her urinary symptoms and can thus avoid surgery for her.  For now, we will provide her with a bladder diary and have her follow-up in a few months after she has her A1c checked again.    Diagnoses and all orders for this visit:    1. Urge urinary incontinence (Primary)      Follow Up:   Return in about 3 months (around 5/20/2024) for Recheck.    I spent approximately 45 minutes providing clinical care for this patient; including review of patient's chart and provider documentation, face to face time spent with patient in examination room (obtaining history, performing physical exam, " discussing diagnosis and management options), placing orders, and completing patient documentation.     Theresa Becerra MD  [facility] [place]

## 2024-02-21 ENCOUNTER — TELEPHONE (OUTPATIENT)
Dept: UROLOGY | Facility: CLINIC | Age: 69
End: 2024-02-21
Payer: MEDICARE

## 2024-02-21 NOTE — TELEPHONE ENCOUNTER
PATIENT SAYS SHE CAN'T DO MORNING APPTS AND [facility] ONLY HAS MORNING AVAILABILITY. SHOULD I JUST SCHEDULE PATIENT WITH [facility] OR [facility]? THANKS

## 2024-05-01 ENCOUNTER — TELEPHONE (OUTPATIENT)
Dept: UROLOGY | Facility: CLINIC | Age: 69
End: 2024-05-01
Payer: MEDICARE

## 2024-05-01 NOTE — TELEPHONE ENCOUNTER
Provider: DR MACARIO    Caller: RIGOBERTO TAYLOR    Relationship to Patient: SELF    Reason for Call: PT WAS SEEN BY DR MACARIO 2/20/24.    PT ADVISED WE WERE SUPPOSED TO CALL HER TO SCHEDULE TESTING ON HER BLADDER.  PLEASE REVIEW AND CALL TO DISCUSS.

## 2024-05-02 NOTE — TELEPHONE ENCOUNTER
Tried to call the patient, but got vm.  Left her a msg letting her know that her last note said that she would have to get her diabetes checked and under control to see if that would help and if she has done so with no help then they could discuss options.

## 2024-05-10 ENCOUNTER — OFFICE VISIT (OUTPATIENT)
Age: 69
End: 2024-05-10
Payer: MEDICARE

## 2024-05-10 ENCOUNTER — LAB (OUTPATIENT)
Facility: HOSPITAL | Age: 69
End: 2024-05-10
Payer: MEDICARE

## 2024-05-10 VITALS
DIASTOLIC BLOOD PRESSURE: 69 MMHG | HEIGHT: 62 IN | WEIGHT: 157 LBS | HEART RATE: 63 BPM | OXYGEN SATURATION: 96 % | BODY MASS INDEX: 28.89 KG/M2 | SYSTOLIC BLOOD PRESSURE: 114 MMHG

## 2024-05-10 DIAGNOSIS — Z79.4 TYPE 2 DIABETES MELLITUS WITH HYPERGLYCEMIA, WITH LONG-TERM CURRENT USE OF INSULIN: Primary | ICD-10-CM

## 2024-05-10 DIAGNOSIS — E11.65 TYPE 2 DIABETES MELLITUS WITH HYPERGLYCEMIA, WITH LONG-TERM CURRENT USE OF INSULIN: Primary | ICD-10-CM

## 2024-05-10 DIAGNOSIS — Z79.4 TYPE 2 DIABETES MELLITUS WITH HYPERGLYCEMIA, WITH LONG-TERM CURRENT USE OF INSULIN: ICD-10-CM

## 2024-05-10 DIAGNOSIS — N39.41 URGE URINARY INCONTINENCE: ICD-10-CM

## 2024-05-10 DIAGNOSIS — E11.65 TYPE 2 DIABETES MELLITUS WITH HYPERGLYCEMIA, WITH LONG-TERM CURRENT USE OF INSULIN: ICD-10-CM

## 2024-05-10 LAB
BILIRUB BLD-MCNC: NEGATIVE MG/DL
CLARITY, POC: CLEAR
COLOR UR: YELLOW
EXPIRATION DATE: ABNORMAL
GLUCOSE UR STRIP-MCNC: ABNORMAL MG/DL
HBA1C MFR BLD: 9.8 % (ref 4.8–5.6)
KETONES UR QL: NEGATIVE
LEUKOCYTE EST, POC: NEGATIVE
Lab: ABNORMAL
NITRITE UR-MCNC: NEGATIVE MG/ML
PH UR: 6 [PH] (ref 5–8)
PROT UR STRIP-MCNC: NEGATIVE MG/DL
RBC # UR STRIP: NEGATIVE /UL
SP GR UR: 1.01 (ref 1–1.03)
UROBILINOGEN UR QL: NORMAL

## 2024-05-10 PROCEDURE — 83036 HEMOGLOBIN GLYCOSYLATED A1C: CPT

## 2024-05-10 PROCEDURE — 36415 COLL VENOUS BLD VENIPUNCTURE: CPT

## 2024-05-10 NOTE — PROGRESS NOTES
Established Female Office Visit        Chief Complaint   Patient presents with    Urinary Incontinence        HPI  Ms. Harrell is a 69 y.o. female with urge urinary incontinence.  Prior medical history significant for hypertension, history of DVT, A-fib, chronic anticoagulation with warfarin, type 2 diabetes, fibromyalgia, epilepsy, GERD.  She has had progressive issues with urinary urgency/urge incontinence, previously followed by Dr. Bo, now established with Dr. Becerra.     At this visit, remains interested in PNE. She actually thought that today's visit was for PNE and did not fully understand that Dr. Becerra had recommend first, better blood sugar control to improve her symptoms and, if needed, lower her risk of surgical complications. Previous A1c was 10.3, has not had this rechecked.    Previous PVR 61cc. UA normal today.       Bladder & Bowel Symptom Questionnaire    How often do you usually urinate during the day ?   4 - At least once an hour    2.   How many timed do you urinate at night?   4 - 5 or more times at night   3.   What is the reason that you usually urinate?   4 - Desperate urge (must go immediately)   4.   Once you get the urge to go, how long can you     comfortably delay?   4 - Must go immediately    5.   How often do you get a sudden urge that makes you rush to the bathroom?   4 - At least once a day   6.   How often does a sudden urge to urinate result in you leaking urine or wetting pads?   4 - At least once a day   7.  In your opinion, how good is your bladder control?   4 - No control   8.  Do you have accidental bowel leakage?   no   9.  Do you have difficulty fully emptying your bladder?   no   10.  Do you experience accidental leakage when performing some physical activity such as coughing, sneezing, laughing or exercise?   yes   11. Have you tried medications to help improve your symptoms?   yes   12. Would you be interested in learning about a long-lasting option that may  help you with your symptoms?   no                                                                             Total Score   28     0-7 (Mild) 8-16 (Moderate) 17-28 (Severe)       Past Medical History:   Diagnosis Date    Anemia     Asthma     Chronic bronchitis     Depression     Diabetes mellitus     DVT (deep venous thrombosis) 05/25/2023    Epilepsy     Fibromyalgia, primary     GERD (gastroesophageal reflux disease)     Hyperlipidemia     Leg DVT (deep venous thromboembolism), acute, left 06/22/2019       Past Surgical History:   Procedure Laterality Date    BACK SURGERY      NECK SURGERY      TUBAL ABDOMINAL LIGATION           Current Outpatient Medications:     albuterol sulfate  (90 Base) MCG/ACT inhaler, Inhale 1 puff., Disp: , Rfl:     atorvastatin (LIPITOR) 20 MG tablet, Take 1 tablet by mouth Daily., Disp: , Rfl:     B-D UF III MINI PEN NEEDLES 31G X 5 MM misc, Use daily with insulin, Disp: 50 each, Rfl: 11    busPIRone (BUSPAR) 15 MG tablet, , Disp: , Rfl:     Continuous Blood Gluc Sensor (FreeStyle Pauly 2 Sensor) misc, SEE ADMIN INSTRUCTIONS EVERY 14 DAYS, Disp: , Rfl:     DULoxetine (CYMBALTA) 60 MG capsule, Take 1 capsule by mouth Daily., Disp: , Rfl: 0    Empagliflozin (JARDIANCE) 25 MG tablet, Take 25 mg by mouth Daily., Disp: 30 tablet, Rfl: 6    fenofibrate 160 MG tablet, , Disp: , Rfl:     Fosamax Plus D  MG-UNIT per tablet, Take 1 tablet by mouth Every 7 (Seven) Days., Disp: , Rfl:     furosemide (LASIX) 40 MG tablet, Take 1 tablet by mouth Every 12 (Twelve) Hours., Disp: , Rfl:     gabapentin (NEURONTIN) 100 MG capsule, , Disp: , Rfl:     HYDROcodone-acetaminophen (NORCO) 7.5-325 MG per tablet, Take 1 tablet by mouth 3 (Three) Times a Day As Needed., Disp: , Rfl:     Insulin Glargine, 2 Unit Dial, (TOUJEO MAX SOLOSTAR) 300 UNIT/ML solution pen-injector injection, Take 100 units at bedtime, after a week go up to 110 units, Disp: 4 pen, Rfl: 6    Insulin Lispro, 1 Unit Dial,  "(HUMALOG KWIKPEN) 100 UNIT/ML solution pen-injector, Take 20u before lunch (if you eat lunch) and take 40 units before supper, Disp: 6 pen, Rfl: 6    magnesium oxide (MAG-OX) 400 MG tablet, Take 1 tablet by mouth 2 (Two) Times a Day., Disp: , Rfl:     metFORMIN (GLUCOPHAGE) 1000 MG tablet, Take 1 tablet by mouth 2 (Two) Times a Day With Meals., Disp: 60 tablet, Rfl: 6    metFORMIN (GLUCOPHAGE) 1000 MG tablet, Take 1 tablet by mouth., Disp: , Rfl:     metoprolol tartrate (LOPRESSOR) 50 MG tablet, Take 1 tablet by mouth Every 12 (Twelve) Hours., Disp: 60 tablet, Rfl: 0    Morphine (MS CONTIN) 30 MG 12 hr tablet, Take 1 tablet by mouth 2 (Two) Times a Day., Disp: , Rfl: 0    omega-3 acid ethyl esters (LOVAZA) 1 g capsule, Take 1 capsule by mouth Every 12 (Twelve) Hours., Disp: , Rfl:     Omega-3 Fatty Acids (fish oil) 1000 MG capsule capsule, Take 1 capsule by mouth Every 12 (Twelve) Hours., Disp: , Rfl:     omeprazole (priLOSEC) 20 MG capsule, Take 1 capsule by mouth Daily., Disp: , Rfl:     ONE TOUCH ULTRA TEST test strip, Test BS TID, Disp: 100 each, Rfl: 11    ONETOUCH DELICA LANCETS FINE misc, Test BS TID, Disp: 100 each, Rfl: 11    Ozempic, 1 MG/DOSE, 4 MG/3ML solution pen-injector, Inject 1 mg under the skin into the appropriate area as directed 1 (One) Time Per Week., Disp: , Rfl:     phenytoin (DILANTIN) 100 MG ER capsule, take 2 capsule by mouth twice a day, Disp: , Rfl: 0    propafenone (RYTHMOL) 225 MG tablet, , Disp: , Rfl:     RA VITAMIN D-3 5000 units capsule, Take 1 capsule by mouth Daily., Disp: , Rfl: 0    silver sulfadiazine (SILVADENE, SSD) 1 % cream, APPLY OVER ULCER EVERY DAY UNTIL HEALED, Disp: , Rfl:     warfarin (COUMADIN) 5 MG tablet, Take 1 tablet by mouth the evening of 5/31/23. Follow-up with PCP for INR on 6/1/23 for further dosage adjustments, Disp: 90 tablet, Rfl: 0     Physical Exam  Visit Vitals  /69   Pulse 63   Ht 157.5 cm (62\")   Wt 71.2 kg (157 lb)   SpO2 96%   BMI 28.72 " kg/m²       Labs  Brief Urine Lab Results  (Last result in the past 365 days)        Color   Clarity   Blood   Leuk Est   Nitrite   Protein   CREAT   Urine HCG        05/10/24 1221 Yellow   Clear   Negative   Negative   Negative   Negative                   Lab Results   Component Value Date    GLUCOSE 302 (H) 06/05/2023    CALCIUM 8.5 (L) 06/05/2023     06/05/2023    K 4.6 06/05/2023    CO2 28.0 06/05/2023     06/05/2023    BUN 14 06/05/2023    CREATININE 0.80 06/05/2023    EGFRIFNONA 83 07/18/2019    BCR 20.3 06/05/2023    ANIONGAP 9.0 06/05/2023       Lab Results   Component Value Date    WBC 7.13 06/05/2023    HGB 11.3 (L) 06/05/2023    HCT 37.7 06/05/2023    MCV 86.5 06/05/2023     06/05/2023            Assessment / Plan      Assessment/Plan:   Ms. Harrell is a 69 y.o. female with hx of UUI, failed multiple oral meds, presenting with desire for PNE.    As above, Dr. Becerra had recommended better blood sugar control and recheck A1c prior to today's visit. Patient and son that was present at that visit, did not understand that plan and thought she was getting a PNE today.     We again discussed the importance of improving blood sugar as this could greatly help her symptoms and also reduce her risk of surgical complications/improve healing, if surgery is decided as next treatment step. Son present today and the patient state understanding regarding this.     They want to follow up with Dr. Becerra as soon as possible to discuss scheduling PNE. Patient feels her blood sugar has definitely improved in the last 3 months and will get A1c checked today after this visit.     Diagnoses and all orders for this visit:    1. Type 2 diabetes mellitus with hyperglycemia, with long-term current use of insulin (Primary)  -     Hemoglobin A1c; Future    2. Urge urinary incontinence  -     POC Urinalysis Dipstick, Automated         Follow Up:   Return in about 3 weeks (around 5/31/2024) for [place]  A1c.      Rita Styles PA-C  [facility] [place]

## 2024-05-29 ENCOUNTER — OFFICE VISIT (OUTPATIENT)
Dept: UROLOGY | Facility: CLINIC | Age: 69
End: 2024-05-29
Payer: MEDICARE

## 2024-05-29 ENCOUNTER — TELEPHONE (OUTPATIENT)
Dept: UROLOGY | Facility: CLINIC | Age: 69
End: 2024-05-29

## 2024-05-29 VITALS — HEIGHT: 62 IN | BODY MASS INDEX: 28.52 KG/M2 | WEIGHT: 155 LBS

## 2024-05-29 DIAGNOSIS — N39.41 URGE URINARY INCONTINENCE: Primary | ICD-10-CM

## 2024-05-29 RX ORDER — OXYCODONE HYDROCHLORIDE AND ACETAMINOPHEN 5; 325 MG/1; MG/1
1 TABLET ORAL EVERY 4 HOURS PRN
COMMUNITY
Start: 2024-05-26

## 2024-05-29 NOTE — TELEPHONE ENCOUNTER
Addended by: MARIPOSA GRESHAM on: 3/27/2019 07:09 PM     Modules accepted: Orders     Provider: DR MACARIO    Caller: MATHEW TAYLOR    Relationship to Patient: SON    Reason for Call: PT SON CALLED TO ADVISE PT FELL WHILE ON CRUISE AND BROKE 3 RIBS.    HE WOULD LIKE TO CONFIRM IF IT IS POSSIBLE TO DISCUSS A1C ON THE PHONE INSTEAD OF MAKING THE PT TRAVEL.    PT IS SCHEDULED TODAY AT 3:30    PLEASE CALL SON BACK #[phone]

## 2024-05-29 NOTE — PROGRESS NOTES
Follow Up Office Visit      Patient Name: Martha Harrell  : 1955   MRN: [id number]     Chief Complaint:    Chief Complaint   Patient presents with    Urge urinary incontinence       Referring Provider: No ref. provider found    History of Present Illness: Martha Harrell is a 69 y.o. female who presents today for follow up of urge urinary incontinence.  She reports she continues to have some urgency and frequency.    She reports she continues to drink a lot of diet coke.      She recently fell and broke three ribs.      Subjective      Review of System:   Review of Systems   Genitourinary:  Positive for frequency and urgency.      I have reviewed the ROS documented by my clinical staff, I have updated appropriately and I agree. Theresa Becerra MD    I have reviewed and the following portions of the patient's history were updated as appropriate: past family history, past medical history, past social history, past surgical history and problem list.    Past Medical History:   Past Medical History:   Diagnosis Date    Anemia     Asthma     Chronic bronchitis     Depression     Diabetes mellitus     DVT (deep venous thrombosis) 2023    Epilepsy     Fibromyalgia, primary     GERD (gastroesophageal reflux disease)     Hyperlipidemia     Leg DVT (deep venous thromboembolism), acute, left 2019       Past Surgical History:  Past Surgical History:   Procedure Laterality Date    BACK SURGERY      NECK SURGERY      TUBAL ABDOMINAL LIGATION         Family History:   family history includes Arthritis in her father, maternal aunt, maternal uncle, mother, sister, sister, sister, son, and son; Cancer in her father and maternal uncle; Heart disease in her mother; Hyperlipidemia in her father; Hypertension in her father; Obesity in her father, mother, and sister.   Otherwise pertinent FHx was reviewed and not pertinent to current issue.    Social History:    reports that she quit smoking about 14 years ago.  Her smoking use included cigarettes. She has been exposed to tobacco smoke. She has never used smokeless tobacco. She reports that she does not drink alcohol and does not use drugs.    Medications:     Current Outpatient Medications:     albuterol sulfate  (90 Base) MCG/ACT inhaler, Inhale 1 puff., Disp: , Rfl:     atorvastatin (LIPITOR) 20 MG tablet, Take 1 tablet by mouth Daily., Disp: , Rfl:     B-D UF III MINI PEN NEEDLES 31G X 5 MM misc, Use daily with insulin, Disp: 50 each, Rfl: 11    busPIRone (BUSPAR) 15 MG tablet, , Disp: , Rfl:     Continuous Blood Gluc Sensor (FreeStyle Pauly 2 Sensor) misc, SEE ADMIN INSTRUCTIONS EVERY 14 DAYS, Disp: , Rfl:     DULoxetine (CYMBALTA) 60 MG capsule, Take 1 capsule by mouth Daily., Disp: , Rfl: 0    Empagliflozin (JARDIANCE) 25 MG tablet, Take 25 mg by mouth Daily., Disp: 30 tablet, Rfl: 6    fenofibrate 160 MG tablet, , Disp: , Rfl:     Fosamax Plus D  MG-UNIT per tablet, Take 1 tablet by mouth Every 7 (Seven) Days., Disp: , Rfl:     furosemide (LASIX) 40 MG tablet, Take 1 tablet by mouth Every 12 (Twelve) Hours., Disp: , Rfl:     gabapentin (NEURONTIN) 100 MG capsule, , Disp: , Rfl:     HYDROcodone-acetaminophen (NORCO) 7.5-325 MG per tablet, Take 1 tablet by mouth 3 (Three) Times a Day As Needed., Disp: , Rfl:     Insulin Glargine, 2 Unit Dial, (TOUJEO MAX SOLOSTAR) 300 UNIT/ML solution pen-injector injection, Take 100 units at bedtime, after a week go up to 110 units, Disp: 4 pen, Rfl: 6    Insulin Lispro, 1 Unit Dial, (HUMALOG KWIKPEN) 100 UNIT/ML solution pen-injector, Take 20u before lunch (if you eat lunch) and take 40 units before supper, Disp: 6 pen, Rfl: 6    magnesium oxide (MAG-OX) 400 MG tablet, Take 1 tablet by mouth 2 (Two) Times a Day., Disp: , Rfl:     metFORMIN (GLUCOPHAGE) 1000 MG tablet, Take 1 tablet by mouth 2 (Two) Times a Day With Meals., Disp: 60 tablet, Rfl: 6    metFORMIN (GLUCOPHAGE) 1000 MG tablet, Take 1 tablet by mouth., Disp:  , Rfl:     metoprolol tartrate (LOPRESSOR) 50 MG tablet, Take 1 tablet by mouth Every 12 (Twelve) Hours., Disp: 60 tablet, Rfl: 0    Morphine (MS CONTIN) 30 MG 12 hr tablet, Take 1 tablet by mouth 2 (Two) Times a Day., Disp: , Rfl: 0    omega-3 acid ethyl esters (LOVAZA) 1 g capsule, Take 1 capsule by mouth Every 12 (Twelve) Hours., Disp: , Rfl:     Omega-3 Fatty Acids (fish oil) 1000 MG capsule capsule, Take 1 capsule by mouth Every 12 (Twelve) Hours., Disp: , Rfl:     omeprazole (priLOSEC) 20 MG capsule, Take 1 capsule by mouth Daily., Disp: , Rfl:     ONE TOUCH ULTRA TEST test strip, Test BS TID, Disp: 100 each, Rfl: 11    ONETOUCH DELICA LANCETS FINE misc, Test BS TID, Disp: 100 each, Rfl: 11    oxyCODONE-acetaminophen (PERCOCET) 5-325 MG per tablet, Take 1 tablet by mouth Every 4 (Four) Hours As Needed., Disp: , Rfl:     Ozempic, 1 MG/DOSE, 4 MG/3ML solution pen-injector, Inject 1 mg under the skin into the appropriate area as directed 1 (One) Time Per Week., Disp: , Rfl:     phenytoin (DILANTIN) 100 MG ER capsule, take 2 capsule by mouth twice a day, Disp: , Rfl: 0    propafenone (RYTHMOL) 225 MG tablet, , Disp: , Rfl:     RA VITAMIN D-3 5000 units capsule, Take 1 capsule by mouth Daily., Disp: , Rfl: 0    silver sulfadiazine (SILVADENE, SSD) 1 % cream, APPLY OVER ULCER EVERY DAY UNTIL HEALED, Disp: , Rfl:     warfarin (COUMADIN) 5 MG tablet, Take 1 tablet by mouth the evening of 5/31/23. Follow-up with PCP for INR on 6/1/23 for further dosage adjustments, Disp: 90 tablet, Rfl: 0    Allergies:   Allergies   Allergen Reactions    Naproxen Itching and Unknown - Low Severity     Bladder & Bowel Symptom Questionnaire    How often do you usually urinate during the day ?   3 - About every 1-2 hours   2.   How many timed do you urinate at night?   3 - 4 times at night   3.   What is the reason that you usually urinate?   4 - Desperate urge (must go immediately)   4.   Once you get the urge to go, how long can you      "comfortably delay?   4 - Must go immediately    5.   How often do you get a sudden urge that makes you rush to the bathroom?   4 - At least once a day   6.   How often does a sudden urge to urinate result in you leaking urine or wetting pads?   4 - At least once a day   7.  In your opinion, how good is your bladder control?   4 - No control   8.  Do you have accidental bowel leakage?   yes   9.  Do you have difficulty fully emptying your bladder?   yes   10.  Do you experience accidental leakage when performing some physical activity such as coughing, sneezing, laughing or exercise?   yes   11. Have you tried medications to help improve your symptoms?   yes   12. Would you be interested in learning about a long-lasting option that may help you with your symptoms?   yes                                                                             Total Score   26     0-7 (Mild) 8-16 (Moderate) 17-28 (Severe)       Objective     Physical Exam:   Vital Signs:   Vitals:    05/29/24 1537   Weight: 70.3 kg (155 lb)   Height: 157.5 cm (62\")   PainSc: 0-No pain     Body mass index is 28.35 kg/m².     Physical Exam  Vitals and nursing note reviewed. Exam conducted with a chaperone present.   Constitutional:       General: She is awake. She is not in acute distress.     Appearance: Normal appearance.   HENT:      Head: Normocephalic and atraumatic.      Right Ear: External ear normal.      Left Ear: External ear normal.      Nose: Nose normal.   Eyes:      Conjunctiva/sclera: Conjunctivae normal.   Pulmonary:      Effort: Pulmonary effort is normal. No respiratory distress.   Abdominal:      General: Abdomen is flat. There is no distension.      Palpations: Abdomen is soft. There is no mass.      Tenderness: There is no abdominal tenderness. There is no right CVA tenderness, left CVA tenderness, guarding or rebound.      Hernia: No hernia is present.   Genitourinary:     Exam position: Lithotomy position.   Skin:     General: " "Skin is warm.   Neurological:      General: No focal deficit present.      Mental Status: She is alert and oriented to person, place, and time.      Gait: Gait normal.   Psychiatric:         Behavior: Behavior normal. Behavior is cooperative.         Thought Content: Thought content normal.         Judgment: Judgment normal.         Labs:   Brief Urine Lab Results  (Last result in the past 365 days)        Color   Clarity   Blood   Leuk Est   Nitrite   Protein   CREAT   Urine HCG        05/10/24 1221 Yellow   Clear   Negative   Negative   Negative   Negative                        Lab Results   Component Value Date    GLUCOSE 302 (H) 06/05/2023    CALCIUM 8.5 (L) 06/05/2023     06/05/2023    K 4.6 06/05/2023    CO2 28.0 06/05/2023     06/05/2023    BUN 14 06/05/2023    CREATININE 0.80 06/05/2023    EGFRIFNONA 83 07/18/2019    BCR 20.3 06/05/2023    ANIONGAP 9.0 06/05/2023       Lab Results   Component Value Date    WBC 7.13 06/05/2023    HGB 11.3 (L) 06/05/2023    HCT 37.7 06/05/2023    MCV 86.5 06/05/2023     06/05/2023       No results found for: \"PSA\"    Images:   CT chest abdomen pelvis with contrast    Result Date: 5/26/2024  Displaced right anterior seventh, eighth and ninth rib fractures. No acute traumatic findings in the abdomen or pelvis. Images personally reviewed, interpreted and dictated by CLEMENTE Tyson M.D.    CT Lumbar Spine Without Contrast    Result Date: 5/26/2024  No acute fracture or malalignment of the lumbar spine. Images personally reviewed, interpreted and dictated by CLEMENTE Tyson M.D.    XR Hip With or Without Pelvis 2 - 3 View Right    Result Date: 3/13/2024  Degenerative changes with no acute bony abnormality. Images reviewed, interpreted, and dictated by Dr. BJ Klein. Transcribed by CARMELINA Rowan(PHILIPP).    US doppler venous leg left    Result Date: 3/7/2024  No evidence of deep venous thrombosis of the left lower extremity. Images reviewed, " interpreted, and dictated by Dr. Mc Acosta. Transcribed by Rishabh Villa PA-C.      Measures:   Tobacco:   Martha Harrell  reports that she quit smoking about 14 years ago. Her smoking use included cigarettes. She has been exposed to tobacco smoke. She has never used smokeless tobacco.    Urine Incontinence: Patient reports that she is not currently experiencing any symptoms of urinary incontinence.         Assessment / Plan      Assessment/Plan:   69 y.o. female who presented today for follow up of OAB.  She continues to consume caffeine.  She is going to try and switch to caffeine free diet coke.  We also discussed her A1c and at this time she is not a candidate for PNE or SNS.    I will see her back in 6 months.      Diagnoses and all orders for this visit:    1. Urge urinary incontinence (Primary)         Follow Up:   Return in about 6 months (around 11/29/2024) for Recheck.    I spent approximately 30 minutes providing clinical care for this patient; including review of patient's chart and provider documentation, face to face time spent with patient in examination room (obtaining history, performing physical exam, discussing diagnosis and management options), placing orders, and completing patient documentation.     Theresa Becerra MD  [facility] [place]

## 2024-05-29 NOTE — TELEPHONE ENCOUNTER
Provider: DR MACARIO    Caller: MATHEW TAYLOR    Relationship to Patient: SON    Phone Number: [phone]    Reason for Call: PT'S SON CALLED BACK TO ADVISE THAT SINCE THEY HADN'T HEARD BACK FROM OFFICE ABOUT HER APPT TODAY, PT'S GRANDDAUGHTER IS ON HER WAY WITH PT. THEY WILL KEEP APPT TODAY.

## 2024-06-25 ENCOUNTER — TRANSCRIBE ORDERS (OUTPATIENT)
Dept: ADMINISTRATIVE | Facility: HOSPITAL | Age: 69
End: 2024-06-25
Payer: MEDICARE

## 2024-06-25 DIAGNOSIS — M79.89 SWELLING OF RIGHT EXTREMITY: Primary | ICD-10-CM

## 2024-06-25 DIAGNOSIS — L98.411: ICD-10-CM

## 2024-06-26 ENCOUNTER — HOSPITAL ENCOUNTER (OUTPATIENT)
Dept: CARDIOLOGY | Facility: HOSPITAL | Age: 69
Discharge: HOME | End: 2024-06-26
Admitting: NURSE PRACTITIONER
Payer: MEDICARE

## 2024-06-26 DIAGNOSIS — M79.89 SWELLING OF RIGHT EXTREMITY: ICD-10-CM

## 2024-06-26 DIAGNOSIS — L98.411: ICD-10-CM

## 2024-06-26 PROCEDURE — 93971 EXTREMITY STUDY: CPT

## 2024-06-27 LAB
BH CV LOW VAS RIGHT COMMON FEMORAL SPONT: 1
BH CV LOW VAS RIGHT GASTRONEMIUS VESSEL: 1
BH CV LOW VAS RIGHT POPLITEAL SPONT: 1
BH CV LOWER VASCULAR LEFT COMMON FEMORAL AUGMENT: NORMAL
BH CV LOWER VASCULAR LEFT COMMON FEMORAL PHASIC: NORMAL
BH CV LOWER VASCULAR LEFT COMMON FEMORAL SPONT: NORMAL
BH CV LOWER VASCULAR RIGHT COMMON FEMORAL AUGMENT: NORMAL
BH CV LOWER VASCULAR RIGHT COMMON FEMORAL COMPRESS: NORMAL
BH CV LOWER VASCULAR RIGHT COMMON FEMORAL PHASIC: NORMAL
BH CV LOWER VASCULAR RIGHT COMMON FEMORAL SPONT: NORMAL
BH CV LOWER VASCULAR RIGHT DISTAL FEMORAL AUGMENT: NORMAL
BH CV LOWER VASCULAR RIGHT DISTAL FEMORAL COMPRESS: NORMAL
BH CV LOWER VASCULAR RIGHT DISTAL FEMORAL PHASIC: NORMAL
BH CV LOWER VASCULAR RIGHT DISTAL FEMORAL SPONT: NORMAL
BH CV LOWER VASCULAR RIGHT GASTRONEMIUS COMPRESS: NORMAL
BH CV LOWER VASCULAR RIGHT GREATER SAPH AK COMPRESS: NORMAL
BH CV LOWER VASCULAR RIGHT GREATER SAPH BK COMPRESS: NORMAL
BH CV LOWER VASCULAR RIGHT LESSER SAPH COMPRESS: NORMAL
BH CV LOWER VASCULAR RIGHT MID FEMORAL AUGMENT: NORMAL
BH CV LOWER VASCULAR RIGHT MID FEMORAL COMPRESS: NORMAL
BH CV LOWER VASCULAR RIGHT MID FEMORAL PHASIC: NORMAL
BH CV LOWER VASCULAR RIGHT MID FEMORAL SPONT: NORMAL
BH CV LOWER VASCULAR RIGHT PERONEAL COMPRESS: NORMAL
BH CV LOWER VASCULAR RIGHT POPLITEAL AUGMENT: NORMAL
BH CV LOWER VASCULAR RIGHT POPLITEAL COMPRESS: NORMAL
BH CV LOWER VASCULAR RIGHT POPLITEAL PHASIC: NORMAL
BH CV LOWER VASCULAR RIGHT POPLITEAL SPONT: NORMAL
BH CV LOWER VASCULAR RIGHT POSTERIOR TIBIAL COMPRESS: NORMAL
BH CV LOWER VASCULAR RIGHT PROFUNDA FEMORAL PHASIC: NORMAL
BH CV LOWER VASCULAR RIGHT PROFUNDA FEMORAL SPONT: NORMAL
BH CV LOWER VASCULAR RIGHT PROXIMAL FEMORAL AUGMENT: NORMAL
BH CV LOWER VASCULAR RIGHT PROXIMAL FEMORAL COMPRESS: NORMAL
BH CV LOWER VASCULAR RIGHT PROXIMAL FEMORAL PHASIC: NORMAL
BH CV LOWER VASCULAR RIGHT PROXIMAL FEMORAL SPONT: NORMAL
BH CV LOWER VASCULAR RIGHT SAPHENOFEMORAL JUNCTION AUGMENT: NORMAL
BH CV LOWER VASCULAR RIGHT SAPHENOFEMORAL JUNCTION COMPRESS: NORMAL
BH CV LOWER VASCULAR RIGHT SAPHENOFEMORAL JUNCTION PHASIC: NORMAL
BH CV LOWER VASCULAR RIGHT SAPHENOFEMORAL JUNCTION SPONT: NORMAL

## 2024-10-01 ENCOUNTER — HOSPITAL ENCOUNTER (EMERGENCY)
Facility: HOSPITAL | Age: 69
Discharge: HOME | End: 2024-10-01
Attending: EMERGENCY MEDICINE
Payer: MEDICARE

## 2024-10-01 ENCOUNTER — APPOINTMENT (OUTPATIENT)
Dept: CT IMAGING | Facility: HOSPITAL | Age: 69
End: 2024-10-01
Payer: MEDICARE

## 2024-10-01 VITALS
OXYGEN SATURATION: 95 % | DIASTOLIC BLOOD PRESSURE: 77 MMHG | BODY MASS INDEX: 32.98 KG/M2 | SYSTOLIC BLOOD PRESSURE: 148 MMHG | HEIGHT: 60 IN | TEMPERATURE: 98.7 F | WEIGHT: 168 LBS | RESPIRATION RATE: 18 BRPM | HEART RATE: 70 BPM

## 2024-10-01 DIAGNOSIS — R73.9 ACUTE HYPERGLYCEMIA: ICD-10-CM

## 2024-10-01 DIAGNOSIS — S30.0XXA COCCYX CONTUSION, INITIAL ENCOUNTER: Primary | ICD-10-CM

## 2024-10-01 LAB
ALBUMIN SERPL-MCNC: 3.5 G/DL (ref 3.5–5.2)
ALBUMIN/GLOB SERPL: 1 G/DL
ALP SERPL-CCNC: 160 U/L (ref 39–117)
ALT SERPL W P-5'-P-CCNC: 35 U/L (ref 1–33)
ANION GAP SERPL CALCULATED.3IONS-SCNC: 9 MMOL/L (ref 5–15)
ARTERIAL PATENCY WRIST A: ABNORMAL
AST SERPL-CCNC: 30 U/L (ref 1–32)
ATMOSPHERIC PRESS: ABNORMAL MM[HG]
BASE EXCESS BLDA CALC-SCNC: 3.9 MMOL/L (ref 0–2)
BASOPHILS # BLD AUTO: 0.01 10*3/MM3 (ref 0–0.2)
BASOPHILS NFR BLD AUTO: 0.1 % (ref 0–1.5)
BDY SITE: ABNORMAL
BILIRUB SERPL-MCNC: 0.3 MG/DL (ref 0–1.2)
BODY TEMPERATURE: 37
BUN SERPL-MCNC: 19 MG/DL (ref 8–23)
BUN/CREAT SERPL: 28.4 (ref 7–25)
CALCIUM SPEC-SCNC: 9 MG/DL (ref 8.6–10.5)
CHLORIDE SERPL-SCNC: 95 MMOL/L (ref 98–107)
CO2 BLDA-SCNC: 29.2 MMOL/L (ref 22–33)
CO2 SERPL-SCNC: 31 MMOL/L (ref 22–29)
COHGB MFR BLD: 1.6 % (ref 0–2)
CREAT SERPL-MCNC: 0.67 MG/DL (ref 0.57–1)
DEPRECATED RDW RBC AUTO: 43.1 FL (ref 37–54)
EGFRCR SERPLBLD CKD-EPI 2021: 94.2 ML/MIN/1.73
EOSINOPHIL # BLD AUTO: 0.17 10*3/MM3 (ref 0–0.4)
EOSINOPHIL NFR BLD AUTO: 2.2 % (ref 0.3–6.2)
EPAP: 0
ERYTHROCYTE [DISTWIDTH] IN BLOOD BY AUTOMATED COUNT: 13.4 % (ref 12.3–15.4)
GLOBULIN UR ELPH-MCNC: 3.6 GM/DL
GLUCOSE BLDC GLUCOMTR-MCNC: 296 MG/DL (ref 70–130)
GLUCOSE BLDC GLUCOMTR-MCNC: 360 MG/DL (ref 70–130)
GLUCOSE BLDC GLUCOMTR-MCNC: 387 MG/DL (ref 70–130)
GLUCOSE SERPL-MCNC: 508 MG/DL (ref 65–99)
HCO3 BLDA-SCNC: 27.9 MMOL/L (ref 20–26)
HCT VFR BLD AUTO: 44.7 % (ref 34–46.6)
HCT VFR BLD CALC: 42.9 % (ref 38–51)
HGB BLD-MCNC: 15.1 G/DL (ref 12–15.9)
HGB BLDA-MCNC: 14 G/DL (ref 14–18)
IMM GRANULOCYTES # BLD AUTO: 0.03 10*3/MM3 (ref 0–0.05)
IMM GRANULOCYTES NFR BLD AUTO: 0.4 % (ref 0–0.5)
INHALED O2 CONCENTRATION: 21 %
INR PPP: 3.77 (ref 0.89–1.12)
IPAP: 0
LYMPHOCYTES # BLD AUTO: 1.09 10*3/MM3 (ref 0.7–3.1)
LYMPHOCYTES NFR BLD AUTO: 13.8 % (ref 19.6–45.3)
MCH RBC QN AUTO: 29.9 PG (ref 26.6–33)
MCHC RBC AUTO-ENTMCNC: 33.8 G/DL (ref 31.5–35.7)
MCV RBC AUTO: 88.5 FL (ref 79–97)
METHGB BLD QL: ABNORMAL
MODALITY: ABNORMAL
MONOCYTES # BLD AUTO: 0.35 10*3/MM3 (ref 0.1–0.9)
MONOCYTES NFR BLD AUTO: 4.4 % (ref 5–12)
NEUTROPHILS NFR BLD AUTO: 6.24 10*3/MM3 (ref 1.7–7)
NEUTROPHILS NFR BLD AUTO: 79.1 % (ref 42.7–76)
NRBC BLD AUTO-RTO: 0 /100 WBC (ref 0–0.2)
OXYHGB MFR BLDV: 95.8 % (ref 94–99)
PAW @ PEAK INSP FLOW SETTING VENT: 0 CMH2O
PCO2 BLDA: 39.3 MM HG (ref 35–45)
PCO2 TEMP ADJ BLD: 39.3 MM HG (ref 35–45)
PH BLDA: 7.46 PH UNITS (ref 7.35–7.45)
PH, TEMP CORRECTED: 7.46 PH UNITS
PLATELET # BLD AUTO: 216 10*3/MM3 (ref 140–450)
PMV BLD AUTO: 9.5 FL (ref 6–12)
PO2 BLDA: 72.9 MM HG (ref 83–108)
PO2 TEMP ADJ BLD: 72.9 MM HG (ref 83–108)
POTASSIUM SERPL-SCNC: 5.2 MMOL/L (ref 3.5–5.2)
PROT SERPL-MCNC: 7.1 G/DL (ref 6–8.5)
PROTHROMBIN TIME: 37.2 SECONDS (ref 12.2–14.5)
RBC # BLD AUTO: 5.05 10*6/MM3 (ref 3.77–5.28)
SODIUM SERPL-SCNC: 135 MMOL/L (ref 136–145)
TOTAL RATE: 0 BREATHS/MINUTE
WBC NRBC COR # BLD AUTO: 7.89 10*3/MM3 (ref 3.4–10.8)

## 2024-10-01 PROCEDURE — 85025 COMPLETE CBC W/AUTO DIFF WBC: CPT | Performed by: EMERGENCY MEDICINE

## 2024-10-01 PROCEDURE — 72192 CT PELVIS W/O DYE: CPT

## 2024-10-01 PROCEDURE — 82805 BLOOD GASES W/O2 SATURATION: CPT

## 2024-10-01 PROCEDURE — 82948 REAGENT STRIP/BLOOD GLUCOSE: CPT

## 2024-10-01 PROCEDURE — 25810000003 SODIUM CHLORIDE 0.9 % SOLUTION: Performed by: EMERGENCY MEDICINE

## 2024-10-01 PROCEDURE — 80053 COMPREHEN METABOLIC PANEL: CPT | Performed by: EMERGENCY MEDICINE

## 2024-10-01 PROCEDURE — 83050 HGB METHEMOGLOBIN QUAN: CPT

## 2024-10-01 PROCEDURE — 99284 EMERGENCY DEPT VISIT MOD MDM: CPT

## 2024-10-01 PROCEDURE — 36415 COLL VENOUS BLD VENIPUNCTURE: CPT

## 2024-10-01 PROCEDURE — 82375 ASSAY CARBOXYHB QUANT: CPT

## 2024-10-01 PROCEDURE — 36600 WITHDRAWAL OF ARTERIAL BLOOD: CPT

## 2024-10-01 PROCEDURE — 85610 PROTHROMBIN TIME: CPT | Performed by: EMERGENCY MEDICINE

## 2024-10-01 PROCEDURE — 63710000001 INSULIN REGULAR HUMAN PER 5 UNITS: Performed by: EMERGENCY MEDICINE

## 2024-10-01 PROCEDURE — 72131 CT LUMBAR SPINE W/O DYE: CPT

## 2024-10-01 RX ADMIN — SODIUM CHLORIDE 2000 ML: 9 INJECTION, SOLUTION INTRAVENOUS at 14:34

## 2024-10-01 RX ADMIN — INSULIN HUMAN 5 UNITS: 100 INJECTION, SOLUTION PARENTERAL at 14:42

## 2024-10-01 RX ADMIN — INSULIN HUMAN 5 UNITS: 100 INJECTION, SOLUTION PARENTERAL at 17:01

## 2024-10-01 RX ADMIN — SODIUM CHLORIDE 1000 ML: 9 INJECTION, SOLUTION INTRAVENOUS at 17:01

## 2024-10-01 NOTE — DISCHARGE INSTRUCTIONS
Under blood sugar closely at home.    Drink plenty of fluids.    Apply ice to your buttocks and sit on a soft pillow to minimize pain.    Engage in regular physical activity such as walking and stretching to help improve buttock pain.

## 2024-10-01 NOTE — Clinical Note
[facility] EMERGENCY DEPARTMENT  [address]  [facility] [address]  Phone: [phone]    Martha Harrell was seen and treated in our emergency department on 10/1/2024.  She may return to work on 10/03/2024.         Thank you for choosing [facility].    Jaime Husain MD

## 2024-10-02 NOTE — ED PROVIDER NOTES
Subjective   History of Present Illness  69-year-old female who presents for evaluation of buttocks pain.  The patient reports suffering a fall onto her bottom several weeks ago.  She was evaluated at [facility], and CT imaging that did not show any abnormality.  She continues to report pain if she has pressure placed on that area which prompted current presentation.  She is awake alert answers questions well.  She denies fever.  No chest pain.  No abdominal pain.  She exhibits good strength the lower extremities.  No symptoms of cauda equina.  No further surgeries planned.  No fever.  No history of malignancy or unexpected loss of weight.  No other red flags of back pain.  The patient exhibits pain to palpation of the coccyx.  No redness or overlying skin breakdown.      Review of Systems   Constitutional:  Positive for fatigue. Negative for chills and fever.   HENT:  Negative for congestion, ear pain, postnasal drip, sinus pressure and sore throat.    Eyes:  Negative for pain, redness and visual disturbance.   Respiratory:  Negative for cough, chest tightness and shortness of breath.    Cardiovascular:  Negative for chest pain, palpitations and leg swelling.   Gastrointestinal:  Negative for abdominal pain, anal bleeding, blood in stool, diarrhea, nausea and vomiting.   Endocrine: Negative for polydipsia and polyuria.   Genitourinary:  Negative for difficulty urinating, dysuria, frequency and urgency.   Musculoskeletal:  Positive for back pain. Negative for arthralgias and neck pain.   Skin:  Negative for pallor and rash.   Allergic/Immunologic: Negative for environmental allergies and immunocompromised state.   Neurological:  Negative for dizziness, weakness and headaches.   Hematological:  Negative for adenopathy.   Psychiatric/Behavioral:  Negative for confusion, self-injury and suicidal ideas. The patient is not nervous/anxious.    All other systems reviewed and are negative.      Past Medical History:    Diagnosis Date    Anemia     Asthma     Chronic bronchitis     Depression     Diabetes mellitus     DVT (deep venous thrombosis) 2023    Epilepsy     Fibromyalgia, primary     GERD (gastroesophageal reflux disease)     Hyperlipidemia     Leg DVT (deep venous thromboembolism), acute, left 2019       Allergies   Allergen Reactions    Naproxen Itching and Unknown - Low Severity       Past Surgical History:   Procedure Laterality Date    BACK SURGERY      NECK SURGERY      TUBAL ABDOMINAL LIGATION         Family History   Problem Relation Age of Onset    Arthritis Mother     Heart disease Mother     Obesity Mother     Arthritis Father     Cancer Father     Hypertension Father     Hyperlipidemia Father     Obesity Father     Arthritis Sister     Obesity Sister     Arthritis Sister     Arthritis Sister     Arthritis Maternal Aunt     Arthritis Maternal Uncle     Cancer Maternal Uncle     Arthritis Son     Arthritis Son        Social History     Socioeconomic History    Marital status:    Tobacco Use    Smoking status: Former     Current packs/day: 0.00     Types: Cigarettes     Quit date: 2010     Years since quittin.7     Passive exposure: Past    Smokeless tobacco: Never   Vaping Use    Vaping status: Never Used   Substance and Sexual Activity    Alcohol use: No    Drug use: No    Sexual activity: Not Currently           Objective   Physical Exam  Vitals and nursing note reviewed.   Constitutional:       General: She is not in acute distress.     Appearance: Normal appearance. She is well-developed. She is not toxic-appearing or diaphoretic.   HENT:      Head: Normocephalic and atraumatic.      Right Ear: External ear normal.      Left Ear: External ear normal.      Nose: Nose normal.   Eyes:      General: Lids are normal.      Pupils: Pupils are equal, round, and reactive to light.   Neck:      Trachea: No tracheal deviation.   Cardiovascular:      Rate and Rhythm: Normal rate and  regular rhythm.      Pulses: No decreased pulses.      Heart sounds: Normal heart sounds. No murmur heard.     No friction rub. No gallop.   Pulmonary:      Effort: Pulmonary effort is normal. No respiratory distress.      Breath sounds: Normal breath sounds. No decreased breath sounds, wheezing, rhonchi or rales.   Abdominal:      General: Bowel sounds are normal.      Palpations: Abdomen is soft.      Tenderness: There is no abdominal tenderness. There is no guarding or rebound.   Musculoskeletal:         General: No deformity. Normal range of motion.      Cervical back: Normal range of motion and neck supple.      Lumbar back: Tenderness and bony tenderness present.      Comments: The patient reports pain to palpation of the coccyx.  No overlying redness or skin breakdown.   Lymphadenopathy:      Cervical: No cervical adenopathy.   Skin:     General: Skin is warm and dry.      Findings: No rash.   Neurological:      Mental Status: She is alert and oriented to person, place, and time.      Cranial Nerves: No cranial nerve deficit.      Sensory: No sensory deficit.   Psychiatric:         Speech: Speech normal.         Behavior: Behavior normal.         Thought Content: Thought content normal.         Judgment: Judgment normal.         Procedures           ED Course                                             Medical Decision Making  Differential diagnosis includes lumbar spine fracture, coccyx fracture, contusion, other unspecified etiology.    CT scan of the lumbar spine and of the pelvis is negative for any acute fracture.    Labs show normal kidney function, no significant electrolyte abnormalities.  Significant hyperglycemia but no signs of DKA    Normal white count, normal H&H.    The patient was given IV fluids and 2 doses of insulin and the blood sugar had improved into the 200s.  She reports feeling well exhibits normal lower extremity strength and will be discharged with the advise to apply ice to her  buttocks over the area of pain.    She will be advised to follow-up with primary care physician for recheck within the next week and engage in regular activity and stretching to help maintain strength.    Problems Addressed:  Acute hyperglycemia: complicated acute illness or injury with systemic symptoms  Coccyx contusion, initial encounter: complicated acute illness or injury with systemic symptoms    Amount and/or Complexity of Data Reviewed  External Data Reviewed: labs and radiology.  Labs: ordered. Decision-making details documented in ED Course.  Radiology: ordered and independent interpretation performed. Decision-making details documented in ED Course.    Risk  OTC drugs.        Final diagnoses:   Coccyx contusion, initial encounter   Acute hyperglycemia       ED Disposition  ED Disposition       ED Disposition   Discharge    Condition   Stable    Comment   --               Ginger Ward, APRN  [address] [place] [address] [phone]    In 1 week           Medication List      No changes were made to your prescriptions during this visit.            Jaime Husain MD  10/02/24 2002

## 2024-12-04 ENCOUNTER — OFFICE VISIT (OUTPATIENT)
Dept: UROLOGY | Facility: CLINIC | Age: 69
End: 2024-12-04
Payer: MEDICARE

## 2024-12-04 DIAGNOSIS — N39.41 URGE URINARY INCONTINENCE: Primary | ICD-10-CM

## 2024-12-04 NOTE — PROGRESS NOTES
Follow Up Office Visit      Patient Name: Martha Harrell  : 1955   MRN: [id number]     Chief Complaint:    Chief Complaint   Patient presents with    Urge urinary incontinence       Referring Provider: No ref. provider found    History of Present Illness: Martha Harrell is a 69 y.o. female who presents today for follow up of urgency and frequency.  She reports she is going through 5-6 pads per day.  Her glucose is still fairly uncontrolled.  On average she is still drinking 3-4 diet cokes per day.  She is not currently on any medication for her OAB.         Subjective      Review of System:   Review of Systems   I have reviewed the ROS documented by my clinical staff, I have updated appropriately and I agree. Theresa Becerra MD    I have reviewed and the following portions of the patient's history were updated as appropriate: past family history, past medical history, past social history, past surgical history and problem list.    Past Medical History:   Past Medical History:   Diagnosis Date    Anemia     Asthma     Chronic bronchitis     Depression     Diabetes mellitus     DVT (deep venous thrombosis) 2023    Epilepsy     Fibromyalgia, primary     GERD (gastroesophageal reflux disease)     Hyperlipidemia     Leg DVT (deep venous thromboembolism), acute, left 2019       Past Surgical History:  Past Surgical History:   Procedure Laterality Date    BACK SURGERY      NECK SURGERY      TUBAL ABDOMINAL LIGATION         Family History:   family history includes Arthritis in her father, maternal aunt, maternal uncle, mother, sister, sister, sister, son, and son; Cancer in her father and maternal uncle; Heart disease in her mother; Hyperlipidemia in her father; Hypertension in her father; Obesity in her father, mother, and sister.   Otherwise pertinent FHx was reviewed and not pertinent to current issue.    Social History:    reports that she quit smoking about 14 years ago. Her smoking use  included cigarettes. She has been exposed to tobacco smoke. She has never used smokeless tobacco. She reports that she does not drink alcohol and does not use drugs.    Medications:     Current Outpatient Medications:     albuterol sulfate  (90 Base) MCG/ACT inhaler, Inhale 1 puff., Disp: , Rfl:     atorvastatin (LIPITOR) 20 MG tablet, Take 1 tablet by mouth Daily., Disp: , Rfl:     B-D UF III MINI PEN NEEDLES 31G X 5 MM misc, Use daily with insulin, Disp: 50 each, Rfl: 11    busPIRone (BUSPAR) 15 MG tablet, , Disp: , Rfl:     Continuous Blood Gluc Sensor (FreeStyle Pauly 2 Sensor) misc, SEE ADMIN INSTRUCTIONS EVERY 14 DAYS, Disp: , Rfl:     DULoxetine (CYMBALTA) 60 MG capsule, Take 1 capsule by mouth Daily., Disp: , Rfl: 0    Empagliflozin (JARDIANCE) 25 MG tablet, Take 25 mg by mouth Daily., Disp: 30 tablet, Rfl: 6    fenofibrate 160 MG tablet, , Disp: , Rfl:     Fosamax Plus D  MG-UNIT per tablet, Take 1 tablet by mouth Every 7 (Seven) Days., Disp: , Rfl:     furosemide (LASIX) 40 MG tablet, Take 1 tablet by mouth Every 12 (Twelve) Hours., Disp: , Rfl:     gabapentin (NEURONTIN) 100 MG capsule, , Disp: , Rfl:     HYDROcodone-acetaminophen (NORCO) 7.5-325 MG per tablet, Take 1 tablet by mouth 3 (Three) Times a Day As Needed., Disp: , Rfl:     Insulin Glargine, 2 Unit Dial, (TOUJEO MAX SOLOSTAR) 300 UNIT/ML solution pen-injector injection, Take 100 units at bedtime, after a week go up to 110 units, Disp: 4 pen, Rfl: 6    Insulin Lispro, 1 Unit Dial, (HUMALOG KWIKPEN) 100 UNIT/ML solution pen-injector, Take 20u before lunch (if you eat lunch) and take 40 units before supper, Disp: 6 pen, Rfl: 6    magnesium oxide (MAG-OX) 400 MG tablet, Take 1 tablet by mouth 2 (Two) Times a Day., Disp: , Rfl:     metFORMIN (GLUCOPHAGE) 1000 MG tablet, Take 1 tablet by mouth 2 (Two) Times a Day With Meals., Disp: 60 tablet, Rfl: 6    metFORMIN (GLUCOPHAGE) 1000 MG tablet, Take 1 tablet by mouth., Disp: , Rfl:      metoprolol tartrate (LOPRESSOR) 50 MG tablet, Take 1 tablet by mouth Every 12 (Twelve) Hours., Disp: 60 tablet, Rfl: 0    Morphine (MS CONTIN) 30 MG 12 hr tablet, Take 1 tablet by mouth 2 (Two) Times a Day., Disp: , Rfl: 0    omega-3 acid ethyl esters (LOVAZA) 1 g capsule, Take 1 capsule by mouth Every 12 (Twelve) Hours., Disp: , Rfl:     Omega-3 Fatty Acids (fish oil) 1000 MG capsule capsule, Take 1 capsule by mouth Every 12 (Twelve) Hours., Disp: , Rfl:     omeprazole (priLOSEC) 20 MG capsule, Take 1 capsule by mouth Daily., Disp: , Rfl:     ONE TOUCH ULTRA TEST test strip, Test BS TID, Disp: 100 each, Rfl: 11    ONETOUCH DELICA LANCETS FINE misc, Test BS TID, Disp: 100 each, Rfl: 11    oxyCODONE-acetaminophen (PERCOCET) 5-325 MG per tablet, Take 1 tablet by mouth Every 4 (Four) Hours As Needed., Disp: , Rfl:     Ozempic, 1 MG/DOSE, 4 MG/3ML solution pen-injector, Inject 1 mg under the skin into the appropriate area as directed 1 (One) Time Per Week., Disp: , Rfl:     phenytoin (DILANTIN) 100 MG ER capsule, take 2 capsule by mouth twice a day, Disp: , Rfl: 0    propafenone (RYTHMOL) 225 MG tablet, , Disp: , Rfl:     RA VITAMIN D-3 5000 units capsule, Take 1 capsule by mouth Daily., Disp: , Rfl: 0    silver sulfadiazine (SILVADENE, SSD) 1 % cream, APPLY OVER ULCER EVERY DAY UNTIL HEALED, Disp: , Rfl:     warfarin (COUMADIN) 5 MG tablet, Take 1 tablet by mouth the evening of 5/31/23. Follow-up with PCP for INR on 6/1/23 for further dosage adjustments, Disp: 90 tablet, Rfl: 0    Vibegron 75 MG tablet, Take 1 tablet by mouth Daily for 30 days., Disp: 30 tablet, Rfl: 0    Allergies:   Allergies   Allergen Reactions    Naproxen Itching and Unknown - Low Severity       Bladder & Bowel Symptom Questionnaire    How often do you usually urinate during the day ?   4 - At least once an hour    2.   How many timed do you urinate at night?   4 - 5 or more times at night   3.   What is the reason that you usually urinate?   4 -  Desperate urge (must go immediately)   4.   Once you get the urge to go, how long can you     comfortably delay?   4 - Must go immediately    5.   How often do you get a sudden urge that makes you rush to the bathroom?   4 - At least once a day   6.   How often does a sudden urge to urinate result in you leaking urine or wetting pads?   4 - At least once a day   7.  In your opinion, how good is your bladder control?   4 - No control   8.  Do you have accidental bowel leakage?   no   9.  Do you have difficulty fully emptying your bladder?   yes   10.  Do you experience accidental leakage when performing some physical activity such as coughing, sneezing, laughing or exercise?   yes   11. Have you tried medications to help improve your symptoms?   yes   12. Would you be interested in learning about a long-lasting option that may help you with your symptoms?   yes                                                                             Total Score   30     0-7 (Mild) 8-16 (Moderate) 17-28 (Severe)       Objective     Physical Exam:   Vital Signs: There were no vitals filed for this visit.  There is no height or weight on file to calculate BMI.     Physical Exam  Vitals and nursing note reviewed. Exam conducted with a chaperone present.   Constitutional:       General: She is awake. She is not in acute distress.     Appearance: Normal appearance.   HENT:      Head: Normocephalic and atraumatic.      Right Ear: External ear normal.      Left Ear: External ear normal.      Nose: Nose normal.   Eyes:      Conjunctiva/sclera: Conjunctivae normal.   Pulmonary:      Effort: Pulmonary effort is normal. No respiratory distress.   Abdominal:      General: Abdomen is flat. There is no distension.      Palpations: Abdomen is soft. There is no mass.      Tenderness: There is no abdominal tenderness. There is no right CVA tenderness, left CVA tenderness, guarding or rebound.      Hernia: No hernia is present.   Genitourinary:      "Exam position: Lithotomy position.   Skin:     General: Skin is warm.   Neurological:      General: No focal deficit present.      Mental Status: She is alert and oriented to person, place, and time.      Gait: Gait normal.   Psychiatric:         Behavior: Behavior normal. Behavior is cooperative.         Thought Content: Thought content normal.         Judgment: Judgment normal.         Labs:   Brief Urine Lab Results  (Last result in the past 365 days)        Color   Clarity   Blood   Leuk Est   Nitrite   Protein   CREAT   Urine HCG        05/10/24 1221 Yellow   Clear   Negative   Negative   Negative   Negative                        Lab Results   Component Value Date    GLUCOSE 508 (C) 10/01/2024    CALCIUM 9.0 10/01/2024     (L) 10/01/2024    K 5.2 10/01/2024    CO2 31.0 (H) 10/01/2024    CL 95 (L) 10/01/2024    BUN 19 10/01/2024    CREATININE 0.67 10/01/2024    EGFRIFNONA 83 07/18/2019    BCR 28.4 (H) 10/01/2024    ANIONGAP 9.0 10/01/2024       Lab Results   Component Value Date    WBC 7.89 10/01/2024    HGB 15.1 10/01/2024    HCT 44.7 10/01/2024    MCV 88.5 10/01/2024     10/01/2024       No results found for: \"PSA\"    Images:   CT Pelvis Without Contrast    Result Date: 10/1/2024  Impression: No acute process. No fracture of the sacrum or coccyx identified. Severe degenerative change noted of the left hip. Electronically Signed: Zenobia Medellin MD  10/1/2024 1:36 PM EDT  Workstation ID: [id number]    CT Lumbar Spine Without Contrast    Result Date: 10/1/2024  Impression: 1. No acute fracture or malalignment of the lumbar spine. 2. Multilevel degenerative disc disease and degenerative facet change throughout the lumbar spine. Electronically Signed: Aubrey Bronson MD  10/1/2024 1:31 PM EDT  Workstation ID: [id number]    CT ABDOMEN PELVIS W CONTRAST    Result Date: 9/24/2024  No acute intracranial process. CT SCAN OF THE CHEST, ABDOMEN AND PELVIS WITH CONTRAST    9/24/2024 5:58 PM HISTORY: Pain. Trauma. " COMPARISON: None. PROCEDURE: The patient was injected with IV contrast. Axial images were obtained from the lung apex to the pubic symphysis by computed tomography. This study was performed with techniques to keep radiation doses as low as reasonably achievable, (ALARA). Individualized dose reduction techniques using automated exposure control or adjustment of mA and/or kV according to the patient size were employed. FINDINGS: CHEST: There is no axillary adenopathy. There is no hilar or mediastinal adenopathy.  The heart size is normal. Coronary artery calcifications. There is no pericardial or pleural effusion. No suspicious infiltrate or nodule identified. There is evidence of old granulomatous disease exposure. ABDOMEN: The liver is homogenous with no focal abnormality. Prominent gallbladder containing multiple stones, but there is no pericholecystic fluid, gallbladder wall thickening or biliary ductal dilatation. The spleen is unremarkable. No adrenal mass is present.  The pancreas is unremarkable. Bilateral benign-appearing renal cysts. The kidneys are otherwise unremarkable, without evidence of mass or hydronephrosis. The aorta is normal in caliber. There is no free fluid or adenopathy. Multifocal small ventral abdominal wall fat-containing hernias (series 2, images 89-93). Mild inflammatory changes seen in the subcutaneous fat overlying the right upper leg. PELVIS: The GI tract demonstrates no obstruction. The appendix is not identified. The urinary bladder is unremarkable.  There is no free fluid, adenopathy, or inflammatory process. BONES: No acute fractures. Multiple healed fractures of the right-sided ribs. Degenerative changes are present in the bilateral hip joints, advanced on the left side and mild on the right side. IMPRESSION: 1.  Mild inflammatory stranding within the subcutaneous fat overlying the right upper leg likely reflecting ecchymosis. 2.  No acute osseous findings. 3.  Advanced left hip  osteoarthritis. 4.  Cholelithiasis without evidence of acute cholecystitis. Images reviewed, interpreted, and dictated by KALEB Crooks M.D.     CT CHEST W CONTRAST DIAGNOSTIC    Result Date: 9/24/2024  No acute intracranial process. CT SCAN OF THE CHEST, ABDOMEN AND PELVIS WITH CONTRAST    9/24/2024 5:58 PM HISTORY: Pain. Trauma. COMPARISON: None. PROCEDURE: The patient was injected with IV contrast. Axial images were obtained from the lung apex to the pubic symphysis by computed tomography. This study was performed with techniques to keep radiation doses as low as reasonably achievable, (ALARA). Individualized dose reduction techniques using automated exposure control or adjustment of mA and/or kV according to the patient size were employed. FINDINGS: CHEST: There is no axillary adenopathy. There is no hilar or mediastinal adenopathy.  The heart size is normal. Coronary artery calcifications. There is no pericardial or pleural effusion. No suspicious infiltrate or nodule identified. There is evidence of old granulomatous disease exposure. ABDOMEN: The liver is homogenous with no focal abnormality. Prominent gallbladder containing multiple stones, but there is no pericholecystic fluid, gallbladder wall thickening or biliary ductal dilatation. The spleen is unremarkable. No adrenal mass is present.  The pancreas is unremarkable. Bilateral benign-appearing renal cysts. The kidneys are otherwise unremarkable, without evidence of mass or hydronephrosis. The aorta is normal in caliber. There is no free fluid or adenopathy. Multifocal small ventral abdominal wall fat-containing hernias (series 2, images 89-93). Mild inflammatory changes seen in the subcutaneous fat overlying the right upper leg. PELVIS: The GI tract demonstrates no obstruction. The appendix is not identified. The urinary bladder is unremarkable.  There is no free fluid, adenopathy, or inflammatory process. BONES: No acute fractures. Multiple healed  fractures of the right-sided ribs. Degenerative changes are present in the bilateral hip joints, advanced on the left side and mild on the right side. IMPRESSION: 1.  Mild inflammatory stranding within the subcutaneous fat overlying the right upper leg likely reflecting ecchymosis. 2.  No acute osseous findings. 3.  Advanced left hip osteoarthritis. 4.  Cholelithiasis without evidence of acute cholecystitis. Images reviewed, interpreted, and dictated by KALEB Crooks M.D.     CT HEAD WO CONTRAST    Result Date: 9/24/2024  No acute intracranial process. CT SCAN OF THE CHEST, ABDOMEN AND PELVIS WITH CONTRAST    9/24/2024 5:58 PM HISTORY: Pain. Trauma. COMPARISON: None. PROCEDURE: The patient was injected with IV contrast. Axial images were obtained from the lung apex to the pubic symphysis by computed tomography. This study was performed with techniques to keep radiation doses as low as reasonably achievable, (ALARA). Individualized dose reduction techniques using automated exposure control or adjustment of mA and/or kV according to the patient size were employed. FINDINGS: CHEST: There is no axillary adenopathy. There is no hilar or mediastinal adenopathy.  The heart size is normal. Coronary artery calcifications. There is no pericardial or pleural effusion. No suspicious infiltrate or nodule identified. There is evidence of old granulomatous disease exposure. ABDOMEN: The liver is homogenous with no focal abnormality. Prominent gallbladder containing multiple stones, but there is no pericholecystic fluid, gallbladder wall thickening or biliary ductal dilatation. The spleen is unremarkable. No adrenal mass is present.  The pancreas is unremarkable. Bilateral benign-appearing renal cysts. The kidneys are otherwise unremarkable, without evidence of mass or hydronephrosis. The aorta is normal in caliber. There is no free fluid or adenopathy. Multifocal small ventral abdominal wall fat-containing hernias (series 2,  images 89-93). Mild inflammatory changes seen in the subcutaneous fat overlying the right upper leg. PELVIS: The GI tract demonstrates no obstruction. The appendix is not identified. The urinary bladder is unremarkable.  There is no free fluid, adenopathy, or inflammatory process. BONES: No acute fractures. Multiple healed fractures of the right-sided ribs. Degenerative changes are present in the bilateral hip joints, advanced on the left side and mild on the right side. IMPRESSION: 1.  Mild inflammatory stranding within the subcutaneous fat overlying the right upper leg likely reflecting ecchymosis. 2.  No acute osseous findings. 3.  Advanced left hip osteoarthritis. 4.  Cholelithiasis without evidence of acute cholecystitis. Images reviewed, interpreted, and dictated by KALEB Crooks M.D.       Measures:   Tobacco:   Martha Harrell  reports that she quit smoking about 14 years ago. Her smoking use included cigarettes. She has been exposed to tobacco smoke. She has never used smokeless tobacco.      Urine Incontinence: Patient reports that she is not currently experiencing any symptoms of urinary incontinence.         Assessment / Plan      Assessment/Plan:   69 y.o. female who presented today for follow up of OAB.  Her diabetes is still very poorly controlled.  They are working with her diet and medications to try and help her.  In the meantime, I will restart her on Gemtesa.  I will see her back in 6 months.  Unfortunately, she has significant medical comorbidity and I would not recommend PNE.    Diagnoses and all orders for this visit:    1. Urge urinary incontinence (Primary)  -     Vibegron 75 MG tablet; Take 1 tablet by mouth Daily for 30 days.  Dispense: 30 tablet; Refill: 0         Follow Up:   Return in about 6 months (around 6/4/2025) for Recheck.    I spent approximately 30 minutes providing clinical care for this patient; including review of patient's chart and provider documentation, face to face  time spent with patient in examination room (obtaining history, performing physical exam, discussing diagnosis and management options), placing orders, and completing patient documentation.     Theresa Becerra MD  [facility]

## 2025-03-06 ENCOUNTER — TELEPHONE (OUTPATIENT)
Dept: UROLOGY | Facility: CLINIC | Age: 70
End: 2025-03-06

## 2025-03-06 NOTE — TELEPHONE ENCOUNTER
Provider: ANNETTE HUTSON    Caller: alida rich    Relationship to Patient: Emergency Contact      Reason for Call: PT WOULD LIKE TO CONFIRM IF SHE SHOULD BE SEEN SOONER THAN JUNE.  HER RECENT A1C LAB RESULTS SHOW 6.7.     PT WOULD LIKE TO CONFIRM IF IT IS POSSIBLE TO MOVE FORWARD WITH USING THE DEVICE FOR HER OAB.    When was the patient last seen: 12/4/25    PLEASE CALL TO DISCUSS.

## 2025-05-29 ENCOUNTER — OFFICE VISIT (OUTPATIENT)
Dept: NEUROLOGY | Facility: CLINIC | Age: 70
End: 2025-05-29
Payer: COMMERCIAL

## 2025-05-29 VITALS
DIASTOLIC BLOOD PRESSURE: 64 MMHG | HEIGHT: 60 IN | SYSTOLIC BLOOD PRESSURE: 98 MMHG | HEART RATE: 84 BPM | BODY MASS INDEX: 30.82 KG/M2 | WEIGHT: 157 LBS | OXYGEN SATURATION: 96 %

## 2025-05-29 DIAGNOSIS — G40.909 NONINTRACTABLE EPILEPSY WITHOUT STATUS EPILEPTICUS, UNSPECIFIED EPILEPSY TYPE: Primary | Chronic | ICD-10-CM

## 2025-05-29 PROCEDURE — 1159F MED LIST DOCD IN RCRD: CPT | Performed by: PSYCHIATRY & NEUROLOGY

## 2025-05-29 PROCEDURE — 99204 OFFICE O/P NEW MOD 45 MIN: CPT | Performed by: PSYCHIATRY & NEUROLOGY

## 2025-05-29 PROCEDURE — 1160F RVW MEDS BY RX/DR IN RCRD: CPT | Performed by: PSYCHIATRY & NEUROLOGY

## 2025-05-29 RX ORDER — METHOCARBAMOL 500 MG/1
500 TABLET, FILM COATED ORAL 3 TIMES DAILY PRN
COMMUNITY
Start: 2025-05-23

## 2025-05-29 RX ORDER — CETIRIZINE HYDROCHLORIDE 10 MG/1
10 TABLET ORAL DAILY
COMMUNITY

## 2025-05-29 RX ORDER — FLUTICASONE PROPIONATE 50 MCG
1 SPRAY, SUSPENSION (ML) NASAL 2 TIMES DAILY
COMMUNITY

## 2025-05-29 RX ORDER — ATORVASTATIN CALCIUM 40 MG/1
40 TABLET, FILM COATED ORAL DAILY
COMMUNITY

## 2025-05-29 RX ORDER — ACETAMINOPHEN 500 MG
TABLET ORAL
COMMUNITY
Start: 2025-05-23

## 2025-05-29 RX ORDER — PSEUDOEPHEDRINE HCL 30 MG
1 TABLET ORAL 2 TIMES DAILY
COMMUNITY
Start: 2025-05-23 | End: 2025-06-22

## 2025-05-29 RX ORDER — ENOXAPARIN SODIUM 100 MG/ML
40 INJECTION SUBCUTANEOUS
COMMUNITY
Start: 2025-05-23

## 2025-05-29 RX ORDER — HYDROCODONE BITARTRATE AND ACETAMINOPHEN 5; 325 MG/1; MG/1
5 TABLET ORAL EVERY 6 HOURS PRN
COMMUNITY
Start: 2025-05-23

## 2025-05-29 RX ORDER — PHENYTOIN SODIUM 100 MG/1
200 CAPSULE, EXTENDED RELEASE ORAL 2 TIMES DAILY
Qty: 360 CAPSULE | Refills: 3 | Status: SHIPPED | OUTPATIENT
Start: 2025-05-29 | End: 2026-05-29

## 2025-05-29 NOTE — PROGRESS NOTES
Subjective   Patient ID: Martha Harrell is a 70 y.o. female     Chief Complaint   Patient presents with    Seizures        History of Present Illness    70 y.o. female referred by Dr Ginger Ward for seizures.    25 yo first sz after birth of second child.     Sz last month trouble understanding and producing speech.  Off PHT for a few days.     Sz once every few months.     GTC sz 10 years ago.      AED:  PHT  Reviewed medical records:    Sz d/o treated with PHT.    Last sz 3 months ago.      HCT/CT cervical 7/30/24 s/p posterior fixation C3-4-5-6, normal brain     A1C 14     5/22/25 s/p L XENIA     Past Medical History:   Diagnosis Date    Anemia     Asthma     Chronic bronchitis     Depression     Diabetes mellitus     DVT (deep venous thrombosis) 05/25/2023    Epilepsy     Fibromyalgia, primary     GERD (gastroesophageal reflux disease)     Hyperlipidemia     Leg DVT (deep venous thromboembolism), acute, left 06/22/2019     Family History   Problem Relation Age of Onset    Arthritis Mother     Heart disease Mother     Obesity Mother     Arthritis Father     Cancer Father     Hypertension Father     Hyperlipidemia Father     Obesity Father     Arthritis Sister     Obesity Sister     Arthritis Sister     Arthritis Sister     Arthritis Maternal Aunt     Arthritis Maternal Uncle     Cancer Maternal Uncle     Arthritis Son     Arthritis Son      Social History     Socioeconomic History    Marital status:    Tobacco Use    Smoking status: Former     Current packs/day: 0.00     Types: Cigarettes     Quit date: 1/22/2010     Years since quitting: 15.3     Passive exposure: Past    Smokeless tobacco: Never   Vaping Use    Vaping status: Never Used   Substance and Sexual Activity    Alcohol use: No    Drug use: No    Sexual activity: Not Currently       Review of Systems   Constitutional:  Negative for activity change, fatigue and unexpected weight change.   HENT:  Negative for tinnitus and trouble swallowing.   "  Eyes:  Negative for photophobia and visual disturbance.   Respiratory:  Negative for apnea, cough and choking.    Cardiovascular:  Negative for leg swelling.   Gastrointestinal:  Negative for nausea and vomiting.   Endocrine: Negative for cold intolerance and heat intolerance.   Genitourinary:  Negative for difficulty urinating, frequency, menstrual problem and urgency.   Musculoskeletal:  Positive for arthralgias, back pain, myalgias, neck pain and neck stiffness. Negative for gait problem.   Skin:  Negative for color change and rash.   Allergic/Immunologic: Negative for immunocompromised state.   Neurological:  Positive for seizures and weakness. Negative for dizziness, tremors, syncope, facial asymmetry, speech difficulty, light-headedness, numbness and headaches.   Hematological:  Negative for adenopathy. Does not bruise/bleed easily.   Psychiatric/Behavioral:  Negative for behavioral problems, confusion, decreased concentration, hallucinations and sleep disturbance.        Objective     Vitals:    05/29/25 1439   BP: 98/64   Pulse: 84   SpO2: 96%   Weight: 71.2 kg (157 lb)   Height: 152.4 cm (60\")       Neurological Exam  Mental Status  Awake, alert and oriented to person, place and time. Oriented to person, place and time. Speech is normal. Language is fluent with no aphasia. Attention and concentration are normal. Fund of knowledge is appropriate for level of education.    Cranial Nerves  CN III, IV, VI: Extraocular movements intact bilaterally. Pupils equal round and reactive to light bilaterally.  CN V: Facial sensation is normal.  CN VII: Full and symmetric facial movement.  CN IX, X: Palate elevates symmetrically  CN XI: Shoulder shrug strength is normal.  CN XII: Tongue midline without atrophy or fasciculations.    Motor   Strength is 5/5 throughout all four extremities.    Sensory  Sensation is intact to light touch, pinprick, vibration and proprioception in all four extremities.    Reflexes  Deep " tendon reflexes are 2+ and symmetric in all four extremities.    Coordination    Finger-to-nose, rapid alternating movements and heel-to-shin normal bilaterally without dysmetria.    Gait  Normal casual, toe, heel and tandem gait.       Physical Exam  Eyes:      Extraocular Movements: Extraocular movements intact.      Pupils: Pupils are equal, round, and reactive to light.   Neurological:      Motor: Motor strength is normal.     Coordination: Coordination is intact.      Deep Tendon Reflexes: Reflexes are normal and symmetric.   Psychiatric:         Speech: Speech normal.         Admission on 10/01/2024, Discharged on 10/01/2024   Component Date Value Ref Range Status    Protime 10/01/2024 37.2 (H)  12.2 - 14.5 Seconds Final    INR 10/01/2024 3.77 (H)  0.89 - 1.12 Final    Glucose 10/01/2024 508 (C)  65 - 99 mg/dL Final    BUN 10/01/2024 19  8 - 23 mg/dL Final    Creatinine 10/01/2024 0.67  0.57 - 1.00 mg/dL Final    Sodium 10/01/2024 135 (L)  136 - 145 mmol/L Final    Potassium 10/01/2024 5.2  3.5 - 5.2 mmol/L Final    Chloride 10/01/2024 95 (L)  98 - 107 mmol/L Final    CO2 10/01/2024 31.0 (H)  22.0 - 29.0 mmol/L Final    Calcium 10/01/2024 9.0  8.6 - 10.5 mg/dL Final    Total Protein 10/01/2024 7.1  6.0 - 8.5 g/dL Final    Albumin 10/01/2024 3.5  3.5 - 5.2 g/dL Final    ALT (SGPT) 10/01/2024 35 (H)  1 - 33 U/L Final    AST (SGOT) 10/01/2024 30  1 - 32 U/L Final    Alkaline Phosphatase 10/01/2024 160 (H)  39 - 117 U/L Final    Total Bilirubin 10/01/2024 0.3  0.0 - 1.2 mg/dL Final    Globulin 10/01/2024 3.6  gm/dL Final    Calculated Result    A/G Ratio 10/01/2024 1.0  g/dL Final    BUN/Creatinine Ratio 10/01/2024 28.4 (H)  7.0 - 25.0 Final    Anion Gap 10/01/2024 9.0  5.0 - 15.0 mmol/L Final    eGFR 10/01/2024 94.2  >60.0 mL/min/1.73 Final    WBC 10/01/2024 7.89  3.40 - 10.80 10*3/mm3 Final    RBC 10/01/2024 5.05  3.77 - 5.28 10*6/mm3 Final    Hemoglobin 10/01/2024 15.1  12.0 - 15.9 g/dL Final    Hematocrit  10/01/2024 44.7  34.0 - 46.6 % Final    MCV 10/01/2024 88.5  79.0 - 97.0 fL Final    MCH 10/01/2024 29.9  26.6 - 33.0 pg Final    MCHC 10/01/2024 33.8  31.5 - 35.7 g/dL Final    RDW 10/01/2024 13.4  12.3 - 15.4 % Final    RDW-SD 10/01/2024 43.1  37.0 - 54.0 fl Final    MPV 10/01/2024 9.5  6.0 - 12.0 fL Final    Platelets 10/01/2024 216  140 - 450 10*3/mm3 Final    Neutrophil % 10/01/2024 79.1 (H)  42.7 - 76.0 % Final    Lymphocyte % 10/01/2024 13.8 (L)  19.6 - 45.3 % Final    Monocyte % 10/01/2024 4.4 (L)  5.0 - 12.0 % Final    Eosinophil % 10/01/2024 2.2  0.3 - 6.2 % Final    Basophil % 10/01/2024 0.1  0.0 - 1.5 % Final    Immature Grans % 10/01/2024 0.4  0.0 - 0.5 % Final    Neutrophils, Absolute 10/01/2024 6.24  1.70 - 7.00 10*3/mm3 Final    Lymphocytes, Absolute 10/01/2024 1.09  0.70 - 3.10 10*3/mm3 Final    Monocytes, Absolute 10/01/2024 0.35  0.10 - 0.90 10*3/mm3 Final    Eosinophils, Absolute 10/01/2024 0.17  0.00 - 0.40 10*3/mm3 Final    Basophils, Absolute 10/01/2024 0.01  0.00 - 0.20 10*3/mm3 Final    Immature Grans, Absolute 10/01/2024 0.03  0.00 - 0.05 10*3/mm3 Final    nRBC 10/01/2024 0.0  0.0 - 0.2 /100 WBC Final    Site 10/01/2024 Right Radial   Final    Ricardo's Test 10/01/2024 N/A   Final    pH, Arterial 10/01/2024 7.460 (H)  7.350 - 7.450 pH units Final    83 Value above reference range    pCO2, Arterial 10/01/2024 39.3  35.0 - 45.0 mm Hg Final    pO2, Arterial 10/01/2024 72.9 (L)  83.0 - 108.0 mm Hg Final    84 Value below reference range    HCO3, Arterial 10/01/2024 27.9 (H)  20.0 - 26.0 mmol/L Final    Base Excess, Arterial 10/01/2024 3.9 (H)  0.0 - 2.0 mmol/L Final    Hemoglobin, Blood Gas 10/01/2024 14.0  14 - 18 g/dL Final    Hematocrit, Blood Gas 10/01/2024 42.9  38.0 - 51.0 % Final    Oxyhemoglobin 10/01/2024 95.8  94 - 99 % Final    Methemoglobin 10/01/2024    Final    94 Value below reportable range < _0.1    Carboxyhemoglobin 10/01/2024 1.6  0 - 2 % Final    CO2 Content 10/01/2024 29.2   22 - 33 mmol/L Final    Temperature 10/01/2024 37.0   Final    Barometric Pressure for Blood Gas 10/01/2024    Final    N/A    Modality 10/01/2024 Room Air   Final    FIO2 10/01/2024 21  % Final    Rate 10/01/2024 0  Breaths/minute Final    PIP 10/01/2024 0  cmH2O Final    Meter: [id number]     :  [id number]    IPAP 10/01/2024 0   Final    EPAP 10/01/2024 0   Final    pH, Temp Corrected 10/01/2024 7.460  pH Units Final    pCO2, Temperature Corrected 10/01/2024 39.3  35 - 45 mm Hg Final    pO2, Temperature Corrected 10/01/2024 72.9 (L)  83 - 108 mm Hg Final    Glucose 10/01/2024 387 (H)  70 - 130 mg/dL Final    Glucose 10/01/2024 360 (H)  70 - 130 mg/dL Final    Glucose 10/01/2024 296 (H)  70 - 130 mg/dL Final         Assessment & Plan     Problem List Items Addressed This Visit          Neuro    Epilepsy - Primary (Chronic)    Current Assessment & Plan   Sz controlled on Phenytoin 200 mg BID    PHT level          Relevant Medications    gabapentin (NEURONTIN) 100 MG capsule    phenytoin ER (DILANTIN) 100 MG capsule    Other Relevant Orders    Phenytoin Level, Total          No follow-ups on file.

## 2025-06-04 ENCOUNTER — OFFICE VISIT (OUTPATIENT)
Dept: UROLOGY | Facility: CLINIC | Age: 70
End: 2025-06-04
Payer: COMMERCIAL

## 2025-06-04 VITALS — DIASTOLIC BLOOD PRESSURE: 64 MMHG | SYSTOLIC BLOOD PRESSURE: 102 MMHG | HEART RATE: 70 BPM | OXYGEN SATURATION: 97 %

## 2025-06-04 DIAGNOSIS — N39.41 URGE URINARY INCONTINENCE: Primary | ICD-10-CM

## 2025-06-04 DIAGNOSIS — E11.65 TYPE 2 DIABETES MELLITUS WITH HYPERGLYCEMIA, WITH LONG-TERM CURRENT USE OF INSULIN: ICD-10-CM

## 2025-06-04 DIAGNOSIS — Z79.4 TYPE 2 DIABETES MELLITUS WITH HYPERGLYCEMIA, WITH LONG-TERM CURRENT USE OF INSULIN: ICD-10-CM

## 2025-06-04 PROCEDURE — 99214 OFFICE O/P EST MOD 30 MIN: CPT | Performed by: NURSE PRACTITIONER

## 2025-06-04 PROCEDURE — 1159F MED LIST DOCD IN RCRD: CPT | Performed by: NURSE PRACTITIONER

## 2025-06-04 PROCEDURE — 1160F RVW MEDS BY RX/DR IN RCRD: CPT | Performed by: NURSE PRACTITIONER

## 2025-06-04 NOTE — PROGRESS NOTES
[facility] Female Office Visit      Patient Name: Martha Harrell  : 1955   MRN: [id number]     Chief Complaint:  Lower Urinary Tract Symptoms.   Chief Complaint   Patient presents with    Urinary Incontinence     urge       Referring Provider: No ref. provider found    History of Present Illness: Ms. Harrell is a 70 y.o. female with history of lower urinary tract symptoms. She is wearing 3-4 pads per day. She is wearing 2-3 pads at night time. Her last HgB A1c was 6.2 per patient son report. She has failed Gemtesa and Myrbetriq.  She is interested in sacral neuromodulation but has previously been told she needed to further control her diabetes before she could be a candidate for surgical procedure.  Patient's son is with her.  Patient recently had hip replacement.    She is drinking caffeine free soda per day, usually 1 per day. She drinks sugar free lemonade.    Patient managed on Warfarin.      BBSQ 28.   Subjective      Review of System: Review of Systems   Genitourinary:  Positive for urgency.        Urinary incontinence.   All other systems reviewed and are negative.     I have reviewed the ROS documented by my clinical staff, I have updated appropriately and I agree. SHERRY Chowdhury    Past Medical History:  Past Medical History:   Diagnosis Date    Anemia     Asthma     Chronic bronchitis     Depression     Diabetes mellitus     DVT (deep venous thrombosis) 2023    Epilepsy     Fibromyalgia, primary     GERD (gastroesophageal reflux disease)     Hyperlipidemia     Leg DVT (deep venous thromboembolism), acute, left 2019       Past Surgical History:  Past Surgical History:   Procedure Laterality Date    BACK SURGERY      HIP ARTHROPLASTY Left 2025    [facility] [employer]    NECK SURGERY      TUBAL ABDOMINAL LIGATION         Medications:    Current Outpatient Medications:     acetaminophen (TYLENOL) 500 MG tablet, TAKE 1 TABLET BY MOUTH EVERY 6 HOURS AS NEEDED FOR PAIN FOR 10 DAYS, Disp: ,  Rfl:     albuterol sulfate  (90 Base) MCG/ACT inhaler, Inhale 1 puff., Disp: , Rfl:     atorvastatin (LIPITOR) 40 MG tablet, Take 1 tablet by mouth Daily., Disp: , Rfl:     B-D UF III MINI PEN NEEDLES 31G X 5 MM misc, Use daily with insulin, Disp: 50 each, Rfl: 11    busPIRone (BUSPAR) 15 MG tablet, , Disp: , Rfl:     cetirizine (zyrTEC) 10 MG tablet, Take 1 tablet by mouth Daily., Disp: , Rfl:     docusate sodium 250 MG capsule, Take 1 capsule by mouth 2 (Two) Times a Day., Disp: , Rfl:     DULoxetine (CYMBALTA) 60 MG capsule, Take 1 capsule by mouth Daily., Disp: , Rfl: 0    enoxaparin sodium (LOVENOX) 40 MG/0.4ML solution prefilled syringe syringe, Inject 0.4 mL under the skin into the appropriate area as directed., Disp: , Rfl:     fenofibrate 160 MG tablet, , Disp: , Rfl:     fluticasone (FLONASE) 50 MCG/ACT nasal spray, 1 spray 2 (Two) Times a Day. shake liquid, Disp: , Rfl:     Fosamax Plus D  MG-UNIT per tablet, Take 1 tablet by mouth Every 7 (Seven) Days., Disp: , Rfl:     gabapentin (NEURONTIN) 100 MG capsule, , Disp: , Rfl:     HYDROcodone-acetaminophen (NORCO) 5-325 MG per tablet, Take 5 mg of hydrocodone by mouth Every 6 (Six) Hours As Needed., Disp: , Rfl:     HYDROcodone-acetaminophen (NORCO) 7.5-325 MG per tablet, Take 1 tablet by mouth 3 (Three) Times a Day As Needed., Disp: , Rfl:     Insulin Glargine, 2 Unit Dial, (TOUJEO MAX SOLOSTAR) 300 UNIT/ML solution pen-injector injection, Take 100 units at bedtime, after a week go up to 110 units, Disp: 4 pen, Rfl: 6    Insulin Lispro, 1 Unit Dial, (HUMALOG KWIKPEN) 100 UNIT/ML solution pen-injector, Take 20u before lunch (if you eat lunch) and take 40 units before supper, Disp: 6 pen, Rfl: 6    magnesium oxide (MAG-OX) 400 MG tablet, Take 1 tablet by mouth 2 (Two) Times a Day., Disp: , Rfl:     metFORMIN (GLUCOPHAGE) 1000 MG tablet, Take 1 tablet by mouth., Disp: , Rfl:     methocarbamol (ROBAXIN) 500 MG tablet, Take 1 tablet by mouth 3  (Three) Times a Day As Needed for Muscle Spasms., Disp: , Rfl:     Morphine (MS CONTIN) 30 MG 12 hr tablet, Take 1 tablet by mouth 2 (Two) Times a Day., Disp: , Rfl: 0    Omega-3 Fatty Acids (fish oil) 1000 MG capsule capsule, Take 1 capsule by mouth Every 12 (Twelve) Hours., Disp: , Rfl:     omeprazole (priLOSEC) 20 MG capsule, Take 1 capsule by mouth Daily., Disp: , Rfl:     ONE TOUCH ULTRA TEST test strip, Test BS TID, Disp: 100 each, Rfl: 11    ONETOUCH DELICA LANCETS FINE misc, Test BS TID, Disp: 100 each, Rfl: 11    Ozempic, 1 MG/DOSE, 4 MG/3ML solution pen-injector, Inject 1 mg under the skin into the appropriate area as directed 1 (One) Time Per Week., Disp: , Rfl:     phenytoin ER (DILANTIN) 100 MG capsule, Take 2 capsules by mouth 2 (Two) Times a Day., Disp: 360 capsule, Rfl: 3    silver sulfadiazine (SILVADENE, SSD) 1 % cream, APPLY OVER ULCER EVERY DAY UNTIL HEALED, Disp: , Rfl:     warfarin (COUMADIN) 5 MG tablet, Take 1 tablet by mouth the evening of 5/31/23. Follow-up with PCP for INR on 6/1/23 for further dosage adjustments, Disp: 90 tablet, Rfl: 0    Continuous Blood Gluc Sensor (FreeStyle Pauly 2 Sensor) misc, SEE ADMIN INSTRUCTIONS EVERY 14 DAYS (Patient not taking: Reported on 6/4/2025), Disp: , Rfl:     Empagliflozin (JARDIANCE) 25 MG tablet, Take 25 mg by mouth Daily. (Patient not taking: Reported on 6/4/2025), Disp: 30 tablet, Rfl: 6    furosemide (LASIX) 40 MG tablet, Take 1 tablet by mouth Every 12 (Twelve) Hours. (Patient not taking: Reported on 6/4/2025), Disp: , Rfl:     omega-3 acid ethyl esters (LOVAZA) 1 g capsule, Take 1 capsule by mouth Every 12 (Twelve) Hours. (Patient not taking: Reported on 6/4/2025), Disp: , Rfl:     oxybutynin XL (Ditropan XL) 10 MG 24 hr tablet, Take 1 tablet by mouth Daily., Disp: 30 tablet, Rfl: 0    RA VITAMIN D-3 5000 units capsule, Take 1 capsule by mouth Daily. (Patient not taking: Reported on 6/4/2025), Disp: , Rfl: 0    Vibegron 75 MG tablet, Take 1  tablet by mouth Daily., Disp: 42 tablet, Rfl: 0    Allergies:  Allergies   Allergen Reactions    Naproxen Itching       Social History:  Social History     Socioeconomic History    Marital status:    Tobacco Use    Smoking status: Former     Current packs/day: 0.00     Types: Cigarettes     Quit date: 1/22/2010     Years since quitting: 15.3     Passive exposure: Past    Smokeless tobacco: Never   Vaping Use    Vaping status: Never Used   Substance and Sexual Activity    Alcohol use: No    Drug use: No    Sexual activity: Not Currently       Family History:  Family History   Problem Relation Age of Onset    Arthritis Father     Cancer Father     Hypertension Father     Hyperlipidemia Father     Obesity Father     Arthritis Mother     Heart disease Mother     Obesity Mother     Arthritis Maternal Aunt     Arthritis Maternal Uncle     Cancer Maternal Uncle     Arthritis Sister     Obesity Sister     Arthritis Sister     Arthritis Sister     Arthritis Son     Arthritis Son     Kidney cancer Neg Hx         bladder cancer     Bladder & Bowel Symptom Questionnaire    How often do you usually urinate during the day ?   4 - At least once an hour    2.   How many timed do you urinate at night?   4 - 5 or more times at night   3.   What is the reason that you usually urinate?   4 - Desperate urge (must go immediately)   4.   Once you get the urge to go, how long can you     comfortably delay?   4 - Must go immediately    5.   How often do you get a sudden urge that makes you rush to the bathroom?   4 - At least once a day   6.   How often does a sudden urge to urinate result in you leaking urine or wetting pads?   4 - At least once a day   7.  In your opinion, how good is your bladder control?   4 - No control   8.  Do you have accidental bowel leakage?   no   9.  Do you have difficulty fully emptying your bladder?   yes   10.  Do you experience accidental leakage when performing some physical activity such as  coughing, sneezing, laughing or exercise?   yes   11. Have you tried medications to help improve your symptoms?   yes   12. Would you be interested in learning about a long-lasting option that may help you with your symptoms?   yes                                                                             Total Score   28     0-7 (Mild) 8-16 (Moderate) 17-28 (Severe)        Objective     Physical Exam:   Vital Signs:   Vitals:    06/04/25 1512   BP: 102/64   Pulse: 70   SpO2: 97%     There is no height or weight on file to calculate BMI.     Physical Exam  Vitals and nursing note reviewed.   Constitutional:       Appearance: Normal appearance.   HENT:      Head: Normocephalic and atraumatic.      Nose: Nose normal.      Mouth/Throat:      Mouth: Mucous membranes are moist.   Eyes:      Pupils: Pupils are equal, round, and reactive to light.   Pulmonary:      Effort: Pulmonary effort is normal.   Abdominal:      General: Abdomen is flat.      Palpations: Abdomen is soft.   Musculoskeletal:         General: Normal range of motion.      Cervical back: Normal range of motion.      Comments: Walker to ambulate.   Skin:     General: Skin is warm and dry.      Capillary Refill: Capillary refill takes less than 2 seconds.   Neurological:      General: No focal deficit present.      Mental Status: She is alert.   Psychiatric:         Mood and Affect: Mood normal.         Labs:   Brief Urine Lab Results       None                 Lab Results   Component Value Date    GLUCOSE 508 (C) 10/01/2024    CALCIUM 9.0 10/01/2024     (L) 10/01/2024    K 5.2 10/01/2024    CO2 31.0 (H) 10/01/2024    CL 95 (L) 10/01/2024    BUN 19 10/01/2024    CREATININE 0.67 10/01/2024    EGFRIFNONA 83 07/18/2019    BCR 28.4 (H) 10/01/2024    ANIONGAP 9.0 10/01/2024       Lab Results   Component Value Date    WBC 6.62 05/23/2025    HGB 11 (L) 05/23/2025    HCT 33.2 (L) 05/23/2025    MCV 92 05/23/2025     05/23/2025       Images:   XR hip 2+  vw unilateral  Result Date: 5/22/2025  Expected postoperative changes of left noncemented total hip arthroplasty CRITICAL RESULT:   No. COMMUNICATION: Per this written report. Drafted by Raymundo He MD on 5/22/2025 1:11 PM Final report signed by Raymundo He MD on 5/22/2025 1:12 PM    XR Knee 4+ View Left  Result Date: 3/26/2025  Severe osteoarthrosis of the left hip as above, progressed from comparison exam. Moderate tricompartmental osteoarthrosis of the left knee. Chondrocalcinosis correlate for CPPD arthropathy. CRITICAL RESULT:   No. COMMUNICATION: Per this written report. By electronically signing this report, I, the attending physician, attest that I have personally reviewed the images/data for the above examination(s) and agree with the final edited report. Drafted by Amy Phillips MD on 3/26/2025 2:27 PM Final report signed by Paul Abad on 3/26/2025 3:01 PM    XR hip 2+ vw unilateral  Result Date: 3/26/2025  Severe osteoarthrosis of the left hip as above, progressed from comparison exam. Moderate tricompartmental osteoarthrosis of the left knee. Chondrocalcinosis correlate for CPPD arthropathy. CRITICAL RESULT:   No. COMMUNICATION: Per this written report. By electronically signing this report, I, the attending physician, attest that I have personally reviewed the images/data for the above examination(s) and agree with the final edited report. Drafted by Amy Phillips MD on 3/26/2025 2:27 PM Final report signed by Paul Abad on 3/26/2025 3:01 PM      Measures:   Tobacco:   Martha Harrell  reports that she quit smoking about 15 years ago. Her smoking use included cigarettes. She has been exposed to tobacco smoke. She has never used smokeless tobacco.          Urine Incontinence: Patient reports that she is currently experiencing any symptoms of urinary incontinence.     Assessment / Plan      Assessment:  Mrs. Harrell is a 70 y.o. female who presented today with lower urinary tract  symptoms.  We discussed third line options to include sacral neuromodulation and intravesical Botox.  She is interested in both procedures.  Patient was provided educational material regarding intravesical Botox to take home.  I discussed plan of care with Dr. Becerra and patient will follow-up in the next few weeks to see Dr. Becerra to discuss surgical options given patient's multiple medical comorbidities, hemoglobin A1c of 6.2 and recent blood glucose in the 300s.    In the meantime patient will restart samples of Gemtesa which were provided in office today.  Given patient's bothersome urinary symptoms we will do a short course of oxybutynin as well until patient can see Dr. Becerra to discuss surgical options.  Patient is understanding and agreeable with plan of care.    Diagnoses and all orders for this visit:    1. Urge urinary incontinence (Primary)  -     Vibegron 75 MG tablet; Take 1 tablet by mouth Daily.  Dispense: 42 tablet; Refill: 0  -     oxybutynin XL (Ditropan XL) 10 MG 24 hr tablet; Take 1 tablet by mouth Daily.  Dispense: 30 tablet; Refill: 0    2. Type 2 diabetes mellitus with hyperglycemia, with long-term current use of insulin         Follow Up:   Return in about 2 weeks (around 6/18/2025) for F/U with Dr. Becerra.    I spent approximately 20 minutes providing clinical care for this patient; including review of patient's chart and provider documentation, face to face time spent with patient in examination room (obtaining history, performing physical exam, discussing diagnosis and management options), placing orders, and completing patient documentation.     SHERRY Harvey  [facility]

## 2025-06-05 RX ORDER — OXYBUTYNIN CHLORIDE 10 MG/1
10 TABLET, EXTENDED RELEASE ORAL DAILY
Qty: 30 TABLET | Refills: 0 | Status: SHIPPED | OUTPATIENT
Start: 2025-06-05

## 2025-06-25 ENCOUNTER — OFFICE VISIT (OUTPATIENT)
Dept: UROLOGY | Facility: CLINIC | Age: 70
End: 2025-06-25
Payer: MEDICARE

## 2025-06-25 DIAGNOSIS — N32.81 OAB (OVERACTIVE BLADDER): Primary | ICD-10-CM

## 2025-06-25 PROCEDURE — 99215 OFFICE O/P EST HI 40 MIN: CPT | Performed by: UROLOGY

## 2025-06-25 PROCEDURE — 1160F RVW MEDS BY RX/DR IN RCRD: CPT | Performed by: UROLOGY

## 2025-06-25 PROCEDURE — 1159F MED LIST DOCD IN RCRD: CPT | Performed by: UROLOGY

## 2025-06-25 RX ORDER — OXYBUTYNIN CHLORIDE 15 MG/1
15 TABLET, EXTENDED RELEASE ORAL DAILY
Qty: 30 TABLET | Refills: 2 | Status: SHIPPED | OUTPATIENT
Start: 2025-06-25

## 2025-06-25 NOTE — PROGRESS NOTES
Follow Up Office Visit      Patient Name: Martha Harrell  : 1955   MRN: [id number]     Chief Complaint:    Chief Complaint   Patient presents with    Urge urinary incontinence       Referring Provider: No ref. provider found    History of Present Illness: Martha Harrell is a 70 y.o. female who presents today for follow up of OAB.  She reports he is doing better on the Gemtesa and Oxybutynin.  She is taking them together.  She still has issues when she turns on the water.  She is only going to the bathroom three times/day.    No dysuria or gross hematuria.        Subjective      Review of System:   Review of Systems   Genitourinary:  Positive for frequency and urgency.      I have reviewed the ROS documented by my clinical staff, I have updated appropriately and I agree. Theresa Becerra MD    I have reviewed and the following portions of the patient's history were updated as appropriate: past family history, past medical history, past social history, past surgical history and problem list.    Past Medical History:   Past Medical History:   Diagnosis Date    Anemia     Asthma     Chronic bronchitis     Depression     Diabetes mellitus     DVT (deep venous thrombosis) 2023    Epilepsy     Fibromyalgia, primary     GERD (gastroesophageal reflux disease)     Hyperlipidemia     Leg DVT (deep venous thromboembolism), acute, left 2019       Past Surgical History:  Past Surgical History:   Procedure Laterality Date    BACK SURGERY      HIP ARTHROPLASTY Left 2025    GOOD [facility]    NECK SURGERY      TUBAL ABDOMINAL LIGATION         Family History:   family history includes Arthritis in her father, maternal aunt, maternal uncle, mother, sister, sister, sister, son, and son; Cancer in her father and maternal uncle; Heart disease in her mother; Hyperlipidemia in her father; Hypertension in her father; Obesity in her father, mother, and sister.   Otherwise pertinent FHx was reviewed and not  pertinent to current issue.    Social History:    reports that she quit smoking about 15 years ago. Her smoking use included cigarettes. She has been exposed to tobacco smoke. She has never used smokeless tobacco. She reports that she does not drink alcohol and does not use drugs.    Medications:     Current Outpatient Medications:     acetaminophen (TYLENOL) 500 MG tablet, TAKE 1 TABLET BY MOUTH EVERY 6 HOURS AS NEEDED FOR PAIN FOR 10 DAYS, Disp: , Rfl:     albuterol sulfate  (90 Base) MCG/ACT inhaler, Inhale 1 puff., Disp: , Rfl:     atorvastatin (LIPITOR) 40 MG tablet, Take 1 tablet by mouth Daily., Disp: , Rfl:     B-D UF III MINI PEN NEEDLES 31G X 5 MM misc, Use daily with insulin, Disp: 50 each, Rfl: 11    busPIRone (BUSPAR) 15 MG tablet, , Disp: , Rfl:     cetirizine (zyrTEC) 10 MG tablet, Take 1 tablet by mouth Daily., Disp: , Rfl:     DULoxetine (CYMBALTA) 60 MG capsule, Take 1 capsule by mouth Daily., Disp: , Rfl: 0    enoxaparin sodium (LOVENOX) 40 MG/0.4ML solution prefilled syringe syringe, Inject 0.4 mL under the skin into the appropriate area as directed., Disp: , Rfl:     fenofibrate 160 MG tablet, , Disp: , Rfl:     fluticasone (FLONASE) 50 MCG/ACT nasal spray, 1 spray 2 (Two) Times a Day. shake liquid, Disp: , Rfl:     Fosamax Plus D  MG-UNIT per tablet, Take 1 tablet by mouth Every 7 (Seven) Days., Disp: , Rfl:     gabapentin (NEURONTIN) 100 MG capsule, , Disp: , Rfl:     HYDROcodone-acetaminophen (NORCO) 5-325 MG per tablet, Take 5 mg of hydrocodone by mouth Every 6 (Six) Hours As Needed., Disp: , Rfl:     HYDROcodone-acetaminophen (NORCO) 7.5-325 MG per tablet, Take 1 tablet by mouth 3 (Three) Times a Day As Needed., Disp: , Rfl:     Insulin Glargine, 2 Unit Dial, (TOUJEO MAX SOLOSTAR) 300 UNIT/ML solution pen-injector injection, Take 100 units at bedtime, after a week go up to 110 units, Disp: 4 pen, Rfl: 6    Insulin Lispro, 1 Unit Dial, (HUMALOG KWIKPEN) 100 UNIT/ML solution  pen-injector, Take 20u before lunch (if you eat lunch) and take 40 units before supper, Disp: 6 pen, Rfl: 6    magnesium oxide (MAG-OX) 400 MG tablet, Take 1 tablet by mouth 2 (Two) Times a Day., Disp: , Rfl:     metFORMIN (GLUCOPHAGE) 1000 MG tablet, Take 1 tablet by mouth., Disp: , Rfl:     methocarbamol (ROBAXIN) 500 MG tablet, Take 1 tablet by mouth 3 (Three) Times a Day As Needed for Muscle Spasms., Disp: , Rfl:     Morphine (MS CONTIN) 30 MG 12 hr tablet, Take 1 tablet by mouth 2 (Two) Times a Day., Disp: , Rfl: 0    Omega-3 Fatty Acids (fish oil) 1000 MG capsule capsule, Take 1 capsule by mouth Every 12 (Twelve) Hours., Disp: , Rfl:     omeprazole (priLOSEC) 20 MG capsule, Take 1 capsule by mouth Daily., Disp: , Rfl:     ONE TOUCH ULTRA TEST test strip, Test BS TID, Disp: 100 each, Rfl: 11    ONETOUCH DELICA LANCETS FINE misc, Test BS TID, Disp: 100 each, Rfl: 11    Ozempic, 1 MG/DOSE, 4 MG/3ML solution pen-injector, Inject 1 mg under the skin into the appropriate area as directed 1 (One) Time Per Week., Disp: , Rfl:     phenytoin ER (DILANTIN) 100 MG capsule, Take 2 capsules by mouth 2 (Two) Times a Day., Disp: 360 capsule, Rfl: 3    silver sulfadiazine (SILVADENE, SSD) 1 % cream, APPLY OVER ULCER EVERY DAY UNTIL HEALED, Disp: , Rfl:     Vibegron 75 MG tablet, Take 1 tablet by mouth Daily., Disp: 42 tablet, Rfl: 0    warfarin (COUMADIN) 5 MG tablet, Take 1 tablet by mouth the evening of 5/31/23. Follow-up with PCP for INR on 6/1/23 for further dosage adjustments, Disp: 90 tablet, Rfl: 0    Continuous Blood Gluc Sensor (FreeStyle Pauly 2 Sensor) misc, SEE ADMIN INSTRUCTIONS EVERY 14 DAYS (Patient not taking: Reported on 5/29/2025), Disp: , Rfl:     Empagliflozin (JARDIANCE) 25 MG tablet, Take 25 mg by mouth Daily. (Patient not taking: Reported on 5/29/2025), Disp: 30 tablet, Rfl: 6    furosemide (LASIX) 40 MG tablet, Take 1 tablet by mouth Every 12 (Twelve) Hours. (Patient not taking: Reported on 5/29/2025),  Disp: , Rfl:     omega-3 acid ethyl esters (LOVAZA) 1 g capsule, Take 1 capsule by mouth Every 12 (Twelve) Hours. (Patient not taking: Reported on 6/25/2025), Disp: , Rfl:     oxybutynin XL (DITROPAN XL) 15 MG 24 hr tablet, Take 1 tablet by mouth Daily., Disp: 30 tablet, Rfl: 2    RA VITAMIN D-3 5000 units capsule, Take 1 capsule by mouth Daily. (Patient not taking: Reported on 5/29/2025), Disp: , Rfl: 0    Vibegron 75 MG tablet, Take 1 tablet by mouth Daily for 360 days., Disp: 90 tablet, Rfl: 3    Allergies:   Allergies   Allergen Reactions    Naproxen Itching       Bladder & Bowel Symptom Questionnaire    How often do you usually urinate during the day ?   3 - About every 1-2 hours   2.   How many timed do you urinate at night?   3 - 4 times at night   3.   What is the reason that you usually urinate?   4 - Desperate urge (must go immediately)   4.   Once you get the urge to go, how long can you     comfortably delay?   4 - Must go immediately    5.   How often do you get a sudden urge that makes you rush to the bathroom?   4 - At least once a day   6.   How often does a sudden urge to urinate result in you leaking urine or wetting pads?   4 - At least once a day   7.  In your opinion, how good is your bladder control?   4 - No control   8.  Do you have accidental bowel leakage?   no   9.  Do you have difficulty fully emptying your bladder?   no   10.  Do you experience accidental leakage when performing some physical activity such as coughing, sneezing, laughing or exercise?   yes   11. Have you tried medications to help improve your symptoms?   yes   12. Would you be interested in learning about a long-lasting option that may help you with your symptoms?   no                                                                             Total Score   26     0-7 (Mild) 8-16 (Moderate) 17-28 (Severe)          Objective     Physical Exam:   Vital Signs: There were no vitals filed for this visit.  There is no height  "or weight on file to calculate BMI.     Physical Exam  Vitals and nursing note reviewed. Exam conducted with a chaperone present.   Constitutional:       General: She is awake. She is not in acute distress.     Appearance: Normal appearance.   HENT:      Head: Normocephalic and atraumatic.      Right Ear: External ear normal.      Left Ear: External ear normal.      Nose: Nose normal.   Eyes:      Conjunctiva/sclera: Conjunctivae normal.   Pulmonary:      Effort: Pulmonary effort is normal. No respiratory distress.   Abdominal:      General: Abdomen is flat. There is no distension.      Palpations: Abdomen is soft. There is no mass.      Tenderness: There is no abdominal tenderness. There is no right CVA tenderness, left CVA tenderness, guarding or rebound.      Hernia: No hernia is present.   Genitourinary:     Exam position: Lithotomy position.   Skin:     General: Skin is warm.   Neurological:      General: No focal deficit present.      Mental Status: She is alert and oriented to person, place, and time.      Gait: Gait normal.   Psychiatric:         Behavior: Behavior normal. Behavior is cooperative.         Thought Content: Thought content normal.         Judgment: Judgment normal.         Labs:   Brief Urine Lab Results       None                 Lab Results   Component Value Date    GLUCOSE 508 (C) 10/01/2024    CALCIUM 9.0 10/01/2024     (L) 10/01/2024    K 5.2 10/01/2024    CO2 31.0 (H) 10/01/2024    CL 95 (L) 10/01/2024    BUN 19 10/01/2024    CREATININE 0.67 10/01/2024    EGFRIFNONA 83 07/18/2019    BCR 28.4 (H) 10/01/2024    ANIONGAP 9.0 10/01/2024       Lab Results   Component Value Date    WBC 6.62 05/23/2025    HGB 11 (L) 05/23/2025    HCT 33.2 (L) 05/23/2025    MCV 92 05/23/2025     05/23/2025       No results found for: \"PSA\"    Images:   XR hip 2+ vw unilateral  Result Date: 5/22/2025  Expected postoperative changes of left noncemented total hip arthroplasty CRITICAL RESULT:   No. " COMMUNICATION: Per this written report. Drafted by Raymundo He MD on 5/22/2025 1:11 PM Final report signed by Raymundo He MD on 5/22/2025 1:12 PM    XR Knee 4+ View Left  Result Date: 3/26/2025  Severe osteoarthrosis of the left hip as above, progressed from comparison exam. Moderate tricompartmental osteoarthrosis of the left knee. Chondrocalcinosis correlate for CPPD arthropathy. CRITICAL RESULT:   No. COMMUNICATION: Per this written report. By electronically signing this report, I, the attending physician, attest that I have personally reviewed the images/data for the above examination(s) and agree with the final edited report. Drafted by Amy Phillips MD on 3/26/2025 2:27 PM Final report signed by Paul Abad on 3/26/2025 3:01 PM    XR hip 2+ vw unilateral  Result Date: 3/26/2025  Severe osteoarthrosis of the left hip as above, progressed from comparison exam. Moderate tricompartmental osteoarthrosis of the left knee. Chondrocalcinosis correlate for CPPD arthropathy. CRITICAL RESULT:   No. COMMUNICATION: Per this written report. By electronically signing this report, I, the attending physician, attest that I have personally reviewed the images/data for the above examination(s) and agree with the final edited report. Drafted by Amy Phillips MD on 3/26/2025 2:27 PM Final report signed by Paul Abad on 3/26/2025 3:01 PM      Measures:   Tobacco:   Martha Harrell  reports that she quit smoking about 15 years ago. Her smoking use included cigarettes. She has been exposed to tobacco smoke. She has never used smokeless tobacco.       Urine Incontinence: Patient reports that she is currently experiencing any symptoms of urinary incontinence.       Assessment / Plan      Assessment/Plan:   70 y.o. female who presented today for follow up of incontinence.  She is high risk surgically, but reports her incontinence is making her quality of life unlivable.  We discussed possible PNE.  I discussed  trying to increase her medication.  I will increase her Oxybutynin to 15 mg.  We will continue her Gemtesa.  I will see her back in 8 weeks to see if things have changed.      Diagnoses and all orders for this visit:    1. OAB (overactive bladder) (Primary)  -     Vibegron 75 MG tablet; Take 1 tablet by mouth Daily for 360 days.  Dispense: 90 tablet; Refill: 3  -     oxybutynin XL (DITROPAN XL) 15 MG 24 hr tablet; Take 1 tablet by mouth Daily.  Dispense: 30 tablet; Refill: 2         Follow Up:   Return in about 2 months (around 8/25/2025).    I spent approximately 45 minutes providing clinical care for this patient; including review of patient's chart and provider documentation, face to face time spent with patient in examination room (obtaining history, performing physical exam, discussing diagnosis and management options), placing orders, and completing patient documentation.     Theresa Becerra MD  [facility] [place]

## 2025-08-05 ENCOUNTER — APPOINTMENT (OUTPATIENT)
Dept: CARDIOLOGY | Facility: HOSPITAL | Age: 70
End: 2025-08-05
Payer: MEDICARE

## 2025-08-05 ENCOUNTER — APPOINTMENT (OUTPATIENT)
Dept: GENERAL RADIOLOGY | Facility: HOSPITAL | Age: 70
End: 2025-08-05
Payer: MEDICARE

## 2025-08-05 ENCOUNTER — HOSPITAL ENCOUNTER (OUTPATIENT)
Facility: HOSPITAL | Age: 70
Setting detail: OBSERVATION
Discharge: HOME | End: 2025-08-07
Attending: EMERGENCY MEDICINE | Admitting: HOSPITALIST
Payer: MEDICARE

## 2025-08-05 DIAGNOSIS — M79.604 ACUTE LEG PAIN, RIGHT: ICD-10-CM

## 2025-08-05 DIAGNOSIS — Z79.01 ANTICOAGULATED ON COUMADIN: ICD-10-CM

## 2025-08-05 DIAGNOSIS — I82.413 ACUTE BILATERAL DEEP VEIN THROMBOSIS (DVT) OF FEMORAL VEINS: Primary | ICD-10-CM

## 2025-08-05 PROBLEM — E11.9 TYPE 2 DIABETES MELLITUS, WITH LONG-TERM CURRENT USE OF INSULIN: Status: ACTIVE | Noted: 2025-08-05

## 2025-08-05 PROBLEM — I82.409 DVT (DEEP VENOUS THROMBOSIS): Status: ACTIVE | Noted: 2025-08-05

## 2025-08-05 PROBLEM — Z79.4 TYPE 2 DIABETES MELLITUS, WITH LONG-TERM CURRENT USE OF INSULIN: Status: ACTIVE | Noted: 2025-08-05

## 2025-08-05 LAB
ALBUMIN SERPL-MCNC: 3.5 G/DL (ref 3.5–5.2)
ALBUMIN/GLOB SERPL: 1.1 G/DL
ALP SERPL-CCNC: 150 U/L (ref 39–117)
ALT SERPL W P-5'-P-CCNC: 19 U/L (ref 1–33)
ANION GAP SERPL CALCULATED.3IONS-SCNC: 7 MMOL/L (ref 5–15)
AST SERPL-CCNC: 16 U/L (ref 1–32)
BASOPHILS # BLD AUTO: 0.03 10*3/MM3 (ref 0–0.2)
BASOPHILS NFR BLD AUTO: 0.4 % (ref 0–1.5)
BH CV LOW VAS LEFT COMMON FEMORAL SPONT: 1
BH CV LOW VAS LEFT EXTERNAL ILIAC AUGMENT: NORMAL
BH CV LOW VAS LEFT EXTERNAL ILIAC COMPRESS: NORMAL
BH CV LOW VAS RIGHT COMMON FEMORAL SPONT: 1
BH CV LOW VAS RIGHT EXTERNAL ILIAC SPONT: 1
BH CV LOW VAS RIGHT POPLITEAL SPONT: 1
BH CV LOW VAS RIGHT PROFUNDA FEMORAL SPONT: 1
BH CV LOW VAS RIGHT SAPHENOFEMORAL JUNCTION SPONT: 1
BH CV LOWER VASCULAR LEFT COMMON FEMORAL AUGMENT: NORMAL
BH CV LOWER VASCULAR LEFT COMMON FEMORAL COMPRESS: NORMAL
BH CV LOWER VASCULAR LEFT COMMON FEMORAL PHASIC: NORMAL
BH CV LOWER VASCULAR LEFT COMMON FEMORAL SPONT: NORMAL
BH CV LOWER VASCULAR LEFT EXTERNAL ILIAC PHASIC: NORMAL
BH CV LOWER VASCULAR LEFT EXTERNAL ILIAC SPONT: NORMAL
BH CV LOWER VASCULAR RIGHT COMMON FEMORAL AUGMENT: NORMAL
BH CV LOWER VASCULAR RIGHT COMMON FEMORAL COMPRESS: NORMAL
BH CV LOWER VASCULAR RIGHT COMMON FEMORAL PHASIC: NORMAL
BH CV LOWER VASCULAR RIGHT COMMON FEMORAL SPONT: NORMAL
BH CV LOWER VASCULAR RIGHT DISTAL FEMORAL AUGMENT: NORMAL
BH CV LOWER VASCULAR RIGHT DISTAL FEMORAL COMPRESS: NORMAL
BH CV LOWER VASCULAR RIGHT DISTAL FEMORAL PHASIC: NORMAL
BH CV LOWER VASCULAR RIGHT DISTAL FEMORAL SPONT: NORMAL
BH CV LOWER VASCULAR RIGHT EXTERNAL ILIAC AUGMENT: NORMAL
BH CV LOWER VASCULAR RIGHT EXTERNAL ILIAC COMPRESS: NORMAL
BH CV LOWER VASCULAR RIGHT EXTERNAL ILIAC PHASIC: NORMAL
BH CV LOWER VASCULAR RIGHT EXTERNAL ILIAC SPONT: NORMAL
BH CV LOWER VASCULAR RIGHT GASTRONEMIUS COMPRESS: NORMAL
BH CV LOWER VASCULAR RIGHT GREATER SAPH AK COMPRESS: NORMAL
BH CV LOWER VASCULAR RIGHT GREATER SAPH BK COMPRESS: NORMAL
BH CV LOWER VASCULAR RIGHT MID FEMORAL AUGMENT: NORMAL
BH CV LOWER VASCULAR RIGHT MID FEMORAL COMPRESS: NORMAL
BH CV LOWER VASCULAR RIGHT MID FEMORAL PHASIC: NORMAL
BH CV LOWER VASCULAR RIGHT MID FEMORAL SPONT: NORMAL
BH CV LOWER VASCULAR RIGHT PERONEAL COMPRESS: NORMAL
BH CV LOWER VASCULAR RIGHT POPLITEAL AUGMENT: NORMAL
BH CV LOWER VASCULAR RIGHT POPLITEAL COMPRESS: NORMAL
BH CV LOWER VASCULAR RIGHT POPLITEAL PHASIC: NORMAL
BH CV LOWER VASCULAR RIGHT POPLITEAL SPONT: NORMAL
BH CV LOWER VASCULAR RIGHT POSTERIOR TIBIAL COMPRESS: NORMAL
BH CV LOWER VASCULAR RIGHT PROFUNDA FEMORAL AUGMENT: NORMAL
BH CV LOWER VASCULAR RIGHT PROFUNDA FEMORAL COMPRESS: NORMAL
BH CV LOWER VASCULAR RIGHT PROFUNDA FEMORAL PHASIC: NORMAL
BH CV LOWER VASCULAR RIGHT PROFUNDA FEMORAL SPONT: NORMAL
BH CV LOWER VASCULAR RIGHT PROXIMAL FEMORAL AUGMENT: NORMAL
BH CV LOWER VASCULAR RIGHT PROXIMAL FEMORAL COMPRESS: NORMAL
BH CV LOWER VASCULAR RIGHT PROXIMAL FEMORAL PHASIC: NORMAL
BH CV LOWER VASCULAR RIGHT PROXIMAL FEMORAL SPONT: NORMAL
BH CV LOWER VASCULAR RIGHT SAPHENOFEMORAL JUNCTION AUGMENT: NORMAL
BH CV LOWER VASCULAR RIGHT SAPHENOFEMORAL JUNCTION COMPRESS: NORMAL
BH CV LOWER VASCULAR RIGHT SAPHENOFEMORAL JUNCTION PHASIC: NORMAL
BH CV LOWER VASCULAR RIGHT SAPHENOFEMORAL JUNCTION SPONT: NORMAL
BH CV VAS PRELIMINARY FINDINGS SCRIPTING: 1
BILIRUB SERPL-MCNC: 0.2 MG/DL (ref 0–1.2)
BUN SERPL-MCNC: 25.9 MG/DL (ref 8–23)
BUN/CREAT SERPL: 43.2 (ref 7–25)
CALCIUM SPEC-SCNC: 8.6 MG/DL (ref 8.6–10.5)
CHLORIDE SERPL-SCNC: 101 MMOL/L (ref 98–107)
CO2 SERPL-SCNC: 31 MMOL/L (ref 22–29)
CREAT SERPL-MCNC: 0.6 MG/DL (ref 0.57–1)
DEPRECATED RDW RBC AUTO: 46.6 FL (ref 37–54)
EGFRCR SERPLBLD CKD-EPI 2021: 96.1 ML/MIN/1.73
EOSINOPHIL # BLD AUTO: 0.28 10*3/MM3 (ref 0–0.4)
EOSINOPHIL NFR BLD AUTO: 3.5 % (ref 0.3–6.2)
ERYTHROCYTE [DISTWIDTH] IN BLOOD BY AUTOMATED COUNT: 13.7 % (ref 12.3–15.4)
GLOBULIN UR ELPH-MCNC: 3.2 GM/DL
GLUCOSE BLDC GLUCOMTR-MCNC: 316 MG/DL (ref 70–130)
GLUCOSE SERPL-MCNC: 292 MG/DL (ref 65–99)
HCT VFR BLD AUTO: 39.2 % (ref 34–46.6)
HGB BLD-MCNC: 12.4 G/DL (ref 12–15.9)
IMM GRANULOCYTES # BLD AUTO: 0.02 10*3/MM3 (ref 0–0.05)
IMM GRANULOCYTES NFR BLD AUTO: 0.3 % (ref 0–0.5)
INR PPP: 2.43 (ref 0.89–1.12)
LYMPHOCYTES # BLD AUTO: 1.7 10*3/MM3 (ref 0.7–3.1)
LYMPHOCYTES NFR BLD AUTO: 21.4 % (ref 19.6–45.3)
MCH RBC QN AUTO: 29.7 PG (ref 26.6–33)
MCHC RBC AUTO-ENTMCNC: 31.6 G/DL (ref 31.5–35.7)
MCV RBC AUTO: 93.8 FL (ref 79–97)
MONOCYTES # BLD AUTO: 0.57 10*3/MM3 (ref 0.1–0.9)
MONOCYTES NFR BLD AUTO: 7.2 % (ref 5–12)
NEUTROPHILS NFR BLD AUTO: 5.34 10*3/MM3 (ref 1.7–7)
NEUTROPHILS NFR BLD AUTO: 67.2 % (ref 42.7–76)
NRBC BLD AUTO-RTO: 0 /100 WBC (ref 0–0.2)
PLATELET # BLD AUTO: 214 10*3/MM3 (ref 140–450)
PMV BLD AUTO: 9.2 FL (ref 6–12)
POTASSIUM SERPL-SCNC: 5.1 MMOL/L (ref 3.5–5.2)
PROT SERPL-MCNC: 6.7 G/DL (ref 6–8.5)
PROTHROMBIN TIME: 28.1 SECONDS (ref 12.2–15.3)
RBC # BLD AUTO: 4.18 10*6/MM3 (ref 3.77–5.28)
SODIUM SERPL-SCNC: 139 MMOL/L (ref 136–145)
WBC NRBC COR # BLD AUTO: 7.94 10*3/MM3 (ref 3.4–10.8)

## 2025-08-05 PROCEDURE — 85610 PROTHROMBIN TIME: CPT | Performed by: EMERGENCY MEDICINE

## 2025-08-05 PROCEDURE — 25010000002 ENOXAPARIN PER 10 MG

## 2025-08-05 PROCEDURE — 80053 COMPREHEN METABOLIC PANEL: CPT | Performed by: EMERGENCY MEDICINE

## 2025-08-05 PROCEDURE — G0378 HOSPITAL OBSERVATION PER HR: HCPCS

## 2025-08-05 PROCEDURE — 99285 EMERGENCY DEPT VISIT HI MDM: CPT

## 2025-08-05 PROCEDURE — 73560 X-RAY EXAM OF KNEE 1 OR 2: CPT

## 2025-08-05 PROCEDURE — 96372 THER/PROPH/DIAG INJ SC/IM: CPT

## 2025-08-05 PROCEDURE — 63710000001 INSULIN LISPRO (HUMAN) PER 5 UNITS: Performed by: NURSE PRACTITIONER

## 2025-08-05 PROCEDURE — 93971 EXTREMITY STUDY: CPT

## 2025-08-05 PROCEDURE — 85025 COMPLETE CBC W/AUTO DIFF WBC: CPT | Performed by: EMERGENCY MEDICINE

## 2025-08-05 PROCEDURE — 99222 1ST HOSP IP/OBS MODERATE 55: CPT | Performed by: NURSE PRACTITIONER

## 2025-08-05 PROCEDURE — 82948 REAGENT STRIP/BLOOD GLUCOSE: CPT

## 2025-08-05 PROCEDURE — 36415 COLL VENOUS BLD VENIPUNCTURE: CPT

## 2025-08-05 PROCEDURE — 93971 EXTREMITY STUDY: CPT | Performed by: INTERNAL MEDICINE

## 2025-08-05 RX ORDER — ONDANSETRON 4 MG/1
4 TABLET, ORALLY DISINTEGRATING ORAL EVERY 6 HOURS PRN
Status: DISCONTINUED | OUTPATIENT
Start: 2025-08-05 | End: 2025-08-07 | Stop reason: HOSPADM

## 2025-08-05 RX ORDER — ACETAMINOPHEN 650 MG/1
650 SUPPOSITORY RECTAL EVERY 4 HOURS PRN
Status: DISCONTINUED | OUTPATIENT
Start: 2025-08-05 | End: 2025-08-07 | Stop reason: HOSPADM

## 2025-08-05 RX ORDER — CETIRIZINE HYDROCHLORIDE 10 MG/1
10 TABLET ORAL DAILY PRN
Status: DISCONTINUED | OUTPATIENT
Start: 2025-08-05 | End: 2025-08-07 | Stop reason: HOSPADM

## 2025-08-05 RX ORDER — SODIUM CHLORIDE 0.9 % (FLUSH) 0.9 %
10 SYRINGE (ML) INJECTION AS NEEDED
Status: DISCONTINUED | OUTPATIENT
Start: 2025-08-05 | End: 2025-08-07 | Stop reason: HOSPADM

## 2025-08-05 RX ORDER — ENOXAPARIN SODIUM 100 MG/ML
1 INJECTION SUBCUTANEOUS EVERY 12 HOURS SCHEDULED
Status: DISCONTINUED | OUTPATIENT
Start: 2025-08-05 | End: 2025-08-07 | Stop reason: HOSPADM

## 2025-08-05 RX ORDER — DULOXETIN HYDROCHLORIDE 60 MG/1
60 CAPSULE, DELAYED RELEASE ORAL DAILY
Status: DISCONTINUED | OUTPATIENT
Start: 2025-08-06 | End: 2025-08-07 | Stop reason: HOSPADM

## 2025-08-05 RX ORDER — INSULIN LISPRO 100 [IU]/ML
2-9 INJECTION, SOLUTION INTRAVENOUS; SUBCUTANEOUS
Status: DISCONTINUED | OUTPATIENT
Start: 2025-08-05 | End: 2025-08-07 | Stop reason: HOSPADM

## 2025-08-05 RX ORDER — ACETAMINOPHEN 325 MG/1
650 TABLET ORAL EVERY 4 HOURS PRN
Status: DISCONTINUED | OUTPATIENT
Start: 2025-08-05 | End: 2025-08-07 | Stop reason: HOSPADM

## 2025-08-05 RX ORDER — PANTOPRAZOLE SODIUM 40 MG/1
40 TABLET, DELAYED RELEASE ORAL
Status: DISCONTINUED | OUTPATIENT
Start: 2025-08-06 | End: 2025-08-07 | Stop reason: HOSPADM

## 2025-08-05 RX ORDER — GABAPENTIN 100 MG/1
100 CAPSULE ORAL 3 TIMES DAILY
Status: DISCONTINUED | OUTPATIENT
Start: 2025-08-05 | End: 2025-08-07 | Stop reason: HOSPADM

## 2025-08-05 RX ORDER — DEXTROSE MONOHYDRATE 25 G/50ML
25 INJECTION, SOLUTION INTRAVENOUS
Status: DISCONTINUED | OUTPATIENT
Start: 2025-08-05 | End: 2025-08-07 | Stop reason: HOSPADM

## 2025-08-05 RX ORDER — HYDROCODONE BITARTRATE AND ACETAMINOPHEN 7.5; 325 MG/1; MG/1
1 TABLET ORAL 3 TIMES DAILY
COMMUNITY

## 2025-08-05 RX ORDER — HYDROCODONE BITARTRATE AND ACETAMINOPHEN 7.5; 325 MG/1; MG/1
1 TABLET ORAL 3 TIMES DAILY
Status: DISCONTINUED | OUTPATIENT
Start: 2025-08-05 | End: 2025-08-07 | Stop reason: HOSPADM

## 2025-08-05 RX ORDER — ACETAMINOPHEN 160 MG/5ML
650 SOLUTION ORAL EVERY 4 HOURS PRN
Status: DISCONTINUED | OUTPATIENT
Start: 2025-08-05 | End: 2025-08-07 | Stop reason: HOSPADM

## 2025-08-05 RX ORDER — BISACODYL 5 MG/1
5 TABLET, DELAYED RELEASE ORAL DAILY PRN
Status: DISCONTINUED | OUTPATIENT
Start: 2025-08-05 | End: 2025-08-07 | Stop reason: HOSPADM

## 2025-08-05 RX ORDER — SODIUM CHLORIDE 0.9 % (FLUSH) 0.9 %
10 SYRINGE (ML) INJECTION EVERY 12 HOURS SCHEDULED
Status: DISCONTINUED | OUTPATIENT
Start: 2025-08-05 | End: 2025-08-07 | Stop reason: HOSPADM

## 2025-08-05 RX ORDER — SODIUM CHLORIDE 9 MG/ML
40 INJECTION, SOLUTION INTRAVENOUS AS NEEDED
Status: DISCONTINUED | OUTPATIENT
Start: 2025-08-05 | End: 2025-08-07 | Stop reason: HOSPADM

## 2025-08-05 RX ORDER — ONDANSETRON 2 MG/ML
4 INJECTION INTRAMUSCULAR; INTRAVENOUS EVERY 6 HOURS PRN
Status: DISCONTINUED | OUTPATIENT
Start: 2025-08-05 | End: 2025-08-07 | Stop reason: HOSPADM

## 2025-08-05 RX ORDER — MORPHINE SULFATE 15 MG/1
30 TABLET, FILM COATED, EXTENDED RELEASE ORAL 2 TIMES DAILY
Status: DISCONTINUED | OUTPATIENT
Start: 2025-08-05 | End: 2025-08-07 | Stop reason: HOSPADM

## 2025-08-05 RX ORDER — AMOXICILLIN 250 MG
2 CAPSULE ORAL 2 TIMES DAILY PRN
Status: DISCONTINUED | OUTPATIENT
Start: 2025-08-05 | End: 2025-08-07 | Stop reason: HOSPADM

## 2025-08-05 RX ORDER — POLYETHYLENE GLYCOL 3350 17 G/17G
17 POWDER, FOR SOLUTION ORAL DAILY PRN
Status: DISCONTINUED | OUTPATIENT
Start: 2025-08-05 | End: 2025-08-07 | Stop reason: HOSPADM

## 2025-08-05 RX ORDER — ATORVASTATIN CALCIUM 40 MG/1
40 TABLET, FILM COATED ORAL NIGHTLY
Status: DISCONTINUED | OUTPATIENT
Start: 2025-08-05 | End: 2025-08-07 | Stop reason: HOSPADM

## 2025-08-05 RX ORDER — NICOTINE POLACRILEX 4 MG
15 LOZENGE BUCCAL
Status: DISCONTINUED | OUTPATIENT
Start: 2025-08-05 | End: 2025-08-07 | Stop reason: HOSPADM

## 2025-08-05 RX ORDER — IBUPROFEN 600 MG/1
1 TABLET ORAL
Status: DISCONTINUED | OUTPATIENT
Start: 2025-08-05 | End: 2025-08-07 | Stop reason: HOSPADM

## 2025-08-05 RX ORDER — PHENYTOIN SODIUM 100 MG/1
200 CAPSULE, EXTENDED RELEASE ORAL 2 TIMES DAILY
Status: DISCONTINUED | OUTPATIENT
Start: 2025-08-05 | End: 2025-08-07 | Stop reason: HOSPADM

## 2025-08-05 RX ORDER — BISACODYL 10 MG
10 SUPPOSITORY, RECTAL RECTAL DAILY PRN
Status: DISCONTINUED | OUTPATIENT
Start: 2025-08-05 | End: 2025-08-07 | Stop reason: HOSPADM

## 2025-08-05 RX ORDER — BUSPIRONE HYDROCHLORIDE 15 MG/1
15 TABLET ORAL 2 TIMES DAILY
Status: DISCONTINUED | OUTPATIENT
Start: 2025-08-05 | End: 2025-08-07 | Stop reason: HOSPADM

## 2025-08-05 RX ADMIN — BUSPIRONE HYDROCHLORIDE 15 MG: 15 TABLET ORAL at 22:16

## 2025-08-05 RX ADMIN — GABAPENTIN 100 MG: 100 CAPSULE ORAL at 21:21

## 2025-08-05 RX ADMIN — MAGNESIUM OXIDE TAB 400 MG (241.3 MG ELEMENTAL MG) 400 MG: 400 (241.3 MG) TAB at 21:22

## 2025-08-05 RX ADMIN — ENOXAPARIN SODIUM 80 MG: 100 INJECTION SUBCUTANEOUS at 18:53

## 2025-08-05 RX ADMIN — ATORVASTATIN CALCIUM 40 MG: 40 TABLET, FILM COATED ORAL at 21:22

## 2025-08-05 RX ADMIN — INSULIN LISPRO 7 UNITS: 100 INJECTION, SOLUTION INTRAVENOUS; SUBCUTANEOUS at 22:15

## 2025-08-05 RX ADMIN — Medication 10 ML: at 21:22

## 2025-08-05 RX ADMIN — PHENYTOIN SODIUM 200 MG: 100 CAPSULE ORAL at 21:21

## 2025-08-05 RX ADMIN — MORPHINE SULFATE 30 MG: 15 TABLET, FILM COATED, EXTENDED RELEASE ORAL at 21:21

## 2025-08-05 RX ADMIN — HYDROCODONE BITARTRATE AND ACETAMINOPHEN 1 TABLET: 7.5; 325 TABLET ORAL at 21:21

## 2025-08-06 LAB
ANION GAP SERPL CALCULATED.3IONS-SCNC: 9.1 MMOL/L (ref 5–15)
BASOPHILS # BLD AUTO: 0.02 10*3/MM3 (ref 0–0.2)
BASOPHILS NFR BLD AUTO: 0.3 % (ref 0–1.5)
BUN SERPL-MCNC: 24.4 MG/DL (ref 8–23)
BUN/CREAT SERPL: 42.1 (ref 7–25)
CALCIUM SPEC-SCNC: 8.2 MG/DL (ref 8.6–10.5)
CHLORIDE SERPL-SCNC: 104 MMOL/L (ref 98–107)
CO2 SERPL-SCNC: 26.9 MMOL/L (ref 22–29)
CREAT SERPL-MCNC: 0.58 MG/DL (ref 0.57–1)
DEPRECATED RDW RBC AUTO: 45.8 FL (ref 37–54)
EGFRCR SERPLBLD CKD-EPI 2021: 96.9 ML/MIN/1.73
EOSINOPHIL # BLD AUTO: 0.37 10*3/MM3 (ref 0–0.4)
EOSINOPHIL NFR BLD AUTO: 6.3 % (ref 0.3–6.2)
ERYTHROCYTE [DISTWIDTH] IN BLOOD BY AUTOMATED COUNT: 13.5 % (ref 12.3–15.4)
GLUCOSE BLDC GLUCOMTR-MCNC: 170 MG/DL (ref 70–130)
GLUCOSE BLDC GLUCOMTR-MCNC: 215 MG/DL (ref 70–130)
GLUCOSE BLDC GLUCOMTR-MCNC: 280 MG/DL (ref 70–130)
GLUCOSE BLDC GLUCOMTR-MCNC: 389 MG/DL (ref 70–130)
GLUCOSE BLDC GLUCOMTR-MCNC: 401 MG/DL (ref 70–130)
GLUCOSE SERPL-MCNC: 265 MG/DL (ref 65–99)
HBA1C MFR BLD: 6.85 % (ref 4.8–5.6)
HCT VFR BLD AUTO: 36.4 % (ref 34–46.6)
HGB BLD-MCNC: 11.8 G/DL (ref 12–15.9)
IMM GRANULOCYTES # BLD AUTO: 0.01 10*3/MM3 (ref 0–0.05)
IMM GRANULOCYTES NFR BLD AUTO: 0.2 % (ref 0–0.5)
LYMPHOCYTES # BLD AUTO: 1.82 10*3/MM3 (ref 0.7–3.1)
LYMPHOCYTES NFR BLD AUTO: 31 % (ref 19.6–45.3)
MCH RBC QN AUTO: 29.7 PG (ref 26.6–33)
MCHC RBC AUTO-ENTMCNC: 32.4 G/DL (ref 31.5–35.7)
MCV RBC AUTO: 91.7 FL (ref 79–97)
MONOCYTES # BLD AUTO: 0.46 10*3/MM3 (ref 0.1–0.9)
MONOCYTES NFR BLD AUTO: 7.8 % (ref 5–12)
NEUTROPHILS NFR BLD AUTO: 3.19 10*3/MM3 (ref 1.7–7)
NEUTROPHILS NFR BLD AUTO: 54.4 % (ref 42.7–76)
NRBC BLD AUTO-RTO: 0 /100 WBC (ref 0–0.2)
PLATELET # BLD AUTO: 212 10*3/MM3 (ref 140–450)
PMV BLD AUTO: 9.6 FL (ref 6–12)
POTASSIUM SERPL-SCNC: 4.6 MMOL/L (ref 3.5–5.2)
RBC # BLD AUTO: 3.97 10*6/MM3 (ref 3.77–5.28)
SODIUM SERPL-SCNC: 140 MMOL/L (ref 136–145)
WBC NRBC COR # BLD AUTO: 5.87 10*3/MM3 (ref 3.4–10.8)

## 2025-08-06 PROCEDURE — 82948 REAGENT STRIP/BLOOD GLUCOSE: CPT

## 2025-08-06 PROCEDURE — 85306 CLOT INHIBIT PROT S FREE: CPT | Performed by: INTERNAL MEDICINE

## 2025-08-06 PROCEDURE — 99215 OFFICE O/P EST HI 40 MIN: CPT | Performed by: INTERNAL MEDICINE

## 2025-08-06 PROCEDURE — 85302 CLOT INHIBIT PROT C ANTIGEN: CPT | Performed by: INTERNAL MEDICINE

## 2025-08-06 PROCEDURE — 99232 SBSQ HOSP IP/OBS MODERATE 35: CPT | Performed by: HOSPITALIST

## 2025-08-06 PROCEDURE — 85303 CLOT INHIBIT PROT C ACTIVITY: CPT | Performed by: INTERNAL MEDICINE

## 2025-08-06 PROCEDURE — 97161 PT EVAL LOW COMPLEX 20 MIN: CPT | Performed by: PHYSICAL THERAPIST

## 2025-08-06 PROCEDURE — 63710000001 INSULIN LISPRO (HUMAN) PER 5 UNITS: Performed by: NURSE PRACTITIONER

## 2025-08-06 PROCEDURE — 85025 COMPLETE CBC W/AUTO DIFF WBC: CPT | Performed by: NURSE PRACTITIONER

## 2025-08-06 PROCEDURE — 85305 CLOT INHIBIT PROT S TOTAL: CPT | Performed by: INTERNAL MEDICINE

## 2025-08-06 PROCEDURE — 83036 HEMOGLOBIN GLYCOSYLATED A1C: CPT | Performed by: HOSPITALIST

## 2025-08-06 PROCEDURE — 25010000002 ENOXAPARIN PER 10 MG

## 2025-08-06 PROCEDURE — 96372 THER/PROPH/DIAG INJ SC/IM: CPT

## 2025-08-06 PROCEDURE — 63710000001 INSULIN GLARGINE PER 5 UNITS: Performed by: HOSPITALIST

## 2025-08-06 PROCEDURE — G2212 PROLONG OUTPT/OFFICE VIS: HCPCS | Performed by: INTERNAL MEDICINE

## 2025-08-06 PROCEDURE — 80048 BASIC METABOLIC PNL TOTAL CA: CPT | Performed by: NURSE PRACTITIONER

## 2025-08-06 PROCEDURE — G0378 HOSPITAL OBSERVATION PER HR: HCPCS

## 2025-08-06 PROCEDURE — 97165 OT EVAL LOW COMPLEX 30 MIN: CPT

## 2025-08-06 RX ADMIN — HYDROCODONE BITARTRATE AND ACETAMINOPHEN 1 TABLET: 7.5; 325 TABLET ORAL at 08:58

## 2025-08-06 RX ADMIN — HYDROCODONE BITARTRATE AND ACETAMINOPHEN 1 TABLET: 7.5; 325 TABLET ORAL at 14:53

## 2025-08-06 RX ADMIN — DULOXETINE 60 MG: 60 CAPSULE, DELAYED RELEASE ORAL at 08:55

## 2025-08-06 RX ADMIN — ENOXAPARIN SODIUM 80 MG: 100 INJECTION SUBCUTANEOUS at 18:04

## 2025-08-06 RX ADMIN — PHENYTOIN SODIUM 200 MG: 100 CAPSULE ORAL at 22:28

## 2025-08-06 RX ADMIN — PHENYTOIN SODIUM 200 MG: 100 CAPSULE ORAL at 08:57

## 2025-08-06 RX ADMIN — OXYBUTYNIN CHLORIDE 15 MG: 5 TABLET, EXTENDED RELEASE ORAL at 08:57

## 2025-08-06 RX ADMIN — INSULIN GLARGINE 10 UNITS: 100 INJECTION, SOLUTION SUBCUTANEOUS at 13:35

## 2025-08-06 RX ADMIN — Medication 10 ML: at 13:34

## 2025-08-06 RX ADMIN — BUSPIRONE HYDROCHLORIDE 15 MG: 15 TABLET ORAL at 08:55

## 2025-08-06 RX ADMIN — INSULIN LISPRO 6 UNITS: 100 INJECTION, SOLUTION INTRAVENOUS; SUBCUTANEOUS at 09:00

## 2025-08-06 RX ADMIN — ATORVASTATIN CALCIUM 40 MG: 40 TABLET, FILM COATED ORAL at 22:28

## 2025-08-06 RX ADMIN — INSULIN LISPRO 2 UNITS: 100 INJECTION, SOLUTION INTRAVENOUS; SUBCUTANEOUS at 11:57

## 2025-08-06 RX ADMIN — MICONAZOLE NITRATE 1 APPLICATION: 20 POWDER TOPICAL at 22:29

## 2025-08-06 RX ADMIN — PANTOPRAZOLE SODIUM 40 MG: 40 TABLET, DELAYED RELEASE ORAL at 06:26

## 2025-08-06 RX ADMIN — INSULIN LISPRO 4 UNITS: 100 INJECTION, SOLUTION INTRAVENOUS; SUBCUTANEOUS at 22:29

## 2025-08-06 RX ADMIN — GABAPENTIN 100 MG: 100 CAPSULE ORAL at 08:56

## 2025-08-06 RX ADMIN — MAGNESIUM OXIDE TAB 400 MG (241.3 MG ELEMENTAL MG) 400 MG: 400 (241.3 MG) TAB at 22:28

## 2025-08-06 RX ADMIN — MORPHINE SULFATE 30 MG: 15 TABLET, FILM COATED, EXTENDED RELEASE ORAL at 08:55

## 2025-08-06 RX ADMIN — MAGNESIUM OXIDE TAB 400 MG (241.3 MG ELEMENTAL MG) 400 MG: 400 (241.3 MG) TAB at 13:33

## 2025-08-06 RX ADMIN — BUSPIRONE HYDROCHLORIDE 15 MG: 15 TABLET ORAL at 22:28

## 2025-08-06 RX ADMIN — ENOXAPARIN SODIUM 80 MG: 100 INJECTION SUBCUTANEOUS at 06:25

## 2025-08-06 RX ADMIN — GABAPENTIN 100 MG: 100 CAPSULE ORAL at 14:54

## 2025-08-06 RX ADMIN — HYDROCODONE BITARTRATE AND ACETAMINOPHEN 1 TABLET: 7.5; 325 TABLET ORAL at 23:30

## 2025-08-06 RX ADMIN — MORPHINE SULFATE 30 MG: 15 TABLET, FILM COATED, EXTENDED RELEASE ORAL at 22:28

## 2025-08-06 RX ADMIN — Medication 10 ML: at 22:30

## 2025-08-06 RX ADMIN — GABAPENTIN 100 MG: 100 CAPSULE ORAL at 22:29

## 2025-08-06 RX ADMIN — INSULIN LISPRO 8 UNITS: 100 INJECTION, SOLUTION INTRAVENOUS; SUBCUTANEOUS at 16:42

## 2025-08-07 VITALS
BODY MASS INDEX: 34.24 KG/M2 | TEMPERATURE: 98.3 F | OXYGEN SATURATION: 96 % | RESPIRATION RATE: 18 BRPM | DIASTOLIC BLOOD PRESSURE: 64 MMHG | SYSTOLIC BLOOD PRESSURE: 139 MMHG | HEIGHT: 60 IN | WEIGHT: 174.4 LBS | HEART RATE: 63 BPM

## 2025-08-07 LAB
ANION GAP SERPL CALCULATED.3IONS-SCNC: 8.8 MMOL/L (ref 5–15)
BUN SERPL-MCNC: 23.8 MG/DL (ref 8–23)
BUN/CREAT SERPL: 39 (ref 7–25)
CALCIUM SPEC-SCNC: 8.5 MG/DL (ref 8.6–10.5)
CHLORIDE SERPL-SCNC: 104 MMOL/L (ref 98–107)
CO2 SERPL-SCNC: 29.2 MMOL/L (ref 22–29)
CREAT SERPL-MCNC: 0.61 MG/DL (ref 0.57–1)
DEPRECATED RDW RBC AUTO: 46.1 FL (ref 37–54)
EGFRCR SERPLBLD CKD-EPI 2021: 95.7 ML/MIN/1.73
ERYTHROCYTE [DISTWIDTH] IN BLOOD BY AUTOMATED COUNT: 13.4 % (ref 12.3–15.4)
GLUCOSE BLDC GLUCOMTR-MCNC: 232 MG/DL (ref 70–130)
GLUCOSE BLDC GLUCOMTR-MCNC: 322 MG/DL (ref 70–130)
GLUCOSE SERPL-MCNC: 191 MG/DL (ref 65–99)
HCT VFR BLD AUTO: 39.7 % (ref 34–46.6)
HGB BLD-MCNC: 12.5 G/DL (ref 12–15.9)
MCH RBC QN AUTO: 29.1 PG (ref 26.6–33)
MCHC RBC AUTO-ENTMCNC: 31.5 G/DL (ref 31.5–35.7)
MCV RBC AUTO: 92.3 FL (ref 79–97)
PLATELET # BLD AUTO: 212 10*3/MM3 (ref 140–450)
PMV BLD AUTO: 9.7 FL (ref 6–12)
POTASSIUM SERPL-SCNC: 4.9 MMOL/L (ref 3.5–5.2)
RBC # BLD AUTO: 4.3 10*6/MM3 (ref 3.77–5.28)
SODIUM SERPL-SCNC: 142 MMOL/L (ref 136–145)
WBC NRBC COR # BLD AUTO: 6.52 10*3/MM3 (ref 3.4–10.8)

## 2025-08-07 PROCEDURE — 25010000002 ENOXAPARIN PER 10 MG

## 2025-08-07 PROCEDURE — 82948 REAGENT STRIP/BLOOD GLUCOSE: CPT

## 2025-08-07 PROCEDURE — 63710000001 INSULIN GLARGINE PER 5 UNITS: Performed by: HOSPITALIST

## 2025-08-07 PROCEDURE — G0378 HOSPITAL OBSERVATION PER HR: HCPCS

## 2025-08-07 PROCEDURE — 99238 HOSP IP/OBS DSCHRG MGMT 30/<: CPT | Performed by: HOSPITALIST

## 2025-08-07 PROCEDURE — 96372 THER/PROPH/DIAG INJ SC/IM: CPT

## 2025-08-07 PROCEDURE — 85027 COMPLETE CBC AUTOMATED: CPT | Performed by: HOSPITALIST

## 2025-08-07 PROCEDURE — 63710000001 INSULIN LISPRO (HUMAN) PER 5 UNITS: Performed by: NURSE PRACTITIONER

## 2025-08-07 PROCEDURE — 80048 BASIC METABOLIC PNL TOTAL CA: CPT | Performed by: HOSPITALIST

## 2025-08-07 RX ORDER — ENOXAPARIN SODIUM 100 MG/ML
1 INJECTION SUBCUTANEOUS EVERY 12 HOURS SCHEDULED
Qty: 48 ML | Refills: 0 | Status: SHIPPED | OUTPATIENT
Start: 2025-08-07

## 2025-08-07 RX ADMIN — GABAPENTIN 100 MG: 100 CAPSULE ORAL at 08:46

## 2025-08-07 RX ADMIN — HYDROCODONE BITARTRATE AND ACETAMINOPHEN 1 TABLET: 7.5; 325 TABLET ORAL at 10:43

## 2025-08-07 RX ADMIN — BUSPIRONE HYDROCHLORIDE 15 MG: 15 TABLET ORAL at 11:48

## 2025-08-07 RX ADMIN — PHENYTOIN SODIUM 200 MG: 100 CAPSULE ORAL at 08:46

## 2025-08-07 RX ADMIN — INSULIN LISPRO 6 UNITS: 100 INJECTION, SOLUTION INTRAVENOUS; SUBCUTANEOUS at 11:47

## 2025-08-07 RX ADMIN — INSULIN LISPRO 4 UNITS: 100 INJECTION, SOLUTION INTRAVENOUS; SUBCUTANEOUS at 08:46

## 2025-08-07 RX ADMIN — MAGNESIUM OXIDE TAB 400 MG (241.3 MG ELEMENTAL MG) 400 MG: 400 (241.3 MG) TAB at 08:46

## 2025-08-07 RX ADMIN — OXYBUTYNIN CHLORIDE 15 MG: 5 TABLET, EXTENDED RELEASE ORAL at 08:47

## 2025-08-07 RX ADMIN — DULOXETINE 60 MG: 60 CAPSULE, DELAYED RELEASE ORAL at 08:46

## 2025-08-07 RX ADMIN — MICONAZOLE NITRATE 1 APPLICATION: 20 POWDER TOPICAL at 08:51

## 2025-08-07 RX ADMIN — PANTOPRAZOLE SODIUM 40 MG: 40 TABLET, DELAYED RELEASE ORAL at 06:45

## 2025-08-07 RX ADMIN — INSULIN GLARGINE 10 UNITS: 100 INJECTION, SOLUTION SUBCUTANEOUS at 08:47

## 2025-08-07 RX ADMIN — MORPHINE SULFATE 30 MG: 15 TABLET, FILM COATED, EXTENDED RELEASE ORAL at 08:46

## 2025-08-07 RX ADMIN — EMPAGLIFLOZIN 25 MG: 25 TABLET, FILM COATED ORAL at 08:46

## 2025-08-07 RX ADMIN — ENOXAPARIN SODIUM 80 MG: 100 INJECTION SUBCUTANEOUS at 06:45

## 2025-08-07 RX ADMIN — Medication 10 ML: at 08:51

## 2025-08-08 LAB
PROT C ACT/NOR PPP CHRO: 72 % (ref 73–180)
PROT S AG ACT/NOR PPP IA: 61 % (ref 60–150)
PROT S FREE AG ACT/NOR PPP IA: 31 % (ref 61–136)

## 2025-08-09 LAB — PROT S ACT/NOR PPP: 25 % (ref 63–140)

## 2025-08-11 LAB — PROT C AG ACT/NOR PPP IA: 64 % (ref 60–150)

## 2025-08-20 ENCOUNTER — OFFICE VISIT (OUTPATIENT)
Dept: UROLOGY | Facility: CLINIC | Age: 70
End: 2025-08-20
Payer: MEDICARE

## 2025-08-20 VITALS — WEIGHT: 174 LBS | BODY MASS INDEX: 34.16 KG/M2 | HEIGHT: 60 IN

## 2025-08-20 DIAGNOSIS — N32.81 OAB (OVERACTIVE BLADDER): Primary | ICD-10-CM

## 2025-08-20 PROCEDURE — 1160F RVW MEDS BY RX/DR IN RCRD: CPT | Performed by: UROLOGY

## 2025-08-20 PROCEDURE — 1159F MED LIST DOCD IN RCRD: CPT | Performed by: UROLOGY

## 2025-08-20 PROCEDURE — 99214 OFFICE O/P EST MOD 30 MIN: CPT | Performed by: UROLOGY

## 2025-08-20 RX ORDER — ALENDRONATE SODIUM 70 MG/1
TABLET ORAL
COMMUNITY

## 2025-08-20 RX ORDER — CEPHALEXIN 500 MG/1
500 CAPSULE ORAL 3 TIMES DAILY
COMMUNITY
Start: 2025-06-30

## 2025-08-20 RX ORDER — PROCHLORPERAZINE 25 MG/1
SUPPOSITORY RECTAL
COMMUNITY
Start: 2025-08-02

## 2025-08-20 RX ORDER — OXYBUTYNIN CHLORIDE 15 MG/1
15 TABLET, EXTENDED RELEASE ORAL DAILY
Qty: 90 TABLET | Refills: 3 | Status: SHIPPED | OUTPATIENT
Start: 2025-08-20 | End: 2026-08-15